# Patient Record
Sex: MALE | Race: WHITE | Employment: OTHER | URBAN - METROPOLITAN AREA
[De-identification: names, ages, dates, MRNs, and addresses within clinical notes are randomized per-mention and may not be internally consistent; named-entity substitution may affect disease eponyms.]

---

## 2017-02-10 ENCOUNTER — HOSPITAL ENCOUNTER (OUTPATIENT)
Dept: OTHER | Age: 62
Discharge: HOME | End: 2017-02-22
Attending: FAMILY MEDICINE | Admitting: INTERNAL MEDICINE

## 2017-02-26 PROBLEM — K80.50 CHOLEDOCHOLITHIASIS: Status: ACTIVE | Noted: 2017-02-26

## 2017-02-26 PROBLEM — N18.30 CHRONIC KIDNEY DISEASE, STAGE III (MODERATE) (HCC): Status: ACTIVE | Noted: 2017-02-26

## 2017-02-27 PROBLEM — R91.8 ABNORMALITY OF LUNG ON CXR: Status: ACTIVE | Noted: 2017-02-27

## 2017-02-27 PROBLEM — Z89.519 S/P BKA (BELOW KNEE AMPUTATION) UNILATERAL (HCC): Chronic | Status: ACTIVE | Noted: 2017-02-27

## 2017-03-08 ENCOUNTER — OFFICE VISIT (OUTPATIENT)
Dept: BARIATRICS/WEIGHT MGMT | Age: 62
End: 2017-03-08

## 2017-03-08 VITALS
DIASTOLIC BLOOD PRESSURE: 84 MMHG | BODY MASS INDEX: 36.48 KG/M2 | HEART RATE: 85 BPM | SYSTOLIC BLOOD PRESSURE: 146 MMHG | WEIGHT: 240.7 LBS | HEIGHT: 68 IN

## 2017-03-08 DIAGNOSIS — Z90.49 S/P LAPAROSCOPIC CHOLECYSTECTOMY: Primary | ICD-10-CM

## 2017-03-08 PROCEDURE — 99024 POSTOP FOLLOW-UP VISIT: CPT | Performed by: SURGERY

## 2017-04-10 PROBLEM — I73.9 PAD (PERIPHERAL ARTERY DISEASE) (HCC): Status: ACTIVE | Noted: 2017-04-10

## 2017-04-10 PROBLEM — I10 HTN (HYPERTENSION): Status: ACTIVE | Noted: 2017-04-10

## 2017-04-10 PROBLEM — N18.4 CHRONIC KIDNEY DISEASE (CKD) STAGE G4/A1, SEVERELY DECREASED GLOMERULAR FILTRATION RATE (GFR) BETWEEN 15-29 ML/MIN/1.73 SQUARE METER AND ALBUMINURIA CREATININE RATIO LESS THAN 30 MG/G (HCC): Status: ACTIVE | Noted: 2017-04-10

## 2017-04-10 PROBLEM — J44.9 COPD (CHRONIC OBSTRUCTIVE PULMONARY DISEASE) (HCC): Status: ACTIVE | Noted: 2017-04-10

## 2017-04-10 PROBLEM — N26.1 ATROPHIC KIDNEY: Status: ACTIVE | Noted: 2017-04-10

## 2017-05-23 PROBLEM — R80.9 PROTEINURIA: Status: ACTIVE | Noted: 2017-05-23

## 2017-05-23 PROBLEM — N18.32 CHRONIC KIDNEY DISEASE (CKD) STAGE G3B/A3, MODERATELY DECREASED GLOMERULAR FILTRATION RATE (GFR) BETWEEN 30-44 ML/MIN/1.73 SQUARE METER AND ALBUMINURIA CREATININE RATIO GREATER THAN 300 MG/G (HCC): Status: ACTIVE | Noted: 2017-05-23

## 2017-05-23 PROBLEM — N18.4 CHRONIC KIDNEY DISEASE (CKD) STAGE G4/A1, SEVERELY DECREASED GLOMERULAR FILTRATION RATE (GFR) BETWEEN 15-29 ML/MIN/1.73 SQUARE METER AND ALBUMINURIA CREATININE RATIO LESS THAN 30 MG/G (HCC): Status: RESOLVED | Noted: 2017-04-10 | Resolved: 2017-05-23

## 2017-05-23 PROBLEM — E87.20 METABOLIC ACIDOSIS: Status: ACTIVE | Noted: 2017-05-23

## 2017-08-24 ENCOUNTER — HOSPITAL ENCOUNTER (OUTPATIENT)
Dept: INFUSION THERAPY | Age: 62
Discharge: HOME | End: 2017-08-24
Attending: FAMILY MEDICINE | Admitting: FAMILY MEDICINE

## 2017-08-24 VITALS
RESPIRATION RATE: 16 BRPM | SYSTOLIC BLOOD PRESSURE: 134 MMHG | DIASTOLIC BLOOD PRESSURE: 78 MMHG | HEART RATE: 67 BPM | TEMPERATURE: 98.6 F

## 2017-09-14 ENCOUNTER — HOSPITAL ENCOUNTER (OUTPATIENT)
Dept: PULMONOLOGY | Age: 62
Discharge: HOME | End: 2017-09-14
Attending: INTERNAL MEDICINE | Admitting: INTERNAL MEDICINE

## 2018-05-17 ENCOUNTER — HOSPITAL ENCOUNTER (OUTPATIENT)
Dept: INFUSION THERAPY | Age: 63
Discharge: HOME | End: 2018-05-17
Attending: FAMILY MEDICINE | Admitting: FAMILY MEDICINE

## 2018-05-17 VITALS
TEMPERATURE: 98.1 F | DIASTOLIC BLOOD PRESSURE: 89 MMHG | RESPIRATION RATE: 16 BRPM | HEART RATE: 80 BPM | OXYGEN SATURATION: 97 % | SYSTOLIC BLOOD PRESSURE: 139 MMHG

## 2018-11-30 ENCOUNTER — HOSPITAL ENCOUNTER (OUTPATIENT)
Dept: INFUSION THERAPY | Age: 63
Setting detail: INFUSION SERIES
Discharge: HOME | End: 2018-11-30
Payer: MEDICARE

## 2018-11-30 VITALS
RESPIRATION RATE: 14 BRPM | HEART RATE: 70 BPM | SYSTOLIC BLOOD PRESSURE: 146 MMHG | TEMPERATURE: 97.4 F | DIASTOLIC BLOOD PRESSURE: 85 MMHG

## 2018-11-30 PROCEDURE — 99195 PHLEBOTOMY: CPT

## 2018-11-30 PROCEDURE — 99211 OFF/OP EST MAY X REQ PHY/QHP: CPT

## 2018-11-30 NOTE — PROGRESS NOTES
Diagnosis:  Polycythemia              Pre-phlebotomy:  Pulse:  74  Blood Pressure:  149 89    Post-phlebotomy:  Pulse:  70  Blood Pressure:  146/85    Volume Removed:  996 Grams    Complications:  None    Comments:  Pt given discharge instructions and voiced understanding. Tolerated procedure without any side effects. To private auto per self.

## 2018-11-30 NOTE — PROGRESS NOTES
Pt arrived to unit for therapeutic phlebotomy. Taken to room 6 chair 1. Feeling well. Call light in reach. Pt oriented to room and TV. Agreeable for therapeutic phlebotomy procedure.

## 2018-12-03 ENCOUNTER — HOSPITAL ENCOUNTER (OUTPATIENT)
Dept: INFUSION THERAPY | Age: 63
End: 2018-12-03

## 2018-12-14 ENCOUNTER — HOSPITAL ENCOUNTER (OUTPATIENT)
Dept: CARDIAC REHAB | Age: 63
Setting detail: THERAPIES SERIES
Discharge: HOME | End: 2018-12-14
Payer: MEDICARE

## 2019-03-01 PROBLEM — I25.10 ASCVD (ARTERIOSCLEROTIC CARDIOVASCULAR DISEASE): Status: ACTIVE | Noted: 2019-03-01

## 2019-07-08 ENCOUNTER — HOSPITAL ENCOUNTER (OUTPATIENT)
Dept: INFUSION THERAPY | Age: 64
Setting detail: INFUSION SERIES
Discharge: HOME | End: 2019-07-08
Payer: MEDICARE

## 2019-07-08 VITALS
OXYGEN SATURATION: 93 % | RESPIRATION RATE: 20 BRPM | DIASTOLIC BLOOD PRESSURE: 83 MMHG | HEART RATE: 73 BPM | SYSTOLIC BLOOD PRESSURE: 126 MMHG | TEMPERATURE: 96 F

## 2019-07-08 PROCEDURE — 99211 OFF/OP EST MAY X REQ PHY/QHP: CPT

## 2019-07-08 PROCEDURE — 99195 PHLEBOTOMY: CPT

## 2019-07-08 NOTE — PROGRESS NOTES
Diagnosis: Polycythemia Vera    Pre-Phlebotomy: /91, HR 70    Post-Phlebotomy: /83, HR 73    Volume Removed: 035 grams    Complications: None    Comments: Pt tolerated phlebotomy well    Lot #: [id number]    Expiration Date: 8/2021    Armando Harrison

## 2020-10-07 ENCOUNTER — HOSPITAL ENCOUNTER (OUTPATIENT)
Age: 65
Discharge: HOME | End: 2020-10-07
Payer: MEDICARE

## 2020-10-07 LAB
ANION GAP SERPL CALCULATED.3IONS-SCNC: 14 MMOL/L (ref 4–16)
BACTERIA: NEGATIVE /HPF
BILIRUBIN URINE: NEGATIVE MG/DL
BLOOD, URINE: ABNORMAL
BUN BLDV-MCNC: 27 MG/DL (ref 6–23)
CALCIUM SERPL-MCNC: 8 MG/DL (ref 8.3–10.6)
CHLORIDE BLD-SCNC: 102 MMOL/L (ref 99–110)
CLARITY: CLEAR
CO2: 22 MMOL/L (ref 21–32)
COLOR: ABNORMAL
CREAT SERPL-MCNC: 3.4 MG/DL (ref 0.9–1.3)
CREATININE URINE: 50 MG/DL (ref 39–259)
GFR AFRICAN AMERICAN: 22 ML/MIN/1.73M2
GFR NON-AFRICAN AMERICAN: 18 ML/MIN/1.73M2
GLUCOSE BLD-MCNC: 84 MG/DL (ref 70–99)
GLUCOSE, URINE: NEGATIVE MG/DL
KETONES, URINE: NEGATIVE MG/DL
LEUKOCYTE ESTERASE, URINE: NEGATIVE
MAGNESIUM: 2.4 MG/DL (ref 1.8–2.4)
MUCUS: ABNORMAL HPF
NITRITE URINE, QUANTITATIVE: NEGATIVE
PH, URINE: 6 (ref 5–8)
PHOSPHORUS: 4.6 MG/DL (ref 2.5–4.9)
POTASSIUM SERPL-SCNC: 4 MMOL/L (ref 3.5–5.1)
PROT/CREAT RATIO, UR: 2.9
PROTEIN UA: 100 MG/DL
RBC URINE: <1 /HPF (ref 0–3)
SODIUM BLD-SCNC: 138 MMOL/L (ref 135–145)
SPECIFIC GRAVITY UA: 1.01 (ref 1–1.03)
SQUAMOUS EPITHELIAL: <1 /HPF
TRICHOMONAS: ABNORMAL /HPF
URINE TOTAL PROTEIN: 147.3 MG/DL
UROBILINOGEN, URINE: NORMAL MG/DL (ref 0.2–1)
WBC UA: <1 /HPF (ref 0–2)

## 2020-10-07 PROCEDURE — 80048 BASIC METABOLIC PNL TOTAL CA: CPT

## 2020-10-07 PROCEDURE — 83970 ASSAY OF PARATHORMONE: CPT

## 2020-10-07 PROCEDURE — 81001 URINALYSIS AUTO W/SCOPE: CPT

## 2020-10-07 PROCEDURE — 36415 COLL VENOUS BLD VENIPUNCTURE: CPT

## 2020-10-07 PROCEDURE — 84100 ASSAY OF PHOSPHORUS: CPT

## 2020-10-07 PROCEDURE — 84156 ASSAY OF PROTEIN URINE: CPT

## 2020-10-07 PROCEDURE — 82570 ASSAY OF URINE CREATININE: CPT

## 2020-10-07 PROCEDURE — 83735 ASSAY OF MAGNESIUM: CPT

## 2020-10-11 LAB — PARATHYROID HORMONE INTACT: 277 PG/ML (ref 15–65)

## 2021-05-11 ENCOUNTER — HOSPITAL ENCOUNTER (INPATIENT)
Age: 66
LOS: 7 days | Discharge: HOME HEALTH | DRG: 291 | End: 2021-05-18
Attending: EMERGENCY MEDICINE | Admitting: INTERNAL MEDICINE
Payer: MEDICARE

## 2021-05-11 ENCOUNTER — APPOINTMENT (OUTPATIENT)
Dept: GENERAL RADIOLOGY | Age: 66
DRG: 291 | End: 2021-05-11
Payer: MEDICARE

## 2021-05-11 DIAGNOSIS — N17.9 ACUTE KIDNEY INJURY SUPERIMPOSED ON CKD (HCC): ICD-10-CM

## 2021-05-11 DIAGNOSIS — J81.0 ACUTE PULMONARY EDEMA (HCC): ICD-10-CM

## 2021-05-11 DIAGNOSIS — J96.21 ACUTE ON CHRONIC RESPIRATORY FAILURE WITH HYPOXIA (HCC): Primary | ICD-10-CM

## 2021-05-11 DIAGNOSIS — N18.9 ACUTE KIDNEY INJURY SUPERIMPOSED ON CKD (HCC): ICD-10-CM

## 2021-05-11 LAB
ALBUMIN SERPL-MCNC: 3.5 GM/DL (ref 3.4–5)
ALP BLD-CCNC: 52 IU/L (ref 40–128)
ALT SERPL-CCNC: 14 U/L (ref 10–40)
ANION GAP SERPL CALCULATED.3IONS-SCNC: 15 MMOL/L (ref 4–16)
ANISOCYTOSIS: ABNORMAL
AST SERPL-CCNC: 12 IU/L (ref 15–37)
BACTERIA: NEGATIVE /HPF
BANDED NEUTROPHILS ABSOLUTE COUNT: 2.47 K/CU MM
BANDED NEUTROPHILS RELATIVE PERCENT: 17 % (ref 5–11)
BASE EXCESS MIXED: 0.6 (ref 0–1.2)
BILIRUB SERPL-MCNC: 0.3 MG/DL (ref 0–1)
BILIRUBIN URINE: NEGATIVE MG/DL
BLOOD, URINE: ABNORMAL
BUN BLDV-MCNC: 60 MG/DL (ref 6–23)
CALCIUM SERPL-MCNC: 7.5 MG/DL (ref 8.3–10.6)
CHLORIDE BLD-SCNC: 92 MMOL/L (ref 99–110)
CLARITY: CLEAR
CO2: 23 MMOL/L (ref 21–32)
COLOR: COLORLESS
COMMENT: ABNORMAL
CREAT SERPL-MCNC: 4.8 MG/DL (ref 0.9–1.3)
CREATININE URINE: 19.4 MG/DL (ref 39–259)
DIFFERENTIAL TYPE: ABNORMAL
DOHLE BODIES: PRESENT
EKG ATRIAL RATE: 95 BPM
EKG DIAGNOSIS: NORMAL
EKG P-R INTERVAL: 128 MS
EKG Q-T INTERVAL: 370 MS
EKG QRS DURATION: 118 MS
EKG QTC CALCULATION (BAZETT): 464 MS
EKG R AXIS: -43 DEGREES
EKG T AXIS: 79 DEGREES
EKG VENTRICULAR RATE: 95 BPM
GFR AFRICAN AMERICAN: 15 ML/MIN/1.73M2
GFR NON-AFRICAN AMERICAN: 12 ML/MIN/1.73M2
GLUCOSE BLD-MCNC: 119 MG/DL (ref 70–99)
GLUCOSE, URINE: NEGATIVE MG/DL
HCO3 VENOUS: 27.4 MMOL/L (ref 19–25)
HCT VFR BLD CALC: 37.9 % (ref 42–52)
HEMOGLOBIN: 12.3 GM/DL (ref 13.5–18)
KETONES, URINE: NEGATIVE MG/DL
LACTATE: 2 MMOL/L (ref 0.4–2)
LEUKOCYTE ESTERASE, URINE: NEGATIVE
LV EF: 50 %
LVEF MODALITY: NORMAL
LYMPHOCYTES ABSOLUTE: 0.6 K/CU MM
LYMPHOCYTES RELATIVE PERCENT: 4 % (ref 24–44)
MCH RBC QN AUTO: 30.3 PG (ref 27–31)
MCHC RBC AUTO-ENTMCNC: 32.5 % (ref 32–36)
MCV RBC AUTO: 93.3 FL (ref 78–100)
MONOCYTES ABSOLUTE: 1.5 K/CU MM
MONOCYTES RELATIVE PERCENT: 10 % (ref 0–4)
MUCUS: ABNORMAL HPF
NITRITE URINE, QUANTITATIVE: NEGATIVE
O2 SAT, VEN: 93.6 % (ref 50–70)
PCO2, VEN: 52 MMHG (ref 38–52)
PDW BLD-RTO: 14.6 % (ref 11.7–14.9)
PH VENOUS: 7.33 (ref 7.32–7.42)
PH, URINE: 6 (ref 5–8)
PLATELET # BLD: 158 K/CU MM (ref 140–440)
PMV BLD AUTO: 8.8 FL (ref 7.5–11.1)
PO2, VEN: 159 MMHG (ref 28–48)
POTASSIUM SERPL-SCNC: 3.8 MMOL/L (ref 3.5–5.1)
PRO-BNP: 3561 PG/ML
PROT/CREAT RATIO, UR: 1.9
PROTEIN UA: 30 MG/DL
RBC # BLD: 4.06 M/CU MM (ref 4.6–6.2)
RBC URINE: ABNORMAL /HPF (ref 0–3)
SARS-COV-2, NAAT: NOT DETECTED
SEGMENTED NEUTROPHILS ABSOLUTE COUNT: 9.9 K/CU MM
SEGMENTED NEUTROPHILS RELATIVE PERCENT: 69 % (ref 36–66)
SODIUM BLD-SCNC: 130 MMOL/L (ref 135–145)
SODIUM URINE: 37 MMOL/L (ref 35–167)
SOURCE: NORMAL
SPECIFIC GRAVITY UA: 1 (ref 1–1.03)
T4 FREE: 1.03 NG/DL (ref 0.9–1.8)
TOTAL PROTEIN: 5.8 GM/DL (ref 6.4–8.2)
TRICHOMONAS: ABNORMAL /HPF
TROPONIN T: 0.02 NG/ML
TROPONIN T: 0.02 NG/ML
TROPONIN T: 0.03 NG/ML
TSH HIGH SENSITIVITY: 0.87 UIU/ML (ref 0.27–4.2)
URINE TOTAL PROTEIN: 37.4 MG/DL
UROBILINOGEN, URINE: NEGATIVE MG/DL (ref 0.2–1)
WBC # BLD: 14.5 K/CU MM (ref 4–10.5)
WBC # BLD: ABNORMAL 10*3/UL
WBC UA: ABNORMAL /HPF (ref 0–2)

## 2021-05-11 PROCEDURE — 6370000000 HC RX 637 (ALT 250 FOR IP): Performed by: INTERNAL MEDICINE

## 2021-05-11 PROCEDURE — 82805 BLOOD GASES W/O2 SATURATION: CPT

## 2021-05-11 PROCEDURE — 80053 COMPREHEN METABOLIC PANEL: CPT

## 2021-05-11 PROCEDURE — 84484 ASSAY OF TROPONIN QUANT: CPT

## 2021-05-11 PROCEDURE — 6360000002 HC RX W HCPCS: Performed by: INTERNAL MEDICINE

## 2021-05-11 PROCEDURE — 84156 ASSAY OF PROTEIN URINE: CPT

## 2021-05-11 PROCEDURE — 96374 THER/PROPH/DIAG INJ IV PUSH: CPT

## 2021-05-11 PROCEDURE — 83605 ASSAY OF LACTIC ACID: CPT

## 2021-05-11 PROCEDURE — 82436 ASSAY OF URINE CHLORIDE: CPT

## 2021-05-11 PROCEDURE — 93005 ELECTROCARDIOGRAM TRACING: CPT | Performed by: EMERGENCY MEDICINE

## 2021-05-11 PROCEDURE — 6360000002 HC RX W HCPCS: Performed by: EMERGENCY MEDICINE

## 2021-05-11 PROCEDURE — 84439 ASSAY OF FREE THYROXINE: CPT

## 2021-05-11 PROCEDURE — 99285 EMERGENCY DEPT VISIT HI MDM: CPT

## 2021-05-11 PROCEDURE — 93306 TTE W/DOPPLER COMPLETE: CPT

## 2021-05-11 PROCEDURE — 82570 ASSAY OF URINE CREATININE: CPT

## 2021-05-11 PROCEDURE — 87635 SARS-COV-2 COVID-19 AMP PRB: CPT

## 2021-05-11 PROCEDURE — 51702 INSERT TEMP BLADDER CATH: CPT

## 2021-05-11 PROCEDURE — 84300 ASSAY OF URINE SODIUM: CPT

## 2021-05-11 PROCEDURE — 94660 CPAP INITIATION&MGMT: CPT

## 2021-05-11 PROCEDURE — 84443 ASSAY THYROID STIM HORMONE: CPT

## 2021-05-11 PROCEDURE — 94640 AIRWAY INHALATION TREATMENT: CPT

## 2021-05-11 PROCEDURE — 85027 COMPLETE CBC AUTOMATED: CPT

## 2021-05-11 PROCEDURE — 84133 ASSAY OF URINE POTASSIUM: CPT

## 2021-05-11 PROCEDURE — 87040 BLOOD CULTURE FOR BACTERIA: CPT

## 2021-05-11 PROCEDURE — 51798 US URINE CAPACITY MEASURE: CPT

## 2021-05-11 PROCEDURE — 2140000000 HC CCU INTERMEDIATE R&B

## 2021-05-11 PROCEDURE — 81001 URINALYSIS AUTO W/SCOPE: CPT

## 2021-05-11 PROCEDURE — 93010 ELECTROCARDIOGRAM REPORT: CPT | Performed by: INTERNAL MEDICINE

## 2021-05-11 PROCEDURE — 2700000000 HC OXYGEN THERAPY PER DAY

## 2021-05-11 PROCEDURE — 6370000000 HC RX 637 (ALT 250 FOR IP): Performed by: EMERGENCY MEDICINE

## 2021-05-11 PROCEDURE — 83880 ASSAY OF NATRIURETIC PEPTIDE: CPT

## 2021-05-11 PROCEDURE — 2580000003 HC RX 258: Performed by: INTERNAL MEDICINE

## 2021-05-11 PROCEDURE — 99223 1ST HOSP IP/OBS HIGH 75: CPT | Performed by: INTERNAL MEDICINE

## 2021-05-11 PROCEDURE — 85007 BL SMEAR W/DIFF WBC COUNT: CPT

## 2021-05-11 PROCEDURE — 71045 X-RAY EXAM CHEST 1 VIEW: CPT

## 2021-05-11 RX ORDER — SODIUM CHLORIDE 0.9 % (FLUSH) 0.9 %
5-40 SYRINGE (ML) INJECTION PRN
Status: DISCONTINUED | OUTPATIENT
Start: 2021-05-11 | End: 2021-05-18 | Stop reason: HOSPADM

## 2021-05-11 RX ORDER — ALPRAZOLAM 0.25 MG/1
0.25 TABLET ORAL 3 TIMES DAILY PRN
Status: DISCONTINUED | OUTPATIENT
Start: 2021-05-11 | End: 2021-05-18 | Stop reason: HOSPADM

## 2021-05-11 RX ORDER — DOXYCYCLINE HYCLATE 100 MG
100 TABLET ORAL EVERY 12 HOURS SCHEDULED
Status: DISCONTINUED | OUTPATIENT
Start: 2021-05-11 | End: 2021-05-18 | Stop reason: HOSPADM

## 2021-05-11 RX ORDER — ONDANSETRON 2 MG/ML
4 INJECTION INTRAMUSCULAR; INTRAVENOUS EVERY 6 HOURS PRN
Status: DISCONTINUED | OUTPATIENT
Start: 2021-05-11 | End: 2021-05-18 | Stop reason: HOSPADM

## 2021-05-11 RX ORDER — ASPIRIN 81 MG/1
81 TABLET ORAL EVERY MORNING
Status: DISCONTINUED | OUTPATIENT
Start: 2021-05-11 | End: 2021-05-18 | Stop reason: HOSPADM

## 2021-05-11 RX ORDER — FOLIC ACID 1 MG/1
1 TABLET ORAL DAILY
Status: DISCONTINUED | OUTPATIENT
Start: 2021-05-11 | End: 2021-05-18 | Stop reason: HOSPADM

## 2021-05-11 RX ORDER — FUROSEMIDE 10 MG/ML
20 INJECTION INTRAMUSCULAR; INTRAVENOUS ONCE
Status: DISCONTINUED | OUTPATIENT
Start: 2021-05-11 | End: 2021-05-11

## 2021-05-11 RX ORDER — POTASSIUM CHLORIDE 20 MEQ/1
10 TABLET, EXTENDED RELEASE ORAL 2 TIMES DAILY
Status: DISCONTINUED | OUTPATIENT
Start: 2021-05-11 | End: 2021-05-12

## 2021-05-11 RX ORDER — NITROGLYCERIN 0.4 MG/1
0.4 TABLET SUBLINGUAL ONCE
Status: COMPLETED | OUTPATIENT
Start: 2021-05-11 | End: 2021-05-11

## 2021-05-11 RX ORDER — NITROGLYCERIN 20 MG/100ML
5-200 INJECTION INTRAVENOUS CONTINUOUS
Status: DISCONTINUED | OUTPATIENT
Start: 2021-05-11 | End: 2021-05-11

## 2021-05-11 RX ORDER — HEPARIN SODIUM 5000 [USP'U]/ML
5000 INJECTION, SOLUTION INTRAVENOUS; SUBCUTANEOUS EVERY 8 HOURS SCHEDULED
Status: DISCONTINUED | OUTPATIENT
Start: 2021-05-11 | End: 2021-05-18 | Stop reason: HOSPADM

## 2021-05-11 RX ORDER — NITROGLYCERIN 0.4 MG/1
0.4 TABLET SUBLINGUAL EVERY 5 MIN PRN
Status: DISCONTINUED | OUTPATIENT
Start: 2021-05-11 | End: 2021-05-18 | Stop reason: HOSPADM

## 2021-05-11 RX ORDER — HYDROCODONE BITARTRATE AND ACETAMINOPHEN 5; 325 MG/1; MG/1
1 TABLET ORAL EVERY 6 HOURS PRN
Status: DISCONTINUED | OUTPATIENT
Start: 2021-05-11 | End: 2021-05-18 | Stop reason: HOSPADM

## 2021-05-11 RX ORDER — MAGNESIUM SULFATE IN WATER 40 MG/ML
2000 INJECTION, SOLUTION INTRAVENOUS PRN
Status: DISCONTINUED | OUTPATIENT
Start: 2021-05-11 | End: 2021-05-11

## 2021-05-11 RX ORDER — BUDESONIDE AND FORMOTEROL FUMARATE DIHYDRATE 80; 4.5 UG/1; UG/1
2 AEROSOL RESPIRATORY (INHALATION) 2 TIMES DAILY
Status: DISCONTINUED | OUTPATIENT
Start: 2021-05-11 | End: 2021-05-18 | Stop reason: HOSPADM

## 2021-05-11 RX ORDER — CITALOPRAM 20 MG/1
10 TABLET ORAL DAILY
Status: DISCONTINUED | OUTPATIENT
Start: 2021-05-11 | End: 2021-05-18 | Stop reason: HOSPADM

## 2021-05-11 RX ORDER — SODIUM CHLORIDE 0.9 % (FLUSH) 0.9 %
5-40 SYRINGE (ML) INJECTION EVERY 12 HOURS SCHEDULED
Status: DISCONTINUED | OUTPATIENT
Start: 2021-05-11 | End: 2021-05-18 | Stop reason: HOSPADM

## 2021-05-11 RX ORDER — CARVEDILOL 25 MG/1
25 TABLET ORAL 2 TIMES DAILY
Status: DISCONTINUED | OUTPATIENT
Start: 2021-05-11 | End: 2021-05-18 | Stop reason: HOSPADM

## 2021-05-11 RX ORDER — AZELASTINE 1 MG/ML
1 SPRAY, METERED NASAL 2 TIMES DAILY
Status: DISCONTINUED | OUTPATIENT
Start: 2021-05-11 | End: 2021-05-18 | Stop reason: HOSPADM

## 2021-05-11 RX ORDER — SODIUM CHLORIDE 9 MG/ML
25 INJECTION, SOLUTION INTRAVENOUS PRN
Status: DISCONTINUED | OUTPATIENT
Start: 2021-05-11 | End: 2021-05-18 | Stop reason: HOSPADM

## 2021-05-11 RX ORDER — IPRATROPIUM BROMIDE AND ALBUTEROL SULFATE 2.5; .5 MG/3ML; MG/3ML
1 SOLUTION RESPIRATORY (INHALATION)
Status: DISCONTINUED | OUTPATIENT
Start: 2021-05-11 | End: 2021-05-13

## 2021-05-11 RX ORDER — POLYETHYLENE GLYCOL 3350 17 G/17G
17 POWDER, FOR SOLUTION ORAL DAILY PRN
Status: DISCONTINUED | OUTPATIENT
Start: 2021-05-11 | End: 2021-05-18 | Stop reason: HOSPADM

## 2021-05-11 RX ORDER — ASPIRIN 81 MG/1
324 TABLET, CHEWABLE ORAL ONCE
Status: COMPLETED | OUTPATIENT
Start: 2021-05-11 | End: 2021-05-11

## 2021-05-11 RX ORDER — FAMOTIDINE 20 MG/1
20 TABLET, FILM COATED ORAL DAILY
Status: DISCONTINUED | OUTPATIENT
Start: 2021-05-11 | End: 2021-05-18 | Stop reason: HOSPADM

## 2021-05-11 RX ORDER — AMLODIPINE BESYLATE 5 MG/1
10 TABLET ORAL DAILY
Status: DISCONTINUED | OUTPATIENT
Start: 2021-05-11 | End: 2021-05-18 | Stop reason: HOSPADM

## 2021-05-11 RX ORDER — MONTELUKAST SODIUM 10 MG/1
10 TABLET ORAL NIGHTLY
Status: DISCONTINUED | OUTPATIENT
Start: 2021-05-11 | End: 2021-05-18 | Stop reason: HOSPADM

## 2021-05-11 RX ORDER — ACETAMINOPHEN 325 MG/1
650 TABLET ORAL EVERY 6 HOURS PRN
Status: DISCONTINUED | OUTPATIENT
Start: 2021-05-11 | End: 2021-05-18 | Stop reason: HOSPADM

## 2021-05-11 RX ORDER — GUAIFENESIN 600 MG/1
600 TABLET, EXTENDED RELEASE ORAL 2 TIMES DAILY
Status: DISCONTINUED | OUTPATIENT
Start: 2021-05-11 | End: 2021-05-18 | Stop reason: HOSPADM

## 2021-05-11 RX ORDER — PROMETHAZINE HYDROCHLORIDE 25 MG/1
12.5 TABLET ORAL EVERY 6 HOURS PRN
Status: DISCONTINUED | OUTPATIENT
Start: 2021-05-11 | End: 2021-05-18 | Stop reason: HOSPADM

## 2021-05-11 RX ORDER — FUROSEMIDE 10 MG/ML
40 INJECTION INTRAMUSCULAR; INTRAVENOUS ONCE
Status: COMPLETED | OUTPATIENT
Start: 2021-05-11 | End: 2021-05-11

## 2021-05-11 RX ORDER — ACETAMINOPHEN 650 MG/1
650 SUPPOSITORY RECTAL EVERY 6 HOURS PRN
Status: DISCONTINUED | OUTPATIENT
Start: 2021-05-11 | End: 2021-05-18 | Stop reason: HOSPADM

## 2021-05-11 RX ORDER — METOLAZONE 2.5 MG/1
2.5 TABLET ORAL DAILY
Status: DISCONTINUED | OUTPATIENT
Start: 2021-05-11 | End: 2021-05-13

## 2021-05-11 RX ADMIN — SODIUM CHLORIDE, PRESERVATIVE FREE 10 ML: 5 INJECTION INTRAVENOUS at 21:49

## 2021-05-11 RX ADMIN — FUROSEMIDE 40 MG: 10 INJECTION INTRAMUSCULAR; INTRAVENOUS at 07:27

## 2021-05-11 RX ADMIN — DOXYCYCLINE HYCLATE 100 MG: 100 TABLET, COATED ORAL at 11:53

## 2021-05-11 RX ADMIN — IPRATROPIUM BROMIDE AND ALBUTEROL SULFATE 1 AMPULE: .5; 3 SOLUTION RESPIRATORY (INHALATION) at 12:12

## 2021-05-11 RX ADMIN — METOLAZONE 2.5 MG: 2.5 TABLET ORAL at 11:54

## 2021-05-11 RX ADMIN — MONTELUKAST 10 MG: 10 TABLET, FILM COATED ORAL at 21:48

## 2021-05-11 RX ADMIN — GUAIFENESIN 600 MG: 600 TABLET, EXTENDED RELEASE ORAL at 21:48

## 2021-05-11 RX ADMIN — FUROSEMIDE 10 MG/HR: 10 INJECTION, SOLUTION INTRAVENOUS at 22:58

## 2021-05-11 RX ADMIN — Medication 2000 UNITS: at 11:53

## 2021-05-11 RX ADMIN — CITALOPRAM HYDROBROMIDE 10 MG: 20 TABLET ORAL at 11:54

## 2021-05-11 RX ADMIN — CARVEDILOL 25 MG: 25 TABLET, FILM COATED ORAL at 21:48

## 2021-05-11 RX ADMIN — FUROSEMIDE 10 MG/HR: 10 INJECTION, SOLUTION INTRAVENOUS at 11:54

## 2021-05-11 RX ADMIN — FAMOTIDINE 20 MG: 20 TABLET, FILM COATED ORAL at 11:54

## 2021-05-11 RX ADMIN — AMLODIPINE BESYLATE 10 MG: 5 TABLET ORAL at 11:58

## 2021-05-11 RX ADMIN — ALPRAZOLAM 0.25 MG: 0.25 TABLET ORAL at 18:17

## 2021-05-11 RX ADMIN — DOXYCYCLINE HYCLATE 100 MG: 100 TABLET, COATED ORAL at 21:56

## 2021-05-11 RX ADMIN — POTASSIUM CHLORIDE 10 MEQ: 1500 TABLET, EXTENDED RELEASE ORAL at 21:48

## 2021-05-11 RX ADMIN — HEPARIN SODIUM 5000 UNITS: 5000 INJECTION INTRAVENOUS; SUBCUTANEOUS at 21:48

## 2021-05-11 RX ADMIN — CARVEDILOL 25 MG: 25 TABLET, FILM COATED ORAL at 11:54

## 2021-05-11 RX ADMIN — GUAIFENESIN 600 MG: 600 TABLET, EXTENDED RELEASE ORAL at 11:54

## 2021-05-11 RX ADMIN — ASPIRIN 324 MG: 81 TABLET, CHEWABLE ORAL at 07:27

## 2021-05-11 RX ADMIN — FOLIC ACID 1 MG: 1 TABLET ORAL at 11:53

## 2021-05-11 RX ADMIN — HEPARIN SODIUM 5000 UNITS: 5000 INJECTION INTRAVENOUS; SUBCUTANEOUS at 16:00

## 2021-05-11 RX ADMIN — IPRATROPIUM BROMIDE AND ALBUTEROL SULFATE 1 AMPULE: .5; 3 SOLUTION RESPIRATORY (INHALATION) at 15:37

## 2021-05-11 RX ADMIN — NITROGLYCERIN 0.4 MG: 0.4 TABLET, ORALLY DISINTEGRATING SUBLINGUAL at 06:27

## 2021-05-11 ASSESSMENT — PAIN SCALES - GENERAL: PAINLEVEL_OUTOF10: 3

## 2021-05-11 ASSESSMENT — PAIN DESCRIPTION - DESCRIPTORS: DESCRIPTORS: ACHING

## 2021-05-11 ASSESSMENT — PAIN DESCRIPTION - ORIENTATION: ORIENTATION: RIGHT;LEFT

## 2021-05-11 NOTE — PROGRESS NOTES
Change settings on bipap from 12/7/70% to 18/10/70% per Dr Carroll See order. Will continue to montior.

## 2021-05-11 NOTE — CONSULTS
is stable. History of renal disease, stage  III to IV. Former smoker. Hypertension, hyperlipidemia, obesity, sleep  apnea present. History of polycythemia vera also. PAST SURGICAL HISTORY:  Left leg below-the-knee amputation done from  vascular issues. FAMILY HISTORY:  Significant for hypertension present. SOCIAL HISTORY:  He does not smoke and does not drink anymore. ALLERGIES:  PENICILLIN and LORAZEPAM.    MEDICATIONS AT HOME:  He was on his Singulair, Bentyl, Aldactone,  inhalers bicarb, Coreg, Celexa, folic acid, aspirin. PHYSICAL EXAMINATION:  GENERAL:  The patient is awake, alert, answering questions, on BiPAP. VITAL SIGNS:  Temperature afebrile; pulse is 73, sinus rhythm; blood  pressure 142/79. HEENT:  Head is normocephalic and atraumatic. Pupils are equal and  reactive. CHEST:  Equal expansion. LUNGS:  Clear to auscultation. Decreased breath sound is present. HEART:  Regular rhythm. ABDOMEN:  Soft and nontender, but distended. EXTREMITIES:  Right leg is 1+ edema, left leg is amputated. LABORATORY DATA:  Creatinine is 4.8. I believe his creatinine according  to the wife has been in this range, it was 3.4. Troponin is elevated,  not positive. BNP is 3500. LFTs are normal.  TSH, T4 normal.  CBC is  normal.  An echo shows LV function is 50% range. IMPRESSION:  A 60-year-old male patient comes in with heart failure  symptoms present. Appears more of a diastolic heart failure present and  also severe exacerbation present. From cardiac stand, his EF is normal  and he does have peripheral vascular disease, but he is stable at this  time. Further recommendations based on hospital course.         Margarette Gonzalez MD    D: 05/11/2021 17:54:01       T: 05/11/2021 17:59:28     [email or web address]  Job#: [id number]     Doc#: [id number]    CC:
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
## 2021-05-11 NOTE — CONSULTS
Discussed with Sandra Medico  Will follow patient  Echo pending  Hx of PAD   Hx of DVT UE in 2015  Hx of COPD  Hx of left leg amputation  Distal abdominal aorta occluded  S/p graft auxiliary fem-bypass -bilateral   Cr was 1.4 -cr is now 4.8  Trop elevated no ACS  EKG no acute changes       Dictated -[id number]    Echo 2014-IMPRESSION:  1. Technically difficult study. 2.  Mild left ventricular hypertrophy noted. 3.  Ejection fraction is around 50% range present. 4.  Right ventricle appears to be grossly normal.   5.  Trace pericardial effusion noted. 6.  Aortic valve is sclerotic. 7.  Grade 1 diastolic dysfunction noted.      Electronically signed by Cayetano Mcleod MD on 5/11/21 at 3:12 PM EDT

## 2021-05-11 NOTE — ED NOTES
This RN called and spoke with YAO in pharmacy to review patient's medications.      Charlotte Meyers, LUDA  05/11/21 [id number]

## 2021-05-11 NOTE — ED NOTES
Urine output 1000mls clear yellow.   Post void residual bladder scan was 212 mls       Garrick Heart RN  05/11/21 8516

## 2021-05-11 NOTE — ED PROVIDER NOTES
Triage Chief Complaint:   Respiratory Distress    [place]:  Lai Prado is a 77 y.o. male that presents with respiratory distress by EMS. Patient reports 2 weeks of progressive decline. Patient reports a history of COPD. Patient was found markedly hypoxemic into the 60s and placed on CPAP prehospital.  IV was established prehospital.  Patient was brought to ED. Patient denies any known congestive heart failure however per chart review patient with diastolic heart failure seen through records through [facility]. Patient is on 2 L of oxygen for baseline. Patient does report adherence to his medications including his Lasix. Patient is in marked distress and is critically ill limiting history. ROS:  Limited as above.     Past Medical History:   Diagnosis Date    Acid reflux     ARF (acute renal failure) ([facility] [address]     with admission 12/18/2015- consult done with Dr Radonna Gosselin Back pain     \"Lower Back\"    Chronic bronchitis ([facility] [address]     Chronic kidney disease     COPD (chronic obstructive pulmonary disease) ([facility] [address]     NO PULMONOLOGIST AT THIS TIME    Emphysema/COPD ([facility] [address]     NO PULMONOLOGIST AT THIS TIME    H. pylori infection Dx 1990's    Hiatal hernia     History of blood transfusion 1987    NO REACTION TO BLOOD TRANSFUSION RECEIVED    History of respiratory failure     following surgery 12/18/2015- pt developed resp failure- placed on ventilator-unable to wean of vent- had trachesotomy tube placed 1/3/2015- off vent 1/7/2015( discharged 1/13/2015)\"per wife went to [place] after discharg and had to be sent to [facility] after had bleeding around the trach- they said the trach was to big- put back on ventilator for 24 hrs- then there for about a week then sent to ARU 2/15    [place] (hard of hearing)     Bilateral Ears    HX OTHER MEDICAL     Primary Care Physician Is Dr. Tramaine Morris     \"Dr. Jack Patel One Of My Kidneys Is Dead\"   Torsten Boom     \"See Dr. Serafin Gonzalez For Blood Being Too Thick\"(per old chart pt dx with polycythemia in 1990's and has had several phlebotomies in the past(pc)( per wife Dr Sergey Washington now says he does not really have polycythemia just from the COPD\"    [address] [place] [address] 6*/25/2015    \"has drainage , clear drainage, since he was in ARU in Feb 2015, under left shoulder, arm pit area\"    Hypertension     Lung nodule \"MRI, PET SCAN\"    \"Right Lung\"    Pneumonia \"Last Episode Early 2000's\"    \"At Least Twice\"( with admission 12/18/2015)    Shortness of breath on exertion     Teeth missing     Upper And Lower    Wears glasses      Past Surgical History:   Procedure Laterality Date    CHOLECYSTECTOMY, LAPAROSCOPIC  02/27/2017    cholangiogram     COLONOSCOPY  2011    \"Couple Polyps Removed\"    ENDOSCOPY, COLON, DIAGNOSTIC  \"Once\"    GASTROSTOMY TUBE PLACEMENT Left 1/3/15    to gravity drain( removed g tube in Feb or jan per wife)    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    Farming Accident    OTHER SURGICAL HISTORY  Mid [address]    \"Emergency Surgery For Ruptured Ulcer\"   [address]    TRACHEOSTOMY N/A 1/3/15    per old chart pt had trachesotomy tube, bronch , egd and g- tube placement done 1/13/2015    VASCULAR SURGERY      per old chart pt had left axillary to bifemoral bypass graft done 12/18/2014(pc)    WISDOM TOOTH EXTRACTION      All [place] Teeth Extracted In Past     Family History   Problem Relation Age of Onset    Cancer Mother         Breast Cancer    Arthritis Mother     High Blood Pressure Mother     High Cholesterol Mother     Vision Loss Mother         Wears Glasses    Cancer Father         Prostate And Bone Cancer    Heart Disease Father         \"Triple Bypass Surgery\"   Sands Migraines Sister     Other Sister         \"Bad Back\"     Social History     Socioeconomic History    Marital status:      Spouse name: Not on file    Number of children: Not on file    Years of education: Not on file    Highest education level: Not on file   Occupational History    Not on file   Social Needs    Financial resource strain: Not on file    Food insecurity     Worry: Not on file     Inability: Not on file    Transportation needs     Medical: Not on file     Non-medical: Not on file   Tobacco Use    Smoking status: Former Smoker     Packs/day: 2.00     Years: 43.00     Pack years: 86.00     Types: Cigarettes     Start date: 1971     Quit date: 12/15/2014     Years since quittin.4    Smokeless tobacco: Never Used   Substance and Sexual Activity    Alcohol use:  Yes     Alcohol/week: 0.0 standard drinks     Comment: \"Occ\"\"average one time per week    Drug use: Yes     Types: Marijuana     Comment: \"Occ\"\"last used 3 rd week 2015\"    Sexual activity: Yes     Partners: Female   Lifestyle    Physical activity     Days per week: Not on file     Minutes per session: Not on file    Stress: Not on file   Relationships    Social connections     Talks on phone: Not on file     Gets together: Not on file     Attends Evangelical service: Not on file     Active member of club or organization: Not on file     Attends meetings of clubs or organizations: Not on file     Relationship status: Not on file    Intimate partner violence     Fear of current or ex partner: Not on file     Emotionally abused: Not on file     Physically abused: Not on file     Forced sexual activity: Not on file   Other Topics Concern    Not on file   Social History Narrative    Not on file     Current Facility-Administered Medications   Medication Dose Route Frequency Provider Last Rate Last Admin    [Held by provider] nitroGLYCERIN 50 mg in dextrose 5% 250 mL infusion  5-200 mcg/min Intravenous Continuous Gilberto Segovia MD   Stopped at 21 0655    furosemide (LASIX) injection 40 mg  40 mg Intravenous Once Macias Lawn, MD         Current Outpatient Medications   Medication Sig Dispense Refill    montelukast (SINGULAIR) 10 MG tablet Take 10 mg by mouth nightly      dicyclomine (BENTYL) 20 MG tablet Take 20 mg by mouth every 6 hours      HYDROcodone-acetaminophen (NORCO) 5-325 MG per tablet Take 1 tablet by mouth every 6 hours as needed for Pain.  spironolactone (ALDACTONE) 25 MG tablet Take 25 mg by mouth daily      azelastine (ASTELIN) 0.1 % nasal spray 1 spray by Nasal route 2 times daily Use in each nostril as directed      fluticasone-vilanterol (BREO ELLIPTA) 100-25 MCG/INH AEPB inhaler Inhale 1 puff into the lungs daily      ipratropium-albuterol (DUONEB) 0.5-2.5 (3) MG/3ML SOLN nebulizer solution Inhale 1 vial into the lungs every 4 hours      sodium bicarbonate 325 MG tablet Take 650 mg by mouth daily      Lactobacillus (ACIDOPHILUS PO) Take by mouth 2 times daily      Cholecalciferol (VITAMIN D3) 2000 UNITS CAPS Take 1 capsule by mouth daily      carvedilol (COREG) 25 MG tablet Take 25 mg by mouth 2 times daily      albuterol (PROVENTIL HFA;VENTOLIN HFA) 108 (90 BASE) MCG/ACT inhaler Inhale 2 puffs into the lungs every 6 hours as needed for Wheezing      ALPRAZolam (XANAX) 0.25 MG tablet Take 0.25 mg by mouth 3 times daily as needed for Sleep      amLODIPine (NORVASC) 10 MG tablet Take 10 mg by mouth daily      citalopram (CELEXA) 20 MG tablet Take 1 tablet by mouth daily. (Patient taking differently: Take 10 mg by mouth daily ) 30 tablet 0    folic acid (FOLVITE) 1 MG tablet Take 1 tablet by mouth daily. 30 tablet 0    aspirin EC 81 MG EC tablet Take 81 mg by mouth every morning. Over The Counter, Last Dose Taken 12-9-14 Due To Scheduled Surgery       Allergies   Allergen Reactions    Penicillins Swelling    Lorazepam      Other reaction(s):  Other - comment required  hallucination       Nursing Notes Reviewed    Physical Exam:  ED Triage Vitals   Enc Vitals Group      BP 05/11/21 0607 (!) 159/91      Pulse 05/11/21 0607 104      Resp 05/11/21 0608 28      Temp --       Temp src --       SpO2 05/11/21 0607 93 %      Weight 05/11/21 0607 246 lb (111.6 kg)      Height 05/11/21 0607 5' 8\" (1.727 m)      Head Circumference --       Peak Flow --       Pain Score --       Pain Loc --       Pain Edu? --       Excl. in [address]? --        My pulse ox interpretation is -hypoxemic to the 70s on nasal cannula oxygen    General appearance: In moderate distress. Appears deconditioned. Appears older than stated age. Skin:   Pale and diaphoretic. Eye:  Extraocular movements intact. Ears, nose, mouth and throat:  Oral mucosa moist   Neck:  Trachea midline. Extremity:  No swelling. Normal ROM. Right-sided below-knee amputation. Heart: Tachycardic and regular, normal S1 & S2, no extra heart sounds. Perfusion:  Intact. Strong symmetric bilateral radial pulses. The right DP pulses present. Left leg distal stump is perfused. Respiratory: Audible gurgling breath sounds heard from doorway. Tachypnea present. Speaking in one-word sentences with clear increased work of breathing and tripoding. .     Abdominal:  Normal bowel sounds. Soft. Nontender. Elevated BMI. Slightly distended. Back:  No CVA tenderness to palpation     Neurological:  Alert and oriented times 3. No focal neuro deficits. Psychiatric: Anxious. Lilly thoughts. Good eye contact.     I have reviewed and interpreted all of the currently available lab results from this visit (if applicable):  Results for orders placed or performed during the hospital encounter of 05/11/21   COVID-19, Rapid    Specimen: Nasopharyngeal   Result Value Ref Range    Source UNKNOWN     SARS-CoV-2, NAAT NOT DETECTED NOT DETECTED   CBC auto diff   Result Value Ref Range    WBC 14.5 (H) 4.0 - 10.5 K/CU MM    RBC 4.06 (L) 4.6 - 6.2 M/CU MM    Hemoglobin 12.3 (L) 13.5 - 18.0 GM/DL    Hematocrit 37.9 (L) 42 - 52 %    MCV 93.3 78 - 100 FL    MCH 30.3 27 - 31 PG    MCHC 32.5 32.0 - 36.0 %    RDW 14.6 11.7 - 14.9 %    Platelets 304 009 - 440 K/CU MM    MPV 8.8 7.5 - 11.1 FL   CMP   Result Value Ref Range    Sodium 130 (L) 135 - 145 MMOL/L    Potassium 3.8 3.5 - 5.1 MMOL/L    Chloride 92 (L) 99 - 110 mMol/L    CO2 23 21 - 32 MMOL/L    BUN 60 (H) 6 - 23 MG/DL    CREATININE 4.8 (H) 0.9 - 1.3 MG/DL    Glucose 119 (H) 70 - 99 MG/DL    Calcium 7.5 (L) 8.3 - 10.6 MG/DL    Albumin 3.5 3.4 - 5.0 GM/DL    Total Protein 5.8 (L) 6.4 - 8.2 GM/DL    Total Bilirubin 0.3 0.0 - 1.0 MG/DL    ALT 14 10 - 40 U/L    AST 12 (L) 15 - 37 IU/L    Alkaline Phosphatase 52 40 - 128 IU/L    GFR Non- 12 (L) >60 mL/min/1.73m2    GFR  15 (L) >60 mL/min/1.73m2    Anion Gap 15 4 - 16   Troponin   Result Value Ref Range    Troponin T 0.018 (H) <0.01 NG/ML   Brain Natriuretic Peptide   Result Value Ref Range    Pro-BNP 3,561 (H) <300 PG/ML   Blood Gas, Venous   Result Value Ref Range    pH, Luke 7.33 7.32 - 7.42    pCO2, Luke 52 38 - 52 mmHG    pO2, Luke 159 (H) 28 - 48 mmHG    Base Exc, Mixed . 6 0 - 1.2    HCO3, Venous 27.4 (H) 19 - 25 MMOL/L    O2 Sat, Luke 93.6 (H) 50 - 70 %    Comment VBG    Lactic Acid, Plasma   Result Value Ref Range    Lactate 2.0 0.4 - 2.0 mMOL/L      Radiographs (if obtained):  [] The following radiograph was interpreted by myself in the absence of a radiologist:   [x] Radiologist's Report Reviewed:  XR CHEST PORTABLE   Preliminary Result   COPD with crowding of the bibasilar lung markings               EKG (if obtained): (All EKG's are interpreted by myself in the absence of a cardiologist)  12 lead EKG per my interpretation:  Normal Sinus Rhythm at 95  Axis is   Left axis deviation  QTc is  within an acceptable range  There is no specific T wave changes appreciated. There is no specific ST wave changes appreciated. No STEMI    Prior EKG to compare with was available and nonspecific intraventricular conduction delay is noted on today's EKG which is new when compared to prior from 2017.       Chart review shows recent radiographs:  No results found. MDM:  Pt presents as above. Emergent conditions considered. Presentation prompted initial immediate evaluation. Additional IV is established and patient is placed on monitor. Respiratory therapy is paged to bedside and patient placed on noninvasive positive pressure ventilation with marked improvement of work of breathing. Patient is hypertensive and with gurgling wet breath sounds and history of COPD and CHF. Nitroglycerin tablet is given and nitroglycerin infusion is ordered. Chest x-ray with findings of COPD and crowding of the bibasilar lung markings. BNP is elevated suggestive of volume overload. Patient is found to have acute on chronic renal injury. VBG is without acidemia. Troponin is abnormal but in the setting of reassuring EKG doubt ACS at this time and suspect type II troponinemia. CBC is with leukocytosis of 14-1/2. Mild chronic anemia. Rapid Covid is negative. Presentation consistent with respiratory failure secondary to pulmonary edema in the setting of underlying COPD. Patient is requiring BiPAP. IV diuresis is initiated in the emergency department. Blood pressure is controlled on recheck. Patient will require admission and is amendable to admission here. Case is to be discussed with hospitalist and patient is to be admitted to medicine. Questions sought and answered with the patient. They voice understanding and agree with plan. CRITICAL CARE NOTE:  There was a high probability of clinically significant life-threatening deterioration of the patient's condition requiring my urgent intervention due to respiratory failure with hypoxemia and pulmonary edema in the setting of COPD.   Noninvasive positive pressure ventilation, respiratory therapy consultation, telemetry monitoring, IV diuresis, chart review including most recent hospitalization discharge summary, medication review, frequent reassessments throughout ED course was performed to address this. Total critical care time is 40 minutes. This includes vital sign monitoring, pulse oximetry monitoring, telemetry monitoring, clinical response to the IV medications, reviewing the nursing notes, consultation time, dictation/documentation time, and interpretation of the lab work. This time excludes time spent performing procedures and separately billable procedures and family discussion time. Clinical Impression:  1. Acute on chronic respiratory failure with hypoxia (HCC)    2. Acute pulmonary edema (HCC)    3. Acute kidney injury superimposed on CKD [facility])      Disposition referral (if applicable):  No follow-up provider specified. Disposition medications (if applicable):  New Prescriptions    No medications on file       Comment: Please note this report has been produced using speech recognition software and may contain errors related to that system including errors in grammar, punctuation, and spelling, as well as words and phrases that may be inappropriate. If there are any questions or concerns please feel free to contact the dictating provider for clarification.        Saira Adamson MD  05/11/21 4560

## 2021-05-11 NOTE — CONSULTS
Pulmonary Consult Note      Reason for Consult: Shortness of breath  Requesting Physician: Dr. Gibson Dotson  Subjective:   CHIEF COMPLAINT :SOB    Patient Active Problem List    Diagnosis Date Noted    Choledocholithiasis 02/26/2017     Priority: High    Chronic kidney disease (CKD), stage III (moderate) 02/26/2017     Priority: Medium     Overview Note:     Overview:   CT in 2016 shows chronic occlusion of R renal artery, left renal artery is patent. 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  Acute and chronic respiratory failure with hypoxia ([address] 05/11/2021    ASCVD (arteriosclerotic cardiovascular disease) 03/01/2019    Proteinuria 05/23/2017    Chronic kidney disease (CKD) stage G3b/A3, moderately decreased glomerular filtration rate (GFR) between 30-44 mL/min/1.73 square meter and albuminuria creatinine ratio greater than 300 mg/g 05/30/6389    Metabolic acidosis 09/54/0298    Atrophic kidney 04/10/2017    COPD (chronic obstructive pulmonary disease) ([address] 04/10/2017    HTN (hypertension) 04/10/2017    PAD (peripheral artery disease) ([address] 04/10/2017    Abnormality of lung on CXR 02/27/2017    S/P BKA (below knee amputation) unilateral ([address] 02/27/2017    Cholangitis     Acute calculous cholecystitis     Abdominal adhesions         HPI:  Acid reflux, ARF (acute renal failure) (HCC), Back pain, Chronic bronchitis (HCC), Chronic kidney disease, COPD- 80 pk yr (chronic obstructive pulmonary disease) ([address] Emphysema/COPD ([address] H. pylori infection, Hiatal hernia              The patient is a 77 y.o. male with significant past medical history of   presents with complaints of SOB x 2 weeks getting worse. He has no sick exposure, no fever, no headaches, no n/v, no abd pain, no diarrhea, no dysuria. He is on 2 L/min oxygen at home. He desaturated to 60's in the ER and on CPAP. He is getting abx, inhalers, Solumedrol. At this time he is lying in the bed.  He is in mild to moderate resp distress    Past Medical History:      Diagnosis Date    Acid reflux     [facility]) [facility]     with admission 12/18/2015- consult done with Dr Sharifa Wyatt Back pain     \"Lower Back\"    Chronic bronchitis ([facility]     Chronic kidney disease     COPD (chronic obstructive pulmonary disease) ([facility]     NO PULMONOLOGIST AT THIS TIME    Emphysema/COPD ([facility]     NO PULMONOLOGIST AT THIS TIME    H. pylori infection Dx 1990's    Hiatal hernia     History of blood transfusion 1987    NO REACTION TO BLOOD TRANSFUSION RECEIVED    History of respiratory failure     following surgery 12/18/2015- pt developed resp failure- placed on ventilator-unable to wean of vent- had trachesotomy tube placed 1/3/2015- off vent 1/7/2015( discharged 1/13/2015)\"per wife went to [place] after discharg and had to be sent to [facility] after had bleeding around the trach- they said the trach was to big- put back on ventilator for 24 hrs- then there for about a week then sent to ARU 2/15    [place] (hard of hearing)     Bilateral Ears    HX OTHER MEDICAL     Primary Care Physician Is Dr. Richard Kramer Right Renal Artery    HX OTHER MEDICAL     \"Dr. Mariluz Bucio One Of My Kidneys Is Dead\"   Jasmine Wilkes     \"See Dr. Alysa Combs For Blood Being Too Thick\"(per old chart pt dx with polycythemia in 1990's and has had several phlebotomies in the past(pc)( per wife Dr Linda Brooke now says he does not really have polycythemia just from the COPD\"    HX OTHER MEDICAL 6*/25/2015    \"has drainage , clear drainage, since he was in ARU in Feb 2015, under left shoulder, arm pit area\"    Hypertension     Lung nodule \"MRI, PET SCAN\"    \"Right Lung\"    Pneumonia \"Last Episode Early 2000's\"    \"At Least Twice\"( with admission 12/18/2015)    Shortness of breath on exertion     Teeth missing     Upper And Lower    Wears glasses       Past Surgical History:        Procedure Laterality Date    Glasses    Cancer Father         Prostate And Bone Cancer    Heart Disease Father         \"Triple Bypass Surgery\"   [place] Migraines Sister     Other Sister         \"Bad Back\"       REVIEW OF SYSTEMS:    CONSTITUTIONAL:  negative for fevers, chills, diaphoresis, activity change, appetite change, fatigue, night sweats and unexpected weight change. EYES:  negative for blurred vision, eye discharge, visual disturbance and icterus  HEENT:  negative for hearing loss, tinnitus, ear drainage, sinus pressure, nasal congestion, epistaxis and snoring  RESPIRATORY:  See HPI  CARDIOVASCULAR:  negative for chest pain, palpitations, exertional chest pressure/discomfort, edema, syncope  GASTROINTESTINAL:  negative for nausea, vomiting, diarrhea, constipation, blood in stool and abdominal pain  GENITOURINARY:  negative for frequency, dysuria, urinary incontinence, decreased urine volume, and hematuria  HEMATOLOGIC/LYMPHATIC:  negative for easy bruising, bleeding and lymphadenopathy  ALLERGIC/IMMUNOLOGIC:  negative for recurrent infections, angioedema, anaphylaxis and drug reactions  ENDOCRINE:  negative for weight changes and diabetic symptoms including polyuria, polydipsia and polyphagia  MUSCULOSKELETAL:  negative for  pain, joint swelling, decreased range of motion and muscle weakness  NEUROLOGICAL:  negative for headaches, slurred speech, unilateral weakness  PSYCHIATRIC/BEHAVIORAL: negative for hallucinations, behavioral problems, confusion and agitation.      Objective:   PHYSICAL EXAM:      VITALS:  /77   Pulse 80   Temp 97 °F (36.1 °C) (Axillary)   Resp 23   Ht 5' 8\" (1.727 m)   Wt 246 lb (111.6 kg)   SpO2 99%   BMI 37.40 kg/m²   24HR INTAKE/OUTPUT:  No intake or output data in the 24 hours ending 21 1240  CURRENT PULSE OXIMETRY:  SpO2: 99 %  24HR PULSE OXIMETRY RANGE:  SpO2  Av.5 %  Min: 93 %  Max: 99 %    CONSTITUTIONAL:  awake, alert, cooperative, no apparent distress, and appears stated age  NECK: Supple, symmetrical, trachea midline, no adenopathy, thyroid symmetric, not enlarged and no tenderness, skin normal  LUNGS:  Occasional basal crackles  CARDIOVASCULAR:  normal S1 and S2, no edema and no JVD  ABDOMEN:  normal bowel sounds, non-distended and no masses palpated, and no tenderness to palpation. No hepatospleenomegaly  LYMPHADENOPATHY:  no axillary or supraclavicular adenopathy. No cervical adnenopathy  PSYCHIATRIC: Oriented to person place and time. No obvious depression or anxiety. MUSCULOSKELETAL: No obvious misalignment or effusion of the joints. No clubbing, cyanosis of the digits. SKIN:  normal skin color, texture, turgor and no redness, warmth, or swelling.  No palpable nodules    DATA:    Old records have been reviewed  CBC with Differential:    Lab Results   Component Value Date    WBC 14.5 05/11/2021    RBC 4.06 05/11/2021    HGB 12.3 05/11/2021    HCT 37.9 05/11/2021     05/11/2021    MCV 93.3 05/11/2021    MCH 30.3 05/11/2021    MCHC 32.5 05/11/2021    RDW 14.6 05/11/2021    NRBC 2 12/28/2014    SEGSPCT 69.0 05/11/2021    BANDSPCT 17 05/11/2021    BLASTSPCT 1 12/28/2014    LYMPHOPCT 4.0 05/11/2021    MONOPCT 10.0 05/11/2021    MYELOPCT 1 03/02/2017    BASOPCT 0.8 02/26/2017    MONOSABS 1.5 05/11/2021    LYMPHSABS 0.6 05/11/2021    EOSABS 0.1 02/26/2017    BASOSABS 0.1 02/26/2017    DIFFTYPE MANUAL DIFFERENTIAL 05/11/2021     BMP:    Lab Results   Component Value Date     05/11/2021    K 3.8 05/11/2021    CL 92 05/11/2021    CO2 23 05/11/2021    BUN 60 05/11/2021    CREATININE 4.8 05/11/2021    CALCIUM 7.5 05/11/2021    GFRAA 15 05/11/2021    LABGLOM 12 05/11/2021    GLUCOSE 119 05/11/2021     Hepatic Function Panel:    Lab Results   Component Value Date    ALKPHOS 52 05/11/2021    ALT 14 05/11/2021    AST 12 05/11/2021    PROT 5.8 05/11/2021    BILITOT 0.3 05/11/2021    BILIDIR 0.2 02/27/2017    IBILI 0.3 02/27/2017     ABG:    Lab Results   Component Value Date    GLO1YJF 19.6 02/27/2017    DRJ5CUF 39.0 02/27/2017    PO2ART 115 02/27/2017       Cultures:   Pending       Radiology Review:    COPD with crowding of the bibasilar lung markings      Assessment/Plan       Patient Active Problem List    Diagnosis Date Noted    Choledocholithiasis 02/26/2017     Priority: High    Chronic kidney disease (CKD), stage III (moderate) 02/26/2017     Priority: Medium     Overview Note:     Overview:   CT in 2016 shows chronic occlusion of R renal artery, left renal artery is patent. 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  Acute and chronic respiratory failure with hypoxia ([address] 05/11/2021    ASCVD (arteriosclerotic cardiovascular disease) 03/01/2019    Proteinuria 05/23/2017    Chronic kidney disease (CKD) stage G3b/A3, moderately decreased glomerular filtration rate (GFR) between 30-44 mL/min/1.73 square meter and albuminuria creatinine ratio greater than 300 mg/g 66/48/1274    Metabolic acidosis 49/13/3976    Atrophic kidney 04/10/2017    COPD (chronic obstructive pulmonary disease) ([facility] 04/10/2017    HTN (hypertension) 04/10/2017    PAD (peripheral artery disease) ([address] 04/10/2017    Abnormality of lung on CXR 02/27/2017    S/P BKA (below knee amputation) unilateral ([address] 02/27/2017    Cholangitis     Acute calculous cholecystitis     Abdominal adhesions      DAX on CKD  AECOPD  Obesity  COMPA  ? Diastolic dysfunction  HTN  Stable RML nodule from 2017    PLAN  1. Inhalers  2. Diuresis  3. CHF optimization  4. Abx  5.f/u C&S  6. Keep sats > 92%  7. DVT and GI Prophylaxis  8. CXR in am  9. CBC, BMP in am  10. BIPAP 18/10 cmh20    Electronically signed by Bright Bright MD on 5/11/2021 at 12:40 PM

## 2021-05-11 NOTE — ED NOTES
Bed: 01TR-01  Expected date:   Expected time:   Means of arrival:   Comments:  Respiratory distress.      lIiana Swan RN  05/11/21 [id number]
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
## 2021-05-11 NOTE — CONSULTS
CARDIOLOGY CONSULT NOTE   Reason for consultation:  CHF    Referring physician:  Cassidy Naranjo MD     Primary care physician: Ravi Rodas DO      Dear  Dr. Cassidy Naranjo MD   Thanks for the consult. Chief Complaints :  Chief Complaint   Patient presents with    Respiratory Distress        History of present illness:Ori is a 77 y. o.year old who presents with worsening shortness of breath lower extremity swelling lethargic somnolence ongoing for several weeks noncompliant with Lasix he does have renal insufficiency with creatinine more than 4 follows up with Dr. Ac Sousa but has not seen nephrology for many years now. He was seen in the emergency department when he was on BiPAP and he was having difficulty expressing himself due to feeling short of breath and winded. He tells me the symptoms have been ongoing for few days he has no chest pain or pressure. Not been compliant with medication    Past medical history:    has a past medical history of Acid reflux, ARF (acute renal failure) (HCC), Back pain, Chronic bronchitis (HCC), Chronic kidney disease, COPD (chronic obstructive pulmonary disease) ([address] Emphysema/COPD ([facility] [address] H. pylori infection, Hiatal hernia, History of blood transfusion, History of respiratory failure, [place] (hard of hearing), HX OTHER MEDICAL, HX OTHER MEDICAL, HX OTHER MEDICAL, HX OTHER MEDICAL, HX OTHER MEDICAL, Hypertension, Lung nodule, Pneumonia, Shortness of breath on exertion, Teeth missing, and Wears glasses. Past surgical history:   has a past surgical history that includes Leg amputation below knee (Left, 6-7-87); Bivalve tooth extraction; other surgical history (Mid 1980's); Endoscopy, colon, diagnostic (\"Once\"); Tonsillectomy (1962); tracheostomy (N/A, 1/3/15); Gastrostomy tube placement (Left, 1/3/15); vascular surgery; Colonoscopy (2011); and Cholecystectomy, laparoscopic (02/27/2017). Social History:   reports that he quit smoking about 6 years ago.  His smoking use included cigarettes. He started smoking about 49 years ago. He has a 86.00 pack-year smoking history. He has never used smokeless tobacco. He reports current alcohol use. He reports current drug use. Drug: Marijuana. Family history:   no family history of CAD, STROKE of DM at early age    Allergies   Allergen Reactions    Penicillins Swelling    Lorazepam      Other reaction(s):  Other - comment required  hallucination       [Held by provider] nitroGLYCERIN 50 mg in dextrose 5% 250 mL infusion, Continuous  ipratropium-albuterol (DUONEB) nebulizer solution 1 ampule, Q4H WA  ALPRAZolam (XANAX) tablet 0.25 mg, TID PRN  amLODIPine (NORVASC) tablet 10 mg, Daily  aspirin EC tablet 81 mg, QAM  azelastine (ASTELIN) 0.1 % nasal spray 1 spray, BID  carvedilol (COREG) tablet 25 mg, BID  vitamin D CAPS 2,000 Units, Daily  citalopram (CELEXA) tablet 10 mg, Daily  budesonide-formoterol (SYMBICORT) 80-4.5 MCG/ACT inhaler 2 puff, BID  folic acid (FOLVITE) tablet 1 mg, Daily  HYDROcodone-acetaminophen (NORCO) 5-325 MG per tablet 1 tablet, Q6H PRN  montelukast (SINGULAIR) tablet 10 mg, Nightly  sodium chloride flush 0.9 % injection 5-40 mL, 2 times per day  sodium chloride flush 0.9 % injection 5-40 mL, PRN  0.9 % sodium chloride infusion, PRN  promethazine (PHENERGAN) tablet 12.5 mg, Q6H PRN    Or  ondansetron (ZOFRAN) injection 4 mg, Q6H PRN  polyethylene glycol (GLYCOLAX) packet 17 g, Daily PRN  acetaminophen (TYLENOL) tablet 650 mg, Q6H PRN    Or  acetaminophen (TYLENOL) suppository 650 mg, Q6H PRN  doxycycline hyclate (VIBRA-TABS) tablet 100 mg, 2 times per day  guaiFENesin (MUCINEX) extended release tablet 600 mg, BID  famotidine (PEPCID) tablet 20 mg, Daily  heparin (porcine) injection 5,000 Units, 3 times per day  furosemide (LASIX) 100 mg in dextrose 5 % 100 mL infusion, Continuous  metOLazone (ZAROXOLYN) tablet 2.5 mg, Daily  nitroGLYCERIN (NITROSTAT) SL tablet 0.4 mg, Q5 Min PRN      Current Facility-Administered Medications   Medication Dose Route Frequency Provider Last Rate Last Admin    [Held by provider] nitroGLYCERIN 50 mg in dextrose 5% 250 mL infusion  5-200 mcg/min Intravenous Continuous Dee Jaimes MD   Stopped at 05/11/21 0655    ipratropium-albuterol (DUONEB) nebulizer solution 1 ampule  1 ampule Inhalation Q4H WA MD Adelaide   1 ampule at 05/11/21 1212    ALPRAZolam (XANAX) tablet 0.25 mg  0.25 mg Oral TID PRN MD Adelaide        amLODIPine (NORVASC) tablet 10 mg  10 mg Oral Daily MD [place]   10 mg at 05/11/21 1158    aspirin EC tablet 81 mg  81 mg Oral QAM MD [place]        azelastine (ASTELIN) 0.1 % nasal spray 1 spray  1 spray Each Nostril BID MD Adelaide        carvedilol (COREG) tablet 25 mg  25 mg Oral BID MD [place]   25 mg at 05/11/21 1154    vitamin D CAPS 2,000 Units  2,000 Units Oral Daily MD [place]   2,000 Units at 05/11/21 1153    citalopram (CELEXA) tablet 10 mg  10 mg Oral Daily MD [place]   10 mg at 05/11/21 1154    budesonide-formoterol (SYMBICORT) 80-4.5 MCG/ACT inhaler 2 puff  2 puff Inhalation BID MD Adelaide        folic acid (FOLVITE) tablet 1 mg  1 mg Oral Daily MD [place]   1 mg at 05/11/21 1153    HYDROcodone-acetaminophen (NORCO) 5-325 MG per tablet 1 tablet  1 tablet Oral Q6H PRN MD [place]        montelukast (SINGULAIR) tablet 10 mg  10 mg Oral Nightly MD [place]        sodium chloride flush 0.9 % injection 5-40 mL  5-40 mL Intravenous 2 times per day MD [place]        sodium chloride flush 0.9 % injection 5-40 mL  5-40 mL Intravenous PRN MD [place]        0.9 % sodium chloride infusion  25 mL Intravenous PRN MD [place]        promethazine (PHENERGAN) tablet 12.5 mg  12.5 mg Oral Q6H PRN MD [place]        Or    ondansetron [facility]) injection 4 mg  4 mg Intravenous Q6H PRN MD [place]        polyethylene glycol times daily      Cholecalciferol (VITAMIN D3) 2000 UNITS CAPS Take 1 capsule by mouth daily      carvedilol (COREG) 25 MG tablet Take 25 mg by mouth 2 times daily      albuterol (PROVENTIL HFA;VENTOLIN HFA) 108 (90 BASE) MCG/ACT inhaler Inhale 2 puffs into the lungs every 6 hours as needed for Wheezing      ALPRAZolam (XANAX) 0.25 MG tablet Take 0.25 mg by mouth 3 times daily as needed for Sleep      amLODIPine (NORVASC) 10 MG tablet Take 10 mg by mouth daily      citalopram (CELEXA) 20 MG tablet Take 1 tablet by mouth daily. (Patient taking differently: Take 10 mg by mouth daily ) 30 tablet 0    folic acid (FOLVITE) 1 MG tablet Take 1 tablet by mouth daily. 30 tablet 0    aspirin EC 81 MG EC tablet Take 81 mg by mouth every morning. Over The Counter, Last Dose Taken 12-9-14 Due To Scheduled Surgery       Review of Systems:   · Constitutional: No Fever or Weight Loss   · Eyes: No Decreased Vision  · ENT: No Headaches, Hearing Loss or Vertigo  · Cardiovascular: As per HPI  · Respiratory: As per HPI  · Gastrointestinal: No abdominal pain, appetite loss, blood in stools, constipation, diarrhea or heartburn  · Genitourinary: No dysuria, trouble voiding, or hematuria  · Musculoskeletal:  No gait disturbance, weakness or joint complaints  · Integumentary: No rash or pruritis  · Neurological: No TIA or stroke symptoms  · Psychiatric: No anxiety or depression  · Endocrine: No malaise, fatigue or temperature intolerance  · Hematologic/Lymphatic: No bleeding problems, blood clots or swollen lymph nodes  · Allergic/Immunologic: No nasal congestion or hives  All systems negative except as marked.         Physical Examination:    Vitals:    05/11/21 1212 05/11/21 1215 05/11/21 1217 05/11/21 1404   BP:       Pulse: 80      Resp:  23 23 19   Temp:       TempSrc:       SpO2:  99%     Weight:       Height:           General Appearance:  , conversant on BiPAP somewhat distressed    Constitutional:  Well developed, Well nourished, No acute distress, Non-toxic appearance. HENT:  Normocephalic, Atraumatic, Bilateral external ears normal, Oropharynx moist, No oral exudates, Nose normal. Neck- Normal range of motion, No tenderness, Supple, No stridor,no apical-carotid delay  Lymphatics : no palpable lymph nodes  Eyes:  PERRL, EOMI, Conjunctiva normal, No discharge. Respiratory:  Normal breath sounds, No respiratory distress, No wheezing, No chest tenderness. ,no use of accessory muscles, crackles Present  Cardiovascular: (PMI) apex non displaced,no lifts no thrills, ankle swelling Present , 1+, s1 and s2 audible,Murmur. Absent , JVD not noted    Abdomen /GI:  Bowel sounds normal, Soft, No tenderness, No masses, No gross visceromegaly   :  No costovertebral angle tenderness   Musculoskeletal:  No edema, no tenderness, no deformities.  Back- no tenderness  Integument:  Well hydrated, no rash   Lymphatic:  No lymphadenopathy noted   Neurologic:  Alert & oriented x 3, CN 2-12 normal, normal motor function, normal sensory function, no focal deficits noted           Medical decision making and Data review:    Lab Review   Recent Labs     05/11/21 0621   WBC 14.5*   HGB 12.3*   HCT 37.9*         Recent Labs     05/11/21 0621   *   K 3.8   CL 92*   CO2 23   BUN 60*   CREATININE 4.8*     Recent Labs     05/11/21 0621   AST 12*   ALT 14   BILITOT 0.3   ALKPHOS 52     Recent Labs     05/11/21  0621 05/11/21  0947   TROPONINT 0.018* 0.025*       Recent Labs     05/11/21 0621   PROBNP 3,561*     Lab Results   Component Value Date    INR 0.85 06/26/2015    PROTIME 9.7 06/26/2015       EKG: (reviewed by myself)    ECHO:(reviewed by myself)    Chest Xray:(reviewed by myself)  Xr Chest Portable    Result Date: 5/11/2021  EXAMINATION: ONE XRAY VIEW OF THE CHEST 5/11/2021 6:24 am COMPARISON: 02/26/2017 HISTORY: ORDERING SYSTEM PROVIDED HISTORY: gurgling breath sounds, resp distress, HTN, h/o copd TECHNOLOGIST PROVIDED HISTORY: Reason for exam:->gurgling breath sounds, resp distress, HTN, h/o copd Reason for Exam: gurgling breath sounds, resp distress, HTN, h/o copd Acuity: Acute Type of Exam: Initial FINDINGS: Heart is stable in size and configuration. Emphysematous changes are present with prominent biapical blebs left greater than right. There is crowding of the bronchovascular markings in the lung bases. No acute osseous abnormality is seen. Surgical clips overlie the left chest.     COPD with crowding of the bibasilar lung markings. All labs, medications and tests reviewed by myself including data  from outside source , patient and available family . Continue all other medications of all above medical condition listed as is. Impression:  Active Problems:    Acute and chronic respiratory failure with hypoxia (HCC)  Resolved Problems:    * No resolved hospital problems. *      Assessment: 77 y. o.year old with PMH of  has a past medical history of Acid reflux, ARF (acute renal failure) ([facility]), Back pain, Chronic bronchitis (HCC), Chronic kidney disease, COPD (chronic obstructive pulmonary disease) ([address] Emphysema/COPD ([address] H. pylori infection, Hiatal hernia, History of blood transfusion, History of respiratory failure, [place] (hard of hearing), HX OTHER MEDICAL, HX OTHER MEDICAL, HX OTHER MEDICAL, HX OTHER MEDICAL, HX OTHER MEDICAL, Hypertension, Lung nodule, Pneumonia, Shortness of breath on exertion, Teeth missing, and Wears glasses. Plan and Recommendations:     Doubt ACS most probably Demand ischemia related leak, No further work up at this time add aspirin 81 mg ,   CHF: Acute  Diastolic decompensated heart failure. Appears to be volume overloaded . Started on Lasix drip by nephrology. Strict Is and Os and Daily weights. chf nurse consult  HTN: stable, continue present medications   Get echo to evaluate LVEF  DVT prophylaxis if no contraindication  6.    Dyslipidemia: continue statins           Thank you  much for consult and giving us the opportunity in contributing in the care of this patient. Please feel free to call me for any questions.        Coby Chen MD, 5/11/2021 2:50 PM

## 2021-05-12 ENCOUNTER — APPOINTMENT (OUTPATIENT)
Dept: INTERVENTIONAL RADIOLOGY/VASCULAR | Age: 66
DRG: 291 | End: 2021-05-12
Payer: MEDICARE

## 2021-05-12 ENCOUNTER — APPOINTMENT (OUTPATIENT)
Dept: GENERAL RADIOLOGY | Age: 66
DRG: 291 | End: 2021-05-12
Payer: MEDICARE

## 2021-05-12 LAB
ALBUMIN SERPL-MCNC: 3.6 GM/DL (ref 3.4–5)
ANION GAP SERPL CALCULATED.3IONS-SCNC: 15 MMOL/L (ref 4–16)
APTT: 29.8 SECONDS (ref 25.1–37.1)
BASOPHILS ABSOLUTE: 0 K/CU MM
BASOPHILS RELATIVE PERCENT: 0.1 % (ref 0–1)
BUN BLDV-MCNC: 68 MG/DL (ref 6–23)
CALCIUM SERPL-MCNC: 8.4 MG/DL (ref 8.3–10.6)
CHLORIDE BLD-SCNC: 98 MMOL/L (ref 99–110)
CHLORIDE URINE RANDOM: 33 MMOL/L (ref 43–210)
CHOLESTEROL: 173 MG/DL
CO2: 25 MMOL/L (ref 21–32)
CREAT SERPL-MCNC: 5 MG/DL (ref 0.9–1.3)
DIFFERENTIAL TYPE: ABNORMAL
EOSINOPHILS ABSOLUTE: 0.1 K/CU MM
EOSINOPHILS RELATIVE PERCENT: 0.4 % (ref 0–3)
GFR AFRICAN AMERICAN: 14 ML/MIN/1.73M2
GFR NON-AFRICAN AMERICAN: 12 ML/MIN/1.73M2
GLUCOSE BLD-MCNC: 107 MG/DL (ref 70–99)
HCT VFR BLD CALC: 37.4 % (ref 42–52)
HDLC SERPL-MCNC: 126 MG/DL
HEMOGLOBIN: 11.7 GM/DL (ref 13.5–18)
IMMATURE NEUTROPHIL %: 0.6 % (ref 0–0.43)
INR BLD: 0.87 INDEX
LDL CHOLESTEROL DIRECT: 46 MG/DL
LYMPHOCYTES ABSOLUTE: 0.7 K/CU MM
LYMPHOCYTES RELATIVE PERCENT: 5.4 % (ref 24–44)
MAGNESIUM: 2.3 MG/DL (ref 1.8–2.4)
MCH RBC QN AUTO: 30.8 PG (ref 27–31)
MCHC RBC AUTO-ENTMCNC: 31.3 % (ref 32–36)
MCV RBC AUTO: 98.4 FL (ref 78–100)
MONOCYTES ABSOLUTE: 0.7 K/CU MM
MONOCYTES RELATIVE PERCENT: 5.3 % (ref 0–4)
NUCLEATED RBC %: 0 %
PDW BLD-RTO: 14.7 % (ref 11.7–14.9)
PHOSPHORUS: 5.3 MG/DL (ref 2.5–4.9)
PLATELET # BLD: 138 K/CU MM (ref 140–440)
PMV BLD AUTO: 9.6 FL (ref 7.5–11.1)
POTASSIUM SERPL-SCNC: 3.6 MMOL/L (ref 3.5–5.1)
POTASSIUM, UR: 15.7 MMOL/L (ref 22–119)
PROTHROMBIN TIME: 10.5 SECONDS (ref 11.7–14.5)
RBC # BLD: 3.8 M/CU MM (ref 4.6–6.2)
SEGMENTED NEUTROPHILS ABSOLUTE COUNT: 12.1 K/CU MM
SEGMENTED NEUTROPHILS RELATIVE PERCENT: 88.2 % (ref 36–66)
SODIUM BLD-SCNC: 138 MMOL/L (ref 135–145)
TOTAL IMMATURE NEUTOROPHIL: 0.08 K/CU MM
TOTAL NUCLEATED RBC: 0 K/CU MM
TRIGL SERPL-MCNC: 55 MG/DL
WBC # BLD: 13.7 K/CU MM (ref 4–10.5)

## 2021-05-12 PROCEDURE — 94761 N-INVAS EAR/PLS OXIMETRY MLT: CPT

## 2021-05-12 PROCEDURE — 6370000000 HC RX 637 (ALT 250 FOR IP): Performed by: INTERNAL MEDICINE

## 2021-05-12 PROCEDURE — 83735 ASSAY OF MAGNESIUM: CPT

## 2021-05-12 PROCEDURE — 2140000000 HC CCU INTERMEDIATE R&B

## 2021-05-12 PROCEDURE — 83721 ASSAY OF BLOOD LIPOPROTEIN: CPT

## 2021-05-12 PROCEDURE — 94640 AIRWAY INHALATION TREATMENT: CPT

## 2021-05-12 PROCEDURE — 85025 COMPLETE CBC W/AUTO DIFF WBC: CPT

## 2021-05-12 PROCEDURE — 2580000003 HC RX 258: Performed by: INTERNAL MEDICINE

## 2021-05-12 PROCEDURE — 85610 PROTHROMBIN TIME: CPT

## 2021-05-12 PROCEDURE — 80069 RENAL FUNCTION PANEL: CPT

## 2021-05-12 PROCEDURE — 76604 US EXAM CHEST: CPT

## 2021-05-12 PROCEDURE — 2700000000 HC OXYGEN THERAPY PER DAY

## 2021-05-12 PROCEDURE — 80061 LIPID PANEL: CPT

## 2021-05-12 PROCEDURE — 85730 THROMBOPLASTIN TIME PARTIAL: CPT

## 2021-05-12 PROCEDURE — 94660 CPAP INITIATION&MGMT: CPT

## 2021-05-12 PROCEDURE — 71045 X-RAY EXAM CHEST 1 VIEW: CPT

## 2021-05-12 PROCEDURE — 99232 SBSQ HOSP IP/OBS MODERATE 35: CPT | Performed by: INTERNAL MEDICINE

## 2021-05-12 PROCEDURE — 36415 COLL VENOUS BLD VENIPUNCTURE: CPT

## 2021-05-12 PROCEDURE — 6360000002 HC RX W HCPCS: Performed by: INTERNAL MEDICINE

## 2021-05-12 PROCEDURE — 2709999900 HC NON-CHARGEABLE SUPPLY

## 2021-05-12 PROCEDURE — 80048 BASIC METABOLIC PNL TOTAL CA: CPT

## 2021-05-12 PROCEDURE — C1729 CATH, DRAINAGE: HCPCS

## 2021-05-12 RX ORDER — SPIRONOLACTONE 25 MG/1
25 TABLET ORAL DAILY
Status: DISCONTINUED | OUTPATIENT
Start: 2021-05-12 | End: 2021-05-17

## 2021-05-12 RX ORDER — AMILORIDE HYDROCHLORIDE 5 MG/1
5 TABLET ORAL DAILY
Status: DISCONTINUED | OUTPATIENT
Start: 2021-05-12 | End: 2021-05-13

## 2021-05-12 RX ADMIN — SPIRONOLACTONE 25 MG: 25 TABLET ORAL at 08:00

## 2021-05-12 RX ADMIN — FOLIC ACID 1 MG: 1 TABLET ORAL at 08:01

## 2021-05-12 RX ADMIN — FUROSEMIDE 10 MG/HR: 10 INJECTION, SOLUTION INTRAVENOUS at 08:06

## 2021-05-12 RX ADMIN — ASPIRIN 81 MG: 81 TABLET, COATED ORAL at 08:02

## 2021-05-12 RX ADMIN — AMLODIPINE BESYLATE 10 MG: 5 TABLET ORAL at 08:01

## 2021-05-12 RX ADMIN — HYDROCODONE BITARTRATE AND ACETAMINOPHEN 1 TABLET: 5; 325 TABLET ORAL at 15:55

## 2021-05-12 RX ADMIN — METOLAZONE 2.5 MG: 2.5 TABLET ORAL at 08:00

## 2021-05-12 RX ADMIN — GUAIFENESIN 600 MG: 600 TABLET, EXTENDED RELEASE ORAL at 08:02

## 2021-05-12 RX ADMIN — IPRATROPIUM BROMIDE AND ALBUTEROL SULFATE 1 AMPULE: .5; 3 SOLUTION RESPIRATORY (INHALATION) at 11:20

## 2021-05-12 RX ADMIN — SODIUM CHLORIDE, PRESERVATIVE FREE 10 ML: 5 INJECTION INTRAVENOUS at 20:47

## 2021-05-12 RX ADMIN — ALPRAZOLAM 0.25 MG: 0.25 TABLET ORAL at 20:44

## 2021-05-12 RX ADMIN — HEPARIN SODIUM 5000 UNITS: 5000 INJECTION INTRAVENOUS; SUBCUTANEOUS at 05:29

## 2021-05-12 RX ADMIN — Medication 2000 UNITS: at 08:01

## 2021-05-12 RX ADMIN — DOXYCYCLINE HYCLATE 100 MG: 100 TABLET, COATED ORAL at 07:59

## 2021-05-12 RX ADMIN — GUAIFENESIN 600 MG: 600 TABLET, EXTENDED RELEASE ORAL at 20:44

## 2021-05-12 RX ADMIN — HEPARIN SODIUM 5000 UNITS: 5000 INJECTION INTRAVENOUS; SUBCUTANEOUS at 14:26

## 2021-05-12 RX ADMIN — FAMOTIDINE 20 MG: 20 TABLET, FILM COATED ORAL at 08:01

## 2021-05-12 RX ADMIN — DOXYCYCLINE HYCLATE 100 MG: 100 TABLET, COATED ORAL at 20:44

## 2021-05-12 RX ADMIN — BUDESONIDE AND FORMOTEROL FUMARATE DIHYDRATE 2 PUFF: 80; 4.5 AEROSOL RESPIRATORY (INHALATION) at 07:40

## 2021-05-12 RX ADMIN — AMILORIDE HYDROCLORIDE 5 MG: 5 TABLET ORAL at 07:59

## 2021-05-12 RX ADMIN — CARVEDILOL 25 MG: 25 TABLET, FILM COATED ORAL at 08:00

## 2021-05-12 RX ADMIN — CITALOPRAM HYDROBROMIDE 10 MG: 20 TABLET ORAL at 08:00

## 2021-05-12 RX ADMIN — HYDROCODONE BITARTRATE AND ACETAMINOPHEN 1 TABLET: 5; 325 TABLET ORAL at 20:44

## 2021-05-12 RX ADMIN — IPRATROPIUM BROMIDE AND ALBUTEROL SULFATE 1 AMPULE: .5; 3 SOLUTION RESPIRATORY (INHALATION) at 07:40

## 2021-05-12 RX ADMIN — ALPRAZOLAM 0.25 MG: 0.25 TABLET ORAL at 15:55

## 2021-05-12 RX ADMIN — BUDESONIDE AND FORMOTEROL FUMARATE DIHYDRATE 2 PUFF: 80; 4.5 AEROSOL RESPIRATORY (INHALATION) at 20:40

## 2021-05-12 RX ADMIN — CARVEDILOL 25 MG: 25 TABLET, FILM COATED ORAL at 20:44

## 2021-05-12 RX ADMIN — SODIUM CHLORIDE, PRESERVATIVE FREE 10 ML: 5 INJECTION INTRAVENOUS at 08:02

## 2021-05-12 RX ADMIN — IPRATROPIUM BROMIDE AND ALBUTEROL SULFATE 1 AMPULE: .5; 3 SOLUTION RESPIRATORY (INHALATION) at 15:44

## 2021-05-12 RX ADMIN — IPRATROPIUM BROMIDE AND ALBUTEROL SULFATE 1 AMPULE: .5; 3 SOLUTION RESPIRATORY (INHALATION) at 20:41

## 2021-05-12 RX ADMIN — HEPARIN SODIUM 5000 UNITS: 5000 INJECTION INTRAVENOUS; SUBCUTANEOUS at 20:44

## 2021-05-12 ASSESSMENT — PAIN SCALES - GENERAL
PAINLEVEL_OUTOF10: 0
PAINLEVEL_OUTOF10: 6
PAINLEVEL_OUTOF10: 7

## 2021-05-12 NOTE — PROGRESS NOTES
Pt arrived to IR for a Left thoracentesis. US images taked by Dr Tala Hargrove and no fluid was found to safely perform the procedure.  [id number] Joe Camarillo RN notified

## 2021-05-12 NOTE — PROGRESS NOTES
Nephrology Progress Note  5/12/2021 6:42 AM        Subjective:   Admit Date: 5/11/2021  PCP: Vanessa Fay DO    Interval History: events leading up to and since adm briefly reviewed     Diet: some    ROS:  With BIPAP   Sob   UOP 4.2 l/d       Data:     Current meds:    ipratropium-albuterol  1 ampule Inhalation Q4H WA    amLODIPine  10 mg Oral Daily    aspirin EC  81 mg Oral QAM    azelastine  1 spray Each Nostril BID    carvedilol  25 mg Oral BID    vitamin D  2,000 Units Oral Daily    citalopram  10 mg Oral Daily    budesonide-formoterol  2 puff Inhalation BID    folic acid  1 mg Oral Daily    montelukast  10 mg Oral Nightly    sodium chloride flush  5-40 mL Intravenous 2 times per day    doxycycline hyclate  100 mg Oral 2 times per day    guaiFENesin  600 mg Oral BID    famotidine  20 mg Oral Daily    heparin (porcine)  5,000 Units Subcutaneous 3 times per day    metOLazone  2.5 mg Oral Daily    potassium chloride  10 mEq Oral BID      sodium chloride      furosemide (LASIX) 1mg/ml infusion 10 mg/hr (05/11/21 2958)         I/O last 3 completed shifts:   In: 480 [P.O.:480]  Out: 1800 [Urine:1800]    CBC:   Recent Labs     05/11/21  0621 05/12/21  0507   WBC 14.5* 13.7*   HGB 12.3* 11.7*    138*          Recent Labs     05/11/21  0621 05/12/21  0507   * 138   K 3.8 3.6   CL 92* 98*   CO2 23 25   BUN 60* 68*   CREATININE 4.8* 5.0*   GLUCOSE 119* 107*       Lab Results   Component Value Date    CALCIUM 8.4 05/12/2021    PHOS 5.3 (H) 05/12/2021       Objective:     Vitals: BP (!) 152/74   Pulse 73   Temp 98.3 °F (36.8 °C) (Oral)   Resp 22   Ht 5' 8\" (1.727 m)   Wt 242 lb 6.4 oz (110 kg)   SpO2 97%   BMI 36.86 kg/m²     General appearance:  No ac distress - some conversational dyspnea- wearing BIPAP- HOB e;evated 30 deg   HEENT:  + conj pallor  Neck:  supple  Lungs:  Limited exam , + adv BS  Heart:  irregular  Abdomen: soft  Extremities:  Lt BKA  Rt + edema   Has arreola

## 2021-05-12 NOTE — PROGRESS NOTES
Pulmonary and Critical Care  Progress Note      VITALS:  /75   Pulse 80   Temp 98 °F (36.7 °C) (Oral)   Resp 20   Ht 5' 8\" (1.727 m)   Wt 242 lb 6.4 oz (110 kg)   SpO2 95%   BMI 36.86 kg/m²     Subjective:   CHIEF COMPLAINT :SOB     HPI:                The patient is lying in the bed. He is in mild resp distress    Objective:   PHYSICAL EXAM:    LUNGS:Decreased air entry right base  Abd-soft, BS+,NT  Ext- no pedal edema  CVS-s1s2,no murmurs      DATA:    CBC:  Recent Labs     05/11/21  0621 05/12/21  0507   WBC 14.5* 13.7*   RBC 4.06* 3.80*   HGB 12.3* 11.7*   HCT 37.9* 37.4*    138*   MCV 93.3 98.4   MCH 30.3 30.8   MCHC 32.5 31.3*   RDW 14.6 14.7   SEGSPCT 69.0* 88.2*   BANDSPCT 17*  --       BMP:  Recent Labs     05/11/21 0621 05/12/21  0507   * 138   K 3.8 3.6   CL 92* 98*   CO2 23 25   BUN 60* 68*   CREATININE 4.8* 5.0*   CALCIUM 7.5* 8.4   GLUCOSE 119* 107*      ABG:  No results for input(s): PH, PO2ART, KMQ9YFO, HCO3, BEART, O2SAT in the last 72 hours. BNP  No results found for: BNP   D-Dimer:  No results found for: DDIMER   Radiology:   Increased moderate-large left pleural effusion.  Slightly improved airspace   disease in the mid to lower right lung     1. Assessment/Plan     Patient Active Problem List    Diagnosis Date Noted    Choledocholithiasis 02/26/2017     Priority: High    Chronic kidney disease (CKD), stage III (moderate) 02/26/2017     Priority: Medium     Overview Note:     Overview:   CT in 2016 shows chronic occlusion of R renal artery, left renal artery is patent. 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       Acute and chronic respiratory failure with hypoxia ([address] 05/11/2021    ASCVD (arteriosclerotic cardiovascular disease) 03/01/2019    Proteinuria 05/23/2017    Chronic kidney disease (CKD) stage G3b/A3, moderately decreased glomerular filtration rate (GFR) between 30-44 mL/min/1.73 square meter and albuminuria creatinine ratio greater than 300 mg/g 41/41/3727    Metabolic acidosis 07/37/7488    Atrophic kidney 04/10/2017    COPD (chronic obstructive pulmonary disease) ([facility] 04/10/2017    HTN (hypertension) 04/10/2017    PAD (peripheral artery disease) ([facility] 04/10/2017    Abnormality of lung on CXR 02/27/2017    S/P BKA (below knee amputation) unilateral ([facility] [facility] 02/27/2017    Cholangitis     Acute calculous cholecystitis     Abdominal adhesions    DAX on CKD  AECOPD- improving  Obesity  COMPA  ? Diastolic dysfunction  HTN  Stable RML nodule from 2017  Moderate to large right pleural effusion       1. Await thoracentesis  2. Inhalers  3. Diuresis  4. CHF optimization  5. BIPAP  6. ICS  7. abx  8. F/u C&S  9. Keep sats > 92%  10. C/w present management  No follow-ups on file.     Electronically signed by Hannah Jiménez MD on 5/12/2021 at 11:31 AM

## 2021-05-12 NOTE — PROGRESS NOTES
Cardiology Progress Note       Aristides Abel is a 77 y.o. male   1955     SUBJECTIVE:   Seen and examined no chest pain for thoracentesis there was not enough fluid for thoracentesis rhythm is sinus  OBJECTIVE:    Review of Systems:  General appearance: alert, appears stated age and cooperative  Skin: Skin color, texture, normal. No rashes or lesions  HEENT: No nose bleed, headache, vision problems  CV: C/O chest pain, tightness, pressure,   Respiratory: C/o no SOB, WRIGHT, Orthopnea, PND  GI: No abdominal pain, black stool, bloating  Limbs: No c/o edema, pain, swelling, intermittent claudication, joint pains  Neuro: No dizziness, lightheadedness, syncope, gait problems, memory problems  Psych: grossly normal. No SI/depression. Vitals:   Blood pressure 138/75, pulse 80, temperature 98 °F (36.7 °C), temperature source Oral, resp. rate 20, height 5' 8\" (1.727 m), weight 242 lb 6.4 oz (110 kg), SpO2 95 %.     HEENT: AT, NC, PERRLA  Neck: No JVD  Heart: S1 S2 audible, no murmur   Lungs: CTA   Abdomen: Nontender   Limbs: No edema   CNS: no focal deficit      Past Medical History:   Diagnosis Date    Acid reflux     ARF (acute renal failure) ([facility] [address]     with admission 12/18/2015- consult done with Dr Mary Jane Ibarra Back pain     \"Lower Back\"    Chronic bronchitis ([facility]     Chronic kidney disease     COPD (chronic obstructive pulmonary disease) ([facility] [facility]     NO PULMONOLOGIST AT THIS TIME    Emphysema/COPD ([facility] [address]     NO PULMONOLOGIST AT THIS TIME    H. pylori infection Dx 1990's    Hiatal hernia     History of blood transfusion 1987    NO REACTION TO BLOOD TRANSFUSION RECEIVED    History of respiratory failure     following surgery 12/18/2015- pt developed resp failure- placed on ventilator-unable to wean of vent- had trachesotomy tube placed 1/3/2015- off vent 1/7/2015( discharged 1/13/2015)\"per wife went to [place] after discharg and had to be sent to [facility] after had bleeding around the trach- they said the trach was Lab Results   Component Value Date     05/12/2021    K 3.6 05/12/2021    CL 98 05/12/2021    CO2 25 05/12/2021    BUN 68 05/12/2021    CREATININE 5.0 05/12/2021    GFRAA 14 05/12/2021    LABGLOM 12 05/12/2021    GLUCOSE 107 05/12/2021    PROT 5.8 05/11/2021    LABALBU 3.6 05/12/2021    CALCIUM 8.4 05/12/2021    BILITOT 0.3 05/11/2021    ALKPHOS 52 05/11/2021    AST 12 05/11/2021    ALT 14 05/11/2021     Hepatic Function Panel:    Lab Results   Component Value Date    ALKPHOS 52 05/11/2021    ALT 14 05/11/2021    AST 12 05/11/2021    PROT 5.8 05/11/2021    BILITOT 0.3 05/11/2021    BILIDIR 0.2 02/27/2017    IBILI 0.3 02/27/2017    LABALBU 3.6 05/12/2021     Magnesium:    Lab Results   Component Value Date    MG 2.3 05/12/2021     PT/INR:    Lab Results   Component Value Date    PROTIME 10.5 05/12/2021    INR 0.87 05/12/2021     Last 3 Troponin:  No results found for: TROPONINI  U/A:    Lab Results   Component Value Date    COLORU COLORLESS 05/11/2021    PHUR 6.0 12/06/2019    WBCUA NONE SEEN 05/11/2021    RBCUA NONE SEEN 05/11/2021    MUCUS RARE 05/11/2021    TRICHOMONAS NONE SEEN 05/11/2021    YEAST FEW 01/09/2015    BACTERIA NEGATIVE 05/11/2021    CLARITYU CLEAR 05/11/2021    SPECGRAV 1.004 05/11/2021    LEUKOCYTESUR NEGATIVE 05/11/2021    UROBILINOGEN NEGATIVE 05/11/2021    BILIRUBINUR NEGATIVE 05/11/2021    BLOODU SMALL 05/11/2021    GLUCOSEU Negative 12/06/2019     ABG:    Lab Results   Component Value Date    GTV6OFB 39.0 02/27/2017    PO2ART 115 02/27/2017    YJI2AZS 19.6 02/27/2017     FLP:    Lab Results   Component Value Date    TRIG 55 05/12/2021     05/12/2021    LDLDIRECT 46 05/12/2021     TSH:  No results found for: TSH   DATA:   ECG: Sinus Rhythm       ASSESSMENT:   1 history of a left axillary femoropopliteal bypass  2 hypertension  Currently well controlled  3 acute on chronic systolic heart failure currently on Lasix drip from nephrology  4 obesity with obstructive sleep apnea  5  PVD 6  Acute on chronic kidney injury creatinine currently 5.0 nephrology following  7 high cholesterol  PLAN   Continue medical treatment

## 2021-05-12 NOTE — CONSULTS
Per the physical rehabilitation triage process, the PT referral was discontinued. Observed patient sleeping and on bipap. Discussed with RN. Patient reported to her he was indep, but did not wish to move when offered by RN, Tu Chatman. Last therapy notes in chart are from OP service back in 2015. No acute care PT needs  Identified at this time. Recommend RN mobility as condition allows.    Rosa Hines, PT  5/12/2021, 2:03 PM

## 2021-05-12 NOTE — CONSULTS
Nutrition Education    · Verbally reviewed information with Patient  · Educated on Heart Failure Nutrition Therapy (Nutrition Care Manual)  · Written educational materials provided. · Contact name and number provided. · Refer to Patient Education activity for more details.     Electronically signed by Stanley Winter RD, AUGUSTIN on 5/12/21 at 2:33 PM EDT    Contact: [phone]

## 2021-05-12 NOTE — PROGRESS NOTES
at 05/12/21 0800    vitamin D CAPS 2,000 Units  2,000 Units Oral Daily Diana Valentino MD   2,000 Units at 05/12/21 0801    citalopram (CELEXA) tablet 10 mg  10 mg Oral Daily Diana Valentino MD   10 mg at 05/12/21 0800    budesonide-formoterol (SYMBICORT) 80-4.5 MCG/ACT inhaler 2 puff  2 puff Inhalation BID Diana Valentino MD   2 puff at 30/29/03 5064    folic acid (FOLVITE) tablet 1 mg  1 mg Oral Daily Diana Valentino MD   1 mg at 05/12/21 0801    HYDROcodone-acetaminophen ([facility]) 5-325 MG per tablet 1 tablet  1 tablet Oral Q6H PRN Diana Valentino MD        montelukast (SINGULAIR) tablet 10 mg  10 mg Oral Nightly Diana Valentino MD   10 mg at 05/11/21 2148    sodium chloride flush 0.9 % injection 5-40 mL  5-40 mL Intravenous 2 times per day Diana Valentino MD   10 mL at 05/12/21 0802    sodium chloride flush 0.9 % injection 5-40 mL  5-40 mL Intravenous PRN Diana Valentino MD        0.9 % sodium chloride infusion  25 mL Intravenous PRN Diana Valentino MD        promethazine (PHENERGAN) tablet 12.5 mg  12.5 mg Oral Q6H PRN Diana Valentino MD        Or    ondansetron [facility]) injection 4 mg  4 mg Intravenous Q6H PRN Diana Valentino MD        polyethylene glycol [facility]) packet 17 g  17 g Oral Daily PRN Diana Valentino MD        acetaminophen (TYLENOL) tablet 650 mg  650 mg Oral Q6H PRN Diana Valentino MD        Or    acetaminophen (TYLENOL) suppository 650 mg  650 mg Rectal Q6H PRN Diana Valentino MD        doxycycline hyclate (VIBRA-TABS) tablet 100 mg  100 mg Oral 2 times per day Diana Valentino MD   100 mg at 05/12/21 0759    guaiFENesin (MUCINEX) extended release tablet 600 mg  600 mg Oral BID Diana Valentino MD   600 mg at 05/12/21 0802    famotidine (PEPCID) tablet 20 mg  20 mg Oral Daily Diana Valentino MD   20 mg at 05/12/21 0801    heparin (porcine) injection 5,000 Units  5,000 Units Subcutaneous 3 times per day Diana Valentino MD   5,000 Units at 05/12/21 0538    furosemide (LASIX) 100 mg in dextrose 5 % 100 mL infusion  10 mg/hr Intravenous Continuous Jeannine Kaminski MD 10 mL/hr at 05/12/21 0806 10 mg/hr at 05/12/21 0806    metOLazone (ZAROXOLYN) tablet 2.5 mg  2.5 mg Oral Daily Jeannine Kaminski MD   2.5 mg at 05/12/21 0800    nitroGLYCERIN (NITROSTAT) SL tablet 0.4 mg  0.4 mg Sublingual Q5 Min PRN Jeannine Kaminski MD           Subjective:     Patient states he is feeling much better today. Using BiPAP as needed and at night. No significant overnight events. Objective: Intake/Output Summary (Last 24 hours) at 5/12/2021 1413  Last data filed at 5/12/2021 0803  Gross per 24 hour   Intake 1108.97 ml   Output 5500 ml   Net -4391.03 ml      Vitals:   Vitals:    05/12/21 1200   BP: 137/79   Pulse: 80   Resp: 22   Temp: 98.6 °F (37 °C)   SpO2: 92%     Physical Exam:  General Appearance:    Alert, cooperative, no distress  Head:      Normocephalic, without obvious abnormality, atraumatic  Eyes:       Conjunctiva/corneas clear, EOM's intact  Lungs:    Clear to auscultation bilaterally, respirations unlabored  Heart:                RRR, S1 and S2 normal, no murmur,   rub or gallop  Abdomen:     Soft, non-tender, bowel sounds +  Extremities:   Left BKA +, right pedal edema + +  Neurological:   Grossly Intact.     Significant Diagnostic Studies:   DATA:    CBC   Recent Labs     05/11/21  0621 05/12/21  0507   WBC 14.5* 13.7*   HGB 12.3* 11.7*   HCT 37.9* 37.4*    138*      BMP   Recent Labs     05/11/21  0621 05/12/21  0507   * 138   K 3.8 3.6   CL 92* 98*   CO2 23 25   PHOS  --  5.3*   BUN 60* 68*   CREATININE 4.8* 5.0*     LFT'S   Recent Labs     05/11/21  0621   AST 12*   ALT 14   BILITOT 0.3   ALKPHOS 52     COAG   Recent Labs     05/12/21  0507   INR 0.87     POC:   Lab Results   Component Value Date    POCGLU 91 02/10/2015    POCGLU 97 02/10/2015    POCGLU 100 02/09/2015    POCGLU 96 02/09/2015     [id number]:  Lab Results   Component Value Date LABA1C 5.4 12/19/2014     CARDIAC ENZYMES  No results for input(s): CKTOTAL, CKMB, CKMBINDEX, TROPONINI in the last 72 hours. Troponin:   Recent Labs     05/11/21  0621 05/11/21  0947 05/11/21  1422   TROPONINT 0.018* 0.025* 0.018*     BNP:   Recent Labs     05/11/21  0621   PROBNP 3,561*     U/A:    Lab Results   Component Value Date    COLORU COLORLESS 05/11/2021    WBCUA NONE SEEN 05/11/2021    RBCUA NONE SEEN 05/11/2021    MUCUS RARE 05/11/2021    BACTERIA NEGATIVE 05/11/2021    CLARITYU CLEAR 05/11/2021    SPECGRAV 1.004 05/11/2021    LEUKOCYTESUR NEGATIVE 05/11/2021    BLOODU SMALL 05/11/2021    GLUCOSEU Negative 12/06/2019       Echocardiogram Complete 2d With Doppler With Color    Result Date: 5/11/2021  Transthoracic Echocardiography Report (TTE)  Demographics   Patient Name       Basia BLACKWOOD     Date of Study       05/11/2021   Date of Birth      1955         Gender              Male   Age                77 year(s)         Race                   Patient Number     [id number]         Room Number         R   Visit Number       [id number]   Corporate ID       [id number]   Accession Number   [id number]         Gabrielle Arriola RDMS   Ordering Physician Shane Haley      Interpreting        Alessandra Rehman MD                     [facility] MD           Physician  Procedure Type of Study   TTE procedure:ECHOCARDIOGRAM COMPLETE 2D W DOPPLER W COLOR. Procedure Date Date: 05/11/2021 Start: 09:38 AM Study Location: ER Technical Quality: Fair visualization Indications:Congestive heart failure. Patient Status: Routine Height: 68 inches Weight: 246 pounds BSA: 2.23 m2 BMI: 37.4 kg/m2 HR: 84 bpm BP: 159/91 mmHg  Conclusions   Summary  Left ventricular systolic function is normal.  Ejection fraction is visually estimated at 50 %. Moderate left ventricular hypertrophy. Mildly dilated left atrium.   Heavily calcified right coronary cusp; Mean PG 8 mmHg. No evidence of any pericardial effusion. Signature   ------------------------------------------------------------------  Electronically signed by Alessandra Rehman MD (Interpreting  physician) on 05/11/2021 at 05:32 PM  ------------------------------------------------------------------   Findings   Left Ventricle  Left ventricular systolic function is normal.  Ejection fraction is visually estimated at 50%. Moderate left ventricular hypertrophy. Left Atrium  Mildly dilated left atrium. Right Atrium  Essentially normal right atrium. Right Ventricle  Essentially normal right ventricle. Aortic Valve  Heavily calcified right coronary cusp; Mean PG 8 mmHg. Trivial aortic regurgitation. Mitral Valve  Trace mitral regurgitation. Tricuspid Valve  Trace tricuspid regurgitation; normal RVSP. Pulmonic Valve  The pulmonic valve was not well visualized. Pericardial Effusion  No evidence of any pericardial effusion. Pleural Effusion  No evidence of pleural effusion. Miscellaneous  Pericardial fat pad visualized.   M-Mode/2D Measurements & Calculations   LV Diastolic Dimension:  LV Systolic Dimension:  LA Dimension: 3.3 cmAO Root  4.26 cm                  3.12 cm                 Dimension: 3.7 cmLA Area:  LV FS:26.8 %             LV Volume Diastolic:    59.4 cm2  LV PW Diastolic: 1.9 cm  092 ml  LV PW Systolic: 2.2 cm   LV Volume Systolic: 53  Septum Diastolic: 5.10   ml  cm                       LV EDV/LV EDV Index:    RV Diastolic Dimension:  Septum Systolic: 7.35 cm 569 BA/83 m2LV ESV/LV   3.09 cm  CO: 7.5 l/min            ESV Index: 53 ml/24 m2  CI: 3.36 l/m*m2          EF Calculated (A4C):    LA/Aorta: 0.89                           54.3 %  LV Area Diastolic: 57.3  EF Calculated (2D):     LA volume/Index: 69 ml  cm2                      52.6 %                  /75M8  LV Area Systolic: 99.4  cm2                      LV Length: 8.23 cm                            LVOT: 2.2 cm and configuration. Emphysematous changes are present with prominent biapical blebs left greater than right. There is crowding of the bronchovascular markings in the lung bases. No acute osseous abnormality is seen. Surgical clips overlie the left chest.     COPD with crowding of the bibasilar lung markings.            [facility]ist

## 2021-05-13 LAB
ALBUMIN SERPL-MCNC: 3.7 GM/DL (ref 3.4–5)
ANION GAP SERPL CALCULATED.3IONS-SCNC: 15 MMOL/L (ref 4–16)
BASOPHILS ABSOLUTE: 0 K/CU MM
BASOPHILS RELATIVE PERCENT: 0.2 % (ref 0–1)
BUN BLDV-MCNC: 74 MG/DL (ref 6–23)
CALCIUM SERPL-MCNC: 8.8 MG/DL (ref 8.3–10.6)
CHLORIDE BLD-SCNC: 92 MMOL/L (ref 99–110)
CO2: 28 MMOL/L (ref 21–32)
CREAT SERPL-MCNC: 5.4 MG/DL (ref 0.9–1.3)
DIFFERENTIAL TYPE: ABNORMAL
EOSINOPHILS ABSOLUTE: 0.2 K/CU MM
EOSINOPHILS RELATIVE PERCENT: 1.6 % (ref 0–3)
GFR AFRICAN AMERICAN: 13 ML/MIN/1.73M2
GFR NON-AFRICAN AMERICAN: 11 ML/MIN/1.73M2
GLUCOSE BLD-MCNC: 103 MG/DL (ref 70–99)
HCT VFR BLD CALC: 38.5 % (ref 42–52)
HEMOGLOBIN: 13.1 GM/DL (ref 13.5–18)
IMMATURE NEUTROPHIL %: 0.4 % (ref 0–0.43)
LYMPHOCYTES ABSOLUTE: 1.4 K/CU MM
LYMPHOCYTES RELATIVE PERCENT: 10.5 % (ref 24–44)
MAGNESIUM: 2.3 MG/DL (ref 1.8–2.4)
MCH RBC QN AUTO: 31.5 PG (ref 27–31)
MCHC RBC AUTO-ENTMCNC: 34 % (ref 32–36)
MCV RBC AUTO: 92.5 FL (ref 78–100)
MONOCYTES ABSOLUTE: 0.8 K/CU MM
MONOCYTES RELATIVE PERCENT: 6 % (ref 0–4)
NUCLEATED RBC %: 0 %
PDW BLD-RTO: 14.4 % (ref 11.7–14.9)
PHOSPHORUS: 5.6 MG/DL (ref 2.5–4.9)
PLATELET # BLD: 143 K/CU MM (ref 140–440)
PMV BLD AUTO: 9.5 FL (ref 7.5–11.1)
POTASSIUM SERPL-SCNC: 3.6 MMOL/L (ref 3.5–5.1)
RBC # BLD: 4.16 M/CU MM (ref 4.6–6.2)
SEGMENTED NEUTROPHILS ABSOLUTE COUNT: 10.9 K/CU MM
SEGMENTED NEUTROPHILS RELATIVE PERCENT: 81.3 % (ref 36–66)
SODIUM BLD-SCNC: 135 MMOL/L (ref 135–145)
TOTAL IMMATURE NEUTOROPHIL: 0.06 K/CU MM
TOTAL NUCLEATED RBC: 0 K/CU MM
WBC # BLD: 13.4 K/CU MM (ref 4–10.5)

## 2021-05-13 PROCEDURE — 85025 COMPLETE CBC W/AUTO DIFF WBC: CPT

## 2021-05-13 PROCEDURE — 94150 VITAL CAPACITY TEST: CPT

## 2021-05-13 PROCEDURE — 6370000000 HC RX 637 (ALT 250 FOR IP): Performed by: INTERNAL MEDICINE

## 2021-05-13 PROCEDURE — 2580000003 HC RX 258: Performed by: INTERNAL MEDICINE

## 2021-05-13 PROCEDURE — 83735 ASSAY OF MAGNESIUM: CPT

## 2021-05-13 PROCEDURE — 2140000000 HC CCU INTERMEDIATE R&B

## 2021-05-13 PROCEDURE — 2700000000 HC OXYGEN THERAPY PER DAY

## 2021-05-13 PROCEDURE — 80069 RENAL FUNCTION PANEL: CPT

## 2021-05-13 PROCEDURE — 99232 SBSQ HOSP IP/OBS MODERATE 35: CPT | Performed by: INTERNAL MEDICINE

## 2021-05-13 PROCEDURE — 94660 CPAP INITIATION&MGMT: CPT

## 2021-05-13 PROCEDURE — 94640 AIRWAY INHALATION TREATMENT: CPT

## 2021-05-13 PROCEDURE — 36415 COLL VENOUS BLD VENIPUNCTURE: CPT

## 2021-05-13 PROCEDURE — 94664 DEMO&/EVAL PT USE INHALER: CPT

## 2021-05-13 PROCEDURE — 94761 N-INVAS EAR/PLS OXIMETRY MLT: CPT

## 2021-05-13 PROCEDURE — 6360000002 HC RX W HCPCS: Performed by: INTERNAL MEDICINE

## 2021-05-13 RX ORDER — ALBUTEROL SULFATE 90 UG/1
2 AEROSOL, METERED RESPIRATORY (INHALATION) 4 TIMES DAILY
Status: DISCONTINUED | OUTPATIENT
Start: 2021-05-13 | End: 2021-05-16

## 2021-05-13 RX ADMIN — HYDROCODONE BITARTRATE AND ACETAMINOPHEN 1 TABLET: 5; 325 TABLET ORAL at 04:03

## 2021-05-13 RX ADMIN — AMLODIPINE BESYLATE 10 MG: 5 TABLET ORAL at 10:11

## 2021-05-13 RX ADMIN — ASPIRIN 81 MG: 81 TABLET, COATED ORAL at 10:12

## 2021-05-13 RX ADMIN — HYDROCODONE BITARTRATE AND ACETAMINOPHEN 1 TABLET: 5; 325 TABLET ORAL at 15:51

## 2021-05-13 RX ADMIN — ALBUTEROL SULFATE 2 PUFF: 90 AEROSOL, METERED RESPIRATORY (INHALATION) at 08:50

## 2021-05-13 RX ADMIN — SODIUM CHLORIDE, PRESERVATIVE FREE 10 ML: 5 INJECTION INTRAVENOUS at 10:12

## 2021-05-13 RX ADMIN — Medication 2 PUFF: at 08:50

## 2021-05-13 RX ADMIN — ALPRAZOLAM 0.25 MG: 0.25 TABLET ORAL at 10:19

## 2021-05-13 RX ADMIN — CITALOPRAM HYDROBROMIDE 10 MG: 20 TABLET ORAL at 10:12

## 2021-05-13 RX ADMIN — GUAIFENESIN 600 MG: 600 TABLET, EXTENDED RELEASE ORAL at 10:11

## 2021-05-13 RX ADMIN — CARVEDILOL 25 MG: 25 TABLET, FILM COATED ORAL at 22:29

## 2021-05-13 RX ADMIN — HEPARIN SODIUM 5000 UNITS: 5000 INJECTION INTRAVENOUS; SUBCUTANEOUS at 06:28

## 2021-05-13 RX ADMIN — Medication 2 PUFF: at 20:35

## 2021-05-13 RX ADMIN — BUDESONIDE AND FORMOTEROL FUMARATE DIHYDRATE 2 PUFF: 80; 4.5 AEROSOL RESPIRATORY (INHALATION) at 20:35

## 2021-05-13 RX ADMIN — HEPARIN SODIUM 5000 UNITS: 5000 INJECTION INTRAVENOUS; SUBCUTANEOUS at 22:30

## 2021-05-13 RX ADMIN — MONTELUKAST 10 MG: 10 TABLET, FILM COATED ORAL at 22:29

## 2021-05-13 RX ADMIN — SPIRONOLACTONE 25 MG: 25 TABLET ORAL at 10:11

## 2021-05-13 RX ADMIN — Medication 2000 UNITS: at 10:11

## 2021-05-13 RX ADMIN — ALBUTEROL SULFATE 2 PUFF: 90 AEROSOL, METERED RESPIRATORY (INHALATION) at 20:34

## 2021-05-13 RX ADMIN — FAMOTIDINE 20 MG: 20 TABLET, FILM COATED ORAL at 10:12

## 2021-05-13 RX ADMIN — DOXYCYCLINE HYCLATE 100 MG: 100 TABLET, COATED ORAL at 10:11

## 2021-05-13 RX ADMIN — GUAIFENESIN 600 MG: 600 TABLET, EXTENDED RELEASE ORAL at 22:28

## 2021-05-13 RX ADMIN — HYDROCODONE BITARTRATE AND ACETAMINOPHEN 1 TABLET: 5; 325 TABLET ORAL at 22:44

## 2021-05-13 RX ADMIN — HEPARIN SODIUM 5000 UNITS: 5000 INJECTION INTRAVENOUS; SUBCUTANEOUS at 15:49

## 2021-05-13 RX ADMIN — DOXYCYCLINE HYCLATE 100 MG: 100 TABLET, COATED ORAL at 22:30

## 2021-05-13 RX ADMIN — SODIUM CHLORIDE, PRESERVATIVE FREE 10 ML: 5 INJECTION INTRAVENOUS at 22:30

## 2021-05-13 RX ADMIN — HYDROCODONE BITARTRATE AND ACETAMINOPHEN 1 TABLET: 5; 325 TABLET ORAL at 10:19

## 2021-05-13 RX ADMIN — FOLIC ACID 1 MG: 1 TABLET ORAL at 10:12

## 2021-05-13 RX ADMIN — AZELASTINE HYDROCHLORIDE 1 SPRAY: 137 SPRAY, METERED NASAL at 11:42

## 2021-05-13 RX ADMIN — BUDESONIDE AND FORMOTEROL FUMARATE DIHYDRATE 2 PUFF: 80; 4.5 AEROSOL RESPIRATORY (INHALATION) at 08:50

## 2021-05-13 RX ADMIN — CARVEDILOL 25 MG: 25 TABLET, FILM COATED ORAL at 10:12

## 2021-05-13 RX ADMIN — ALPRAZOLAM 0.25 MG: 0.25 TABLET ORAL at 22:44

## 2021-05-13 ASSESSMENT — PAIN SCALES - GENERAL
PAINLEVEL_OUTOF10: 4
PAINLEVEL_OUTOF10: 7
PAINLEVEL_OUTOF10: 3

## 2021-05-13 ASSESSMENT — PAIN DESCRIPTION - PAIN TYPE: TYPE: CHRONIC PAIN

## 2021-05-13 ASSESSMENT — PAIN DESCRIPTION - FREQUENCY: FREQUENCY: CONTINUOUS

## 2021-05-13 ASSESSMENT — PAIN DESCRIPTION - ONSET: ONSET: ON-GOING

## 2021-05-13 NOTE — PROGRESS NOTES
5/13/2021 1:09 PM  Patient Room #: 5515/5367-P  Patient Name: Salena Figueroa    (Step 1 Done by RN if possible otherwise call Pulmonary Diagnostics)  1. Place patient on room air at rest for at least 30 minutes. If patient falls below 88% before 30 minutes then you can record the level and stop. Record room air saturation level __ %. If patient is at 88% or below, they will qualify for home oxygen and you can stop. If level does not fall below 88%, fill in level above. If indicated continue to Step 2. Signature:_____ Date: ___  (Step 2&3 Done by [facility])  2. Ambulate patient on room air until saturation falls below 89%. Record level of room air saturation with ambulation___ %. Next, place patient back on ___lpm oxygen and ambulate, record level __%. (Note:  this level must show improvement from room air level done with ambulation.)  If patients saturation on room air with ambulation is 88% or below AND patient shows improvement with oxygen during ambulation, they will qualify for home oxygen and you can stop. If patient does not drop below 89%, then patient should have an overnight oximetry trending on room air to see if level falls below 88%. Complete level in Step 3 below. 3. Room air overnight oximetry level 88 % for___  cumulative minutes. If patients room air oxygen level is < 89% for at least 5 cumulative minutes, patient will qualify for home oxygen and you can stop. (Attach Night Trending Report)    Complete order below: Diagnosis:_COPD___  Home oxygen at:  Length of Need: ?X Lifetime ?  3 Months     ___lpm or __%   via  [] nasal cannula  []mask  [] other:___         []continuous []  with activity  []  Nocturnal   [] Portable Tanks []  Concentrator        Therapist Signature:_______     Date:  ___  Physician Signature:  __Electronically Signed in EMR_    Date:___  Physician Printed Name:  Priscilla Stewart MD NPI:  [id number]    [] Patient Qualifies      [] Patient Does NOT qualify

## 2021-05-13 NOTE — PROGRESS NOTES
Hospitalist Progress Note         Admit Date: 5/11/2021    PCP: Eugenio Pratt DO     Chief Complaint   Patient presents with    Respiratory Distress        Assessment and Plan:     -Acute and chronic respiratory failure with hypoxia (HCC) multifactorial.  Keep on BiPAP. -Acute on chronic diastolic heart failure improving on Lasix IV, metolazone. Good BP control. Echocardiogram and cardiology evaluation ordered. Replace electrolytes as needed.  -Acute kidney injury on stage IV CKD improved on Lasix drip, metolazone, amiloride per nephro. Lasix drip as well as other meds discontinued today. Continue Aldactone. -COPD with minimal exacerbation doxy, duo nebs and BiPAP support. Pulmonology on board. -Mild troponin elevation from kidney injury. Trend tropes. Continue home aspirin. -HTN uncontrolled continue home Norvasc, Coreg. Adjust medication per hospital course.  -Sleep apnea continue BiPAP support as needed and at night.      DVT prophylaxis: Heparin.   DC home depending on hospital progress    Current Facility-Administered Medications   Medication Dose Route Frequency Provider Last Rate Last Admin    albuterol sulfate  (90 Base) MCG/ACT inhaler 2 puff  2 puff Inhalation 4x daily Hannah Jiménez MD        ipratropium (ATROVENT HFA) 17 MCG/ACT inhaler 2 puff  2 puff Inhalation 4x daily Hannah Jiménez MD        spironolactone (ALDACTONE) tablet 25 mg  25 mg Oral Daily Evgeny Zamudio MD   25 mg at 05/12/21 0800    ALPRAZolam (XANAX) tablet 0.25 mg  0.25 mg Oral TID PRN Kimmie Everett MD   0.25 mg at 05/12/21 2044    amLODIPine (NORVASC) tablet 10 mg  10 mg Oral Daily Kimmie Everett MD   10 mg at 05/12/21 0801    aspirin EC tablet 81 mg  81 mg Oral QAM Kimmie Everett MD   81 mg at 05/12/21 0802    azelastine (ASTELIN) 0.1 % nasal spray 1 spray  1 spray Each Nostril BID Kimmie Everett MD        carvedilol (COREG) tablet 25 mg  25 mg Oral BID Kimmie Everett MD   25 mg at 05/12/21 2044    vitamin D CAPS 2,000 Units  2,000 Units Oral Daily Adilene Juarez MD   2,000 Units at 05/12/21 0801    citalopram (CELEXA) tablet 10 mg  10 mg Oral Daily Adilene Juarez MD   10 mg at 05/12/21 0800    budesonide-formoterol (SYMBICORT) 80-4.5 MCG/ACT inhaler 2 puff  2 puff Inhalation BID Adilene Juarez MD   2 puff at 36/27/03 8602    folic acid (FOLVITE) tablet 1 mg  1 mg Oral Daily Adilene Juarez MD   1 mg at 05/12/21 0801    HYDROcodone-acetaminophen ([place]) 5-325 MG per tablet 1 tablet  1 tablet Oral Q6H PRN Adilene Juarez MD   1 tablet at 05/13/21 0403    montelukast (SINGULAIR) tablet 10 mg  10 mg Oral Nightly Adilene Juarez MD   10 mg at 05/11/21 2148    sodium chloride flush 0.9 % injection 5-40 mL  5-40 mL Intravenous 2 times per day Adilene Juarez MD   10 mL at 05/12/21 2047    sodium chloride flush 0.9 % injection 5-40 mL  5-40 mL Intravenous PRN Adilene Juarez MD        0.9 % sodium chloride infusion  25 mL Intravenous PRN Adilene Juarez MD        promethazine (PHENERGAN) tablet 12.5 mg  12.5 mg Oral Q6H PRN Adilene Juarez MD        Or    ondansetron [facility]) injection 4 mg  4 mg Intravenous Q6H PRN Adilene Juarez MD        polyethylene glycol [facility]) packet 17 g  17 g Oral Daily PRN Adilene Juarez MD        acetaminophen (TYLENOL) tablet 650 mg  650 mg Oral Q6H PRN Adilene Juarez MD        Or    acetaminophen (TYLENOL) suppository 650 mg  650 mg Rectal Q6H PRN Adilene Juarez MD        doxycycline hyclate (VIBRA-TABS) tablet 100 mg  100 mg Oral 2 times per day Adilene Juarez MD   100 mg at 05/12/21 2044    guaiFENesin [facility]) extended release tablet 600 mg  600 mg Oral BID Adilene Juarez MD   600 mg at 05/12/21 2044    famotidine (PEPCID) tablet 20 mg  20 mg Oral Daily Adilene Juarez MD   20 mg at 05/12/21 0801    heparin (porcine) injection 5,000 Units  5,000 Units Subcutaneous 3 times per day Adilene Juarez MD   5,000 Units at 05/13/21 0628    nitroGLYCERIN (NITROSTAT) SL tablet 0.4 mg  0.4 mg Sublingual Q5 Min PRN Lilian Felton MD           Subjective:     Patient states he is feeling much better today. Using BiPAP as needed and at night. No significant overnight events. Objective: Intake/Output Summary (Last 24 hours) at 5/13/2021 0732  Last data filed at 5/12/2021 1803  Gross per 24 hour   Intake 139.6 ml   Output 5000 ml   Net -4860.4 ml      Vitals:   Vitals:    05/13/21 0432   BP:    Pulse:    Resp: 18   Temp:    SpO2:      Physical Exam:  General Appearance:    Alert, cooperative, no distress  Head:      Normocephalic, without obvious abnormality, atraumatic  Eyes:       Conjunctiva/corneas clear, EOM's intact  Lungs:    Clear to auscultation bilaterally, respirations unlabored  Heart:                RRR, S1 and S2 normal, no murmur,   rub or gallop  Abdomen:     Soft, non-tender, bowel sounds +  Extremities:   Left BKA +, right pedal edema +   Neurological:   Grossly Intact. Significant Diagnostic Studies:   DATA:    CBC   Recent Labs     05/11/21  0621 05/12/21  0507 05/13/21  0347   WBC 14.5* 13.7* 13.4*   HGB 12.3* 11.7* 13.1*   HCT 37.9* 37.4* 38.5*    138* 143      BMP   Recent Labs     05/11/21  0621 05/12/21  0507 05/13/21  0347   * 138 135   K 3.8 3.6 3.6   CL 92* 98* 92*   CO2 23 25 28   PHOS  --  5.3* 5.6*   BUN 60* 68* 74*   CREATININE 4.8* 5.0* 5.4*     LFT'S   Recent Labs     05/11/21  0621   AST 12*   ALT 14   BILITOT 0.3   ALKPHOS 52     COAG   Recent Labs     05/12/21  0507   INR 0.87     POC:   Lab Results   Component Value Date    POCGLU 91 02/10/2015    POCGLU 97 02/10/2015    POCGLU 100 02/09/2015    POCGLU 96 02/09/2015     [id number]:  Lab Results   Component Value Date    LABA1C 5.4 12/19/2014     CARDIAC ENZYMES  No results for input(s): CKTOTAL, CKMB, CKMBINDEX, TROPONINI in the last 72 hours.   Troponin:   Recent Labs     05/11/21  0621 05/11/21  0947 05/11/21  1422 TROPONINT 0.018* 0.025* 0.018*     BNP:   Recent Labs     05/11/21  0621   PROBNP 3,561*     U/A:    Lab Results   Component Value Date    COLORU COLORLESS 05/11/2021    WBCUA NONE SEEN 05/11/2021    RBCUA NONE SEEN 05/11/2021    MUCUS RARE 05/11/2021    BACTERIA NEGATIVE 05/11/2021    CLARITYU CLEAR 05/11/2021    SPECGRAV 1.004 05/11/2021    LEUKOCYTESUR NEGATIVE 05/11/2021    BLOODU SMALL 05/11/2021    GLUCOSEU Negative 12/06/2019       Echocardiogram Complete 2d With Doppler With Color    Result Date: 5/11/2021  Transthoracic Echocardiography Report (TTE)  Demographics   Patient Name       Prateek BLACKWOOD     Date of Study       05/11/2021   Date of Birth      1955         Gender              Male   Age                77 year(s)         Race                   Patient Number     [id number]         Room Number         [facility]   Visit Number       [id number]   Corporate ID       [id number]   Accession Number   [id number]         Kanwal Nazario RDMS   Ordering Physician Tony Zapata      Interpreting        Valorie Crawley MD                     [facility] MD           Physician  Procedure Type of Study   TTE procedure:ECHOCARDIOGRAM COMPLETE 2D W DOPPLER W COLOR. Procedure Date Date: 05/11/2021 Start: 09:38 AM Study Location: ER Technical Quality: Fair visualization Indications:Congestive heart failure. Patient Status: Routine Height: 68 inches Weight: 246 pounds BSA: 2.23 m2 BMI: 37.4 kg/m2 HR: 84 bpm BP: 159/91 mmHg  Conclusions   Summary  Left ventricular systolic function is normal.  Ejection fraction is visually estimated at 50 %. Moderate left ventricular hypertrophy. Mildly dilated left atrium. Heavily calcified right coronary cusp; Mean PG 8 mmHg. No evidence of any pericardial effusion.    Signature   ------------------------------------------------------------------  Electronically signed by Valorie Crawley MD (Interpreting  physician) on 05/11/2021 at 05:32 PM  ------------------------------------------------------------------   Findings   Left Ventricle  Left ventricular systolic function is normal.  Ejection fraction is visually estimated at 50%. Moderate left ventricular hypertrophy. Left Atrium  Mildly dilated left atrium. Right Atrium  Essentially normal right atrium. Right Ventricle  Essentially normal right ventricle. Aortic Valve  Heavily calcified right coronary cusp; Mean PG 8 mmHg. Trivial aortic regurgitation. Mitral Valve  Trace mitral regurgitation. Tricuspid Valve  Trace tricuspid regurgitation; normal RVSP. Pulmonic Valve  The pulmonic valve was not well visualized. Pericardial Effusion  No evidence of any pericardial effusion. Pleural Effusion  No evidence of pleural effusion. Miscellaneous  Pericardial fat pad visualized.   M-Mode/2D Measurements & Calculations   LV Diastolic Dimension:  LV Systolic Dimension:  LA Dimension: 3.3 cmAO Root  4.26 cm                  3.12 cm                 Dimension: 3.7 cmLA Area:  LV FS:26.8 %             LV Volume Diastolic:    71.2 cm2  LV PW Diastolic: 1.9 cm  009 ml  LV PW Systolic: 2.2 cm   LV Volume Systolic: 53  Septum Diastolic: 7.34   ml  cm                       LV EDV/LV EDV Index:    RV Diastolic Dimension:  Septum Systolic: 7.99 cm 781 QJ/63 m2LV ESV/LV   3.09 cm  CO: 7.5 l/min            ESV Index: 53 ml/24 m2  CI: 3.36 l/m*m2          EF Calculated (A4C):    LA/Aorta: 0.89                           54.3 %  LV Area Diastolic: 78.2  EF Calculated (2D):     LA volume/Index: 69 ml  cm2                      52.6 %                  /57V6  LV Area Systolic: 83.8  cm2                      LV Length: 8.23 cm                            LVOT: 2.2 cm  Doppler Measurements & Calculations   MV Peak E-Wave: 123   AV Peak Velocity: 176 cm/s   LVOT Peak Velocity: 137  cm/s                  AV Peak Gradient: 12.39 mmHg cm/s  MV Peak A-Wave: 103   AV Mean Velocity: 133 cm/s   LVOT Mean Velocity: 90.3  cm/s                  AV Mean Gradient: 8 mmHg     cm/s  MV E/A Ratio: 1.19    AV VTI: 32.4 cm              LVOT Peak Gradient: 8  MV Peak Gradient:     AV Area (Continuity):2.76    mmHgLVOT Mean Gradient: 4  6.05 mmHg             cm2                          mmHg                                                     Estimated RVSP: 16 mmHg  MV P1/2t: 48 msec     LVOT VTI: 23.5 cm            Estimated RAP:3 mmHg  MVA by PHT:4.58 cm2                        Estimated PASP: 11.29 mmHg  MV E' Septal                                       TR Velocity:144 cm/s  Velocity: 6.91 cm/s                                TR Gradient:8.29 mmHg  MV E' Lateral  Velocity: 11 cm/s  MV E/E' septal: 17.8  MV E/E' lateral:  11.18      Xr Chest Portable    Result Date: 5/12/2021  EXAMINATION: ONE XRAY VIEW OF THE CHEST 5/12/2021 5:49 am COMPARISON: 05/11/2021 HISTORY: Acute hypoxia. FINDINGS: Patient is slightly rotated. Heart remains enlarged. Moderate-large left pleural effusion has increased and there is adjacent airspace disease. Slightly improved airspace disease in the mid to lower right lung. No pneumothorax seen. Increased moderate-large left pleural effusion. Slightly improved airspace disease in the mid to lower right lung. Xr Chest Portable    Result Date: 5/11/2021  EXAMINATION: ONE XRAY VIEW OF THE CHEST 5/11/2021 6:24 am COMPARISON: 02/26/2017 HISTORY: ORDERING SYSTEM PROVIDED HISTORY: gurgling breath sounds, resp distress, HTN, h/o copd TECHNOLOGIST PROVIDED HISTORY: Reason for exam:->gurgling breath sounds, resp distress, HTN, h/o copd Reason for Exam: gurgling breath sounds, resp distress, HTN, h/o copd Acuity: Acute Type of Exam: Initial FINDINGS: Heart is stable in size and configuration. Emphysematous changes are present with prominent biapical blebs left greater than right. There is crowding of the bronchovascular markings in the lung bases.   No acute osseous abnormality is seen. Surgical clips overlie the left chest.     COPD with crowding of the bibasilar lung markings.            Jeannine Kaminski  [facility] System[facility]

## 2021-05-13 NOTE — PROGRESS NOTES
Pulmonary and Critical Care  Progress Note      VITALS:  BP (!) 135/92   Pulse 66   Temp 98.3 °F (36.8 °C) (Oral)   Resp 20   Ht 5' 8\" (1.727 m)   Wt 240 lb 4.8 oz (109 kg)   SpO2 96%   BMI 36.54 kg/m²     Subjective:   CHIEF COMPLAINT :SOB     HPI:                The patient is lying in the bed. He is not in acute resp distress    Objective:   PHYSICAL EXAM:    LUNGS:Occasional basal crackles  Abd-soft, BS+,NT  Ext- no pedal edema  CVS-s1s2,no murmurs      DATA:    CBC:  Recent Labs     05/11/21  0621 05/12/21  0507 05/13/21 0347   WBC 14.5* 13.7* 13.4*   RBC 4.06* 3.80* 4.16*   HGB 12.3* 11.7* 13.1*   HCT 37.9* 37.4* 38.5*    138* 143   MCV 93.3 98.4 92.5   MCH 30.3 30.8 31.5*   MCHC 32.5 31.3* 34.0   RDW 14.6 14.7 14.4   SEGSPCT 69.0* 88.2* 81.3*   BANDSPCT 17*  --   --       BMP:  Recent Labs     05/11/21  0621 05/12/21  0507 05/13/21 0347   * 138 135   K 3.8 3.6 3.6   CL 92* 98* 92*   CO2 23 25 28   BUN 60* 68* 74*   CREATININE 4.8* 5.0* 5.4*   CALCIUM 7.5* 8.4 8.8   GLUCOSE 119* 107* 103*      ABG:  No results for input(s): PH, PO2ART, DWX6HAQ, HCO3, BEART, O2SAT in the last 72 hours. BNP  No results found for: BNP   D-Dimer:  No results found for: DDIMER   1. Radiology: None      Assessment/Plan     Patient Active Problem List    Diagnosis Date Noted    Choledocholithiasis 02/26/2017     Priority: High    Chronic kidney disease (CKD), stage III (moderate) 02/26/2017     Priority: Medium     Overview Note:     Overview:   CT in 2016 shows chronic occlusion of R renal artery, left renal artery is patent. 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       Acute on chronic respiratory failure with hypoxia (HCC) 05/11/2021    ASCVD (arteriosclerotic cardiovascular disease) 03/01/2019    Proteinuria 05/23/2017    Chronic kidney disease (CKD) stage G3b/A3, moderately decreased glomerular filtration rate (GFR) between 30-44 mL/min/1.73 square meter and albuminuria creatinine ratio greater than 300 mg/g 07/26/1072    Metabolic acidosis 49/51/4579    Atrophic kidney 04/10/2017    COPD (chronic obstructive [facility] disease) [facility] 04/10/2017    HTN (hypertension) 04/10/2017    PAD (peripheral artery disease) ([facility] 04/10/2017    Abnormality of lung on CXR 02/27/2017    S/P BKA (below knee amputation) unilateral ([facility] 02/27/2017    Cholangitis     Acute calculous cholecystitis     Abdominal adhesions      DAX on CKD  AECOPD- improving  Obesity  COMPA  ? Diastolic dysfunction  HTN  Stable RML nodule from 2017  Moderate to large right pleural effusion       1. Diuresis  2. I/O chart  3. Pleural fluid analysis  4. CHF optimization  5. BIPAP  6. ICS  7. OOB  8. Keep sats > 92%  9. CXR in am  10. C.w present management  No follow-ups on file.     Electronically signed by Babak Lucio MD on 5/13/2021 at 11:56 AM

## 2021-05-13 NOTE — PROGRESS NOTES
Cardiology Progress Note       Salena Figueroa is a 77 y.o. male   1955     SUBJECTIVE:   Seen and examined no chest pain for thoracentesis there was not enough fluid for thoracentesis rhythm is sinus  5/13  Patient seen and examined currently on BiPAP no chest pain  OBJECTIVE:    Review of Systems:  General appearance: alert, appears stated age and cooperative  Skin: Skin color, texture, normal. No rashes or lesions  HEENT: No nose bleed, headache, vision problems  CV: C/O chest pain, tightness, pressure,   Respiratory: C/o no SOB, WRIGHT, Orthopnea, PND  GI: No abdominal pain, black stool, bloating  Limbs: No c/o edema, pain, swelling, intermittent claudication, joint pains  Neuro: No dizziness, lightheadedness, syncope, gait problems, memory problems  Psych: grossly normal. No SI/depression. Vitals:   Blood pressure (!) 135/92, pulse 66, temperature 98.3 °F (36.8 °C), temperature source Oral, resp. rate 20, height 5' 8\" (1.727 m), weight 240 lb 4.8 oz (109 kg), SpO2 94 %.     HEENT: AT, NC, PERRLA  Neck: No JVD  Heart: S1 S2 audible, no murmur   Lungs: CTA   Abdomen: Nontender   Limbs: No edema   CNS: no focal deficit      Past Medical History:   Diagnosis Date    Acid reflux     ARF (acute renal failure) ([address]     with admission 12/18/2015- consult done with Dr Abisai Sun Back pain     \"Lower Back\"    Chronic bronchitis ([address]     Chronic kidney disease     COPD (chronic obstructive pulmonary disease) ([address]     NO PULMONOLOGIST AT THIS TIME    Emphysema/COPD ([facility]     NO PULMONOLOGIST AT THIS TIME    H. pylori infection Dx 1990's    Hiatal hernia     History of blood transfusion 1987    NO REACTION TO BLOOD TRANSFUSION RECEIVED    History of respiratory failure     following surgery 12/18/2015- pt developed resp failure- placed on ventilator-unable to wean of vent- had trachesotomy tube placed 1/3/2015- off vent 1/7/2015( discharged 1/13/2015)\"per wife went to [place] after discharg and had to be sent to [facility] after had bleeding around the trach- they said the trach was to big- put back on ventilator for 24 hrs- then there for about a week then sent to ARU 2/15    [place] (hard of hearing)     Bilateral Ears    HX OTHER MEDICAL     Primary Care Physician Is Dr. Femi Amezquita Right Renal Artery    [facility]     \"Dr. Reuben Miramontes One Of My Kidneys Is Dead\"   [place] Lover     \"See Dr. Fatimah Elder For Blood Being Too Thick\"(per old chart pt dx with polycythemia in 1990's and has had several phlebotomies in the past(pc)( per wife Dr Diop Sly now says he does not really have polycythemia just from the COPD\"    [address] [place] [address] 6*/25/2015    \"has drainage , clear drainage, since he was in ARU in Feb 2015, under left shoulder, arm pit area\"    Hypertension     Lung nodule \"MRI, PET SCAN\"    \"Right Lung\"    Pneumonia \"Last Episode Early 2000's\"    \"At Least Twice\"( with admission 12/18/2015)    Shortness of breath on exertion     Teeth missing     Upper And Lower    Wears glasses         Patient Active Problem List   Diagnosis    Choledocholithiasis    Chronic kidney disease (CKD), stage III (moderate)    Abnormality of lung on CXR    S/P BKA (below knee amputation) unilateral (HCC)    Cholangitis    Acute calculous cholecystitis    Abdominal adhesions    Atrophic kidney    COPD (chronic obstructive pulmonary disease) ([address]    HTN (hypertension)    [facility]    Proteinuria    Chronic kidney disease (CKD) stage G3b/A3, moderately decreased glomerular filtration rate (GFR) between 30-44 mL/min/1.73 square meter and albuminuria creatinine ratio greater than 875 mg/g    Metabolic acidosis    ASCVD (arteriosclerotic cardiovascular disease)    Acute on chronic respiratory failure with hypoxia (HCC)        Allergies   Allergen Reactions    Penicillins Swelling    Lorazepam      Other reaction(s):  Other - comment required  hallucination        Current Inpatient Medications:    Current Facility-Administered Medications   Medication Dose Route Frequency Provider Last Rate Last Admin    albuterol sulfate  (90 Base) MCG/ACT inhaler 2 puff  2 puff Inhalation 4x daily Srikanth Stone MD   2 puff at 05/13/21 0850    ipratropium (ATROVENT HFA) 17 MCG/ACT inhaler 2 puff  2 puff Inhalation 4x daily Srikanth Stone MD   2 puff at 05/13/21 0850    spironolactone (ALDACTONE) tablet 25 mg  25 mg Oral Daily Ricarda Irizarry MD   25 mg at 05/12/21 0800    ALPRAZolam (XANAX) tablet 0.25 mg  0.25 mg Oral TID PRN Tori Jacobo MD   0.25 mg at 05/12/21 2044    amLODIPine (NORVASC) tablet 10 mg  10 mg Oral Daily Tori Jacobo MD   10 mg at 05/12/21 0801    aspirin EC tablet 81 mg  81 mg Oral QAM Tori Jacobo MD   81 mg at 05/12/21 0802    azelastine (ASTELIN) 0.1 % nasal spray 1 spray  1 spray Each Nostril BID Tori Jacobo MD        carvedilol (COREG) tablet 25 mg  25 mg Oral BID Tori Jacobo MD   25 mg at 05/12/21 2044    vitamin D CAPS 2,000 Units  2,000 Units Oral Daily Tori Jacobo MD   2,000 Units at 05/12/21 0801    citalopram (CELEXA) tablet 10 mg  10 mg Oral Daily Tori Jacobo MD   10 mg at 05/12/21 0800    budesonide-formoterol (SYMBICORT) 80-4.5 MCG/ACT inhaler 2 puff  2 puff Inhalation BID Tori Jacobo MD   2 puff at 24/91/06 4785    folic acid (FOLVITE) tablet 1 mg  1 mg Oral Daily Tori Jacobo MD   1 mg at 05/12/21 0801    HYDROcodone-acetaminophen (Dorenda Black) 5-325 MG per tablet 1 tablet  1 tablet Oral Q6H PRN Tori Jacobo MD   1 tablet at 05/13/21 0403    montelukast (SINGULAIR) tablet 10 mg  10 mg Oral Nightly Tori Jacobo MD   10 mg at 05/11/21 2148    sodium chloride flush 0.9 % injection 5-40 mL  5-40 mL Intravenous 2 times per day Tori Jacobo MD   10 mL at 05/12/21 2047    sodium chloride flush 0.9 % injection 5-40 mL  5-40 mL Intravenous RICCO Vail Purcell Libman, MD        0.9 % sodium chloride infusion  25 mL Intravenous PRN Bryan Sesay MD        promethazine (PHENERGAN) tablet 12.5 mg  12.5 mg Oral Q6H PRN Bryan Sesay MD        Or    ondansetron [facility]) injection 4 mg  4 mg Intravenous Q6H PRN Bryan Sesay MD        polyethylene glycol [facility]) packet 17 g  17 g Oral Daily PRN Bryan Sesay MD        acetaminophen (TYLENOL) tablet 650 mg  650 mg Oral Q6H PRN Bryan Sesay MD        Or    acetaminophen (TYLENOL) suppository 650 mg  650 mg Rectal Q6H PRN Bryan Sesay MD        doxycycline hyclate (VIBRA-TABS) tablet 100 mg  100 mg Oral 2 times per day Bryan Sesay MD   100 mg at 05/12/21 2044    guaiFENesin (MUCINEX) extended release tablet 600 mg  600 mg Oral BID Bryan Sesay MD   600 mg at 05/12/21 2044    famotidine (PEPCID) tablet 20 mg  20 mg Oral Daily Bryan Sesay MD   20 mg at 05/12/21 0801    heparin (porcine) injection 5,000 Units  5,000 Units Subcutaneous 3 times per day Bryan Sesay MD   5,000 Units at 05/13/21 0628    nitroGLYCERIN (NITROSTAT) SL tablet 0.4 mg  0.4 mg Sublingual Q5 Min PRN Bryan Sesay MD               Labs:  CBC with Differential:    Lab Results   Component Value Date    WBC 13.4 05/13/2021    RBC 4.16 05/13/2021    HGB 13.1 05/13/2021    HCT 38.5 05/13/2021     05/13/2021    MCV 92.5 05/13/2021    MCH 31.5 05/13/2021    MCHC 34.0 05/13/2021    RDW 14.4 05/13/2021    NRBC 2 12/28/2014    SEGSPCT 81.3 05/13/2021    BANDSPCT 17 05/11/2021    BLASTSPCT 1 12/28/2014    LYMPHOPCT 10.5 05/13/2021    MONOPCT 6.0 05/13/2021    MYELOPCT 1 03/02/2017    BASOPCT 0.2 05/13/2021    MONOSABS 0.8 05/13/2021    LYMPHSABS 1.4 05/13/2021    EOSABS 0.2 05/13/2021    BASOSABS 0.0 05/13/2021    DIFFTYPE AUTOMATED DIFFERENTIAL 05/13/2021     CMP:    Lab Results   Component Value Date     05/13/2021    K 3.6 05/13/2021    CL 92 05/13/2021    CO2 28 05/13/2021    BUN 74 05/13/2021 Continue medical treatment  Stable from cardiology standpoint

## 2021-05-13 NOTE — PROGRESS NOTES
Pt's SpO2 is 97% on BiPap with FIO2 of 45%. Removed BiPap for Home Oxygen Evaluation and notified RN. Pt has pulse oximeter on finger and is being monitored.

## 2021-05-13 NOTE — CARE COORDINATION
.CM met with pt for d/c planning. Introduced self and updated white board. Pt lives with spouse and is usually independent with ADL's. His wife is a nurse and helps him as needed. Pt drives, has a PCP, has insurance, and is able to afford his medication. Pt has an O2 concentrator and a O2 tank. He is on O2 at hs and prn. Pt uses a walker and a cane, has prosthetic leg,shower seat and a raised toilet seat. Pt states that he is active with [facility]. Pt denies any new d/c needs at this time. D/c plan is home with spouse and [facility]. Notify CM if any new d/c needs arise. TE        NOTIFY Marilin Chin -[phone] AND FAX THE AVS, H&P, AND [facility] ORDER -[phone] WHEN PT IS DISCHARGED.

## 2021-05-13 NOTE — PROGRESS NOTES
Nephrology Progress Note  5/13/2021 9:27 AM        Subjective:   Admit Date: 5/11/2021  PCP: Tomasz Suggs DO    Interval History: better   Off BIPAP    Diet: reasonable     ROS:  Less sob   He is cramping   UOP 5 L/d     Data:     Current meds:    albuterol sulfate HFA  2 puff Inhalation 4x daily    ipratropium  2 puff Inhalation 4x daily    spironolactone  25 mg Oral Daily    amLODIPine  10 mg Oral Daily    aspirin EC  81 mg Oral QAM    azelastine  1 spray Each Nostril BID    carvedilol  25 mg Oral BID    vitamin D  2,000 Units Oral Daily    citalopram  10 mg Oral Daily    budesonide-formoterol  2 puff Inhalation BID    folic acid  1 mg Oral Daily    montelukast  10 mg Oral Nightly    sodium chloride flush  5-40 mL Intravenous 2 times per day    doxycycline hyclate  100 mg Oral 2 times per day    guaiFENesin  600 mg Oral BID    famotidine  20 mg Oral Daily    heparin (porcine)  5,000 Units Subcutaneous 3 times per day      sodium chloride           I/O last 3 completed shifts: In: 139.6 [I.V.:139.6]  Out: 5000 [Urine:5000]    CBC:   Recent Labs     05/11/21  0621 05/12/21  0507 05/13/21  0347   WBC 14.5* 13.7* 13.4*   HGB 12.3* 11.7* 13.1*    138* 143          Recent Labs     05/11/21  0621 05/12/21  0507 05/13/21  0347   * 138 135   K 3.8 3.6 3.6   CL 92* 98* 92*   CO2 23 25 28   BUN 60* 68* 74*   CREATININE 4.8* 5.0* 5.4*   GLUCOSE 119* 107* 103*       Lab Results   Component Value Date    CALCIUM 8.8 05/13/2021    PHOS 5.6 (H) 05/13/2021       Objective:     Vitals: BP (!) 135/92   Pulse 66   Temp 98.3 °F (36.8 °C) (Oral)   Resp 20   Ht 5' 8\" (1.727 m)   Wt 240 lb 4.8 oz (109 kg)   SpO2 94%   BMI 36.54 kg/m²     General appearance:  No distress   HEENT:  No gross conj pallor   Neck:  Supple   Lungs:  Coarse BS  Heart:  Seems RRR  Abdomen: soft  Extremities:  No edema       Problem List :         Impression :     1.  DAX- CKD stage 4 A3 - sec to CRS / ACOPDE etc - UOP 5 l/d ans he is cramping likely from too much diureitcs - so hold all diuretics for today except MRA - let the \" plasma refill \"  2. ADHF- better by sx   3. ACOPDE- and diffuse and several atherosclerotic dz -   4. HTN  Acceptable     Recommendation/Plan  :     1. Hold loop- thiazide   2. D/C amiloride   3. Keep aldactone   4. Watch UOP  daily    5. Wt'  Daily  6. low salt  7.  Fluid restriction to 1.5 l/d   8. Labs in am       Edna Tate MD

## 2021-05-14 ENCOUNTER — APPOINTMENT (OUTPATIENT)
Dept: ULTRASOUND IMAGING | Age: 66
DRG: 291 | End: 2021-05-14
Payer: MEDICARE

## 2021-05-14 ENCOUNTER — APPOINTMENT (OUTPATIENT)
Dept: INTERVENTIONAL RADIOLOGY/VASCULAR | Age: 66
DRG: 291 | End: 2021-05-14
Payer: MEDICARE

## 2021-05-14 ENCOUNTER — APPOINTMENT (OUTPATIENT)
Dept: GENERAL RADIOLOGY | Age: 66
DRG: 291 | End: 2021-05-14
Payer: MEDICARE

## 2021-05-14 ENCOUNTER — APPOINTMENT (OUTPATIENT)
Dept: CT IMAGING | Age: 66
DRG: 291 | End: 2021-05-14
Payer: MEDICARE

## 2021-05-14 LAB
ALBUMIN SERPL-MCNC: 3.7 GM/DL (ref 3.4–5)
ANION GAP SERPL CALCULATED.3IONS-SCNC: 16 MMOL/L (ref 4–16)
BACTERIA: NEGATIVE /HPF
BASOPHILS ABSOLUTE: 0 K/CU MM
BASOPHILS RELATIVE PERCENT: 0.3 % (ref 0–1)
BILIRUBIN URINE: NEGATIVE MG/DL
BLOOD, URINE: ABNORMAL
BUN BLDV-MCNC: 76 MG/DL (ref 6–23)
CALCIUM SERPL-MCNC: 8.7 MG/DL (ref 8.3–10.6)
CHLORIDE BLD-SCNC: 91 MMOL/L (ref 99–110)
CLARITY: CLEAR
CO2: 28 MMOL/L (ref 21–32)
COLOR: YELLOW
CREAT SERPL-MCNC: 5.8 MG/DL (ref 0.9–1.3)
DIFFERENTIAL TYPE: ABNORMAL
EOSINOPHILS ABSOLUTE: 0.3 K/CU MM
EOSINOPHILS RELATIVE PERCENT: 2.6 % (ref 0–3)
GFR AFRICAN AMERICAN: 12 ML/MIN/1.73M2
GFR NON-AFRICAN AMERICAN: 10 ML/MIN/1.73M2
GLUCOSE BLD-MCNC: 119 MG/DL (ref 70–99)
GLUCOSE, URINE: NEGATIVE MG/DL
HCT VFR BLD CALC: 40.4 % (ref 42–52)
HEMOGLOBIN: 13.4 GM/DL (ref 13.5–18)
HIGH SENSITIVE C-REACTIVE PROTEIN: 94.6 MG/L
IMMATURE NEUTROPHIL %: 0.4 % (ref 0–0.43)
KETONES, URINE: NEGATIVE MG/DL
LEUKOCYTE ESTERASE, URINE: ABNORMAL
LYMPHOCYTES ABSOLUTE: 1.3 K/CU MM
LYMPHOCYTES RELATIVE PERCENT: 11.5 % (ref 24–44)
MAGNESIUM: 2.3 MG/DL (ref 1.8–2.4)
MCH RBC QN AUTO: 30.6 PG (ref 27–31)
MCHC RBC AUTO-ENTMCNC: 33.2 % (ref 32–36)
MCV RBC AUTO: 92.2 FL (ref 78–100)
MONOCYTES ABSOLUTE: 0.9 K/CU MM
MONOCYTES RELATIVE PERCENT: 8.2 % (ref 0–4)
NITRITE URINE, QUANTITATIVE: NEGATIVE
NUCLEATED RBC %: 0 %
PDW BLD-RTO: 14 % (ref 11.7–14.9)
PH, URINE: 7 (ref 5–8)
PHOSPHORUS: 5.2 MG/DL (ref 2.5–4.9)
PLATELET # BLD: 155 K/CU MM (ref 140–440)
PMV BLD AUTO: 9.5 FL (ref 7.5–11.1)
POTASSIUM SERPL-SCNC: 3.5 MMOL/L (ref 3.5–5.1)
PRO-BNP: 1831 PG/ML
PROCALCITONIN: 5.53
PROTEIN UA: 100 MG/DL
RBC # BLD: 4.38 M/CU MM (ref 4.6–6.2)
RBC URINE: 1 /HPF (ref 0–3)
SEGMENTED NEUTROPHILS ABSOLUTE COUNT: 8.6 K/CU MM
SEGMENTED NEUTROPHILS RELATIVE PERCENT: 77 % (ref 36–66)
SODIUM BLD-SCNC: 135 MMOL/L (ref 135–145)
SPECIFIC GRAVITY UA: 1.01 (ref 1–1.03)
TOTAL IMMATURE NEUTOROPHIL: 0.04 K/CU MM
TOTAL NUCLEATED RBC: 0 K/CU MM
TRICHOMONAS: ABNORMAL /HPF
UROBILINOGEN, URINE: NEGATIVE MG/DL (ref 0.2–1)
WBC # BLD: 11.2 K/CU MM (ref 4–10.5)
WBC UA: 1 /HPF (ref 0–2)

## 2021-05-14 PROCEDURE — 87205 SMEAR GRAM STAIN: CPT

## 2021-05-14 PROCEDURE — 94761 N-INVAS EAR/PLS OXIMETRY MLT: CPT

## 2021-05-14 PROCEDURE — 6370000000 HC RX 637 (ALT 250 FOR IP): Performed by: INTERNAL MEDICINE

## 2021-05-14 PROCEDURE — 81001 URINALYSIS AUTO W/SCOPE: CPT

## 2021-05-14 PROCEDURE — 84145 PROCALCITONIN (PCT): CPT

## 2021-05-14 PROCEDURE — 71250 CT THORAX DX C-: CPT

## 2021-05-14 PROCEDURE — 83735 ASSAY OF MAGNESIUM: CPT

## 2021-05-14 PROCEDURE — 36415 COLL VENOUS BLD VENIPUNCTURE: CPT

## 2021-05-14 PROCEDURE — 2580000003 HC RX 258: Performed by: INTERNAL MEDICINE

## 2021-05-14 PROCEDURE — 2709999900 HC NON-CHARGEABLE SUPPLY

## 2021-05-14 PROCEDURE — 6360000002 HC RX W HCPCS: Performed by: INTERNAL MEDICINE

## 2021-05-14 PROCEDURE — 71045 X-RAY EXAM CHEST 1 VIEW: CPT

## 2021-05-14 PROCEDURE — 93971 EXTREMITY STUDY: CPT

## 2021-05-14 PROCEDURE — 2700000000 HC OXYGEN THERAPY PER DAY

## 2021-05-14 PROCEDURE — 87070 CULTURE OTHR SPECIMN AEROBIC: CPT

## 2021-05-14 PROCEDURE — 94640 AIRWAY INHALATION TREATMENT: CPT

## 2021-05-14 PROCEDURE — 85025 COMPLETE CBC W/AUTO DIFF WBC: CPT

## 2021-05-14 PROCEDURE — 83880 ASSAY OF NATRIURETIC PEPTIDE: CPT

## 2021-05-14 PROCEDURE — 80069 RENAL FUNCTION PANEL: CPT

## 2021-05-14 PROCEDURE — C1729 CATH, DRAINAGE: HCPCS

## 2021-05-14 PROCEDURE — 86141 C-REACTIVE PROTEIN HS: CPT

## 2021-05-14 PROCEDURE — 99232 SBSQ HOSP IP/OBS MODERATE 35: CPT | Performed by: INTERNAL MEDICINE

## 2021-05-14 PROCEDURE — 2140000000 HC CCU INTERMEDIATE R&B

## 2021-05-14 PROCEDURE — 76604 US EXAM CHEST: CPT

## 2021-05-14 RX ORDER — DOCUSATE SODIUM 100 MG/1
100 CAPSULE, LIQUID FILLED ORAL 2 TIMES DAILY
Status: DISCONTINUED | OUTPATIENT
Start: 2021-05-14 | End: 2021-05-18 | Stop reason: HOSPADM

## 2021-05-14 RX ADMIN — FAMOTIDINE 20 MG: 20 TABLET, FILM COATED ORAL at 09:19

## 2021-05-14 RX ADMIN — CARVEDILOL 25 MG: 25 TABLET, FILM COATED ORAL at 22:06

## 2021-05-14 RX ADMIN — HEPARIN SODIUM 5000 UNITS: 5000 INJECTION INTRAVENOUS; SUBCUTANEOUS at 06:32

## 2021-05-14 RX ADMIN — AMLODIPINE BESYLATE 10 MG: 5 TABLET ORAL at 09:19

## 2021-05-14 RX ADMIN — HEPARIN SODIUM 5000 UNITS: 5000 INJECTION INTRAVENOUS; SUBCUTANEOUS at 22:06

## 2021-05-14 RX ADMIN — ALBUTEROL SULFATE 2 PUFF: 90 AEROSOL, METERED RESPIRATORY (INHALATION) at 08:40

## 2021-05-14 RX ADMIN — ALPRAZOLAM 0.25 MG: 0.25 TABLET ORAL at 09:24

## 2021-05-14 RX ADMIN — AZELASTINE HYDROCHLORIDE 1 SPRAY: 137 SPRAY, METERED NASAL at 00:17

## 2021-05-14 RX ADMIN — ASPIRIN 81 MG: 81 TABLET, COATED ORAL at 09:19

## 2021-05-14 RX ADMIN — CARVEDILOL 25 MG: 25 TABLET, FILM COATED ORAL at 09:19

## 2021-05-14 RX ADMIN — SODIUM CHLORIDE, PRESERVATIVE FREE 10 ML: 5 INJECTION INTRAVENOUS at 22:07

## 2021-05-14 RX ADMIN — Medication 2000 UNITS: at 09:19

## 2021-05-14 RX ADMIN — ALPRAZOLAM 0.25 MG: 0.25 TABLET ORAL at 22:12

## 2021-05-14 RX ADMIN — SODIUM CHLORIDE, PRESERVATIVE FREE 10 ML: 5 INJECTION INTRAVENOUS at 09:19

## 2021-05-14 RX ADMIN — CITALOPRAM HYDROBROMIDE 10 MG: 20 TABLET ORAL at 09:19

## 2021-05-14 RX ADMIN — FOLIC ACID 1 MG: 1 TABLET ORAL at 09:19

## 2021-05-14 RX ADMIN — BUDESONIDE AND FORMOTEROL FUMARATE DIHYDRATE 2 PUFF: 80; 4.5 AEROSOL RESPIRATORY (INHALATION) at 08:40

## 2021-05-14 RX ADMIN — DOCUSATE SODIUM 100 MG: 100 CAPSULE, LIQUID FILLED ORAL at 22:05

## 2021-05-14 RX ADMIN — ALBUTEROL SULFATE 2 PUFF: 90 AEROSOL, METERED RESPIRATORY (INHALATION) at 11:39

## 2021-05-14 RX ADMIN — CEFEPIME HYDROCHLORIDE 500 MG: 1 INJECTION, POWDER, FOR SOLUTION INTRAMUSCULAR; INTRAVENOUS at 13:46

## 2021-05-14 RX ADMIN — ALBUTEROL SULFATE 2 PUFF: 90 AEROSOL, METERED RESPIRATORY (INHALATION) at 20:49

## 2021-05-14 RX ADMIN — DOXYCYCLINE HYCLATE 100 MG: 100 TABLET, COATED ORAL at 09:19

## 2021-05-14 RX ADMIN — Medication 2 PUFF: at 11:39

## 2021-05-14 RX ADMIN — HYDROCODONE BITARTRATE AND ACETAMINOPHEN 1 TABLET: 5; 325 TABLET ORAL at 09:24

## 2021-05-14 RX ADMIN — MONTELUKAST 10 MG: 10 TABLET, FILM COATED ORAL at 22:05

## 2021-05-14 RX ADMIN — GUAIFENESIN 600 MG: 600 TABLET, EXTENDED RELEASE ORAL at 09:19

## 2021-05-14 RX ADMIN — Medication 2 PUFF: at 08:40

## 2021-05-14 RX ADMIN — Medication 2 PUFF: at 20:50

## 2021-05-14 RX ADMIN — BUDESONIDE AND FORMOTEROL FUMARATE DIHYDRATE 2 PUFF: 80; 4.5 AEROSOL RESPIRATORY (INHALATION) at 20:49

## 2021-05-14 RX ADMIN — SPIRONOLACTONE 25 MG: 25 TABLET ORAL at 09:19

## 2021-05-14 RX ADMIN — DOXYCYCLINE HYCLATE 100 MG: 100 TABLET, COATED ORAL at 22:05

## 2021-05-14 RX ADMIN — HEPARIN SODIUM 5000 UNITS: 5000 INJECTION INTRAVENOUS; SUBCUTANEOUS at 13:46

## 2021-05-14 RX ADMIN — GUAIFENESIN 600 MG: 600 TABLET, EXTENDED RELEASE ORAL at 22:05

## 2021-05-14 RX ADMIN — HYDROCODONE BITARTRATE AND ACETAMINOPHEN 1 TABLET: 5; 325 TABLET ORAL at 22:12

## 2021-05-14 ASSESSMENT — PAIN DESCRIPTION - DESCRIPTORS
DESCRIPTORS: ACHING;DISCOMFORT;CRAMPING
DESCRIPTORS: ACHING;DISCOMFORT;THROBBING

## 2021-05-14 ASSESSMENT — PAIN SCALES - GENERAL
PAINLEVEL_OUTOF10: 0
PAINLEVEL_OUTOF10: 8
PAINLEVEL_OUTOF10: 8
PAINLEVEL_OUTOF10: 0
PAINLEVEL_OUTOF10: 6

## 2021-05-14 ASSESSMENT — PAIN DESCRIPTION - PROGRESSION
CLINICAL_PROGRESSION: NOT CHANGED

## 2021-05-14 ASSESSMENT — PAIN DESCRIPTION - PAIN TYPE: TYPE: ACUTE PAIN

## 2021-05-14 ASSESSMENT — PAIN DESCRIPTION - ORIENTATION
ORIENTATION: OTHER (COMMENT)
ORIENTATION: RIGHT;OTHER (COMMENT)

## 2021-05-14 ASSESSMENT — PAIN DESCRIPTION - ONSET: ONSET: ON-GOING

## 2021-05-14 ASSESSMENT — PAIN - FUNCTIONAL ASSESSMENT: PAIN_FUNCTIONAL_ASSESSMENT: PREVENTS OR INTERFERES SOME ACTIVE ACTIVITIES AND ADLS

## 2021-05-14 ASSESSMENT — PAIN DESCRIPTION - FREQUENCY: FREQUENCY: CONTINUOUS

## 2021-05-14 NOTE — PROGRESS NOTES
Cardiology Progress Note       Elida Garcia is a 77 y.o. male   1955     SUBJECTIVE:   Seen and examined no chest pain for thoracentesis there was not enough fluid for thoracentesis rhythm is sinus  5/13  Patient seen and examined currently on BiPAP no chest pain  5/14  Patient seen and examined said he feels much better  OBJECTIVE:    Review of Systems:  General appearance: alert, appears stated age and cooperative  Skin: Skin color, texture, normal. No rashes or lesions  HEENT: No nose bleed, headache, vision problems  CV: C/O chest pain, tightness, pressure,   Respiratory: C/o no SOB, WRIGHT, Orthopnea, PND  GI: No abdominal pain, black stool, bloating  Limbs: No c/o edema, pain, swelling, intermittent claudication, joint pains  Neuro: No dizziness, lightheadedness, syncope, gait problems, memory problems  Psych: grossly normal. No SI/depression. Vitals:   Blood pressure 121/77, pulse 79, temperature 98.6 °F (37 °C), temperature source Oral, resp. rate 16, height 5' 8\" (1.727 m), weight 240 lb 6.4 oz (109 kg), SpO2 94 %.     HEENT: AT, NC, PERRLA  Neck: No JVD  Heart: S1 S2 audible, no murmur   Lungs: CTA   Abdomen: Nontender   Limbs: No edema   CNS: no focal deficit      Past Medical History:   Diagnosis Date    Acid reflux     [facility]) [facility]     with admission 12/18/2015- consult done with Dr Abbe Chaney Back pain     \"Lower Back\"    Chronic bronchitis ([address]     Chronic kidney disease     COPD (chronic obstructive pulmonary disease) ([address]     NO PULMONOLOGIST AT THIS TIME    Emphysema/COPD ([facility]     NO PULMONOLOGIST AT THIS TIME    H. pylori infection Dx 1990's    Hiatal hernia     History of blood transfusion 1987    NO REACTION TO BLOOD TRANSFUSION RECEIVED    History of respiratory failure     following surgery 12/18/2015- pt developed resp failure- placed on ventilator-unable to wean of vent- had trachesotomy tube placed 1/3/2015- off vent 1/7/2015( discharged 1/13/2015)\"per wife went to [place] after discharg and had to be sent to [facility] after had bleeding around the trach- they said the trach was to big- put back on ventilator for 24 hrs- then there for about a week then sent to ARU 2/15    [place] (hard of hearing)     Bilateral Ears    HX OTHER MEDICAL     Primary Care Physician Is Dr. Antoine Sharpe Right Renal Artery    HX OTHER MEDICAL     \"Dr. Arianne De Luna One Of My Kidneys Is Dead\"   [facility] [place]     \"See Dr. Salena Antoine For Blood Being Too Thick\"(per old chart pt dx with polycythemia in 1990's and has had several phlebotomies in the past(pc)( per wife Dr Melinda Briceño now says he does not really have polycythemia just from the COPD\"    [address] [place] [address] 6*/25/2015    \"has drainage , clear drainage, since he was in ARU in Feb 2015, under left shoulder, arm pit area\"    Hypertension     Lung nodule \"MRI, PET SCAN\"    \"Right Lung\"    Pneumonia \"Last Episode Early 2000's\"    \"At Least Twice\"( with admission 12/18/2015)    Shortness of breath on exertion     Teeth missing     Upper And Lower    Wears glasses         Patient Active Problem List   Diagnosis    Choledocholithiasis    Chronic kidney disease (CKD), stage III (moderate)    Abnormality of lung on CXR    S/P BKA (below knee amputation) unilateral (HCC)    Cholangitis    Acute calculous cholecystitis    Abdominal adhesions    Atrophic kidney    [facility]    HTN (hypertension)    PAD (peripheral artery disease) ([facility]    Proteinuria    Chronic kidney disease (CKD) stage G3b/A3, moderately decreased glomerular filtration rate (GFR) between 30-44 mL/min/1.73 square meter and albuminuria creatinine ratio greater than 407 mg/g    Metabolic acidosis    ASCVD (arteriosclerotic cardiovascular disease)    Acute on chronic respiratory failure with hypoxia (HCC)        Allergies   Allergen Reactions    Penicillins Swelling    Lorazepam      Other reaction(s):  Other - comment required  hallucination        Current Inpatient Medications:    Current Facility-Administered Medications   Medication Dose Route Frequency Provider Last Rate Last Admin    albuterol sulfate  (90 Base) MCG/ACT inhaler 2 puff  2 puff Inhalation 4x daily Meera Corcoran MD   2 puff at 05/14/21 0840    ipratropium (ATROVENT HFA) 17 MCG/ACT inhaler 2 puff  2 puff Inhalation 4x daily Meera Corcoran MD   2 puff at 05/14/21 0840    spironolactone (ALDACTONE) tablet 25 mg  25 mg Oral Daily João Cardenas MD   25 mg at 05/14/21 0919    ALPRAZolam (XANAX) tablet 0.25 mg  0.25 mg Oral TID PRN Tamera Monge MD   0.25 mg at 05/14/21 0924    amLODIPine (NORVASC) tablet 10 mg  10 mg Oral Daily Tamera Monge MD   10 mg at 05/14/21 0919    aspirin EC tablet 81 mg  81 mg Oral QAM Tamera Monge MD   81 mg at 05/14/21 0919    azelastine (ASTELIN) 0.1 % nasal spray 1 spray  1 spray Each Nostril BID Tamera Monge MD   1 spray at 05/14/21 0017    carvedilol (COREG) tablet 25 mg  25 mg Oral BID Tamera Monge MD   25 mg at 05/14/21 0919    vitamin D CAPS 2,000 Units  2,000 Units Oral Daily Tamera Monge MD   2,000 Units at 05/14/21 0919    citalopram (CELEXA) tablet 10 mg  10 mg Oral Daily Tamera Monge MD   10 mg at 05/14/21 0919    budesonide-formoterol (SYMBICORT) 80-4.5 MCG/ACT inhaler 2 puff  2 puff Inhalation BID Tamera Monge MD   2 puff at 89/46/71 5187    folic acid (FOLVITE) tablet 1 mg  1 mg Oral Daily Tamera Monge MD   1 mg at 05/14/21 0919    HYDROcodone-acetaminophen (Yoshi Free) 5-325 MG per tablet 1 tablet  1 tablet Oral Q6H PRN Tamera Monge MD   1 tablet at 05/14/21 0924    montelukast (SINGULAIR) tablet 10 mg  10 mg Oral Nightly Tamera Monge MD   10 mg at 05/13/21 2227    sodium chloride flush 0.9 % injection 5-40 mL  5-40 mL Intravenous 2 times per day Tamera Monge MD   10 mL at 05/14/21 6932    05/14/2021    CL 91 05/14/2021    CO2 28 05/14/2021    BUN 76 05/14/2021    CREATININE 5.8 05/14/2021    GFRAA 12 05/14/2021    LABGLOM 10 05/14/2021    GLUCOSE 119 05/14/2021    PROT 5.8 05/11/2021    LABALBU 3.7 05/14/2021    CALCIUM 8.7 05/14/2021    BILITOT 0.3 05/11/2021    ALKPHOS 52 05/11/2021    AST 12 05/11/2021    ALT 14 05/11/2021     Hepatic Function Panel:    Lab Results   Component Value Date    ALKPHOS 52 05/11/2021    ALT 14 05/11/2021    AST 12 05/11/2021    PROT 5.8 05/11/2021    BILITOT 0.3 05/11/2021    BILIDIR 0.2 02/27/2017    IBILI 0.3 02/27/2017    LABALBU 3.7 05/14/2021     Magnesium:    Lab Results   Component Value Date    MG 2.3 05/14/2021     PT/INR:    Lab Results   Component Value Date    PROTIME 10.5 05/12/2021    INR 0.87 05/12/2021     Last 3 Troponin:  No results found for: TROPONINI  U/A:    Lab Results   Component Value Date    COLORU COLORLESS 05/11/2021    PHUR 6.0 12/06/2019    WBCUA NONE SEEN 05/11/2021    RBCUA NONE SEEN 05/11/2021    MUCUS RARE 05/11/2021    TRICHOMONAS NONE SEEN 05/11/2021    YEAST FEW 01/09/2015    BACTERIA NEGATIVE 05/11/2021    CLARITYU CLEAR 05/11/2021    SPECGRAV 1.004 05/11/2021    LEUKOCYTESUR NEGATIVE 05/11/2021    UROBILINOGEN NEGATIVE 05/11/2021    BILIRUBINUR NEGATIVE 05/11/2021    BLOODU SMALL 05/11/2021    GLUCOSEU Negative 12/06/2019     ABG:    Lab Results   Component Value Date    ZRP9LIQ 39.0 02/27/2017    PO2ART 115 02/27/2017    BYK4QOR 19.6 02/27/2017     FLP:    Lab Results   Component Value Date    TRIG 55 05/12/2021     05/12/2021    LDLDIRECT 46 05/12/2021     TSH:  No results found for: TSH   DATA:   ECG: Sinus Rhythm       ASSESSMENT:   1 history of a left axillary femoropopliteal bypass  No claudication    2 hypertension  Currently well controlled    3 acute on chronic systolic heart failure currently on Lasix drip from nephrology  Patient clinically much better    4 obesity with obstructive sleep apnea    5  PVD    6 Acute on chronic kidney injury creatinine currently 5.4 nephrology following  7 high cholesterol  PLAN   Continue medical treatment  Stable from cardiology standpoint

## 2021-05-14 NOTE — PROGRESS NOTES
Nephrology Progress Note  5/14/2021 9:23 AM        Subjective:   Admit Date: 5/11/2021  PCP: Margoth Jordan, DO    Interval History: breathing better     Diet: better    ROS:  Off BIPAP- with O2 per Nc  UOP 2200- off all diureitcs except MRA  No cramps now     Data:     Current meds:    albuterol sulfate HFA  2 puff Inhalation 4x daily    ipratropium  2 puff Inhalation 4x daily    spironolactone  25 mg Oral Daily    amLODIPine  10 mg Oral Daily    aspirin EC  81 mg Oral QAM    azelastine  1 spray Each Nostril BID    carvedilol  25 mg Oral BID    vitamin D  2,000 Units Oral Daily    citalopram  10 mg Oral Daily    budesonide-formoterol  2 puff Inhalation BID    folic acid  1 mg Oral Daily    montelukast  10 mg Oral Nightly    sodium chloride flush  5-40 mL Intravenous 2 times per day    doxycycline hyclate  100 mg Oral 2 times per day    guaiFENesin  600 mg Oral BID    famotidine  20 mg Oral Daily    heparin (porcine)  5,000 Units Subcutaneous 3 times per day      sodium chloride           I/O last 3 completed shifts:   In: 780 [P.O.:780]  Out: 2200 [Urine:2200]    CBC:   Recent Labs     05/12/21  0507 05/13/21  0347 05/14/21  0459   WBC 13.7* 13.4* 11.2*   HGB 11.7* 13.1* 13.4*   * 143 155          Recent Labs     05/12/21  0507 05/13/21  0347 05/14/21  0459    135 135   K 3.6 3.6 3.5   CL 98* 92* 91*   CO2 25 28 28   BUN 68* 74* 76*   CREATININE 5.0* 5.4* 5.8*   GLUCOSE 107* 103* 119*       Lab Results   Component Value Date    CALCIUM 8.7 05/14/2021    PHOS 5.2 (H) 05/14/2021       Objective:     Vitals: /77   Pulse 79   Temp 98.6 °F (37 °C) (Oral)   Resp 16   Ht 5' 8\" (1.727 m)   Wt 240 lb 6.4 oz (109 kg)   SpO2 94%   BMI 36.55 kg/m²     General appearance:  Off BIPAP- HOB elevated 45 deg - no ac distress  HEENT:  No conj pallor  Neck:  supple  Lungs:  + crackles- ADV   Bs  Heart:  RRR  Abdomen: soft  Extremities:  Lt BKA  Rt leg no overt edema       Problem List : Impression :     1. DAX- CKD stage 4 a3- good UOP_ sec to CRS- ACOPDE etc - cr up - but He breaths better- likely has delayed \" capillary plasma refill- redo UA   2. Cr will plateau soon -   3. ADHF-ACOP DE  4. AS CVD- HTN - proteinuria etc     Recommendation/Plan  :     1. Hold all but MRA  2. Watch UOP  3. Labs in am   4. expect his cr to plateau  soon in 1-2 days   5. Follow clinically   6.  Repeat Kiarra Andrade MD

## 2021-05-14 NOTE — PROGRESS NOTES
Pulmonary and Critical Care  Progress Note      VITALS:  /77   Pulse 79   Temp 98.6 °F (37 °C) (Oral)   Resp 16   Ht 5' 8\" (1.727 m)   Wt 240 lb 6.4 oz (109 kg)   SpO2 94%   BMI 36.55 kg/m²     Subjective:   CHIEF COMPLAINT :SOB     HPI:                The patient is lying in the bed. He is in mild reps distress    Objective:   PHYSICAL EXAM:    LUNGS:Decreased air entry left base  Abd-soft, BS+,NT  Ext- no pedal edema  CVS-s1s2,no murmurs      DATA:    CBC:  Recent Labs     05/12/21  0507 05/13/21 0347 05/14/21  0459   WBC 13.7* 13.4* 11.2*   RBC 3.80* 4.16* 4.38*   HGB 11.7* 13.1* 13.4*   HCT 37.4* 38.5* 40.4*   * 143 155   MCV 98.4 92.5 92.2   MCH 30.8 31.5* 30.6   MCHC 31.3* 34.0 33.2   RDW 14.7 14.4 14.0   SEGSPCT 88.2* 81.3* 77.0*      BMP:  Recent Labs     05/12/21  0507 05/13/21 0347 05/14/21  0459    135 135   K 3.6 3.6 3.5   CL 98* 92* 91*   CO2 25 28 28   BUN 68* 74* 76*   CREATININE 5.0* 5.4* 5.8*   CALCIUM 8.4 8.8 8.7   GLUCOSE 107* 103* 119*      ABG:  No results for input(s): PH, PO2ART, OLG7TRW, HCO3, BEART, O2SAT in the last 72 hours. BNP  No results found for: BNP   D-Dimer:  No results found for: DDIMER   1. Radiology: Larger left pleural effusion with decreased aeration of the left lung apex. Assessment/Plan     Patient Active Problem List    Diagnosis Date Noted    Choledocholithiasis 02/26/2017     Priority: High    Chronic kidney disease (CKD), stage III (moderate) 02/26/2017     Priority: Medium     Overview Note:     Overview:   CT in 2016 shows chronic occlusion of R renal artery, left renal artery is patent. 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       Acute on chronic respiratory failure with hypoxia (HCC) 05/11/2021    ASCVD (arteriosclerotic cardiovascular disease) 03/01/2019    Proteinuria 05/23/2017    Chronic kidney disease (CKD) stage G3b/A3, moderately decreased glomerular filtration rate (GFR) between 30-44 mL/min/1.73

## 2021-05-14 NOTE — PROGRESS NOTES
Hospitalist Progress Note         Admit Date: 5/11/2021    PCP: Agatha Hudson DO     Chief Complaint   Patient presents with    Respiratory Distress        Assessment and Plan:     -Acute and chronic respiratory failure with hypoxia (HCC) multifactorial.  Keep on BiPAP. -Acute on chronic diastolic heart failure improving on Lasix IV, metolazone. Good BP control. Echocardiogram and cardiology evaluation ordered. Replace electrolytes as needed.  -Possible pneumonia/atelectasis CT chest pending. Start meropenem besides doxy. Continue to trend inflammatory markers and sputum C/S results. No fluid on ultrasound to drain.  -Acute kidney injury on stage IV CKD improved on Lasix drip, metolazone, amiloride per nephro. Lasix drip as well as other meds discontinued today. Continue Aldactone. -COPD with minimal exacerbation doxy, duo nebs and BiPAP support. Pulmonology on board. -Mild troponin elevation from kidney injury. Trend tropes. Continue home aspirin. -HTN uncontrolled continue home Norvasc, Coreg. Adjust medication per hospital course.  -Sleep apnea continue BiPAP support as needed and at night.      DVT prophylaxis: Heparin.   DC home depending on hospital progress    Current Facility-Administered Medications   Medication Dose Route Frequency Provider Last Rate Last Admin    albuterol sulfate  (90 Base) MCG/ACT inhaler 2 puff  2 puff Inhalation 4x daily Brenna Angeles MD   2 puff at 05/13/21 2034    ipratropium (ATROVENT HFA) 17 MCG/ACT inhaler 2 puff  2 puff Inhalation 4x daily Brenna Angeles MD   2 puff at 05/13/21 2035    spironolactone (ALDACTONE) tablet 25 mg  25 mg Oral Daily Caden Roque MD   25 mg at 05/13/21 1011    ALPRAZolam Clarise Jaegers) tablet 0.25 mg  0.25 mg Oral TID PRN Farzana Golden MD   0.25 mg at 05/13/21 2244    amLODIPine (NORVASC) tablet 10 mg  10 mg Oral Daily Farzana Golden MD   10 mg at 05/13/21 1011    aspirin EC tablet 81 mg  81 mg Oral QAM Guerrero Nassar MD   81 mg at 05/13/21 1012    azelastine (ASTELIN) 0.1 % nasal spray 1 spray  1 spray Each Nostril BID Guerrero Nassar MD   1 spray at 05/14/21 0017    carvedilol (COREG) tablet 25 mg  25 mg Oral BID Guerrero Nassar MD   25 mg at 05/13/21 2229    vitamin D CAPS 2,000 Units  2,000 Units Oral Daily Guerrero Nassar MD   2,000 Units at 05/13/21 1011    citalopram (CELEXA) tablet 10 mg  10 mg Oral Daily Guerrero Nassar MD   10 mg at 05/13/21 1012    budesonide-formoterol (SYMBICORT) 80-4.5 MCG/ACT inhaler 2 puff  2 puff Inhalation BID Guerrero Nassar MD   2 puff at 77/38/10 8211    folic acid (FOLVITE) tablet 1 mg  1 mg Oral Daily Guerrero Nassar MD   1 mg at 05/13/21 1012    HYDROcodone-acetaminophen ([address] [place] [address]) 5-325 MG per tablet 1 tablet  1 tablet Oral Q6H PRN Guerrero Nassar MD   1 tablet at 05/13/21 2244    montelukast (SINGULAIR) tablet 10 mg  10 mg Oral Nightly Guerrero Nassar MD   10 mg at 05/13/21 2229    sodium chloride flush 0.9 % injection 5-40 mL  5-40 mL Intravenous 2 times per day Guerrero Nassar MD   10 mL at 05/13/21 2230    sodium chloride flush 0.9 % injection 5-40 mL  5-40 mL Intravenous PRN Guerrero Nassar MD        0.9 % sodium chloride infusion  25 mL Intravenous PRN Guerrero Nassar MD        promethazine (PHENERGAN) tablet 12.5 mg  12.5 mg Oral Q6H PRN Guerrero Nassar MD        Or    ondansetron [facility]) injection 4 mg  4 mg Intravenous Q6H PRN Guerrero Nassar MD        polyethylene glycol [facility]) packet 17 g  17 g Oral Daily PRN Guerrero Nassar MD        acetaminophen (TYLENOL) tablet 650 mg  650 mg Oral Q6H PRN Guerrero Nassar MD        Or    acetaminophen (TYLENOL) suppository 650 mg  650 mg Rectal Q6H PRN Guerrero Nassar MD        doxycycline hyclate (VIBRA-TABS) tablet 100 mg  100 mg Oral 2 times per day Guerrero Nassar MD   100 mg at 05/13/21 2230    guaiFENesin (MUCINEX) extended release tablet 600 mg  600 mg Oral BID Guerrero Nassar MD Results   Component Value Date    LABA1C 5.4 12/19/2014     CARDIAC ENZYMES  No results for input(s): CKTOTAL, CKMB, CKMBINDEX, TROPONINI in the last 72 hours. Troponin:   Recent Labs     05/11/21  0947 05/11/21  1422   TROPONINT 0.025* 0.018*     BNP:   Recent Labs     05/14/21  0459   PROBNP 1,831*     U/A:    Lab Results   Component Value Date    COLORU COLORLESS 05/11/2021    WBCUA NONE SEEN 05/11/2021    RBCUA NONE SEEN 05/11/2021    MUCUS RARE 05/11/2021    BACTERIA NEGATIVE 05/11/2021    CLARITYU CLEAR 05/11/2021    SPECGRAV 1.004 05/11/2021    LEUKOCYTESUR NEGATIVE 05/11/2021    BLOODU SMALL 05/11/2021    GLUCOSEU Negative 12/06/2019       Echocardiogram Complete 2d With Doppler With Color    Result Date: 5/11/2021  Transthoracic Echocardiography Report (TTE)  Demographics   Patient Name       Cherise Ganser S     Date of Study       05/11/2021   Date of Birth      1955         Gender              Male   Age                77 year(s)         Race                   Patient Number     [id number]         Room Number         [facility]   Visit Number       [id number]   Corporate ID       [id number]   Accession Number   [id number]         Gia Francis RDMS   Ordering Physician Emily Torres      Interpreting        Chris Fink MD                     [facility] MD           Physician  Procedure Type of Study   TTE procedure:ECHOCARDIOGRAM COMPLETE 2D W DOPPLER W COLOR. Procedure Date Date: 05/11/2021 Start: 09:38 AM Study Location: ER Technical Quality: Fair visualization Indications:Congestive heart failure. Patient Status: Routine Height: 68 inches Weight: 246 pounds BSA: 2.23 m2 BMI: 37.4 kg/m2 HR: 84 bpm BP: 159/91 mmHg  Conclusions   Summary  Left ventricular systolic function is normal.  Ejection fraction is visually estimated at 50 %. Moderate left ventricular hypertrophy. Mildly dilated left atrium.   Heavily calcified right coronary cusp; Mean PG 8 mmHg. No evidence of any pericardial effusion. Signature   ------------------------------------------------------------------  Electronically signed by Julio Cesar Ellis MD (Interpreting  physician) on 05/11/2021 at 05:32 PM  ------------------------------------------------------------------   Findings   Left Ventricle  Left ventricular systolic function is normal.  Ejection fraction is visually estimated at 50%. Moderate left ventricular hypertrophy. Left Atrium  Mildly dilated left atrium. Right Atrium  Essentially normal right atrium. Right Ventricle  Essentially normal right ventricle. Aortic Valve  Heavily calcified right coronary cusp; Mean PG 8 mmHg. Trivial aortic regurgitation. Mitral Valve  Trace mitral regurgitation. Tricuspid Valve  Trace tricuspid regurgitation; normal RVSP. Pulmonic Valve  The pulmonic valve was not well visualized. Pericardial Effusion  No evidence of any pericardial effusion. Pleural Effusion  No evidence of pleural effusion. Miscellaneous  Pericardial fat pad visualized.   M-Mode/2D Measurements & Calculations   LV Diastolic Dimension:  LV Systolic Dimension:  LA Dimension: 3.3 cmAO Root  4.26 cm                  3.12 cm                 Dimension: 3.7 cmLA Area:  LV FS:26.8 %             LV Volume Diastolic:    75.6 cm2  LV PW Diastolic: 1.9 cm  890 ml  LV PW Systolic: 2.2 cm   LV Volume Systolic: 53  Septum Diastolic: 0.33   ml  cm                       LV EDV/LV EDV Index:    RV Diastolic Dimension:  Septum Systolic: 7.13 cm 288 OP/47 m2LV ESV/LV   3.09 cm  CO: 7.5 l/min            ESV Index: 53 ml/24 m2  CI: 3.36 l/m*m2          EF Calculated (A4C):    LA/Aorta: 0.89                           54.3 %  LV Area Diastolic: 51.2  EF Calculated (2D):     LA volume/Index: 69 ml  cm2                      52.6 %                  /55Q5  LV Area Systolic: 01.0  cm2                      LV Length: 8.23 cm LVOT: 2.2 cm  Doppler Measurements & Calculations   MV Peak E-Wave: 123   AV Peak Velocity: 176 cm/s   LVOT Peak Velocity: 137  cm/s                  AV Peak Gradient: 12.39 mmHg cm/s  MV Peak A-Wave: 103   AV Mean Velocity: 133 cm/s   LVOT Mean Velocity: 90.3  cm/s                  AV Mean Gradient: 8 mmHg     cm/s  MV E/A Ratio: 1.19    AV VTI: 32.4 cm              LVOT Peak Gradient: 8  MV Peak Gradient:     AV Area (Continuity):2.76    mmHgLVOT Mean Gradient: 4  6.05 mmHg             cm2                          mmHg                                                     Estimated RVSP: 16 mmHg  MV P1/2t: 48 msec     LVOT VTI: 23.5 cm            Estimated RAP:3 mmHg  MVA by PHT:4.58 cm2                        Estimated PASP: 11.29 mmHg  MV E' Septal                                       TR Velocity:144 cm/s  Velocity: 6.91 cm/s                                TR Gradient:8.29 mmHg  MV E' Lateral  Velocity: 11 cm/s  MV E/E' septal: 17.8  MV E/E' lateral:  11.18      Xr Chest Portable    Result Date: 5/12/2021  EXAMINATION: ONE XRAY VIEW OF THE CHEST 5/12/2021 5:49 am COMPARISON: 05/11/2021 HISTORY: Acute hypoxia. FINDINGS: Patient is slightly rotated. Heart remains enlarged. Moderate-large left pleural effusion has increased and there is adjacent airspace disease. Slightly improved airspace disease in the mid to lower right lung. No pneumothorax seen. Increased moderate-large left pleural effusion. Slightly improved airspace disease in the mid to lower right lung.      Xr Chest Portable    Result Date: 5/11/2021  EXAMINATION: ONE XRAY VIEW OF THE CHEST 5/11/2021 6:24 am COMPARISON: 02/26/2017 HISTORY: ORDERING SYSTEM PROVIDED HISTORY: gurgling breath sounds, resp distress, HTN, h/o copd TECHNOLOGIST PROVIDED HISTORY: Reason for exam:->gurgling breath sounds, resp distress, HTN, h/o copd Reason for Exam: gurgling breath sounds, resp distress, HTN, h/o copd Acuity: Acute Type of Exam: Initial FINDINGS: Heart is stable in size and configuration. Emphysematous changes are present with prominent biapical blebs left greater than right. There is crowding of the bronchovascular markings in the lung bases. No acute osseous abnormality is seen. Surgical clips overlie the left chest.     COPD with crowding of the bibasilar lung markings.            [facility] Hospitalist

## 2021-05-14 NOTE — PROGRESS NOTES
LARGE IR PROCEDURES      PROCEDURE PERFORMED: Left Thoracentesis    PRIMARY INDICATION FOR PROCEDURE:  Pleural Effusion    PT TRANSPORTED FROM:  3119A         TO THE IR ROOM:  Small Room    NOTE:  Not enough fluid to perform procedure. STAFF PRESENT DURING PROCEDURE:  Emmanuel Lyn RN, Ricarda Felix RT, Dr. Tho Patterson:  Phillip perez RN. Pt transported back to 3119.

## 2021-05-14 NOTE — PLAN OF CARE
Problem: Skin Integrity:  Goal: Will show no infection signs and symptoms  Description: Will show no infection signs and symptoms  5/14/2021 1450 by Jenae Schwarz RN  Outcome: Ongoing  5/14/2021 0741 by Juan José Cox RN  Outcome: Ongoing  Goal: Absence of new skin breakdown  Description: Absence of new skin breakdown  5/14/2021 1450 by Jenae Schwarz RN  Outcome: Ongoing  5/14/2021 0741 by Juan José Cox RN  Outcome: Ongoing     Problem: Falls - Risk of:  Goal: Will remain free from falls  Description: Will remain free from falls  5/14/2021 1450 by Jenae Schwarz RN  Outcome: Ongoing  5/14/2021 0741 by Juan José Cox RN  Outcome: Ongoing  Goal: Absence of physical injury  Description: Absence of physical injury  5/14/2021 1450 by Jenae Schwarz RN  Outcome: Ongoing  5/14/2021 0741 by Juan José Cox RN  Outcome: Ongoing     Problem: Pain:  Goal: Pain level will decrease  Description: Pain level will decrease  5/14/2021 1450 by Jenae Schwarz RN  Outcome: Ongoing  5/14/2021 0741 by Juan José Cox RN  Outcome: Ongoing  Goal: Control of acute pain  Description: Control of acute pain  5/14/2021 1450 by Jenae Schwarz RN  Outcome: Ongoing  5/14/2021 0741 by Juan José Cox RN  Outcome: Ongoing  Goal: Control of chronic pain  Description: Control of chronic pain  5/14/2021 1450 by Jenae Schwarz RN  Outcome: Ongoing  5/14/2021 0741 by Juan José Cox RN  Outcome: Ongoing     Problem: Breathing Pattern - Ineffective:  Goal: Ability to achieve and maintain a regular respiratory rate will improve  Description: Ability to achieve and maintain a regular respiratory rate will improve  Outcome: Ongoing     Problem: Fluid Volume:  Goal: Hemodynamic stability will improve  Description: Hemodynamic stability will improve  Outcome: Ongoing  Goal: Ability to maintain a balanced intake and output will improve  Description: Ability to maintain a balanced intake and output will improve  Outcome: Ongoing Problem: Health Behavior:  Goal: Identification of resources available to assist in meeting health care needs will improve  Description: Identification of resources available to assist in meeting health care needs will improve  Outcome: Ongoing     Problem: Respiratory:  Goal: Respiratory status will improve  Description: Respiratory status will improve  Outcome: Ongoing

## 2021-05-15 LAB
ALBUMIN SERPL-MCNC: 3.6 GM/DL (ref 3.4–5)
ANION GAP SERPL CALCULATED.3IONS-SCNC: 14 MMOL/L (ref 4–16)
BASOPHILS ABSOLUTE: 0 K/CU MM
BASOPHILS RELATIVE PERCENT: 0.4 % (ref 0–1)
BUN BLDV-MCNC: 80 MG/DL (ref 6–23)
CALCIUM SERPL-MCNC: 9.1 MG/DL (ref 8.3–10.6)
CHLORIDE BLD-SCNC: 93 MMOL/L (ref 99–110)
CO2: 29 MMOL/L (ref 21–32)
CREAT SERPL-MCNC: 6.2 MG/DL (ref 0.9–1.3)
DIFFERENTIAL TYPE: ABNORMAL
EOSINOPHILS ABSOLUTE: 0.3 K/CU MM
EOSINOPHILS RELATIVE PERCENT: 3.7 % (ref 0–3)
GFR AFRICAN AMERICAN: 11 ML/MIN/1.73M2
GFR NON-AFRICAN AMERICAN: 9 ML/MIN/1.73M2
GLUCOSE BLD-MCNC: 95 MG/DL (ref 70–99)
HCT VFR BLD CALC: 40.8 % (ref 42–52)
HEMOGLOBIN: 13.3 GM/DL (ref 13.5–18)
IMMATURE NEUTROPHIL %: 0.5 % (ref 0–0.43)
LYMPHOCYTES ABSOLUTE: 1 K/CU MM
LYMPHOCYTES RELATIVE PERCENT: 12.9 % (ref 24–44)
MAGNESIUM: 2.3 MG/DL (ref 1.8–2.4)
MCH RBC QN AUTO: 30.6 PG (ref 27–31)
MCHC RBC AUTO-ENTMCNC: 32.6 % (ref 32–36)
MCV RBC AUTO: 94 FL (ref 78–100)
MONOCYTES ABSOLUTE: 0.9 K/CU MM
MONOCYTES RELATIVE PERCENT: 12 % (ref 0–4)
NUCLEATED RBC %: 0 %
PDW BLD-RTO: 13.8 % (ref 11.7–14.9)
PHOSPHORUS: 5.6 MG/DL (ref 2.5–4.9)
PLATELET # BLD: 155 K/CU MM (ref 140–440)
PMV BLD AUTO: 9.6 FL (ref 7.5–11.1)
POTASSIUM SERPL-SCNC: 3.8 MMOL/L (ref 3.5–5.1)
RBC # BLD: 4.34 M/CU MM (ref 4.6–6.2)
SEGMENTED NEUTROPHILS ABSOLUTE COUNT: 5.5 K/CU MM
SEGMENTED NEUTROPHILS RELATIVE PERCENT: 70.5 % (ref 36–66)
SODIUM BLD-SCNC: 136 MMOL/L (ref 135–145)
TOTAL IMMATURE NEUTOROPHIL: 0.04 K/CU MM
TOTAL NUCLEATED RBC: 0 K/CU MM
WBC # BLD: 7.7 K/CU MM (ref 4–10.5)

## 2021-05-15 PROCEDURE — 6370000000 HC RX 637 (ALT 250 FOR IP): Performed by: INTERNAL MEDICINE

## 2021-05-15 PROCEDURE — 2580000003 HC RX 258: Performed by: INTERNAL MEDICINE

## 2021-05-15 PROCEDURE — 94761 N-INVAS EAR/PLS OXIMETRY MLT: CPT

## 2021-05-15 PROCEDURE — 2700000000 HC OXYGEN THERAPY PER DAY

## 2021-05-15 PROCEDURE — 83735 ASSAY OF MAGNESIUM: CPT

## 2021-05-15 PROCEDURE — 6360000002 HC RX W HCPCS: Performed by: INTERNAL MEDICINE

## 2021-05-15 PROCEDURE — 2140000000 HC CCU INTERMEDIATE R&B

## 2021-05-15 PROCEDURE — 51702 INSERT TEMP BLADDER CATH: CPT

## 2021-05-15 PROCEDURE — 94660 CPAP INITIATION&MGMT: CPT

## 2021-05-15 PROCEDURE — 99232 SBSQ HOSP IP/OBS MODERATE 35: CPT | Performed by: INTERNAL MEDICINE

## 2021-05-15 PROCEDURE — 36415 COLL VENOUS BLD VENIPUNCTURE: CPT

## 2021-05-15 PROCEDURE — 85025 COMPLETE CBC W/AUTO DIFF WBC: CPT

## 2021-05-15 PROCEDURE — 80069 RENAL FUNCTION PANEL: CPT

## 2021-05-15 PROCEDURE — 87081 CULTURE SCREEN ONLY: CPT

## 2021-05-15 PROCEDURE — 94640 AIRWAY INHALATION TREATMENT: CPT

## 2021-05-15 RX ORDER — ACETYLCYSTEINE 200 MG/ML
600 SOLUTION ORAL; RESPIRATORY (INHALATION) 2 TIMES DAILY
Status: DISCONTINUED | OUTPATIENT
Start: 2021-05-15 | End: 2021-05-18 | Stop reason: HOSPADM

## 2021-05-15 RX ADMIN — ALBUTEROL SULFATE 2 PUFF: 90 AEROSOL, METERED RESPIRATORY (INHALATION) at 07:47

## 2021-05-15 RX ADMIN — ASPIRIN 81 MG: 81 TABLET, COATED ORAL at 09:31

## 2021-05-15 RX ADMIN — HEPARIN SODIUM 5000 UNITS: 5000 INJECTION INTRAVENOUS; SUBCUTANEOUS at 05:44

## 2021-05-15 RX ADMIN — AMLODIPINE BESYLATE 10 MG: 5 TABLET ORAL at 09:31

## 2021-05-15 RX ADMIN — MONTELUKAST 10 MG: 10 TABLET, FILM COATED ORAL at 20:48

## 2021-05-15 RX ADMIN — FAMOTIDINE 20 MG: 20 TABLET, FILM COATED ORAL at 09:31

## 2021-05-15 RX ADMIN — DOCUSATE SODIUM 100 MG: 100 CAPSULE, LIQUID FILLED ORAL at 20:49

## 2021-05-15 RX ADMIN — BUDESONIDE AND FORMOTEROL FUMARATE DIHYDRATE 2 PUFF: 80; 4.5 AEROSOL RESPIRATORY (INHALATION) at 07:47

## 2021-05-15 RX ADMIN — Medication 2 PUFF: at 07:47

## 2021-05-15 RX ADMIN — ALPRAZOLAM 0.25 MG: 0.25 TABLET ORAL at 09:38

## 2021-05-15 RX ADMIN — Medication 2 PUFF: at 11:00

## 2021-05-15 RX ADMIN — GUAIFENESIN 600 MG: 600 TABLET, EXTENDED RELEASE ORAL at 09:31

## 2021-05-15 RX ADMIN — CARVEDILOL 25 MG: 25 TABLET, FILM COATED ORAL at 09:31

## 2021-05-15 RX ADMIN — BUDESONIDE AND FORMOTEROL FUMARATE DIHYDRATE 2 PUFF: 80; 4.5 AEROSOL RESPIRATORY (INHALATION) at 20:07

## 2021-05-15 RX ADMIN — CITALOPRAM HYDROBROMIDE 10 MG: 20 TABLET ORAL at 09:31

## 2021-05-15 RX ADMIN — ACETYLCYSTEINE 600 MG: 200 SOLUTION ORAL; RESPIRATORY (INHALATION) at 20:08

## 2021-05-15 RX ADMIN — GUAIFENESIN 600 MG: 600 TABLET, EXTENDED RELEASE ORAL at 20:49

## 2021-05-15 RX ADMIN — Medication 2000 UNITS: at 09:31

## 2021-05-15 RX ADMIN — Medication 2 PUFF: at 20:07

## 2021-05-15 RX ADMIN — ALBUTEROL SULFATE 2 PUFF: 90 AEROSOL, METERED RESPIRATORY (INHALATION) at 11:00

## 2021-05-15 RX ADMIN — SODIUM CHLORIDE, PRESERVATIVE FREE 10 ML: 5 INJECTION INTRAVENOUS at 09:32

## 2021-05-15 RX ADMIN — HYDROCODONE BITARTRATE AND ACETAMINOPHEN 1 TABLET: 5; 325 TABLET ORAL at 09:38

## 2021-05-15 RX ADMIN — AZELASTINE HYDROCHLORIDE 1 SPRAY: 137 SPRAY, METERED NASAL at 09:41

## 2021-05-15 RX ADMIN — SPIRONOLACTONE 25 MG: 25 TABLET ORAL at 09:31

## 2021-05-15 RX ADMIN — DOXYCYCLINE HYCLATE 100 MG: 100 TABLET, COATED ORAL at 09:31

## 2021-05-15 RX ADMIN — CEFEPIME HYDROCHLORIDE 500 MG: 1 INJECTION, POWDER, FOR SOLUTION INTRAMUSCULAR; INTRAVENOUS at 20:54

## 2021-05-15 RX ADMIN — SODIUM CHLORIDE, PRESERVATIVE FREE 10 ML: 5 INJECTION INTRAVENOUS at 20:50

## 2021-05-15 RX ADMIN — ALBUTEROL SULFATE 2 PUFF: 90 AEROSOL, METERED RESPIRATORY (INHALATION) at 20:05

## 2021-05-15 RX ADMIN — FOLIC ACID 1 MG: 1 TABLET ORAL at 09:31

## 2021-05-15 RX ADMIN — HYDROCODONE BITARTRATE AND ACETAMINOPHEN 1 TABLET: 5; 325 TABLET ORAL at 20:48

## 2021-05-15 RX ADMIN — HEPARIN SODIUM 5000 UNITS: 5000 INJECTION INTRAVENOUS; SUBCUTANEOUS at 13:43

## 2021-05-15 RX ADMIN — DOCUSATE SODIUM 100 MG: 100 CAPSULE, LIQUID FILLED ORAL at 09:31

## 2021-05-15 RX ADMIN — HEPARIN SODIUM 5000 UNITS: 5000 INJECTION INTRAVENOUS; SUBCUTANEOUS at 20:43

## 2021-05-15 RX ADMIN — ALPRAZOLAM 0.25 MG: 0.25 TABLET ORAL at 20:48

## 2021-05-15 RX ADMIN — CARVEDILOL 25 MG: 25 TABLET, FILM COATED ORAL at 20:49

## 2021-05-15 RX ADMIN — Medication 2 PUFF: at 15:11

## 2021-05-15 RX ADMIN — DOXYCYCLINE HYCLATE 100 MG: 100 TABLET, COATED ORAL at 20:49

## 2021-05-15 RX ADMIN — CEFEPIME HYDROCHLORIDE 500 MG: 1 INJECTION, POWDER, FOR SOLUTION INTRAMUSCULAR; INTRAVENOUS at 09:38

## 2021-05-15 RX ADMIN — ALBUTEROL SULFATE 2 PUFF: 90 AEROSOL, METERED RESPIRATORY (INHALATION) at 15:11

## 2021-05-15 RX ADMIN — AZELASTINE HYDROCHLORIDE 1 SPRAY: 137 SPRAY, METERED NASAL at 20:50

## 2021-05-15 ASSESSMENT — PAIN SCALES - GENERAL
PAINLEVEL_OUTOF10: 5
PAINLEVEL_OUTOF10: 4
PAINLEVEL_OUTOF10: 8

## 2021-05-15 NOTE — PROGRESS NOTES
Nephrology Progress Note  5/15/2021 10:51 AM  Subjective: Interval History: Hosie Duane is a 77 y.o. male with improved shortness of breath and congestion appears somewhat better today        Data:   Scheduled Meds:   cefepime  500 mg Intravenous Q12H    docusate sodium  100 mg Oral BID    albuterol sulfate HFA  2 puff Inhalation 4x daily    ipratropium  2 puff Inhalation 4x daily    spironolactone  25 mg Oral Daily    amLODIPine  10 mg Oral Daily    aspirin EC  81 mg Oral QAM    azelastine  1 spray Each Nostril BID    carvedilol  25 mg Oral BID    vitamin D  2,000 Units Oral Daily    citalopram  10 mg Oral Daily    budesonide-formoterol  2 puff Inhalation BID    folic acid  1 mg Oral Daily    montelukast  10 mg Oral Nightly    sodium chloride flush  5-40 mL Intravenous 2 times per day    doxycycline hyclate  100 mg Oral 2 times per day    guaiFENesin  600 mg Oral BID    famotidine  20 mg Oral Daily    heparin (porcine)  5,000 Units Subcutaneous 3 times per day     Continuous Infusions:   sodium chloride           CBC   Recent Labs     05/13/21  0347 05/14/21  0459 05/15/21  0549   WBC 13.4* 11.2* 7.7   HGB 13.1* 13.4* 13.3*   HCT 38.5* 40.4* 40.8*    155 155      BMP   Recent Labs     05/13/21 0347 05/14/21  0459 05/15/21  0549    135 136   K 3.6 3.5 3.8   CL 92* 91* 93*   CO2 28 28 29   PHOS 5.6* 5.2* 5.6*   BUN 74* 76* 80*   CREATININE 5.4* 5.8* 6.2*     Hepatic: No results for input(s): AST, ALT, ALB, BILITOT, ALKPHOS in the last 72 hours. Troponin: No results for input(s): TROPONINI in the last 72 hours. BNP: No results for input(s): BNP in the last 72 hours. Lipids: No results for input(s): CHOL, HDL in the last 72 hours. Invalid input(s): LDLCALCU  ABGs:   Lab Results   Component Value Date    PO2ART 115 02/27/2017    AOG5EKN 39.0 02/27/2017     INR: No results for input(s): INR in the last 72 hours.   Renal Labs  Albumin:    Lab Results   Component Value Date

## 2021-05-15 NOTE — PROGRESS NOTES
Pulmonary and Critical Care  Progress Note      VITALS:  /74   Pulse 84   Temp 99 °F (37.2 °C) (Oral)   Resp 17   Ht 5' 8\" (1.727 m)   Wt 240 lb 6.4 oz (109 kg)   SpO2 94%   BMI 36.55 kg/m²     Subjective:   CHIEF COMPLAINT :SOB     HPI:                The patient is sitting in the bed. He is not in acute resp distress    Objective:   PHYSICAL EXAM:    LUNGS:Decreased air entry left base  Abd-soft, BS+,NT  Ext- no pedal edema  CVS-s1s2,no murmurs      DATA:    CBC:  Recent Labs     05/13/21  0347 05/14/21  0459 05/15/21  0549   WBC 13.4* 11.2* 7.7   RBC 4.16* 4.38* 4.34*   HGB 13.1* 13.4* 13.3*   HCT 38.5* 40.4* 40.8*    155 155   MCV 92.5 92.2 94.0   MCH 31.5* 30.6 30.6   MCHC 34.0 33.2 32.6   RDW 14.4 14.0 13.8   SEGSPCT 81.3* 77.0* 70.5*      BMP:  Recent Labs     05/13/21  0347 05/14/21  0459 05/15/21  0549    135 136   K 3.6 3.5 3.8   CL 92* 91* 93*   CO2 28 28 29   BUN 74* 76* 80*   CREATININE 5.4* 5.8* 6.2*   CALCIUM 8.8 8.7 9.1   GLUCOSE 103* 119* 95      ABG:  No results for input(s): PH, PO2ART, VWG9CWN, HCO3, BEART, O2SAT in the last 72 hours. BNP  No results found for: BNP   D-Dimer:  No results found for: DDIMER   1. Radiology: None      Assessment/Plan     Patient Active Problem List    Diagnosis Date Noted    Choledocholithiasis 02/26/2017     Priority: High    Chronic kidney disease (CKD), stage III (moderate) 02/26/2017     Priority: Medium     Overview Note:     Overview:   CT in 2016 shows chronic occlusion of R renal artery, left renal artery is patent. 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       Acute on chronic respiratory failure with hypoxia (HCC) 05/11/2021    ASCVD (arteriosclerotic cardiovascular disease) 03/01/2019    Proteinuria 05/23/2017    Chronic kidney disease (CKD) stage G3b/A3, moderately decreased glomerular filtration rate (GFR) between 30-44 mL/min/1.73 square meter and albuminuria creatinine ratio greater than 300 mg/g 36/35/9330    Metabolic acidosis 50/95/8633    Atrophic kidney 04/10/2017    COPD (chronic obstructive pulmonary disease) ([facility] [address] 04/10/2017    HTN (hypertension) 04/10/2017    PAD (peripheral artery disease) ([facility] [address] 04/10/2017    Abnormality of lung on CXR 02/27/2017    S/P BKA (below knee amputation) unilateral ([facility] 02/27/2017    Cholangitis     Acute calculous cholecystitis     Abdominal adhesions    DAX on CKD  AECOPD- improving  Obesity  COMPA  ? Diastolic dysfunction  HTN  Stable RML nodule from 2017  Left Lower lobe atelectasis       1. Diuresis  2. BIPAP  3. LLL if no better, may need a Bronch  4. CPT  5. ICS  6. OOB  7. Keep sats > 92%  8. C.w present management  No follow-ups on file.     Electronically signed by Ibrahima Olmos MD on 5/15/2021 at 12:05 PM

## 2021-05-15 NOTE — PROGRESS NOTES
Cardiology Progress Note       Lui Aguero is a 77 y.o. male   1955     SUBJECTIVE:   Seen and examined no chest pain for thoracentesis there was not enough fluid for thoracentesis rhythm is sinus  5/13  Patient seen and examined currently on BiPAP no chest pain  5/15  Patient seen and examined said he feels much better rhythm is sinus no new cardiac issues  OBJECTIVE:    Review of Systems:  General appearance: alert, appears stated age and cooperative  Skin: Skin color, texture, normal. No rashes or lesions  HEENT: No nose bleed, headache, vision problems  CV: C/O chest pain, tightness, pressure,   Respiratory: C/o no SOB, WRIGHT, Orthopnea, PND  GI: No abdominal pain, black stool, bloating  Limbs: No c/o edema, pain, swelling, intermittent claudication, joint pains  Neuro: No dizziness, lightheadedness, syncope, gait problems, memory problems  Psych: grossly normal. No SI/depression. Vitals:   Blood pressure 108/72, pulse 69, temperature 98.5 °F (36.9 °C), temperature source Oral, resp. rate 14, height 5' 8\" (1.727 m), weight 240 lb 6.4 oz (109 kg), SpO2 92 %.     HEENT: AT, NC, PERRLA  Neck: No JVD  Heart: S1 S2 audible, no murmur   Lungs: CTA   Abdomen: Nontender   Limbs: No edema   CNS: no focal deficit      Past Medical History:   Diagnosis Date    Acid reflux     [facility]acute [facility] failure) [address] [address]     with admission 12/18/2015- consult done with Dr Rodriguez Oar Back pain     \"Lower Back\"    Chronic bronchitis ([address]     Chronic kidney disease     COPD (chronic obstructive pulmonary disease) ([address]     NO PULMONOLOGIST AT THIS TIME    Emphysema/COPD ([address]     NO PULMONOLOGIST AT THIS TIME    H. pylori infection Dx 1990's    Hiatal hernia     History of blood transfusion 1987    NO REACTION TO BLOOD TRANSFUSION RECEIVED    History of respiratory failure     following surgery 12/18/2015- pt developed resp failure- placed on ventilator-unable to wean of vent- had trachesotomy tube placed 1/3/2015- off vent 1/7/2015( discharged 1/13/2015)\"per wife went to [facility] after discharg and had to be sent to [facility] after had bleeding around the trach- they said the trach was to big- put back on ventilator for 24 hrs- then there for about a week then sent to ARU 2/15    [place] (hard of hearing)     Bilateral Ears    HX OTHER MEDICAL     Primary Care Physician Is Dr. Rc Shin Right Renal Artery    [address] [place] [address]     \"Dr. Kirt Bullock One Of My Kidneys Is Dead\"   [place] Maharaj     \"See Dr. Jamshid Marx For Blood Being Too Thick\"(per old chart pt dx with polycythemia in 1990's and has had several phlebotomies in the past(pc)( per wife Dr Jennie Ogden now says he does not really have polycythemia just from the COPD\"    [address] [place] [address] 6*/25/2015    \"has drainage , clear drainage, since he was in ARU in Feb 2015, under left shoulder, arm pit area\"    Hypertension     Lung nodule \"MRI, PET SCAN\"    \"Right Lung\"    Pneumonia \"Last Episode Early 2000's\"    \"At Least Twice\"( with admission 12/18/2015)    Shortness of breath on exertion     Teeth missing     Upper And Lower    Wears glasses         Patient Active Problem List   Diagnosis    Choledocholithiasis    Chronic kidney disease (CKD), stage III (moderate)    Abnormality of lung on CXR    S/P BKA (below knee amputation) unilateral (HCC)    Cholangitis    Acute calculous cholecystitis    Abdominal adhesions    Atrophic kidney    COPD [facility]chronic obstructive pulmonary disease) [facility] [address]    HTN (hypertension)    PAD (peripheral artery disease) ([facility] [address]    Proteinuria    Chronic kidney disease (CKD) stage G3b/A3, moderately decreased glomerular filtration rate (GFR) between 30-44 mL/min/1.73 square meter and albuminuria creatinine ratio greater than 355 mg/g    Metabolic acidosis    ASCVD (arteriosclerotic cardiovascular disease)    Acute on chronic respiratory failure with hypoxia (HCC)        Allergies   Allergen Reactions    Penicillins Swelling    Lorazepam      Other reaction(s):  Other - comment required  hallucination        Current Inpatient Medications:    Current Facility-Administered Medications   Medication Dose Route Frequency Provider Last Rate Last Admin    cefepime (MAXIPIME) 500 mg in dextrose 5 % 50 mL IVPB  500 mg Intravenous Q12H Myles Hurd  mL/hr at 05/15/21 0938 500 mg at 05/15/21 2536    docusate sodium (COLACE) capsule 100 mg  100 mg Oral BID Myles Hurd MD   100 mg at 05/15/21 0931    albuterol sulfate  (90 Base) MCG/ACT inhaler 2 puff  2 puff Inhalation 4x daily Ibrahima Olmos MD   2 puff at 05/15/21 0747    ipratropium (ATROVENT HFA) 17 MCG/ACT inhaler 2 puff  2 puff Inhalation 4x daily Ibrahima Olmos MD   2 puff at 05/15/21 0747    spironolactone (ALDACTONE) tablet 25 mg  25 mg Oral Daily Mendel Hermann, MD   25 mg at 05/15/21 0931    ALPRAZolam (XANAX) tablet 0.25 mg  0.25 mg Oral TID PRN Myles Hurd MD   0.25 mg at 05/15/21 0938    amLODIPine (NORVASC) tablet 10 mg  10 mg Oral Daily Myles Hurd MD   10 mg at 05/15/21 0931    aspirin EC tablet 81 mg  81 mg Oral QAM Myles Hurd MD   81 mg at 05/15/21 0931    azelastine (ASTELIN) 0.1 % nasal spray 1 spray  1 spray Each Nostril BID Myles Hurd MD   1 spray at 05/15/21 0941    carvedilol (COREG) tablet 25 mg  25 mg Oral BID Myles Hurd MD   25 mg at 05/15/21 0931    vitamin D CAPS 2,000 Units  2,000 Units Oral Daily Myles Hurd MD   2,000 Units at 05/15/21 0931    citalopram (CELEXA) tablet 10 mg  10 mg Oral Daily Myles Hurd MD   10 mg at 05/15/21 0931    budesonide-formoterol (SYMBICORT) 80-4.5 MCG/ACT inhaler 2 puff  2 puff Inhalation BID Myles Hurd MD   2 puff at 18/47/23 8849    folic acid (FOLVITE) tablet 1 mg  1 mg Oral Daily Myles Hurd MD   1 mg at 05/15/21 0931    HYDROcodone-acetaminophen (NORCO) 5-325 MG per tablet 1 tablet  1 tablet Oral Q6H PRN Avni Linder MD   1 tablet at 05/15/21 0938    montelukast (SINGULAIR) tablet 10 mg  10 mg Oral Nightly Avni Linder MD   10 mg at 05/14/21 2205    sodium chloride flush 0.9 % injection 5-40 mL  5-40 mL Intravenous 2 times per day Avni Linder MD   10 mL at 05/15/21 0932    sodium chloride flush 0.9 % injection 5-40 mL  5-40 mL Intravenous PRN Avni Linder MD        0.9 % sodium chloride infusion  25 mL Intravenous PRN Avni Linder MD        promethazine (PHENERGAN) tablet 12.5 mg  12.5 mg Oral Q6H PRN Avni Linder MD        Or    [facility] injection 4 mg  4 mg Intravenous Q6H PRN Avni Linder MD        polyethylene glycol [facility]) packet 17 g  17 g Oral Daily PRN Avni Linder MD        acetaminophen (TYLENOL) tablet 650 mg  650 mg Oral Q6H PRN Avni Linder MD        Or    acetaminophen (TYLENOL) suppository 650 mg  650 mg Rectal Q6H PRN Avni Linder MD        doxycycline hyclate (VIBRA-TABS) tablet 100 mg  100 mg Oral 2 times per day Avni Linder MD   100 mg at 05/15/21 0931    guaiFENesin (MUCINEX) extended release tablet 600 mg  600 mg Oral BID Avni Linder MD   600 mg at 05/15/21 0931    famotidine (PEPCID) tablet 20 mg  20 mg Oral Daily Avni Linder MD   20 mg at 05/15/21 0931    heparin (porcine) injection 5,000 Units  5,000 Units Subcutaneous 3 times per day Avni Linder MD   5,000 Units at 05/15/21 0544    nitroGLYCERIN (NITROSTAT) SL tablet 0.4 mg  0.4 mg Sublingual Q5 Min PRN Avni Linder MD               Labs:  CBC with Differential:    Lab Results   Component Value Date    WBC 7.7 05/15/2021    RBC 4.34 05/15/2021    HGB 13.3 05/15/2021    HCT 40.8 05/15/2021     05/15/2021    MCV 94.0 05/15/2021    MCH 30.6 05/15/2021    MCHC 32.6 05/15/2021    RDW 13.8 05/15/2021    NRBC 2 12/28/2014    SEGSPCT 70.5 05/15/2021    BANDSPCT 17 05/11/2021    BLASTSPCT 1 12/28/2014    LYMPHOPCT 12.9 05/15/2021    MONOPCT 12.0 05/15/2021    MYELOPCT 1 03/02/2017    BASOPCT 0.4 05/15/2021    MONOSABS 0.9 05/15/2021    LYMPHSABS 1.0 05/15/2021    EOSABS 0.3 05/15/2021    BASOSABS 0.0 05/15/2021    DIFFTYPE AUTOMATED DIFFERENTIAL 05/15/2021     CMP:    Lab Results   Component Value Date     05/15/2021    K 3.8 05/15/2021    CL 93 05/15/2021    CO2 29 05/15/2021    BUN 80 05/15/2021    CREATININE 6.2 05/15/2021    GFRAA 11 05/15/2021    LABGLOM 9 05/15/2021    GLUCOSE 95 05/15/2021    PROT 5.8 05/11/2021    LABALBU 3.6 05/15/2021    CALCIUM 9.1 05/15/2021    BILITOT 0.3 05/11/2021    ALKPHOS 52 05/11/2021    AST 12 05/11/2021    ALT 14 05/11/2021     Hepatic Function Panel:    Lab Results   Component Value Date    ALKPHOS 52 05/11/2021    ALT 14 05/11/2021    AST 12 05/11/2021    PROT 5.8 05/11/2021    BILITOT 0.3 05/11/2021    BILIDIR 0.2 02/27/2017    IBILI 0.3 02/27/2017    LABALBU 3.6 05/15/2021     Magnesium:    Lab Results   Component Value Date    MG 2.3 05/15/2021     PT/INR:    Lab Results   Component Value Date    PROTIME 10.5 05/12/2021    INR 0.87 05/12/2021     Last 3 Troponin:  No results found for: TROPONINI  U/A:    Lab Results   Component Value Date    COLORU YELLOW 05/14/2021    PHUR 6.0 12/06/2019    WBCUA 1 05/14/2021    RBCUA 1 05/14/2021    MUCUS RARE 05/11/2021    TRICHOMONAS NONE SEEN 05/14/2021    YEAST FEW 01/09/2015    BACTERIA NEGATIVE 05/14/2021    CLARITYU CLEAR 05/14/2021    SPECGRAV 1.013 05/14/2021    LEUKOCYTESUR TRACE 05/14/2021    UROBILINOGEN NEGATIVE 05/14/2021    BILIRUBINUR NEGATIVE 05/14/2021    BLOODU SMALL 05/14/2021    GLUCOSEU Negative 12/06/2019     ABG:    Lab Results   Component Value Date    WLY6FQC 39.0 02/27/2017    PO2ART 115 02/27/2017    TZV3JDB 19.6 02/27/2017     FLP:    Lab Results   Component Value Date    TRIG 55 05/12/2021     05/12/2021    LDLDIRECT 46 05/12/2021     TSH:  No results found for: TSH   DATA:   ECG: Sinus Rhythm       ASSESSMENT:   1 history of a left

## 2021-05-15 NOTE — PROGRESS NOTES
Hospitalist Progress Note         Admit Date: 5/11/2021    PCP: Bobby Gerard DO     Chief Complaint   Patient presents with    Respiratory Distress        Assessment and Plan:     -Acute and chronic respiratory failure with hypoxia (HCC) multifactorial.  Keep on BiPAP. -Acute on chronic diastolic heart failure improving on Lasix IV, metolazone. Good BP control. Echocardiogram and cardiology evaluation ordered. Replace electrolytes as needed.  -Possible left lower lobe pneumonia/atelectasis Continue meropenem and doxy. Continue to trend inflammatory markers and sputum C/S results. No fluid on ultrasound to drain.  -Acute kidney injury on stage IV CKD improved on Lasix drip, metolazone, amiloride per nephro. Lasix drip as well as other meds discontinued today. Continue Aldactone. -COPD with minimal exacerbation doxy, duo nebs and BiPAP support. Pulmonology on board. -Mild troponin elevation from kidney injury. Trend tropes. Continue home aspirin. -HTN uncontrolled continue home Norvasc, Coreg. Adjust medication per hospital course.  -Sleep apnea continue BiPAP support as needed and at night.   -Constipation with large stools visible on CT continue MiraLAX and Colace.     DVT prophylaxis: Heparin.   DC home depending on hospital progress    Current Facility-Administered Medications   Medication Dose Route Frequency Provider Last Rate Last Admin    acetylcysteine (MUCOMYST) 20 % solution 600 mg  600 mg Inhalation BID Bright Bright MD        cefepime (MAXIPIME) 500 mg in dextrose 5 % 50 mL IVPB  500 mg Intravenous Q12H Sydni Pena MD   Stopped at 05/15/21 1008    docusate sodium (COLACE) capsule 100 mg  100 mg Oral BID Sydni Pena MD   100 mg at 05/15/21 0931    albuterol sulfate  (90 Base) MCG/ACT inhaler 2 puff  2 puff Inhalation 4x daily Bright Bright MD   2 puff at 05/15/21 1511    ipratropium (ATROVENT HFA) 17 MCG/ACT inhaler 2 puff  2 puff Inhalation 4x daily Micheline Reddy MD   2 puff at 05/15/21 1511    spironolactone (ALDACTONE) tablet 25 mg  25 mg Oral Daily Fabi Duarte MD   25 mg at 05/15/21 0931    ALPRAZolam (XANAX) tablet 0.25 mg  0.25 mg Oral TID PRN Jeannine Kaminski MD   0.25 mg at 05/15/21 0938    amLODIPine (NORVASC) tablet 10 mg  10 mg Oral Daily Jeannine Kaminski MD   10 mg at 05/15/21 0931    aspirin EC tablet 81 mg  81 mg Oral QAM Jeannine Kaminski MD   81 mg at 05/15/21 0931    azelastine (ASTELIN) 0.1 % nasal spray 1 spray  1 spray Each Nostril BID Jeannine Kaminski MD   1 spray at 05/15/21 0941    carvedilol (COREG) tablet 25 mg  25 mg Oral BID Jeannine Kaminski MD   25 mg at 05/15/21 0931    vitamin D CAPS 2,000 Units  2,000 Units Oral Daily Jeannine Kaminski MD   2,000 Units at 05/15/21 0931    citalopram (CELEXA) tablet 10 mg  10 mg Oral Daily Jeannine Kaminski MD   10 mg at 05/15/21 0931    budesonide-formoterol (SYMBICORT) 80-4.5 MCG/ACT inhaler 2 puff  2 puff Inhalation BID Jeannine Kaminski MD   2 puff at 44/47/93 0903    folic acid (FOLVITE) tablet 1 mg  1 mg Oral Daily Jeannine Kaminski MD   1 mg at 05/15/21 0931    HYDROcodone-acetaminophen (NORCO) 5-325 MG per tablet 1 tablet  1 tablet Oral Q6H PRN Jeannine Kaminski MD   1 tablet at 05/15/21 0938    montelukast (SINGULAIR) tablet 10 mg  10 mg Oral Nightly Jeannine Kaminski MD   10 mg at 05/14/21 2205    sodium chloride flush 0.9 % injection 5-40 mL  5-40 mL Intravenous 2 times per day Jeannine Kaminski MD   10 mL at 05/15/21 0932    sodium chloride flush 0.9 % injection 5-40 mL  5-40 mL Intravenous PRN Jeannine Kaminski MD        0.9 % sodium chloride infusion  25 mL Intravenous PRN Jeannine Kaminski MD        promethazine (PHENERGAN) tablet 12.5 mg  12.5 mg Oral Q6H PRN Jeannine Kaminski MD        Or    ondansetron [facility]) injection 4 mg  4 mg Intravenous Q6H PRN Jeannine Kaminski MD        polyethylene glycol [facility]) packet 17 g  17 g Oral Daily PRN Jeannine Kaminski MD  acetaminophen (TYLENOL) tablet 650 mg  650 mg Oral Q6H PRN MD [place]        Or    acetaminophen (TYLENOL) suppository 650 mg  650 mg Rectal Q6H PRN MD [place]        doxycycline hyclate (VIBRA-TABS) tablet 100 mg  100 mg Oral 2 times per day MD [place]   100 mg at 05/15/21 0931    guaiFENesin (MUCINEX) extended release tablet 600 mg  600 mg Oral BID MD [place]   600 mg at 05/15/21 0931    famotidine (PEPCID) tablet 20 mg  20 mg Oral Daily MD [place]   20 mg at 05/15/21 0931    heparin (porcine) injection 5,000 Units  5,000 Units Subcutaneous 3 times per day MD [place]   5,000 Units at 05/15/21 1343    nitroGLYCERIN (NITROSTAT) SL tablet 0.4 mg  0.4 mg Sublingual Q5 Min PRN MD [place]           Subjective:     Patient states he is feeling okay. Using BiPAP as needed and at night. No significant overnight events. Objective: Intake/Output Summary (Last 24 hours) at 5/15/2021 1759  Last data filed at 5/15/2021 0852  Gross per 24 hour   Intake --   Output 2900 ml   Net -2900 ml      Vitals:   Vitals:    05/15/21 1500   BP: 128/76   Pulse: 72   Resp: 13   Temp: 97.9 °F (36.6 °C)   SpO2: 93%     Physical Exam:  General Appearance:    Alert, cooperative, no distress  Head:      Normocephalic, without obvious abnormality, atraumatic  Eyes:       Conjunctiva/corneas clear, EOM's intact  Lungs:    Clear to auscultation bilaterally, respirations unlabored  Heart:                RRR, S1 and S2 normal, no murmur,   rub or gallop  Abdomen:     Soft, non-tender, bowel sounds +  Extremities:   Left BKA +, right pedal edema +   Neurological:   Grossly Intact.     Significant Diagnostic Studies:   DATA:    CBC   Recent Labs     05/13/21  0347 05/14/21  0459 05/15/21  0549   WBC 13.4* 11.2* 7.7   HGB 13.1* 13.4* 13.3*   HCT 38.5* 40.4* 40.8*    155 155      BMP   Recent Labs     05/13/21  0347 05/14/21  0459 05/15/21  0549    909 938 K 3.6 3.5 3.8   CL 92* 91* 93*   CO2 28 28 29   PHOS 5.6* 5.2* 5.6*   BUN 74* 76* 80*   CREATININE 5.4* 5.8* 6.2*     LFT'S   No results for input(s): AST, ALT, ALB, BILIDIR, BILITOT, ALKPHOS in the last 72 hours. COAG   No results for input(s): INR in the last 72 hours. POC:   Lab Results   Component Value Date    POCGLU 91 02/10/2015    POCGLU 97 02/10/2015    POCGLU 100 02/09/2015    POCGLU 96 02/09/2015     [id number]:  Lab Results   Component Value Date    LABA1C 5.4 12/19/2014     CARDIAC ENZYMES  No results for input(s): CKTOTAL, CKMB, CKMBINDEX, TROPONINI in the last 72 hours. Troponin:   No results for input(s): TROPONINT in the last 72 hours. BNP:   Recent Labs     05/14/21  0459   PROBNP 1,831*     U/A:    Lab Results   Component Value Date    COLORU YELLOW 05/14/2021    WBCUA 1 05/14/2021    RBCUA 1 05/14/2021    MUCUS RARE 05/11/2021    BACTERIA NEGATIVE 05/14/2021    CLARITYU CLEAR 05/14/2021    SPECGRAV 1.013 05/14/2021    LEUKOCYTESUR TRACE 05/14/2021    BLOODU SMALL 05/14/2021    GLUCOSEU Negative 12/06/2019       Echocardiogram Complete 2d With Doppler With Color    Result Date: 5/11/2021  Transthoracic Echocardiography Report (TTE)  Demographics   Patient Name       Chrystal BLACKWOOD     Date of Study       05/11/2021   Date of Birth      1955         Gender              Male   Age                77 year(s)         Race                   Patient Number     [id number]         Room Number         R   Visit Number       [id number]   Corporate ID       [id number]   Accession Number   [id number]         Pablo Scott RDMS   Ordering Physician Mignon Mata      Interpreting        Tiny Hinds MD                     [facility] MD           Physician  Procedure Type of Study   TTE procedure:ECHOCARDIOGRAM COMPLETE 2D W DOPPLER W COLOR.   Procedure Date Date: 05/11/2021 Start: 09:38 AM Study Location: ER m2LV ESV/LV   3.09 cm  CO: 7.5 l/min            ESV Index: 53 ml/24 m2  CI: 3.36 l/m*m2          EF Calculated (A4C):    LA/Aorta: 0.89                           54.3 %  LV Area Diastolic: 79.1  EF Calculated (2D):     LA volume/Index: 69 ml  cm2                      52.6 %                  /53I3  LV Area Systolic: 73.6  cm2                      LV Length: 8.23 cm                            LVOT: 2.2 cm  Doppler Measurements & Calculations   MV Peak E-Wave: 123   AV Peak Velocity: 176 cm/s   LVOT Peak Velocity: 137  cm/s                  AV Peak Gradient: 12.39 mmHg cm/s  MV Peak A-Wave: 103   AV Mean Velocity: 133 cm/s   LVOT Mean Velocity: 90.3  cm/s                  AV Mean Gradient: 8 mmHg     cm/s  MV E/A Ratio: 1.19    AV VTI: 32.4 cm              LVOT Peak Gradient: 8  MV Peak Gradient:     AV Area (Continuity):2.76    mmHgLVOT Mean Gradient: 4  6.05 mmHg             cm2                          mmHg                                                     Estimated RVSP: 16 mmHg  MV P1/2t: 48 msec     LVOT VTI: 23.5 cm            Estimated RAP:3 mmHg  MVA by PHT:4.58 cm2                        Estimated PASP: 11.29 mmHg  MV E' Septal                                       TR Velocity:144 cm/s  Velocity: 6.91 cm/s                                TR Gradient:8.29 mmHg  MV E' Lateral  Velocity: 11 cm/s  MV E/E' septal: 17.8  MV E/E' lateral:  11.18      Xr Chest Portable    Result Date: 5/12/2021  EXAMINATION: ONE XRAY VIEW OF THE CHEST 5/12/2021 5:49 am COMPARISON: 05/11/2021 HISTORY: Acute hypoxia. FINDINGS: Patient is slightly rotated. Heart remains enlarged. Moderate-large left pleural effusion has increased and there is adjacent airspace disease. Slightly improved airspace disease in the mid to lower right lung. No pneumothorax seen. Increased moderate-large left pleural effusion. Slightly improved airspace disease in the mid to lower right lung.      Xr Chest Portable    Result Date: 5/11/2021  EXAMINATION: ONE XRAY VIEW OF THE CHEST 5/11/2021 6:24 am COMPARISON: 02/26/2017 HISTORY: ORDERING SYSTEM PROVIDED HISTORY: gurgling breath sounds, resp distress, HTN, h/o copd TECHNOLOGIST PROVIDED HISTORY: Reason for exam:->gurgling breath sounds, resp distress, HTN, h/o copd Reason for Exam: gurgling breath sounds, resp distress, HTN, h/o copd Acuity: Acute Type of Exam: Initial FINDINGS: Heart is stable in size and configuration. Emphysematous changes are present with prominent biapical blebs left greater than right. There is crowding of the bronchovascular markings in the lung bases. No acute osseous abnormality is seen. Surgical clips overlie the left chest.     COPD with crowding of the bibasilar lung markings.            Diana Godfrey Hospitalist

## 2021-05-16 LAB
ALBUMIN SERPL-MCNC: 3.7 GM/DL (ref 3.4–5)
ANION GAP SERPL CALCULATED.3IONS-SCNC: 16 MMOL/L (ref 4–16)
BASOPHILS ABSOLUTE: 0 K/CU MM
BASOPHILS RELATIVE PERCENT: 0.3 % (ref 0–1)
BUN BLDV-MCNC: 86 MG/DL (ref 6–23)
CALCIUM SERPL-MCNC: 8.8 MG/DL (ref 8.3–10.6)
CHLORIDE BLD-SCNC: 95 MMOL/L (ref 99–110)
CO2: 25 MMOL/L (ref 21–32)
CREAT SERPL-MCNC: 6.4 MG/DL (ref 0.9–1.3)
CULTURE: NORMAL
DIFFERENTIAL TYPE: ABNORMAL
EOSINOPHILS ABSOLUTE: 0.4 K/CU MM
EOSINOPHILS RELATIVE PERCENT: 5.1 % (ref 0–3)
GFR AFRICAN AMERICAN: 11 ML/MIN/1.73M2
GFR NON-AFRICAN AMERICAN: 9 ML/MIN/1.73M2
GLUCOSE BLD-MCNC: 109 MG/DL (ref 70–99)
GRAM SMEAR: NORMAL
HCT VFR BLD CALC: 39.7 % (ref 42–52)
HEMOGLOBIN: 12.6 GM/DL (ref 13.5–18)
IMMATURE NEUTROPHIL %: 0.8 % (ref 0–0.43)
LYMPHOCYTES ABSOLUTE: 1.1 K/CU MM
LYMPHOCYTES RELATIVE PERCENT: 15 % (ref 24–44)
Lab: NORMAL
MAGNESIUM: 2.4 MG/DL (ref 1.8–2.4)
MCH RBC QN AUTO: 29.6 PG (ref 27–31)
MCHC RBC AUTO-ENTMCNC: 31.7 % (ref 32–36)
MCV RBC AUTO: 93.2 FL (ref 78–100)
MONOCYTES ABSOLUTE: 1.1 K/CU MM
MONOCYTES RELATIVE PERCENT: 14.8 % (ref 0–4)
NUCLEATED RBC %: 0 %
PDW BLD-RTO: 13.7 % (ref 11.7–14.9)
PHOSPHORUS: 7 MG/DL (ref 2.5–4.9)
PLATELET # BLD: 163 K/CU MM (ref 140–440)
PMV BLD AUTO: 9.6 FL (ref 7.5–11.1)
POTASSIUM SERPL-SCNC: 3.9 MMOL/L (ref 3.5–5.1)
RBC # BLD: 4.26 M/CU MM (ref 4.6–6.2)
SEGMENTED NEUTROPHILS ABSOLUTE COUNT: 4.7 K/CU MM
SEGMENTED NEUTROPHILS RELATIVE PERCENT: 64 % (ref 36–66)
SODIUM BLD-SCNC: 136 MMOL/L (ref 135–145)
SPECIMEN: NORMAL
TOTAL IMMATURE NEUTOROPHIL: 0.06 K/CU MM
TOTAL NUCLEATED RBC: 0 K/CU MM
WBC # BLD: 7.3 K/CU MM (ref 4–10.5)

## 2021-05-16 PROCEDURE — 6370000000 HC RX 637 (ALT 250 FOR IP): Performed by: INTERNAL MEDICINE

## 2021-05-16 PROCEDURE — 94640 AIRWAY INHALATION TREATMENT: CPT

## 2021-05-16 PROCEDURE — 51702 INSERT TEMP BLADDER CATH: CPT

## 2021-05-16 PROCEDURE — 36415 COLL VENOUS BLD VENIPUNCTURE: CPT

## 2021-05-16 PROCEDURE — 99232 SBSQ HOSP IP/OBS MODERATE 35: CPT | Performed by: INTERNAL MEDICINE

## 2021-05-16 PROCEDURE — 85025 COMPLETE CBC W/AUTO DIFF WBC: CPT

## 2021-05-16 PROCEDURE — 80069 RENAL FUNCTION PANEL: CPT

## 2021-05-16 PROCEDURE — 2580000003 HC RX 258: Performed by: INTERNAL MEDICINE

## 2021-05-16 PROCEDURE — 6370000000 HC RX 637 (ALT 250 FOR IP)

## 2021-05-16 PROCEDURE — 6360000002 HC RX W HCPCS: Performed by: INTERNAL MEDICINE

## 2021-05-16 PROCEDURE — 94761 N-INVAS EAR/PLS OXIMETRY MLT: CPT

## 2021-05-16 PROCEDURE — 2700000000 HC OXYGEN THERAPY PER DAY

## 2021-05-16 PROCEDURE — 83735 ASSAY OF MAGNESIUM: CPT

## 2021-05-16 PROCEDURE — 2140000000 HC CCU INTERMEDIATE R&B

## 2021-05-16 RX ORDER — IPRATROPIUM BROMIDE AND ALBUTEROL SULFATE 2.5; .5 MG/3ML; MG/3ML
SOLUTION RESPIRATORY (INHALATION)
Status: COMPLETED
Start: 2021-05-16 | End: 2021-05-16

## 2021-05-16 RX ORDER — ATORVASTATIN CALCIUM 20 MG/1
20 TABLET, FILM COATED ORAL NIGHTLY
Status: DISCONTINUED | OUTPATIENT
Start: 2021-05-16 | End: 2021-05-18 | Stop reason: HOSPADM

## 2021-05-16 RX ORDER — IPRATROPIUM BROMIDE AND ALBUTEROL SULFATE 2.5; .5 MG/3ML; MG/3ML
1 SOLUTION RESPIRATORY (INHALATION)
Status: DISCONTINUED | OUTPATIENT
Start: 2021-05-16 | End: 2021-05-18 | Stop reason: HOSPADM

## 2021-05-16 RX ORDER — ALBUTEROL SULFATE 90 UG/1
2 AEROSOL, METERED RESPIRATORY (INHALATION) EVERY 4 HOURS PRN
Status: DISCONTINUED | OUTPATIENT
Start: 2021-05-16 | End: 2021-05-18 | Stop reason: HOSPADM

## 2021-05-16 RX ADMIN — HYDROCODONE BITARTRATE AND ACETAMINOPHEN 1 TABLET: 5; 325 TABLET ORAL at 21:11

## 2021-05-16 RX ADMIN — ASPIRIN 81 MG: 81 TABLET, COATED ORAL at 08:31

## 2021-05-16 RX ADMIN — AMLODIPINE BESYLATE 10 MG: 5 TABLET ORAL at 08:31

## 2021-05-16 RX ADMIN — HEPARIN SODIUM 5000 UNITS: 5000 INJECTION INTRAVENOUS; SUBCUTANEOUS at 06:32

## 2021-05-16 RX ADMIN — HEPARIN SODIUM 5000 UNITS: 5000 INJECTION INTRAVENOUS; SUBCUTANEOUS at 21:10

## 2021-05-16 RX ADMIN — IPRATROPIUM BROMIDE AND ALBUTEROL SULFATE 1 AMPULE: .5; 3 SOLUTION RESPIRATORY (INHALATION) at 12:35

## 2021-05-16 RX ADMIN — IPRATROPIUM BROMIDE AND ALBUTEROL SULFATE 1 AMPULE: .5; 3 SOLUTION RESPIRATORY (INHALATION) at 09:20

## 2021-05-16 RX ADMIN — CARVEDILOL 25 MG: 25 TABLET, FILM COATED ORAL at 08:31

## 2021-05-16 RX ADMIN — HYDROCODONE BITARTRATE AND ACETAMINOPHEN 1 TABLET: 5; 325 TABLET ORAL at 03:26

## 2021-05-16 RX ADMIN — SPIRONOLACTONE 25 MG: 25 TABLET ORAL at 08:31

## 2021-05-16 RX ADMIN — CEFEPIME HYDROCHLORIDE 500 MG: 1 INJECTION, POWDER, FOR SOLUTION INTRAMUSCULAR; INTRAVENOUS at 21:16

## 2021-05-16 RX ADMIN — IPRATROPIUM BROMIDE AND ALBUTEROL SULFATE 1 AMPULE: .5; 3 SOLUTION RESPIRATORY (INHALATION) at 16:45

## 2021-05-16 RX ADMIN — Medication 2000 UNITS: at 08:31

## 2021-05-16 RX ADMIN — CEFEPIME HYDROCHLORIDE 500 MG: 1 INJECTION, POWDER, FOR SOLUTION INTRAMUSCULAR; INTRAVENOUS at 08:33

## 2021-05-16 RX ADMIN — DOCUSATE SODIUM 100 MG: 100 CAPSULE, LIQUID FILLED ORAL at 21:12

## 2021-05-16 RX ADMIN — AZELASTINE HYDROCHLORIDE 1 SPRAY: 137 SPRAY, METERED NASAL at 08:34

## 2021-05-16 RX ADMIN — ALPRAZOLAM 0.25 MG: 0.25 TABLET ORAL at 21:12

## 2021-05-16 RX ADMIN — SODIUM CHLORIDE, PRESERVATIVE FREE 10 ML: 5 INJECTION INTRAVENOUS at 21:12

## 2021-05-16 RX ADMIN — ACETYLCYSTEINE 600 MG: 200 SOLUTION ORAL; RESPIRATORY (INHALATION) at 20:33

## 2021-05-16 RX ADMIN — CITALOPRAM HYDROBROMIDE 10 MG: 20 TABLET ORAL at 08:31

## 2021-05-16 RX ADMIN — SODIUM CHLORIDE, PRESERVATIVE FREE 10 ML: 5 INJECTION INTRAVENOUS at 08:31

## 2021-05-16 RX ADMIN — GUAIFENESIN 600 MG: 600 TABLET, EXTENDED RELEASE ORAL at 21:12

## 2021-05-16 RX ADMIN — DOCUSATE SODIUM 100 MG: 100 CAPSULE, LIQUID FILLED ORAL at 08:30

## 2021-05-16 RX ADMIN — CARVEDILOL 25 MG: 25 TABLET, FILM COATED ORAL at 21:12

## 2021-05-16 RX ADMIN — ACETYLCYSTEINE 600 MG: 200 SOLUTION ORAL; RESPIRATORY (INHALATION) at 09:21

## 2021-05-16 RX ADMIN — MONTELUKAST 10 MG: 10 TABLET, FILM COATED ORAL at 21:11

## 2021-05-16 RX ADMIN — DOXYCYCLINE HYCLATE 100 MG: 100 TABLET, COATED ORAL at 21:12

## 2021-05-16 RX ADMIN — ALPRAZOLAM 0.25 MG: 0.25 TABLET ORAL at 03:26

## 2021-05-16 RX ADMIN — FAMOTIDINE 20 MG: 20 TABLET, FILM COATED ORAL at 08:31

## 2021-05-16 RX ADMIN — DOXYCYCLINE HYCLATE 100 MG: 100 TABLET, COATED ORAL at 08:30

## 2021-05-16 RX ADMIN — BUDESONIDE AND FORMOTEROL FUMARATE DIHYDRATE 2 PUFF: 80; 4.5 AEROSOL RESPIRATORY (INHALATION) at 20:33

## 2021-05-16 RX ADMIN — GUAIFENESIN 600 MG: 600 TABLET, EXTENDED RELEASE ORAL at 08:31

## 2021-05-16 RX ADMIN — IPRATROPIUM BROMIDE AND ALBUTEROL SULFATE 1 AMPULE: .5; 3 SOLUTION RESPIRATORY (INHALATION) at 20:33

## 2021-05-16 RX ADMIN — FOLIC ACID 1 MG: 1 TABLET ORAL at 08:31

## 2021-05-16 RX ADMIN — HEPARIN SODIUM 5000 UNITS: 5000 INJECTION INTRAVENOUS; SUBCUTANEOUS at 14:10

## 2021-05-16 RX ADMIN — ATORVASTATIN CALCIUM 20 MG: 20 TABLET, FILM COATED ORAL at 21:11

## 2021-05-16 ASSESSMENT — PAIN DESCRIPTION - PAIN TYPE
TYPE: ACUTE PAIN
TYPE: ACUTE PAIN;CHRONIC PAIN

## 2021-05-16 ASSESSMENT — PAIN DESCRIPTION - LOCATION: LOCATION: LEG

## 2021-05-16 ASSESSMENT — PAIN DESCRIPTION - ORIENTATION: ORIENTATION: RIGHT

## 2021-05-16 ASSESSMENT — PAIN SCALES - GENERAL
PAINLEVEL_OUTOF10: 8
PAINLEVEL_OUTOF10: 8

## 2021-05-16 NOTE — PROGRESS NOTES
Nephrology Progress Note  5/16/2021 11:08 AM  Subjective: Interval History: Loren Kirk is a 77 y.o. male appears somewhat better today and less short of breath but still weak. Data:   Scheduled Meds:   ipratropium-albuterol  1 ampule Inhalation Q4H WA    ipratropium-albuterol        acetylcysteine  600 mg Inhalation BID    cefepime  500 mg Intravenous Q12H    docusate sodium  100 mg Oral BID    spironolactone  25 mg Oral Daily    amLODIPine  10 mg Oral Daily    aspirin EC  81 mg Oral QAM    azelastine  1 spray Each Nostril BID    carvedilol  25 mg Oral BID    vitamin D  2,000 Units Oral Daily    citalopram  10 mg Oral Daily    budesonide-formoterol  2 puff Inhalation BID    folic acid  1 mg Oral Daily    montelukast  10 mg Oral Nightly    sodium chloride flush  5-40 mL Intravenous 2 times per day    doxycycline hyclate  100 mg Oral 2 times per day    guaiFENesin  600 mg Oral BID    famotidine  20 mg Oral Daily    heparin (porcine)  5,000 Units Subcutaneous 3 times per day     Continuous Infusions:   sodium chloride           CBC   Recent Labs     05/14/21 0459 05/15/21  0549 05/16/21  0429   WBC 11.2* 7.7 7.3   HGB 13.4* 13.3* 12.6*   HCT 40.4* 40.8* 39.7*    155 163      BMP   Recent Labs     05/14/21 0459 05/15/21  0549 05/16/21  0429    136 136   K 3.5 3.8 3.9   CL 91* 93* 95*   CO2 28 29 25   PHOS 5.2* 5.6* 7.0*   BUN 76* 80* 86*   CREATININE 5.8* 6.2* 6.4*     Hepatic: No results for input(s): AST, ALT, ALB, BILITOT, ALKPHOS in the last 72 hours. Troponin: No results for input(s): TROPONINI in the last 72 hours. BNP: No results for input(s): BNP in the last 72 hours. Lipids: No results for input(s): CHOL, HDL in the last 72 hours. Invalid input(s): LDLCALCU  ABGs:   Lab Results   Component Value Date    PO2ART 115 02/27/2017    SUL0QAM 39.0 02/27/2017     INR: No results for input(s): INR in the last 72 hours.   Renal Labs  Albumin:    Lab Results supple, symmetrical, trachea midline  Lungs: diminished breath sounds bilaterally  Heart: S1, S2 normal  Abdomen: abnormal findings:  soft nt obese  Extremities: edema positive  Neurologic: Mental status: alertness: alert        Assessment and Plan:      IMP:  Acute renal failure from ATN on CKD 4. Secondary to cardiorenal syndrome  2. Congestive heart failure with shortness of breath  3. Coronary disease  4. Hypertension    Plan     #1 renal function still slightly elevated in setting of cardiorenal syndrome good urine output and will monitor. 2.  Cardiac monitor  3. Hold on dialysis for now and hold on more aggressive therapy for now  4.   Blood pressure stable   urine output 3.5 L of diuretic therapy and monitor for now           Boston Collado MD, MD

## 2021-05-16 NOTE — PROGRESS NOTES
Daily Progress Note    Awake alert feeling ok  No chest pain, dyspnea improving  Heart rate is sinus has short SVT   COPDE   Hx of PAD s/p graft placed -left leg BKA  Normal EF noted on echo recently   Elevated trop no AC   Add statins low dose      Pt. Awake, alert and feeling better  HR stable, NSR, BP stable  No CP, Some SOB but improved    PNA    Noted on imaging    ON ABx    Tx. Per primary    Acute on Chronic HFpEF    Possibly exacerbated by PNA    Given Lasix-holding now as Creatinine is elevated    Diuresis per renal    BNP mildly elevated on 5/14    ON aldactone and Coreg-BP well controlled    Will follow-SOB is improved per pt. - he is on 3 L O2- baseline is 2 L at night only    DAX on CKD    Cr continues to rise    May need HD    Renal following    Elevated Trop- d/t kidney injury and HF- no ACS  Will follow    Echo-5/11/21  Summary   Left ventricular systolic function is normal.   Ejection fraction is visually estimated at 50 %. Moderate left ventricular hypertrophy. Mildly dilated left atrium. Heavily calcified right coronary cusp; Mean PG 8 mmHg. No evidence of any pericardial effusion. PAST MEDICAL HISTORY:  There is a past history of peripheral vascular  disease present, status post distal abdominal is occluded and he has a  left axillary fem bypass surgery done and had infection, had to revise  and multiple issues, but he is stable. History of renal disease, stage  III to IV. Former smoker. Hypertension, hyperlipidemia, obesity, sleep  apnea present. History of polycythemia vera also.     PAST SURGICAL HISTORY:  Left leg below-the-knee amputation done from  vascular issues.     FAMILY HISTORY:  Significant for hypertension present.     SOCIAL HISTORY:  He does not smoke and does not drink anymore.     ALLERGIES:  PENICILLIN and LORAZEPAM.     MEDICATIONS AT HOME:  He was on his Singulair, Bentyl, Aldactone,  inhalers bicarb, Coreg, Celexa, folic acid, aspirin.   Objective:   BP 40.4* 40.8* 39.7*   MCV 92.2 94.0 93.2    155 163     BMP:   Recent Labs     05/14/21  0459 05/15/21  0549 05/16/21  0429    136 136   K 3.5 3.8 3.9   CL 91* 93* 95*   CO2 28 29 25   PHOS 5.2* 5.6* 7.0*   BUN 76* 80* 86*   CREATININE 5.8* 6.2* 6.4*     LIVER PROFILE: No results for input(s): AST, ALT, LIPASE, BILIDIR, BILITOT, ALKPHOS in the last 72 hours. Invalid input(s): AMYLASE,  ALB  PT/INR: No results for input(s): PROTIME, INR in the last 72 hours. APTT: No results for input(s): APTT in the last 72 hours. BNP:  No results for input(s): BNP in the last 72 hours.       Assessment:  Patient Active Problem List    Diagnosis Date Noted    Choledocholithiasis 02/26/2017    Chronic kidney disease (CKD), stage III (moderate) 02/26/2017    Acute on chronic respiratory failure with hypoxia ([facility] [address] 05/11/2021    ASCVD (arteriosclerotic cardiovascular disease) 03/01/2019    Proteinuria 05/23/2017    Chronic kidney disease (CKD) stage G3b/A3, moderately decreased glomerular filtration rate (GFR) between 30-44 mL/min/1.73 square meter and albuminuria creatinine ratio greater than 300 mg/g 96/20/3130    Metabolic acidosis 50/21/8578    Atrophic kidney 04/10/2017    COPD (chronic obstructive pulmonary disease) ([facility] [address] 04/10/2017    HTN (hypertension) 04/10/2017    PAD (peripheral artery disease) ([address] 04/10/2017    Abnormality of lung on CXR 02/27/2017    S/P BKA (below knee amputation) unilateral ([address] 02/27/2017    Cholangitis     Acute calculous cholecystitis     Abdominal adhesions        Electronically signed by Jorie Phalen, PA-C on 5/16/2021 at 9:31 AM    Electronically signed by Aaliyah Saxena MD on 5/16/21 at 11:35 AM EDT

## 2021-05-16 NOTE — PLAN OF CARE
Problem: Skin Integrity:  Goal: Will show no infection signs and symptoms  Description: Will show no infection signs and symptoms  Outcome: Ongoing  Goal: Absence of new skin breakdown  Description: Absence of new skin breakdown  Outcome: Ongoing     Problem: Falls - Risk of:  Goal: Will remain free from falls  Description: Will remain free from falls  Outcome: Ongoing  Goal: Absence of physical injury  Description: Absence of physical injury  Outcome: Ongoing     Problem: Pain:  Description: Pain management should include both nonpharmacologic and pharmacologic interventions. Goal: Pain level will decrease  Description: Pain level will decrease  Outcome: Ongoing  Goal: Control of acute pain  Description: Control of acute pain  Outcome: Ongoing  Goal: Control of chronic pain  Description: Control of chronic pain  Outcome: Ongoing     Problem: Breathing Pattern - Ineffective:  Goal: Ability to achieve and maintain a regular respiratory rate will improve  Description: Ability to achieve and maintain a regular respiratory rate will improve  Outcome: Ongoing     Problem: Fluid Volume:  Description: [TRUNCATED] Hyponatremia indicates a relatively greater amount of water to sodium in plasma. In hypovolemia, a deficit of both total body water and sodium exists but relatively less water deficit. Euvolemia represents near normal total body sodium co .. Harry Casanova [TRUNCATED] Hyponatremia indicates a relatively greater amount of water to sodium in plasma. In hypovolemia, a deficit of both total body water and sodium exists but relatively less water deficit. Euvolemia represents near normal total body sodium co ...   Goal: Hemodynamic stability will improve  Description: Hemodynamic stability will improve  Outcome: Ongoing  Goal: Ability to maintain a balanced intake and output will improve  Description: Ability to maintain a balanced intake and output will improve  Outcome: Ongoing     Problem: Health Behavior:  Goal: Identification of resources available to assist in meeting health care needs will improve  Description: Identification of resources available to assist in meeting health care needs will improve  Outcome: Ongoing     Problem: Respiratory:  Goal: Respiratory status will improve  Description: Respiratory status will improve  Outcome: Ongoing

## 2021-05-16 NOTE — PROGRESS NOTES
Hospitalist Progress Note         Admit Date: 5/11/2021    PCP: Mikki Squires DO     Chief Complaint   Patient presents with    Respiratory Distress        Assessment and Plan:     -Acute and chronic respiratory failure with hypoxia (HCC) multifactorial.  Keep on BiPAP. -Acute on chronic diastolic heart failure improving on Lasix IV, metolazone. Good BP control. Echocardiogram and cardiology evaluation ordered. Replace electrolytes as needed.  -Possible left lower lobe pneumonia/atelectasis Continue cefepime and doxy. Continue to trend inflammatory markers and sputum C/S results. No left pleural effusion on ultrasound.  -Acute kidney injury on stage IV CKD improved on Lasix drip, metolazone, amiloride per nephro. Lasix drip as well as other meds discontinued. Current creatinine 6.4 and dialysis plan if RF worsens. -COPD with minimal exacerbation doxy, duo nebs and BiPAP support. Pulmonology on board. -Mild troponin elevation from kidney injury. Trend tropes. Continue home aspirin. -HTN uncontrolled continue home Norvasc, Coreg. Adjust medication per hospital course.  -Sleep apnea continue BiPAP support as needed and at night.   -Constipation with large stools visible on CT continue MiraLAX and Colace.     DVT prophylaxis: Heparin. Patient will need renal function to stabilize/improve to make appropriate DC plan.     Current Facility-Administered Medications   Medication Dose Route Frequency Provider Last Rate Last Admin    acetylcysteine (MUCOMYST) 20 % solution 600 mg  600 mg Inhalation BID Reshma Dolan MD   600 mg at 05/15/21 2008    cefepime (MAXIPIME) 500 mg in dextrose 5 % 50 mL IVPB  500 mg Intravenous Q12H Orin Darby MD   Stopped at 05/16/21 9809    docusate sodium (COLACE) capsule 100 mg  100 mg Oral BID Orin Darby MD   100 mg at 05/16/21 0830    albuterol sulfate  (90 Base) MCG/ACT inhaler 2 puff  2 puff Inhalation 4x daily Reshma Dolan MD   2 puff at 05/15/21 2005    ipratropium (ATROVENT HFA) 17 MCG/ACT inhaler 2 puff  2 puff Inhalation 4x daily Hannah Jiménez MD   2 puff at 05/15/21 2007    spironolactone (ALDACTONE) tablet 25 mg  25 mg Oral Daily Evgeny Zamudio MD   25 mg at 05/16/21 0831    ALPRAZolam (XANAX) tablet 0.25 mg  0.25 mg Oral TID PRN Kimmie Everett MD   0.25 mg at 05/16/21 0326    amLODIPine (NORVASC) tablet 10 mg  10 mg Oral Daily Kimmie Everett MD   10 mg at 05/16/21 0831    aspirin EC tablet 81 mg  81 mg Oral QAM Kimmie Everett MD   81 mg at 05/16/21 0831    azelastine (ASTELIN) 0.1 % nasal spray 1 spray  1 spray Each Nostril BID Kimmie Everett MD   1 spray at 05/16/21 0834    carvedilol (COREG) tablet 25 mg  25 mg Oral BID Kimmie Everett MD   25 mg at 05/16/21 0831    vitamin D CAPS 2,000 Units  2,000 Units Oral Daily Kimmie Everett MD   2,000 Units at 05/16/21 0831    citalopram (CELEXA) tablet 10 mg  10 mg Oral Daily Kimmie Everett MD   10 mg at 05/16/21 0831    budesonide-formoterol (SYMBICORT) 80-4.5 MCG/ACT inhaler 2 puff  2 puff Inhalation BID Kimmie Everett MD   2 puff at 63/44/87 0999    folic acid (FOLVITE) tablet 1 mg  1 mg Oral Daily Kimmie Everett MD   1 mg at 05/16/21 0831    HYDROcodone-acetaminophen ([facility]) 5-325 MG per tablet 1 tablet  1 tablet Oral Q6H PRN Kimmie Everett MD   1 tablet at 05/16/21 0326    montelukast (SINGULAIR) tablet 10 mg  10 mg Oral Nightly Kimmie Everett MD   10 mg at 05/15/21 2048    sodium chloride flush 0.9 % injection 5-40 mL  5-40 mL Intravenous 2 times per day Kimmie Everett MD   10 mL at 05/16/21 0831    sodium chloride flush 0.9 % injection 5-40 mL  5-40 mL Intravenous PRN Kimmie Everett MD        0.9 % sodium chloride infusion  25 mL Intravenous PRN Kimmie Everett MD        promethazine (PHENERGAN) tablet 12.5 mg  12.5 mg Oral Q6H PRN Kimmie Everett MD        Or    ondansetron [facility]) injection 4 mg  4 mg Intravenous Q6H PRN [place] 155 155 163      BMP   Recent Labs     05/14/21  0459 05/15/21  0549 05/16/21  0429    136 136   K 3.5 3.8 3.9   CL 91* 93* 95*   CO2 28 29 25   PHOS 5.2* 5.6* 7.0*   BUN 76* 80* 86*   CREATININE 5.8* 6.2* 6.4*     LFT'S   No results for input(s): AST, ALT, ALB, BILIDIR, BILITOT, ALKPHOS in the last 72 hours. COAG   No results for input(s): INR in the last 72 hours. POC:   Lab Results   Component Value Date    POCGLU 91 02/10/2015    POCGLU 97 02/10/2015    POCGLU 100 02/09/2015    POCGLU 96 02/09/2015     [id number]:  Lab Results   Component Value Date    LABA1C 5.4 12/19/2014     CARDIAC ENZYMES  No results for input(s): CKTOTAL, CKMB, CKMBINDEX, TROPONINI in the last 72 hours. Troponin:   No results for input(s): TROPONINT in the last 72 hours.   BNP:   Recent Labs     05/14/21 0459   PROBNP 1,831*     U/A:    Lab Results   Component Value Date    COLORU YELLOW 05/14/2021    WBCUA 1 05/14/2021    RBCUA 1 05/14/2021    MUCUS RARE 05/11/2021    BACTERIA NEGATIVE 05/14/2021    CLARITYU CLEAR 05/14/2021    SPECGRAV 1.013 05/14/2021    LEUKOCYTESUR TRACE 05/14/2021    BLOODU SMALL 05/14/2021    GLUCOSEU Negative 12/06/2019       Echocardiogram Complete 2d With Doppler With Color    Result Date: 5/11/2021  Transthoracic Echocardiography Report (TTE)  Demographics   Patient Name       Jessica BLACKWOOD     Date of Study       05/11/2021   Date of Birth      1955         Gender              Male   Age                77 year(s)         Race                   Patient Number     [id number]         Room Number         [facility]   Visit Number       [id number]   Corporate ID       [id number]   Accession Number   [id number]         Aurelio Grimaldo RDMS   Ordering Physician Bruna Bermudez      Interpreting        Sylvia Cobb MD                     [facility] MD           Physician  Procedure Type of Study   TTE effusion. Slightly improved airspace disease in the mid to lower right lung. Xr Chest Portable    Result Date: 5/11/2021  EXAMINATION: ONE XRAY VIEW OF THE CHEST 5/11/2021 6:24 am COMPARISON: 02/26/2017 HISTORY: ORDERING SYSTEM PROVIDED HISTORY: gurgling breath sounds, resp distress, HTN, h/o copd TECHNOLOGIST PROVIDED HISTORY: Reason for exam:->gurgling breath sounds, resp distress, HTN, h/o copd Reason for Exam: gurgling breath sounds, resp distress, HTN, h/o copd Acuity: Acute Type of Exam: Initial FINDINGS: Heart is stable in size and configuration. Emphysematous changes are present with prominent biapical blebs left greater than right. There is crowding of the bronchovascular markings in the lung bases. No acute osseous abnormality is seen. Surgical clips overlie the left chest.     COPD with crowding of the bibasilar lung markings.            [facility] Hospitalist

## 2021-05-16 NOTE — PROGRESS NOTES
Pulmonary and Critical Care  Progress Note      VITALS:  /72   Pulse 81   Temp 98 °F (36.7 °C) (Oral)   Resp 24   Ht 5' 8\" (1.727 m)   Wt 240 lb 6.4 oz (109 kg)   SpO2 97%   BMI 36.55 kg/m²     Subjective:   CHIEF COMPLAINT :SOB     HPI:                The patient is sitting in the bed. He is not in acute resp distress    Objective:   PHYSICAL EXAM:    LUNGS:Decreased air entry left base  Abd-soft, BS+,NT  Ext- no pedal edema  CVS-s1s2,no murmurs      DATA:    CBC:  Recent Labs     05/14/21  0459 05/15/21  0549 05/16/21  0429   WBC 11.2* 7.7 7.3   RBC 4.38* 4.34* 4.26*   HGB 13.4* 13.3* 12.6*   HCT 40.4* 40.8* 39.7*    155 163   MCV 92.2 94.0 93.2   MCH 30.6 30.6 29.6   MCHC 33.2 32.6 31.7*   RDW 14.0 13.8 13.7   SEGSPCT 77.0* 70.5* 64.0      BMP:  Recent Labs     05/14/21  0459 05/15/21  0549 05/16/21  0429    136 136   K 3.5 3.8 3.9   CL 91* 93* 95*   CO2 28 29 25   BUN 76* 80* 86*   CREATININE 5.8* 6.2* 6.4*   CALCIUM 8.7 9.1 8.8   GLUCOSE 119* 95 109*      ABG:  No results for input(s): PH, PO2ART, ALL3JTM, HCO3, BEART, O2SAT in the last 72 hours. BNP  No results found for: BNP   D-Dimer:  No results found for: DDIMER   1. Radiology: None      Assessment/Plan     Patient Active Problem List    Diagnosis Date Noted    Choledocholithiasis 02/26/2017     Priority: High    Chronic kidney disease (CKD), stage III (moderate) 02/26/2017     Priority: Medium     Overview Note:     Overview:   CT in 2016 shows chronic occlusion of R renal artery, left renal artery is patent. 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       Acute on chronic respiratory failure with hypoxia (HCC) 05/11/2021    ASCVD (arteriosclerotic cardiovascular disease) 03/01/2019    Proteinuria 05/23/2017    Chronic kidney disease (CKD) stage G3b/A3, moderately decreased glomerular filtration rate (GFR) between 30-44 mL/min/1.73 square meter and albuminuria creatinine ratio greater than 300 mg/g 65/40/1723    Metabolic acidosis 78/83/2503    Atrophic kidney 04/10/2017    COPD (chronic obstructive pulmonary disease) ([facility] [address] 04/10/2017    HTN (hypertension) 04/10/2017    PAD (peripheral artery disease) ([facility] [address] 04/10/2017    Abnormality of lung on CXR 02/27/2017    [facility] 02/27/2017    Cholangitis     Acute calculous cholecystitis     Abdominal adhesions      DAX on CKD  AECOPD- improving  Obesity  COMPA  ? Diastolic dysfunction  HTN  Stable RML nodule from 2017  Left Lower lobe atelectasis       1. Mucomyst  2. CPT  3. CXR in am  4. Keep sats > 92%  5. BIPAP  6. Diuresis  7. ICS  8. OOB  9. C/w present management  No follow-ups on file.     Electronically signed by Sujata Murphy MD on 5/16/2021 at 12:25 PM

## 2021-05-16 NOTE — PLAN OF CARE
Problem: Skin Integrity:  Goal: Will show no infection signs and symptoms  Description: Will show no infection signs and symptoms  Outcome: Ongoing  Goal: Absence of new skin breakdown  Description: Absence of new skin breakdown  Outcome: Ongoing     Problem: Falls - Risk of:  Goal: Will remain free from falls  Description: Will remain free from falls  Outcome: Ongoing  Goal: Absence of physical injury  Description: Absence of physical injury  Outcome: Ongoing     Problem: Pain:  Goal: Pain level will decrease  Description: Pain level will decrease  Outcome: Ongoing  Goal: Control of acute pain  Description: Control of acute pain  Outcome: Ongoing  Goal: Control of chronic pain  Description: Control of chronic pain  Outcome: Ongoing     Problem: Breathing Pattern - Ineffective:  Goal: Ability to achieve and maintain a regular respiratory rate will improve  Description: Ability to achieve and maintain a regular respiratory rate will improve  Outcome: Ongoing     Problem: Fluid Volume:  Goal: Hemodynamic stability will improve  Description: Hemodynamic stability will improve  Outcome: Ongoing  Goal: Ability to maintain a balanced intake and output will improve  Description: Ability to maintain a balanced intake and output will improve  Outcome: Ongoing     Problem: Health Behavior:  Goal: Identification of resources available to assist in meeting health care needs will improve  Description: Identification of resources available to assist in meeting health care needs will improve  Outcome: Ongoing     Problem: Respiratory:  Goal: Respiratory status will improve  Description: Respiratory status will improve  Outcome: Ongoing

## 2021-05-17 ENCOUNTER — APPOINTMENT (OUTPATIENT)
Dept: GENERAL RADIOLOGY | Age: 66
DRG: 291 | End: 2021-05-17
Payer: MEDICARE

## 2021-05-17 LAB
ALBUMIN SERPL-MCNC: 3.7 GM/DL (ref 3.4–5)
ANION GAP SERPL CALCULATED.3IONS-SCNC: 17 MMOL/L (ref 4–16)
BACTERIA: ABNORMAL /HPF
BASOPHILS ABSOLUTE: 0 K/CU MM
BASOPHILS RELATIVE PERCENT: 0.4 % (ref 0–1)
BILIRUBIN URINE: NEGATIVE MG/DL
BLOOD, URINE: ABNORMAL
BUN BLDV-MCNC: 81 MG/DL (ref 6–23)
CALCIUM SERPL-MCNC: 8.9 MG/DL (ref 8.3–10.6)
CHLORIDE BLD-SCNC: 93 MMOL/L (ref 99–110)
CLARITY: CLEAR
CO2: 26 MMOL/L (ref 21–32)
COLOR: YELLOW
CREAT SERPL-MCNC: 6.5 MG/DL (ref 0.9–1.3)
CULTURE: NORMAL
DIFFERENTIAL TYPE: ABNORMAL
EOSINOPHILS ABSOLUTE: 0.4 K/CU MM
EOSINOPHILS RELATIVE PERCENT: 5.7 % (ref 0–3)
GFR AFRICAN AMERICAN: 10 ML/MIN/1.73M2
GFR NON-AFRICAN AMERICAN: 9 ML/MIN/1.73M2
GLUCOSE BLD-MCNC: 92 MG/DL (ref 70–99)
GLUCOSE, URINE: NEGATIVE MG/DL
HCT VFR BLD CALC: 37.4 % (ref 42–52)
HEMOGLOBIN: 12.6 GM/DL (ref 13.5–18)
IMMATURE NEUTROPHIL %: 1.2 % (ref 0–0.43)
KETONES, URINE: NEGATIVE MG/DL
LEUKOCYTE ESTERASE, URINE: ABNORMAL
LYMPHOCYTES ABSOLUTE: 1.4 K/CU MM
LYMPHOCYTES RELATIVE PERCENT: 18.3 % (ref 24–44)
Lab: NORMAL
MCH RBC QN AUTO: 30.6 PG (ref 27–31)
MCHC RBC AUTO-ENTMCNC: 33.7 % (ref 32–36)
MCV RBC AUTO: 90.8 FL (ref 78–100)
MONOCYTES ABSOLUTE: 1.4 K/CU MM
MONOCYTES RELATIVE PERCENT: 18.5 % (ref 0–4)
NITRITE URINE, QUANTITATIVE: NEGATIVE
NUCLEATED RBC %: 0 %
PDW BLD-RTO: 13.8 % (ref 11.7–14.9)
PH, URINE: 6 (ref 5–8)
PHOSPHORUS: 6.9 MG/DL (ref 2.5–4.9)
PLATELET # BLD: 179 K/CU MM (ref 140–440)
PMV BLD AUTO: 10.1 FL (ref 7.5–11.1)
POTASSIUM SERPL-SCNC: 4 MMOL/L (ref 3.5–5.1)
PRO-BNP: 569 PG/ML
PROTEIN UA: 100 MG/DL
RBC # BLD: 4.12 M/CU MM (ref 4.6–6.2)
RBC URINE: 14 /HPF (ref 0–3)
SEGMENTED NEUTROPHILS ABSOLUTE COUNT: 4.3 K/CU MM
SEGMENTED NEUTROPHILS RELATIVE PERCENT: 55.9 % (ref 36–66)
SODIUM BLD-SCNC: 136 MMOL/L (ref 135–145)
SPECIFIC GRAVITY UA: 1.01 (ref 1–1.03)
SPECIMEN: NORMAL
SQUAMOUS EPITHELIAL: <1 /HPF
TOTAL IMMATURE NEUTOROPHIL: 0.09 K/CU MM
TOTAL NUCLEATED RBC: 0 K/CU MM
TRICHOMONAS: ABNORMAL /HPF
UROBILINOGEN, URINE: NEGATIVE MG/DL (ref 0.2–1)
WBC # BLD: 7.7 K/CU MM (ref 4–10.5)
WBC UA: 13 /HPF (ref 0–2)

## 2021-05-17 PROCEDURE — 83880 ASSAY OF NATRIURETIC PEPTIDE: CPT

## 2021-05-17 PROCEDURE — 99232 SBSQ HOSP IP/OBS MODERATE 35: CPT | Performed by: INTERNAL MEDICINE

## 2021-05-17 PROCEDURE — 85025 COMPLETE CBC W/AUTO DIFF WBC: CPT

## 2021-05-17 PROCEDURE — 2140000000 HC CCU INTERMEDIATE R&B

## 2021-05-17 PROCEDURE — 81001 URINALYSIS AUTO W/SCOPE: CPT

## 2021-05-17 PROCEDURE — 2580000003 HC RX 258: Performed by: INTERNAL MEDICINE

## 2021-05-17 PROCEDURE — 71045 X-RAY EXAM CHEST 1 VIEW: CPT

## 2021-05-17 PROCEDURE — 36415 COLL VENOUS BLD VENIPUNCTURE: CPT

## 2021-05-17 PROCEDURE — 2700000000 HC OXYGEN THERAPY PER DAY

## 2021-05-17 PROCEDURE — 6370000000 HC RX 637 (ALT 250 FOR IP): Performed by: INTERNAL MEDICINE

## 2021-05-17 PROCEDURE — 6360000002 HC RX W HCPCS: Performed by: INTERNAL MEDICINE

## 2021-05-17 PROCEDURE — 51702 INSERT TEMP BLADDER CATH: CPT

## 2021-05-17 PROCEDURE — 94761 N-INVAS EAR/PLS OXIMETRY MLT: CPT

## 2021-05-17 PROCEDURE — 80069 RENAL FUNCTION PANEL: CPT

## 2021-05-17 PROCEDURE — 94640 AIRWAY INHALATION TREATMENT: CPT

## 2021-05-17 RX ADMIN — FOLIC ACID 1 MG: 1 TABLET ORAL at 09:11

## 2021-05-17 RX ADMIN — ACETYLCYSTEINE 600 MG: 200 SOLUTION ORAL; RESPIRATORY (INHALATION) at 07:21

## 2021-05-17 RX ADMIN — HEPARIN SODIUM 5000 UNITS: 5000 INJECTION INTRAVENOUS; SUBCUTANEOUS at 22:29

## 2021-05-17 RX ADMIN — SODIUM CHLORIDE, PRESERVATIVE FREE 10 ML: 5 INJECTION INTRAVENOUS at 09:20

## 2021-05-17 RX ADMIN — SODIUM CHLORIDE, PRESERVATIVE FREE 10 ML: 5 INJECTION INTRAVENOUS at 22:33

## 2021-05-17 RX ADMIN — ASPIRIN 81 MG: 81 TABLET, COATED ORAL at 09:12

## 2021-05-17 RX ADMIN — Medication 2000 UNITS: at 09:11

## 2021-05-17 RX ADMIN — HYDROCODONE BITARTRATE AND ACETAMINOPHEN 1 TABLET: 5; 325 TABLET ORAL at 22:36

## 2021-05-17 RX ADMIN — SODIUM CHLORIDE, PRESERVATIVE FREE 10 ML: 5 INJECTION INTRAVENOUS at 21:00

## 2021-05-17 RX ADMIN — IPRATROPIUM BROMIDE AND ALBUTEROL SULFATE 1 AMPULE: .5; 3 SOLUTION RESPIRATORY (INHALATION) at 22:46

## 2021-05-17 RX ADMIN — CITALOPRAM HYDROBROMIDE 10 MG: 20 TABLET ORAL at 09:12

## 2021-05-17 RX ADMIN — AZELASTINE HYDROCHLORIDE 1 SPRAY: 137 SPRAY, METERED NASAL at 22:28

## 2021-05-17 RX ADMIN — ATORVASTATIN CALCIUM 20 MG: 20 TABLET, FILM COATED ORAL at 22:27

## 2021-05-17 RX ADMIN — IPRATROPIUM BROMIDE AND ALBUTEROL SULFATE 1 AMPULE: .5; 3 SOLUTION RESPIRATORY (INHALATION) at 07:21

## 2021-05-17 RX ADMIN — HEPARIN SODIUM 5000 UNITS: 5000 INJECTION INTRAVENOUS; SUBCUTANEOUS at 15:32

## 2021-05-17 RX ADMIN — ALPRAZOLAM 0.25 MG: 0.25 TABLET ORAL at 04:30

## 2021-05-17 RX ADMIN — HYDROCODONE BITARTRATE AND ACETAMINOPHEN 1 TABLET: 5; 325 TABLET ORAL at 15:28

## 2021-05-17 RX ADMIN — CEFEPIME HYDROCHLORIDE 500 MG: 1 INJECTION, POWDER, FOR SOLUTION INTRAMUSCULAR; INTRAVENOUS at 09:19

## 2021-05-17 RX ADMIN — FAMOTIDINE 20 MG: 20 TABLET, FILM COATED ORAL at 09:12

## 2021-05-17 RX ADMIN — AZELASTINE HYDROCHLORIDE 1 SPRAY: 137 SPRAY, METERED NASAL at 09:19

## 2021-05-17 RX ADMIN — IPRATROPIUM BROMIDE AND ALBUTEROL SULFATE 1 AMPULE: .5; 3 SOLUTION RESPIRATORY (INHALATION) at 11:14

## 2021-05-17 RX ADMIN — GUAIFENESIN 600 MG: 600 TABLET, EXTENDED RELEASE ORAL at 09:12

## 2021-05-17 RX ADMIN — DOCUSATE SODIUM 100 MG: 100 CAPSULE, LIQUID FILLED ORAL at 09:12

## 2021-05-17 RX ADMIN — AMLODIPINE BESYLATE 10 MG: 5 TABLET ORAL at 09:12

## 2021-05-17 RX ADMIN — BUDESONIDE AND FORMOTEROL FUMARATE DIHYDRATE 2 PUFF: 80; 4.5 AEROSOL RESPIRATORY (INHALATION) at 07:21

## 2021-05-17 RX ADMIN — MONTELUKAST 10 MG: 10 TABLET, FILM COATED ORAL at 22:25

## 2021-05-17 RX ADMIN — IPRATROPIUM BROMIDE AND ALBUTEROL SULFATE 1 AMPULE: .5; 3 SOLUTION RESPIRATORY (INHALATION) at 15:44

## 2021-05-17 RX ADMIN — ALPRAZOLAM 0.25 MG: 0.25 TABLET ORAL at 22:37

## 2021-05-17 RX ADMIN — HEPARIN SODIUM 5000 UNITS: 5000 INJECTION INTRAVENOUS; SUBCUTANEOUS at 06:38

## 2021-05-17 RX ADMIN — GUAIFENESIN 600 MG: 600 TABLET, EXTENDED RELEASE ORAL at 22:27

## 2021-05-17 RX ADMIN — CEFEPIME HYDROCHLORIDE 1000 MG: 1 INJECTION, POWDER, FOR SOLUTION INTRAMUSCULAR; INTRAVENOUS at 22:20

## 2021-05-17 RX ADMIN — ALPRAZOLAM 0.25 MG: 0.25 TABLET ORAL at 17:00

## 2021-05-17 RX ADMIN — CARVEDILOL 25 MG: 25 TABLET, FILM COATED ORAL at 22:28

## 2021-05-17 RX ADMIN — DOXYCYCLINE HYCLATE 100 MG: 100 TABLET, COATED ORAL at 22:26

## 2021-05-17 RX ADMIN — DOCUSATE SODIUM 100 MG: 100 CAPSULE, LIQUID FILLED ORAL at 22:27

## 2021-05-17 RX ADMIN — BUDESONIDE AND FORMOTEROL FUMARATE DIHYDRATE 2 PUFF: 80; 4.5 AEROSOL RESPIRATORY (INHALATION) at 22:48

## 2021-05-17 RX ADMIN — DOXYCYCLINE HYCLATE 100 MG: 100 TABLET, COATED ORAL at 09:13

## 2021-05-17 RX ADMIN — HYDROCODONE BITARTRATE AND ACETAMINOPHEN 1 TABLET: 5; 325 TABLET ORAL at 04:30

## 2021-05-17 RX ADMIN — CARVEDILOL 25 MG: 25 TABLET, FILM COATED ORAL at 09:13

## 2021-05-17 ASSESSMENT — PAIN DESCRIPTION - FREQUENCY
FREQUENCY: CONTINUOUS
FREQUENCY: CONTINUOUS

## 2021-05-17 ASSESSMENT — PAIN SCALES - GENERAL
PAINLEVEL_OUTOF10: 10
PAINLEVEL_OUTOF10: 4
PAINLEVEL_OUTOF10: 0
PAINLEVEL_OUTOF10: 2

## 2021-05-17 ASSESSMENT — PAIN DESCRIPTION - ORIENTATION
ORIENTATION: RIGHT

## 2021-05-17 ASSESSMENT — PAIN DESCRIPTION - LOCATION
LOCATION: LEG
LOCATION: LEG

## 2021-05-17 ASSESSMENT — PAIN DESCRIPTION - DESCRIPTORS: DESCRIPTORS: ACHING

## 2021-05-17 ASSESSMENT — PAIN - FUNCTIONAL ASSESSMENT: PAIN_FUNCTIONAL_ASSESSMENT: PREVENTS OR INTERFERES SOME ACTIVE ACTIVITIES AND ADLS

## 2021-05-17 ASSESSMENT — PAIN DESCRIPTION - ONSET: ONSET: ON-GOING

## 2021-05-17 ASSESSMENT — PAIN DESCRIPTION - PROGRESSION
CLINICAL_PROGRESSION: NOT CHANGED
CLINICAL_PROGRESSION: GRADUALLY IMPROVING
CLINICAL_PROGRESSION: NOT CHANGED

## 2021-05-17 ASSESSMENT — PAIN DESCRIPTION - PAIN TYPE
TYPE: ACUTE PAIN
TYPE: ACUTE PAIN

## 2021-05-17 NOTE — PROGRESS NOTES
Nightly May Ogden MD   20 mg at 05/16/21 2111    acetylcysteine (MUCOMYST) 20 % solution 600 mg  600 mg Inhalation BID Opal Trammell MD   600 mg at 05/17/21 0721    docusate sodium (COLACE) capsule 100 mg  100 mg Oral BID Diana Valentino MD   100 mg at 05/17/21 0912    ALPRAZolam (XANAX) tablet 0.25 mg  0.25 mg Oral TID PRN Diana Valentino MD   0.25 mg at 05/17/21 1700    amLODIPine (NORVASC) tablet 10 mg  10 mg Oral Daily Diana Valentino MD   10 mg at 05/17/21 0912    aspirin EC tablet 81 mg  81 mg Oral QAM Diana Valentino MD   81 mg at 05/17/21 0912    azelastine (ASTELIN) 0.1 % nasal spray 1 spray  1 spray Each Nostril BID Diana Valentino MD   1 spray at 05/17/21 0919    carvedilol (COREG) tablet 25 mg  25 mg Oral BID Diana Valentino MD   25 mg at 05/17/21 0913    vitamin D CAPS 2,000 Units  2,000 Units Oral Daily Diana Valentino MD   2,000 Units at 05/17/21 0911    citalopram (CELEXA) tablet 10 mg  10 mg Oral Daily Diana Valentino MD   10 mg at 05/17/21 0912    budesonide-formoterol (SYMBICORT) 80-4.5 MCG/ACT inhaler 2 puff  2 puff Inhalation BID Diana Valentino MD   2 puff at 91/68/61 2356    folic acid (FOLVITE) tablet 1 mg  1 mg Oral Daily Diana Valentino MD   1 mg at 05/17/21 0911    HYDROcodone-acetaminophen ([address]) 5-325 MG per tablet 1 tablet  1 tablet Oral Q6H PRN Diana Valentino MD   1 tablet at 05/17/21 1528    montelukast (SINGULAIR) tablet 10 mg  10 mg Oral Nightly Diana Valentino MD   10 mg at 05/16/21 2111    sodium chloride flush 0.9 % injection 5-40 mL  5-40 mL Intravenous 2 times per day Diana Valentino MD   10 mL at 05/17/21 0920    sodium chloride flush 0.9 % injection 5-40 mL  5-40 mL Intravenous PRN Diana Valentino MD        0.9 % sodium chloride infusion  25 mL Intravenous PRN Diana Valentino MD        promethazine (PHENERGAN) tablet 12.5 mg  12.5 mg Oral Q6H PRN Diana Valentino MD        Or    ondansetron [facility] [address] [facility]) injection 4 mg  4 mg Intravenous 40.8* 39.7* 37.4*    163 179      BMP   Recent Labs     05/15/21  0549 05/16/21  0429 05/17/21  0559    136 136   K 3.8 3.9 4.0   CL 93* 95* 93*   CO2 29 25 26   PHOS 5.6* 7.0* 6.9*   BUN 80* 86* 81*   CREATININE 6.2* 6.4* 6.5*     LFT'S   No results for input(s): AST, ALT, ALB, BILIDIR, BILITOT, ALKPHOS in the last 72 hours. COAG   No results for input(s): INR in the last 72 hours. POC:   Lab Results   Component Value Date    POCGLU 91 02/10/2015    POCGLU 97 02/10/2015    POCGLU 100 02/09/2015    POCGLU 96 02/09/2015     [id number]:  Lab Results   Component Value Date    LABA1C 5.4 12/19/2014     CARDIAC ENZYMES  No results for input(s): CKTOTAL, CKMB, CKMBINDEX, TROPONINI in the last 72 hours. Troponin:   No results for input(s): TROPONINT in the last 72 hours.   BNP:   Recent Labs     05/17/21  0559   PROBNP 569.0*     U/A:    Lab Results   Component Value Date    COLORU YELLOW 05/17/2021    WBCUA 13 05/17/2021    RBCUA 14 05/17/2021    MUCUS RARE 05/11/2021    BACTERIA RARE 05/17/2021    CLARITYU CLEAR 05/17/2021    SPECGRAV 1.013 05/17/2021    LEUKOCYTESUR SMALL 05/17/2021    BLOODU MODERATE 05/17/2021    GLUCOSEU Negative 12/06/2019       Echocardiogram Complete 2d With Doppler With Color    Result Date: 5/11/2021  Transthoracic Echocardiography Report (TTE)  Demographics   Patient Name       Magi BLACKWOOD     Date of Study       05/11/2021   Date of Birth      1955         Gender              Male   Age                77 year(s)         Race                   Patient Number     [id number]         Room Number         IWR   Visit Number       [id number]   Corporate ID       [id number]   Accession Number   [id number]         Cary Leblanc RDMS   Ordering Physician Marlen Messina MD                     [facility] MD           Physician  Procedure Type of Study TTE procedure:ECHOCARDIOGRAM COMPLETE 2D W DOPPLER W COLOR. Procedure Date Date: 05/11/2021 Start: 09:38 AM Study Location: ER Technical Quality: Fair visualization Indications:Congestive heart failure. Patient Status: Routine Height: 68 inches Weight: 246 pounds BSA: 2.23 m2 BMI: 37.4 kg/m2 HR: 84 bpm BP: 159/91 mmHg  Conclusions   Summary  Left ventricular systolic function is normal.  Ejection fraction is visually estimated at 50 %. Moderate left ventricular hypertrophy. Mildly dilated left atrium. Heavily calcified right coronary cusp; Mean PG 8 mmHg. No evidence of any pericardial effusion. Signature   ------------------------------------------------------------------  Electronically signed by Gilberto Bee MD (Interpreting  physician) on 05/11/2021 at 05:32 PM  ------------------------------------------------------------------   Findings   Left Ventricle  Left ventricular systolic function is normal.  Ejection fraction is visually estimated at 50%. Moderate left ventricular hypertrophy. Left Atrium  Mildly dilated left atrium. Right Atrium  Essentially normal right atrium. Right Ventricle  Essentially normal right ventricle. Aortic Valve  Heavily calcified right coronary cusp; Mean PG 8 mmHg. Trivial aortic regurgitation. Mitral Valve  Trace mitral regurgitation. Tricuspid Valve  Trace tricuspid regurgitation; normal RVSP. Pulmonic Valve  The pulmonic valve was not well visualized. Pericardial Effusion  No evidence of any pericardial effusion. Pleural Effusion  No evidence of pleural effusion. Miscellaneous  Pericardial fat pad visualized.   M-Mode/2D Measurements & Calculations   LV Diastolic Dimension:  LV Systolic Dimension:  LA Dimension: 3.3 cmAO Root  4.26 cm                  3.12 cm                 Dimension: 3.7 cmLA Area:  LV FS:26.8 %             LV Volume Diastolic:    47.3 cm2  LV PW Diastolic: 1.9 cm  608 ml  LV PW Systolic: 2.2 cm   LV Volume Systolic: 53  Septum Diastolic: 6.05   ml  cm                       LV EDV/LV EDV Index:    RV Diastolic Dimension:  Septum Systolic: 3.27 cm 029 FH/38 m2LV ESV/LV   3.09 cm  CO: 7.5 l/min            ESV Index: 53 ml/24 m2  CI: 3.36 l/m*m2          EF Calculated (A4C):    LA/Aorta: 0.89                           54.3 %  LV Area Diastolic: 10.9  EF Calculated (2D):     LA volume/Index: 69 ml  cm2                      52.6 %                  /37F1  LV Area Systolic: 21.0  cm2                      LV Length: 8.23 cm                            LVOT: 2.2 cm  Doppler Measurements & Calculations   MV Peak E-Wave: 123   AV Peak Velocity: 176 cm/s   LVOT Peak Velocity: 137  cm/s                  AV Peak Gradient: 12.39 mmHg cm/s  MV Peak A-Wave: 103   AV Mean Velocity: 133 cm/s   LVOT Mean Velocity: 90.3  cm/s                  AV Mean Gradient: 8 mmHg     cm/s  MV E/A Ratio: 1.19    AV VTI: 32.4 cm              LVOT Peak Gradient: 8  MV Peak Gradient:     AV Area (Continuity):2.76    mmHgLVOT Mean Gradient: 4  6.05 mmHg             cm2                          mmHg                                                     Estimated RVSP: 16 mmHg  MV P1/2t: 48 msec     LVOT VTI: 23.5 cm            Estimated RAP:3 mmHg  MVA by PHT:4.58 cm2                        Estimated PASP: 11.29 mmHg  MV E' Septal                                       TR Velocity:144 cm/s  Velocity: 6.91 cm/s                                TR Gradient:8.29 mmHg  MV E' Lateral  Velocity: 11 cm/s  MV E/E' septal: 17.8  MV E/E' lateral:  11.18      Xr Chest Portable    Result Date: 5/12/2021  EXAMINATION: ONE XRAY VIEW OF THE CHEST 5/12/2021 5:49 am COMPARISON: 05/11/2021 HISTORY: Acute hypoxia. FINDINGS: Patient is slightly rotated. Heart remains enlarged. Moderate-large left pleural effusion has increased and there is adjacent airspace disease. Slightly improved airspace disease in the mid to lower right lung. No pneumothorax seen.      Increased moderate-large left pleural

## 2021-05-17 NOTE — PROGRESS NOTES
Pulmonary and Critical Care  Progress Note      VITALS:  /66   Pulse 81   Temp 98.1 °F (36.7 °C) (Oral)   Resp 16   Ht 5' 8\" (1.727 m)   Wt 235 lb 4.8 oz (106.7 kg)   SpO2 (!) 88%   BMI 35.78 kg/m²     Subjective:   CHIEF COMPLAINT :SOB     HPI:                The patient is sitting in the bed. He is not in acute resp distress    Objective:   PHYSICAL EXAM:    LUNGS:Decreased air entry left base  Abd-soft, BS+,NT  Ext- no pedal edema  CVS-s1s2,no murmurs      DATA:    CBC:  Recent Labs     05/15/21  0549 05/16/21  0429 05/17/21  0559   WBC 7.7 7.3 7.7   RBC 4.34* 4.26* 4.12*   HGB 13.3* 12.6* 12.6*   HCT 40.8* 39.7* 37.4*    163 179   MCV 94.0 93.2 90.8   MCH 30.6 29.6 30.6   MCHC 32.6 31.7* 33.7   RDW 13.8 13.7 13.8   SEGSPCT 70.5* 64.0 55.9      BMP:  Recent Labs     05/15/21  0549 05/16/21  0429 05/17/21  0559    136 136   K 3.8 3.9 4.0   CL 93* 95* 93*   CO2 29 25 26   BUN 80* 86* 81*   CREATININE 6.2* 6.4* 6.5*   CALCIUM 9.1 8.8 8.9   GLUCOSE 95 109* 92      ABG:  No results for input(s): PH, PO2ART, [id number], HCO3, BEART, O2SAT in the last 72 hours. BNP  No results found for: BNP   D-Dimer:  No results found for: [facility]   Radiology:   Improved aeration of the left lung when compared with 3 days prior. 2. Severe bullous emphysema. 3. Bibasilar airspace opacities, likely a combination of atelectasis/scarring   and pneumonia. 1.       Assessment/Plan     Patient Active Problem List    Diagnosis Date Noted    Choledocholithiasis 02/26/2017     Priority: High    Chronic kidney disease (CKD), stage III (moderate) 02/26/2017     Priority: Medium     Overview Note:     Overview:   CT in 2016 shows chronic occlusion of R renal artery, left renal artery is patent. 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       Acute on chronic respiratory failure with hypoxia (HCC) 05/11/2021    ASCVD (arteriosclerotic cardiovascular disease) 03/01/2019    Proteinuria 05/23/2017  Chronic kidney disease (CKD) stage G3b/A3, moderately decreased glomerular filtration rate (GFR) between 30-44 mL/min/1.73 square meter and albuminuria creatinine ratio greater than 300 mg/g 49/68/1977    Metabolic acidosis 91/58/6975    Atrophic kidney 04/10/2017    COPD (chronic obstructive pulmonary disease) ([facility] [address] 04/10/2017    HTN (hypertension) 04/10/2017    PAD (peripheral artery disease) ([facility] [address] 04/10/2017    Abnormality of lung on CXR 02/27/2017    S/P BKA (below knee amputation) unilateral ([facility] [address] 02/27/2017    Cholangitis     Acute calculous cholecystitis     Abdominal adhesions    DAX on CKD  AECOPD- improving  Obesity  COMPA  ? Diastolic dysfunction  HTN  Stable RML nodule from 2017  Left Lower lobe atelectasis- improving       1. CPT  2. Mucomyst  3. ICS  4. OOB  5. BIPAP  6. Keep sats > 92%  7. CXR in am  8. C/w present management  No follow-ups on file.     Electronically signed by Isac Cruz MD on 5/17/2021 at 12:43 PM

## 2021-05-17 NOTE — CARE COORDINATION
Met with pt for a f/u visit. Pt states that he is still planning to go home with his wife and [facility]. Dr Gamino Flick in room and states that pt may need to go to a SNF if he is d/c'd on dialysis. Pt states that he is not going to a nursing home. Pt is very adamant that he will not go to a SNF. Pt denies any new d/c needs. D/c plan is home with spouse and [facility]. Notify CM if any new d/c needs arise. TE     NOTIFY Unknown Caul [phone] AND FAX THE AVS, H&P, AND C ORDER [phone] WHEN PT IS DISCHARGED.

## 2021-05-17 NOTE — PLAN OF CARE
Problem: Skin Integrity:  Goal: Will show no infection signs and symptoms  Description: Will show no infection signs and symptoms  Outcome: Ongoing  Goal: Absence of new skin breakdown  Description: Absence of new skin breakdown  Outcome: Ongoing     Problem: Falls - Risk of:  Goal: Will remain free from falls  Description: Will remain free from falls  Outcome: Ongoing  Goal: Absence of physical injury  Description: Absence of physical injury  Outcome: Ongoing     Problem: Pain:  Description: Pain management should include both nonpharmacologic and pharmacologic interventions. Goal: Pain level will decrease  Description: Pain level will decrease  Outcome: Ongoing  Goal: Control of acute pain  Description: Control of acute pain  Outcome: Ongoing  Goal: Control of chronic pain  Description: Control of chronic pain  Outcome: Ongoing     Problem: Breathing Pattern - Ineffective:  Goal: Ability to achieve and maintain a regular respiratory rate will improve  Description: Ability to achieve and maintain a regular respiratory rate will improve  Outcome: Ongoing     Problem: Fluid Volume:  Description: [TRUNCATED] Hyponatremia indicates a relatively greater amount of water to sodium in plasma. In hypovolemia, a deficit of both total body water and sodium exists but relatively less water deficit. Euvolemia represents near normal total body sodium co .. Maryln Solar [TRUNCATED] Hyponatremia indicates a relatively greater amount of water to sodium in plasma. In hypovolemia, a deficit of both total body water and sodium exists but relatively less water deficit. Euvolemia represents near normal total body sodium co ...   Goal: Hemodynamic stability will improve  Description: Hemodynamic stability will improve  Outcome: Ongoing  Goal: Ability to maintain a balanced intake and output will improve  Description: Ability to maintain a balanced intake and output will improve  Outcome: Ongoing     Problem: Health Behavior:  Goal: Identification of resources available to assist in meeting health care needs will improve  Description: Identification of resources available to assist in meeting health care needs will improve  Outcome: Ongoing     Problem: Respiratory:  Goal: Respiratory status will improve  Description: Respiratory status will improve  Outcome: Ongoing

## 2021-05-17 NOTE — PROGRESS NOTES
flow - +  rhonchi   Heart:  Seems RRR  Abdomen: soft  Extremities:  Lt NKA  No LE edema   Has arreola     Problem List :         Impression :     1. DAX- CKD CKD stage 4 A3- he seems to have post ATI diuresis and his cr will plateau by tomorrow no uremia   2. ADHF- we have accomplished  the goal  of decongest   3. Also has ACOPD- ASCVD/ BP rather  low     Recommendation/Plan  :     1. So UA  2. May d/C arreola when he can handle  3. likely d/C tomorrow- as I anticipate cr will plateau  4. He is on several abx - ? deescalate   5. Manual BP  6.  Follow C;clinically       Frederic Lopez MD MD

## 2021-05-17 NOTE — PROGRESS NOTES
Daily Progress Note    Awake alert  Feeling better  Heart rate is stable and BP stable  Hx of PAD has a graft present and left leg +BKA  Renal note reviewed  Diuretics per renal -optimize medical treatment  Has normal EF   Hx of COPD and COMPA and obesity   Will plan for stress test as outpatient     Pt. Awake alert this AM. Feeling well. HR stable. BP soft but stable. NSR  SOB has improved. Almost to baseline    Acute on chronic HFpEF    Possibly exacerbated by PNA     Lasix on hold. Elevated Creatine    No plan for HD for now per nephro    On aldactone and Coreg    On 4L O2 NC. usually only uses 2L at   night. PNA     Noted on imaging     On ABX     Treat per primary team    DAX on CKD   Cr. Rising. Per nephro will plateau tomorrow    Nephro following. Elevated Trops d/t DAX and HF, no ACS. Will follow    Echo-5/11/21  Summary   Left ventricular systolic function is normal.   Ejection fraction is visually estimated at 50 %.   Moderate left ventricular hypertrophy.  Opal Plume dilated left atrium.   Heavily calcified right coronary cusp; Mean PG 8 mmHg.   No evidence of any pericardial effusion.     PAST MEDICAL HISTORY: Deidre Payan is a past history of peripheral vascular  disease present, status post distal abdominal is occluded and he has a  left axillary fem bypass surgery done and had infection, had to revise  and multiple issues, but he is stable.  History of renal disease, stage  III to IV.  Former smoker.  Hypertension, hyperlipidemia, obesity, sleep  apnea present.  History of polycythemia vera also.     PAST SURGICAL HISTORY:  Left leg below-the-knee amputation done from  vascular issues.     FAMILY HISTORY:  Significant for hypertension present.     SOCIAL HISTORY:  He does not smoke and does not drink anymore.     ALLERGIES:  PENICILLIN and LORAZEPAM.     MEDICATIONS AT HOME:  He was on his Singulair, Bentyl, Aldactone,  inhalers bicarb, Coreg, Celexa, folic acid, aspirin.     Objective:   BP (!) 91/50   Pulse 66   Temp 97.9 °F (36.6 °C) (Oral)   Resp 12   Ht 5' 8\" (1.727 m)   Wt 235 lb 4.8 oz (106.7 kg)   SpO2 98%   BMI 35.78 kg/m²       Intake/Output Summary (Last 24 hours) at 5/17/2021 0825  Last data filed at 5/17/2021 0640  Gross per 24 hour   Intake --   Output 3450 ml   Net -3450 ml       Medications:   Scheduled Meds:   ipratropium-albuterol  1 ampule Inhalation Q4H WA    atorvastatin  20 mg Oral Nightly    acetylcysteine  600 mg Inhalation BID    cefepime  500 mg Intravenous Q12H    docusate sodium  100 mg Oral BID    amLODIPine  10 mg Oral Daily    aspirin EC  81 mg Oral QAM    azelastine  1 spray Each Nostril BID    carvedilol  25 mg Oral BID    vitamin D  2,000 Units Oral Daily    citalopram  10 mg Oral Daily    budesonide-formoterol  2 puff Inhalation BID    folic acid  1 mg Oral Daily    montelukast  10 mg Oral Nightly    sodium chloride flush  5-40 mL Intravenous 2 times per day    doxycycline hyclate  100 mg Oral 2 times per day    guaiFENesin  600 mg Oral BID    famotidine  20 mg Oral Daily    heparin (porcine)  5,000 Units Subcutaneous 3 times per day      Infusions:   sodium chloride        PRN Meds:  albuterol sulfate HFA, ipratropium, ALPRAZolam, HYDROcodone-acetaminophen, sodium chloride flush, sodium chloride, promethazine **OR** ondansetron, polyethylene glycol, acetaminophen **OR** acetaminophen, nitroGLYCERIN       Physical Exam:  Vitals:    05/17/21 0723   BP:    Pulse:    Resp: 12   Temp:    SpO2: 98%        General: AAO, NAD  Chest: Nontender  Cardiac: First and Second Heart Sounds are Normal, No Murmurs or Gallops noted  Lungs:Clear to auscultation and percussion. Abdomen: Soft, NT, ND, +BS  Extremities: No clubbing, no edema  Vascular:  Equal 2+ peripheral pulses.         Lab Data:  CBC:   Recent Labs     05/15/21  0549 05/16/21  0429 05/17/21  0559   WBC 7.7 7.3 7.7   HGB 13.3* 12.6* 12.6*   HCT 40.8* 39.7* 37.4*   MCV 94.0 93.2 90.8    163 179     BMP:   Recent Labs     05/15/21  0549 05/16/21  0429 05/17/21  0559    136 136   K 3.8 3.9 4.0   CL 93* 95* 93*   CO2 29 25 26   PHOS 5.6* 7.0* 6.9*   BUN 80* 86* 81*   CREATININE 6.2* 6.4* 6.5*     LIVER PROFILE: No results for input(s): AST, ALT, LIPASE, BILIDIR, BILITOT, ALKPHOS in the last 72 hours. Invalid input(s): AMYLASE,  ALB  PT/INR: No results for input(s): PROTIME, INR in the last 72 hours. APTT: No results for input(s): APTT in the last 72 hours. BNP:  No results for input(s): BNP in the last 72 hours.       Assessment:  Patient Active Problem List    Diagnosis Date Noted    Choledocholithiasis 02/26/2017    Chronic kidney disease (CKD), stage III (moderate) 02/26/2017    Acute on chronic respiratory failure with hypoxia ([facility] [address] 05/11/2021    ASCVD (arteriosclerotic cardiovascular disease) 03/01/2019    Proteinuria 05/23/2017    Chronic kidney disease (CKD) stage G3b/A3, moderately decreased glomerular filtration rate (GFR) between 30-44 mL/min/1.73 square meter and albuminuria creatinine ratio greater than 300 mg/g 58/57/9745    Metabolic acidosis 42/72/8185    Atrophic kidney 04/10/2017    COPD (chronic obstructive pulmonary disease) ([facility] [address] 04/10/2017    HTN (hypertension) 04/10/2017    PAD (peripheral artery disease) ([facility] [address] 04/10/2017    Abnormality of lung on CXR 02/27/2017    S/P BKA (below knee amputation) unilateral ([facility] [address] 02/27/2017    Cholangitis     Acute calculous cholecystitis     Abdominal adhesions        Electronically signed by JAYDEN Dill CNP on 5/17/2021 at 8:25 AM  Electronically signed by Elisabeth Olson MD on 5/17/21 at 11:25 AM EDT

## 2021-05-17 NOTE — PLAN OF CARE
hypovolemia, a deficit of both total body water and sodium exists but relatively less water deficit. Euvolemia represents near normal total body sodium co .. Harry Casanova [TRUNCATED] Hyponatremia indicates a relatively greater amount of water to sodium in plasma. In hypovolemia, a deficit of both total body water and sodium exists but relatively less water deficit. Euvolemia represents near normal total body sodium co ...   Goal: Hemodynamic stability will improve  Description: Hemodynamic stability will improve  5/17/2021 0449 by Reyes Council, RN  Outcome: Ongoing  5/16/2021 1759 by Kendell Corcoran RN  Outcome: Ongoing  Goal: Ability to maintain a balanced intake and output will improve  Description: Ability to maintain a balanced intake and output will improve  5/17/2021 0449 by Reyes Council, RN  Outcome: Ongoing  5/16/2021 1759 by Kendell Corcorna RN  Outcome: Ongoing     Problem: Health Behavior:  Goal: Identification of resources available to assist in meeting health care needs will improve  Description: Identification of resources available to assist in meeting health care needs will improve  5/17/2021 0449 by Reyes Council, RN  Outcome: Ongoing  5/16/2021 1759 by Kendell Corcoran RN  Outcome: Ongoing     Problem: Respiratory:  Goal: Respiratory status will improve  Description: Respiratory status will improve  5/17/2021 0449 by Reyes Council, RN  Outcome: Ongoing  5/16/2021 1759 by Kendell Corcoran RN  Outcome: Ongoing

## 2021-05-18 ENCOUNTER — APPOINTMENT (OUTPATIENT)
Dept: GENERAL RADIOLOGY | Age: 66
DRG: 291 | End: 2021-05-18
Payer: MEDICARE

## 2021-05-18 VITALS
BODY MASS INDEX: 35.25 KG/M2 | DIASTOLIC BLOOD PRESSURE: 71 MMHG | TEMPERATURE: 98.3 F | SYSTOLIC BLOOD PRESSURE: 119 MMHG | RESPIRATION RATE: 18 BRPM | HEIGHT: 68 IN | OXYGEN SATURATION: 92 % | WEIGHT: 232.6 LBS | HEART RATE: 81 BPM

## 2021-05-18 LAB
ALBUMIN SERPL-MCNC: 3.6 GM/DL (ref 3.4–5)
ANION GAP SERPL CALCULATED.3IONS-SCNC: 16 MMOL/L (ref 4–16)
BASOPHILS ABSOLUTE: 0 K/CU MM
BASOPHILS RELATIVE PERCENT: 0.4 % (ref 0–1)
BUN BLDV-MCNC: 81 MG/DL (ref 6–23)
CALCIUM SERPL-MCNC: 8.8 MG/DL (ref 8.3–10.6)
CHLORIDE BLD-SCNC: 96 MMOL/L (ref 99–110)
CO2: 23 MMOL/L (ref 21–32)
CREAT SERPL-MCNC: 6.4 MG/DL (ref 0.9–1.3)
DIFFERENTIAL TYPE: ABNORMAL
EOSINOPHILS ABSOLUTE: 0.5 K/CU MM
EOSINOPHILS RELATIVE PERCENT: 7 % (ref 0–3)
GFR AFRICAN AMERICAN: 11 ML/MIN/1.73M2
GFR NON-AFRICAN AMERICAN: 9 ML/MIN/1.73M2
GLUCOSE BLD-MCNC: 100 MG/DL (ref 70–99)
HCT VFR BLD CALC: 38 % (ref 42–52)
HEMOGLOBIN: 12.4 GM/DL (ref 13.5–18)
IMMATURE NEUTROPHIL %: 1.2 % (ref 0–0.43)
LYMPHOCYTES ABSOLUTE: 1.5 K/CU MM
LYMPHOCYTES RELATIVE PERCENT: 22.5 % (ref 24–44)
MCH RBC QN AUTO: 30 PG (ref 27–31)
MCHC RBC AUTO-ENTMCNC: 32.6 % (ref 32–36)
MCV RBC AUTO: 91.8 FL (ref 78–100)
MONOCYTES ABSOLUTE: 1 K/CU MM
MONOCYTES RELATIVE PERCENT: 15.1 % (ref 0–4)
NUCLEATED RBC %: 0 %
PDW BLD-RTO: 13.6 % (ref 11.7–14.9)
PHOSPHORUS: 6.8 MG/DL (ref 2.5–4.9)
PLATELET # BLD: 199 K/CU MM (ref 140–440)
PMV BLD AUTO: 9.5 FL (ref 7.5–11.1)
POTASSIUM SERPL-SCNC: 4 MMOL/L (ref 3.5–5.1)
RBC # BLD: 4.14 M/CU MM (ref 4.6–6.2)
SEGMENTED NEUTROPHILS ABSOLUTE COUNT: 3.6 K/CU MM
SEGMENTED NEUTROPHILS RELATIVE PERCENT: 53.8 % (ref 36–66)
SODIUM BLD-SCNC: 135 MMOL/L (ref 135–145)
TOTAL IMMATURE NEUTOROPHIL: 0.08 K/CU MM
TOTAL NUCLEATED RBC: 0 K/CU MM
WBC # BLD: 6.8 K/CU MM (ref 4–10.5)

## 2021-05-18 PROCEDURE — 94761 N-INVAS EAR/PLS OXIMETRY MLT: CPT

## 2021-05-18 PROCEDURE — 6370000000 HC RX 637 (ALT 250 FOR IP): Performed by: INTERNAL MEDICINE

## 2021-05-18 PROCEDURE — 2700000000 HC OXYGEN THERAPY PER DAY

## 2021-05-18 PROCEDURE — 36415 COLL VENOUS BLD VENIPUNCTURE: CPT

## 2021-05-18 PROCEDURE — 99232 SBSQ HOSP IP/OBS MODERATE 35: CPT | Performed by: INTERNAL MEDICINE

## 2021-05-18 PROCEDURE — 2580000003 HC RX 258: Performed by: INTERNAL MEDICINE

## 2021-05-18 PROCEDURE — 94640 AIRWAY INHALATION TREATMENT: CPT

## 2021-05-18 PROCEDURE — 80069 RENAL FUNCTION PANEL: CPT

## 2021-05-18 PROCEDURE — 71045 X-RAY EXAM CHEST 1 VIEW: CPT

## 2021-05-18 PROCEDURE — 85025 COMPLETE CBC W/AUTO DIFF WBC: CPT

## 2021-05-18 PROCEDURE — 6360000002 HC RX W HCPCS: Performed by: INTERNAL MEDICINE

## 2021-05-18 PROCEDURE — 94660 CPAP INITIATION&MGMT: CPT

## 2021-05-18 RX ORDER — ACETYLCYSTEINE 200 MG/ML
600 SOLUTION ORAL; RESPIRATORY (INHALATION) 2 TIMES DAILY
Qty: 6 ML | Refills: 0 | Status: SHIPPED | OUTPATIENT
Start: 2021-05-18 | End: 2021-06-21

## 2021-05-18 RX ORDER — PSEUDOEPHEDRINE HCL 30 MG
100 TABLET ORAL 2 TIMES DAILY
Qty: 10 CAPSULE | Refills: 0 | Status: SHIPPED | OUTPATIENT
Start: 2021-05-18 | End: 2021-06-21

## 2021-05-18 RX ORDER — ATORVASTATIN CALCIUM 20 MG/1
20 TABLET, FILM COATED ORAL NIGHTLY
Qty: 30 TABLET | Refills: 3 | Status: ON HOLD | OUTPATIENT
Start: 2021-05-18 | End: 2022-10-02 | Stop reason: HOSPADM

## 2021-05-18 RX ADMIN — Medication 2000 UNITS: at 09:10

## 2021-05-18 RX ADMIN — CITALOPRAM HYDROBROMIDE 10 MG: 20 TABLET ORAL at 09:10

## 2021-05-18 RX ADMIN — IPRATROPIUM BROMIDE AND ALBUTEROL SULFATE 1 AMPULE: .5; 3 SOLUTION RESPIRATORY (INHALATION) at 12:18

## 2021-05-18 RX ADMIN — CARVEDILOL 25 MG: 25 TABLET, FILM COATED ORAL at 09:10

## 2021-05-18 RX ADMIN — FAMOTIDINE 20 MG: 20 TABLET, FILM COATED ORAL at 09:10

## 2021-05-18 RX ADMIN — ASPIRIN 81 MG: 81 TABLET, COATED ORAL at 09:10

## 2021-05-18 RX ADMIN — BUDESONIDE AND FORMOTEROL FUMARATE DIHYDRATE 2 PUFF: 80; 4.5 AEROSOL RESPIRATORY (INHALATION) at 09:08

## 2021-05-18 RX ADMIN — SODIUM CHLORIDE, PRESERVATIVE FREE 10 ML: 5 INJECTION INTRAVENOUS at 09:11

## 2021-05-18 RX ADMIN — FOLIC ACID 1 MG: 1 TABLET ORAL at 09:10

## 2021-05-18 RX ADMIN — AMLODIPINE BESYLATE 10 MG: 5 TABLET ORAL at 09:10

## 2021-05-18 RX ADMIN — AZELASTINE HYDROCHLORIDE 1 SPRAY: 137 SPRAY, METERED NASAL at 09:10

## 2021-05-18 RX ADMIN — CEFEPIME HYDROCHLORIDE 1000 MG: 1 INJECTION, POWDER, FOR SOLUTION INTRAMUSCULAR; INTRAVENOUS at 09:10

## 2021-05-18 RX ADMIN — HEPARIN SODIUM 5000 UNITS: 5000 INJECTION INTRAVENOUS; SUBCUTANEOUS at 06:48

## 2021-05-18 RX ADMIN — GUAIFENESIN 600 MG: 600 TABLET, EXTENDED RELEASE ORAL at 09:11

## 2021-05-18 RX ADMIN — DOXYCYCLINE HYCLATE 100 MG: 100 TABLET, COATED ORAL at 09:11

## 2021-05-18 RX ADMIN — DOCUSATE SODIUM 100 MG: 100 CAPSULE, LIQUID FILLED ORAL at 09:11

## 2021-05-18 RX ADMIN — IPRATROPIUM BROMIDE AND ALBUTEROL SULFATE 1 AMPULE: .5; 3 SOLUTION RESPIRATORY (INHALATION) at 09:04

## 2021-05-18 RX ADMIN — ACETYLCYSTEINE 600 MG: 200 SOLUTION ORAL; RESPIRATORY (INHALATION) at 09:04

## 2021-05-18 NOTE — PROGRESS NOTES
Pulmonary and Critical Care  Progress Note      VITALS:  /71   Pulse 81   Temp 98.3 °F (36.8 °C) (Oral)   Resp 18   Ht 5' 8\" (1.727 m)   Wt 232 lb 9.6 oz (105.5 kg)   SpO2 92%   BMI 35.37 kg/m²     Subjective:   CHIEF COMPLAINT :SOB     HPI:                The patient is lying in the bed. He is not in acute resp distress    Objective:   PHYSICAL EXAM:    LUNGS:Decreased air entry left base  Abd-soft, BS+,NT  Ext- no pedal edema  CVS-s1s2,no murmurs      DATA:    CBC:  Recent Labs     05/16/21  0429 05/17/21  0559 05/18/21  0545   WBC 7.3 7.7 6.8   RBC 4.26* 4.12* 4.14*   HGB 12.6* 12.6* 12.4*   HCT 39.7* 37.4* 38.0*    179 199   MCV 93.2 90.8 91.8   MCH 29.6 30.6 30.0   MCHC 31.7* 33.7 32.6   RDW 13.7 13.8 13.6   SEGSPCT 64.0 55.9 53.8      BMP:  Recent Labs     05/16/21 0429 05/17/21  0559 05/18/21  0545    136 135   K 3.9 4.0 4.0   CL 95* 93* 96*   CO2 25 26 23   BUN 86* 81* 81*   CREATININE 6.4* 6.5* 6.4*   CALCIUM 8.8 8.9 8.8   GLUCOSE 109* 92 100*      ABG:  No results for input(s): PH, PO2ART, NJH3DPG, HCO3, BEART, O2SAT in the last 72 hours. BNP  No results found for: BNP   D-Dimer:  No results found for: DDIMER   Radiology:   No significant interval change in left pneumonia.  Mild right basilar   atelectasis. Olivia Briseno 1.       Assessment/Plan     Patient Active Problem List    Diagnosis Date Noted    Choledocholithiasis 02/26/2017     Priority: High    Chronic kidney disease (CKD), stage III (moderate) 02/26/2017     Priority: Medium     Overview Note:     Overview:   CT in 2016 shows chronic occlusion of R renal artery, left renal artery is patent. 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       Acute on chronic respiratory failure with hypoxia (HCC) 05/11/2021    ASCVD (arteriosclerotic cardiovascular disease) 03/01/2019    Proteinuria 05/23/2017    Chronic kidney disease (CKD) stage G3b/A3, moderately decreased glomerular filtration rate (GFR) between 30-44

## 2021-05-18 NOTE — PROGRESS NOTES
05/18/21 0412   NIV Type   NIV Started/Stopped On   Equipment Type v60   Mode Bilevel   Mask Type Full face mask   Mask Size Large   Bonnet size Large   Settings/Measurements   IPAP 18 cmH20   CPAP/EPAP 10 cmH2O   Rate Ordered 12   Resp 15   FiO2  40 %   I Time/ I Time % 1 s   Vt Exhaled 736 mL   Minute Volume 11.7 Liters   Mask Leak (lpm) 0 lpm   Comfort Level Good   Using Accessory Muscles No   SpO2 97   Patient Observation   Observations Pt sleeping   Alarm Settings   Alarms On Y   Press Low Alarm 10 cmH2O   High Pressure Alarm 25 cmH2O   Delay Alarm 20 sec(s)   Apnea (secs) 20 secs   Resp Rate Low Alarm 10   High Respiratory Rate 40 br/min   Oxygen Therapy/Pulse Ox   SpO2 97 %

## 2021-05-18 NOTE — CONSULTS
Per the physical rehabilitation triage process, the PT referral was again discontinued. Patient recently mod indep with w/c and prosthesis for xfers including xfers to commode. D/c plan is home per RN CM, patient reportedly refusing to consider any SNF. No needs for acute care PT service at this time.     Musa, PT  5/18/2021, 10:44 AM

## 2021-05-18 NOTE — PROGRESS NOTES
switch assignments. Charge nurse will inform other nurse of change. Pt understands different nurse will take over care.

## 2021-05-18 NOTE — DISCHARGE SUMMARY
Discharge Summary    Name:  Carin Méndez /Age/Sex: 1955  (77 y.o. male)   MRN & CSN:  [id number] & [id number] Admission Date/Time: 2021  6:06 AM   Attending:  No att. providers found Discharging Physician: Sg Arora MD     Hospital Course:   Carin Méndez is a 77 y.o.  male  who presents with Acute on chronic respiratory failure with hypoxia ([address]    He was admitted to the hospital due to acute on chronic respiratory failure with hypoxia. It was thought to be from CHF exacerbation. He was given diuretics - Lasix infusion. Cardiology and Nephrology were consulted. He was also given antibiotic for mild COPD exacerbation. He was also started on antibiotics for possible pneumonia. He was able to complete the treatment. Lasix infusion was discontinued. He was started on PO diuretics. He was discharged stable. The patient expressed appropriate understanding of and agreement with the discharge recommendations, medications, and plan.      Consults this admission:  IP CONSULT TO HOSPITALIST  IP CONSULT TO HEART FAILURE NURSE/COORDINATOR  IP CONSULT TO DIETITIAN  IP CONSULT TO CARDIOLOGY  IP CONSULT TO NEPHROLOGY  IP CONSULT TO PULMONOLOGY  IP CONSULT TO [address]el [address]    Discharge Instruction:   Follow up appointments: Cardiology, nephrology, pulmonology  Primary care physician:  within 2 weeks    Diet:  cardiac diet   Activity: activity as tolerated  Disposition: Discharged to:   [x]Home, []HHC, []SNF, []Acute Rehab, []Hospice   Condition on discharge: Stable    Discharge Medications:      Renate Dominguez   Home Medication Instructions [facility]    Printed on:21 5796   Medication Information                      acetylcysteine (MUCOMYST) 20 % nebulizer solution  Inhale 3 mLs into the lungs 2 times daily for 1 day             albuterol (PROVENTIL HFA;VENTOLIN HFA) 108 (90 BASE) MCG/ACT inhaler  Inhale 2 puffs into the lungs every 6 hours as needed for Wheezing ALPRAZolam (XANAX) 0.25 MG tablet  Take 0.25 mg by mouth 3 times daily as needed for Sleep             amLODIPine (NORVASC) 10 MG tablet  Take 10 mg by mouth daily             aspirin EC 81 MG EC tablet  Take 81 mg by mouth every morning. Over The Counter, Last Dose Taken 12-9-14 Due To Scheduled Surgery             atorvastatin (LIPITOR) 20 MG tablet  Take 1 tablet by mouth nightly             azelastine (ASTELIN) 0.1 % nasal spray  1 spray by Nasal route 2 times daily Use in each nostril as directed             carvedilol (COREG) 25 MG tablet  Take 25 mg by mouth 2 times daily             Cholecalciferol (VITAMIN D3) 2000 UNITS CAPS  Take 1 capsule by mouth daily             citalopram (CELEXA) 20 MG tablet  Take 1 tablet by mouth daily. dicyclomine (BENTYL) 20 MG tablet  Take 20 mg by mouth every 6 hours             docusate sodium (COLACE, DULCOLAX) 100 MG CAPS  Take 100 mg by mouth 2 times daily             fluticasone-vilanterol (BREO ELLIPTA) 100-25 MCG/INH AEPB inhaler  Inhale 1 puff into the lungs daily             folic acid (FOLVITE) 1 MG tablet  Take 1 tablet by mouth daily. HYDROcodone-acetaminophen (NORCO) 5-325 MG per tablet  Take 1 tablet by mouth every 6 hours as needed for Pain.             ipratropium-albuterol (DUONEB) 0.5-2.5 (3) MG/3ML SOLN nebulizer solution  Inhale 1 vial into the lungs every 4 hours             Lactobacillus (ACIDOPHILUS PO)  Take by mouth 2 times daily             montelukast (SINGULAIR) 10 MG tablet  Take 10 mg by mouth nightly                 Objective Findings at Discharge:   /71   Pulse 81   Temp 98.3 °F (36.8 °C) (Oral)   Resp 18   Ht 5' 8\" (1.727 m)   Wt 232 lb 9.6 oz (105.5 kg)   SpO2 92%   BMI 35.37 kg/m²            PHYSICAL EXAM   GEN Awake male, sitting upright in bed in no apparent distress. Appears given age. EYES Pupils are equally round. No scleral erythema, discharge, or conjunctivitis.   HENT Mucous membranes are moist. Oral pharynx without exudates, no evidence of thrush. NECK Supple, no apparent thyromegaly or masses. RESP Clear to auscultation, no wheezes, rales or rhonchi. Symmetric chest movement while on room air. CARDIO/VASC S1/S2 auscultated. Regular rate without appreciable murmurs, rubs, or gallops. No JVD or carotid bruits. Peripheral pulses equal bilaterally and palpable. No peripheral edema. GI Abdomen is soft without significant tenderness, masses, or guarding. Bowel sounds are normoactive. Rectal exam deferred. MSK status post left below the knee amputation. SKIN Normal coloration, warm, dry. NEURO Cranial nerves appear grossly intact, normal speech, no lateralizing weakness. PSYCH Awake, alert, oriented x 4. Affect appropriate. BMP/CBC  Recent Labs     05/16/21  0429 05/17/21  0559 05/18/21  0545    136 135   K 3.9 4.0 4.0   CL 95* 93* 96*   CO2 25 26 23   BUN 86* 81* 81*   CREATININE 6.4* 6.5* 6.4*   WBC 7.3 7.7 6.8   HCT 39.7* 37.4* 38.0*    179 199       IMAGING:  Echocardiogram complete 2D with doppler with color    Result Date: 5/11/2021  Transthoracic Echocardiography Report (TTE)  Demographics   Patient Name       Dallin BLACKWOOD     Date of Study       05/11/2021   Date of Birth      1955         Gender              Male   Age                77 year(s)         Race                   Patient Number     [id number]         Room Number         [facility]   Visit Number       [id number]   Corporate ID       [id number]   Accession Number   [id number]         Maria A Iverson RDMS   Ordering Physician Maxwell Bhardwaj      Interpreting        Summer Marie MD                     [facility] MD           Physician  Procedure Type of Study   TTE procedure:ECHOCARDIOGRAM COMPLETE 2D W DOPPLER W COLOR.   Procedure Date Date: 05/11/2021 Start: 09:38 AM Study Location: ER Technical Quality: Fair visualization Indications:Congestive heart failure. Patient Status: Routine Height: 68 inches Weight: 246 pounds BSA: 2.23 m2 BMI: 37.4 kg/m2 HR: 84 bpm BP: 159/91 mmHg  Conclusions   Summary  Left ventricular systolic function is normal.  Ejection fraction is visually estimated at 50 %. Moderate left ventricular hypertrophy. Mildly dilated left atrium. Heavily calcified right coronary cusp; Mean PG 8 mmHg. No evidence of any pericardial effusion. Signature   ------------------------------------------------------------------  Electronically signed by Oliver Woodward MD (Interpreting  physician) on 05/11/2021 at 05:32 PM  ------------------------------------------------------------------   Findings   Left Ventricle  Left ventricular systolic function is normal.  Ejection fraction is visually estimated at 50%. Moderate left ventricular hypertrophy. Left Atrium  Mildly dilated left atrium. Right Atrium  Essentially normal right atrium. Right Ventricle  Essentially normal right ventricle. Aortic Valve  Heavily calcified right coronary cusp; Mean PG 8 mmHg. Trivial aortic regurgitation. Mitral Valve  Trace mitral regurgitation. Tricuspid Valve  Trace tricuspid regurgitation; normal RVSP. Pulmonic Valve  The pulmonic valve was not well visualized. Pericardial Effusion  No evidence of any pericardial effusion. Pleural Effusion  No evidence of pleural effusion. Miscellaneous  Pericardial fat pad visualized.   M-Mode/2D Measurements & Calculations   LV Diastolic Dimension:  LV Systolic Dimension:  LA Dimension: 3.3 cmAO Root  4.26 cm                  3.12 cm                 Dimension: 3.7 cmLA Area:  LV FS:26.8 %             LV Volume Diastolic:    92.4 cm2  LV PW Diastolic: 1.9 cm  161 ml  LV PW Systolic: 2.2 cm   LV Volume Systolic: 53  Septum Diastolic: 1.50   ml  cm                       LV EDV/LV EDV Index:    RV Diastolic Dimension:  Septum Systolic: 8.12 cm 144 XF/47 m2LV ESV/LV   3.09 cm  CO: 7.5 l/min No significant interval change in left pneumonia. Mild right basilar atelectasis. .     XR CHEST PORTABLE    Result Date: 5/17/2021  EXAMINATION: ONE XRAY VIEW OF THE CHEST 5/17/2021 6:41 am COMPARISON: 05/14/2021 HISTORY: ORDERING SYSTEM PROVIDED HISTORY: Hypoxia TECHNOLOGIST PROVIDED HISTORY: Reason for exam:->Hypoxia Reason for Exam: Hypoxia Acuity: Acute Type of Exam: Initial FINDINGS: There is improved aeration of the left upper lung. Bullous emphysema and bibasilar atelectasis/scarring/pneumonia remains. No evidence of a pleural effusion or pneumothorax. The heart is stable in size. No acute osseous abnormality is seen. 1. Improved aeration of the left lung when compared with 3 days prior. 2. Severe bullous emphysema. 3. Bibasilar airspace opacities, likely a combination of atelectasis/scarring and pneumonia. XR CHEST PORTABLE    Result Date: 5/14/2021  EXAMINATION: ONE XRAY VIEW OF THE CHEST 5/14/2021 6:20 am COMPARISON: 05/12/2021 HISTORY: ORDERING SYSTEM PROVIDED HISTORY: Hypoxia TECHNOLOGIST PROVIDED HISTORY: Reason for exam:->Hypoxia Reason for Exam: Hypoxia Acuity: Unknown Type of Exam: Subsequent/Follow-up Additional signs and symptoms: Hypoxia Relevant Medical/Surgical History: Hypoxia FINDINGS: The cardiac silhouette and mediastinal contours are stable. There is a left pleural effusion, increased in size with decreased aeration of the left superior hemithorax. Surgical clips are noted in the region of left upper chest. There are areas of atelectasis in the right lung base. No focal right lung infiltrate. Emphysematous changes are noted in the lungs. 1. Larger left pleural effusion with decreased aeration of the left lung apex. XR CHEST PORTABLE    Result Date: 5/12/2021  EXAMINATION: ONE XRAY VIEW OF THE CHEST 5/12/2021 5:49 am COMPARISON: 05/11/2021 HISTORY: Acute hypoxia. FINDINGS: Patient is slightly rotated. Heart remains enlarged.   Moderate-large left pleural effusion has increased and there is adjacent airspace disease. Slightly improved airspace disease in the mid to lower right lung. No pneumothorax seen. Increased moderate-large left pleural effusion. Slightly improved airspace disease in the mid to lower right lung. XR CHEST PORTABLE    Result Date: 5/11/2021  EXAMINATION: ONE XRAY VIEW OF THE CHEST 5/11/2021 6:24 am COMPARISON: 02/26/2017 HISTORY: ORDERING SYSTEM PROVIDED HISTORY: gurgling breath sounds, resp distress, HTN, h/o copd TECHNOLOGIST PROVIDED HISTORY: Reason for exam:->gurgling breath sounds, resp distress, HTN, h/o copd Reason for Exam: gurgling breath sounds, resp distress, HTN, h/o copd Acuity: Acute Type of Exam: Initial FINDINGS: Heart is stable in size and configuration. Emphysematous changes are present with prominent biapical blebs left greater than right. There is crowding of the bronchovascular markings in the lung bases. No acute osseous abnormality is seen. Surgical clips overlie the left chest.     COPD with crowding of the bibasilar lung markings. VL DUP LOWER EXTREMITY VENOUS RIGHT    Result Date: 5/18/2021  EXAMINATION: DUPLEX VENOUS ULTRASOUND OF THE RIGHT LOWER EXTREMITY, 5/14/2021 3:55 pm TECHNIQUE: Duplex ultrasound using B-mode/gray scaled imaging and Doppler spectral analysis and color flow was obtained of the right lower extremity. COMPARISON: None. HISTORY: ORDERING SYSTEM PROVIDED HISTORY: Leg pain TECHNOLOGIST PROVIDED HISTORY: Reason for exam:->Leg pain Reason for Exam: RT calf pain x2-3 days Acuity: Acute Type of Exam: Initial FINDINGS: The visualized veins of the right lower extremity are patent and free of echogenic thrombus. The veins demonstrate good compressibility with normal color flow study and spectral analysis. No evidence of DVT in the right lower extremity.      IR GUIDED THORACENTESIS PLEURAL    Result Date: 5/14/2021  PROCEDURE: Aborted ULTRASOUNDGUIDED left THORACENTESIS 5/14/2021 HISTORY: ORDERING SYSTEM PROVIDED HISTORY: large left pleural effusion TECHNOLOGIST PROVIDED HISTORY: I'll do it at 12:30 PM today Reason for exam:->large left pleural effusion Which side should the procedure be performed? ->Left TECHNIQUE: Informed consent was obtained after a detailed explanation of the procedure including risks, benefits, and alternatives. Universal protocol was performed. The left chest was scanned with preprocedure ultrasound, which showed no significant fluid in left pleural cavity to drain. The thoracentesis was aborted. FINDINGS: Aborted left thoracentesis due to no significant left pleural effusion to drain. Aborted ultrasound guided thoracentesis. IR GUIDED THORACENTESIS PLEURAL    Result Date: 5/12/2021  PROCEDURE: Aborted ULTRASOUNDGUIDED left THORACENTESIS 5/12/2021 HISTORY: ORDERING SYSTEM PROVIDED HISTORY: Concerning for left pleural effusion TECHNOLOGIST PROVIDED HISTORY: Reason for exam:-> left pleural effusion Which side should the procedure be performed? ->Left TECHNIQUE: Informed consent was obtained after a detailed explanation of the procedure including risks, benefits, and alternatives. Universal protocol was performed. The left chest was prepped and draped in sterile fashion. Preprocedure ultrasound showed no significant fluids in the left pleural cavity. The thoracentesis procedure was aborted. FINDINGS: Ultrasound of the left chest showed no significant pleural effusion to drain. The thoracentesis was aborted. Aborted ultrasound guided left thoracentesis due to no significant fluid to drain.        Discharge Time of 20 minutes    Electronically signed by Marnie Mar MD on 5/18/2021 at 5:39 PM

## 2021-05-18 NOTE — PROGRESS NOTES
Daily Progress Note      Overall doing better  Going home today  F/u in office in few weeks  Diuretics per renal  May need HD in future  Hx of PAD    Alert and awake today  Pt. Feeling like he keeps on improving. Cr trending downwards   No CP improvement in SOB  Encourage to ambulate out of bed.      Acute on chronic HFpEF    Possibly exacerbated by PNA     Lasix on hold. Elevated Creatine    No plan for HD for now per nephro    On aldactone and Coreg    On 4L O2 NC. usually only uses 2L at   night.      PNA     Noted on imaging     On ABX     Treat per primary team     DAX on CKD   Cr. plateau     Nephro following.     Elevated Trops d/t DAX and HF, no ACS. Okay from Cardiac standpoint if d/c.      Echo-5/11/21  Summary   Left ventricular systolic function is normal.   Ejection fraction is visually estimated at 50 %.   Moderate left ventricular hypertrophy.  Kamran Chen dilated left atrium.   Heavily calcified right coronary cusp; Mean PG 8 mmHg.   No evidence of any pericardial effusion.     PAST MEDICAL HISTORY: Jason Rodriges is a past history of peripheral vascular  disease present, status post distal abdominal is occluded and he has a  left axillary fem bypass surgery done and had infection, had to revise  and multiple issues, but he is stable.  History of renal disease, stage  III to IV.  Former smoker.  Hypertension, hyperlipidemia, obesity, sleep  apnea present.  History of polycythemia vera also.     PAST SURGICAL HISTORY:  Left leg below-the-knee amputation done from  vascular issues.     FAMILY HISTORY:  Significant for hypertension present.     SOCIAL HISTORY:  He does not smoke and does not drink anymore.     ALLERGIES:  PENICILLIN and LORAZEPAM.     MEDICATIONS AT HOME:  He was on his Singulair, Bentyl, Aldactone,  inhalers bicarb, Coreg, Celexa, folic acid, aspirin.   Objective:   /71   Pulse 76   Temp 98 °F (36.7 °C) (Oral)   Resp 15   Ht 5' 8\" (1.727 m)   Wt 232 lb 9.6 oz (105.5 kg)   SpO2 95% BMI 35.37 kg/m²       Intake/Output Summary (Last 24 hours) at 5/18/2021 1011  Last data filed at 5/18/2021 0328  Gross per 24 hour   Intake --   Output 2075 ml   Net -2075 ml       Medications:   Scheduled Meds:   cefepime  1,000 mg Intravenous Q12H    ipratropium-albuterol  1 ampule Inhalation Q4H WA    atorvastatin  20 mg Oral Nightly    acetylcysteine  600 mg Inhalation BID    docusate sodium  100 mg Oral BID    amLODIPine  10 mg Oral Daily    aspirin EC  81 mg Oral QAM    azelastine  1 spray Each Nostril BID    carvedilol  25 mg Oral BID    vitamin D  2,000 Units Oral Daily    citalopram  10 mg Oral Daily    budesonide-formoterol  2 puff Inhalation BID    folic acid  1 mg Oral Daily    montelukast  10 mg Oral Nightly    sodium chloride flush  5-40 mL Intravenous 2 times per day    doxycycline hyclate  100 mg Oral 2 times per day    guaiFENesin  600 mg Oral BID    famotidine  20 mg Oral Daily    heparin (porcine)  5,000 Units Subcutaneous 3 times per day      Infusions:   sodium chloride        PRN Meds:  albuterol sulfate HFA, ipratropium, ALPRAZolam, HYDROcodone-acetaminophen, sodium chloride flush, sodium chloride, promethazine **OR** ondansetron, polyethylene glycol, acetaminophen **OR** acetaminophen, nitroGLYCERIN       Physical Exam:  Vitals:    05/18/21 0908   BP:    Pulse:    Resp:    Temp:    SpO2: 95%        General: AAO, NAD  Chest: Nontender  Cardiac: First and Second Heart Sounds are Normal, No Murmurs or Gallops noted  Lungs:Clear to auscultation and percussion. Abdomen: Soft, NT, ND, +BS  Extremities: No clubbing, no edema  Vascular:  Equal 2+ peripheral pulses.         Lab Data:  CBC:   Recent Labs     05/16/21 0429 05/17/21  0559 05/18/21  0545   WBC 7.3 7.7 6.8   HGB 12.6* 12.6* 12.4*   HCT 39.7* 37.4* 38.0*   MCV 93.2 90.8 91.8    179 199     BMP:   Recent Labs     05/16/21  0429 05/17/21  0559 05/18/21  0545    136 135   K 3.9 4.0 4.0   CL 95* 93* 96* CO2 25 26 23   PHOS 7.0* 6.9* 6.8*   BUN 86* 81* 81*   CREATININE 6.4* 6.5* 6.4*     LIVER PROFILE: No results for input(s): AST, ALT, LIPASE, BILIDIR, BILITOT, ALKPHOS in the last 72 hours. Invalid input(s): AMYLASE,  ALB  PT/INR: No results for input(s): PROTIME, INR in the last 72 hours. APTT: No results for input(s): APTT in the last 72 hours. BNP:  No results for input(s): BNP in the last 72 hours.       Assessment:  Patient Active Problem List    Diagnosis Date Noted    Choledocholithiasis 02/26/2017    Chronic kidney disease (CKD), stage III (moderate) 02/26/2017    Acute on chronic respiratory failure with hypoxia ([facility] [address] 05/11/2021    ASCVD (arteriosclerotic cardiovascular disease) 03/01/2019    Proteinuria 05/23/2017    Chronic kidney disease (CKD) stage G3b/A3, moderately decreased glomerular filtration rate (GFR) between 30-44 mL/min/1.73 square meter and albuminuria creatinine ratio greater than 300 mg/g 69/95/3085    Metabolic acidosis 34/81/7983    Atrophic kidney 04/10/2017    COPD (chronic obstructive pulmonary disease) ([facility] [address] 04/10/2017    HTN (hypertension) 04/10/2017    PAD (peripheral artery disease) ([facility] [address] [facility] 04/10/2017    Abnormality of lung on CXR 02/27/2017    S/P BKA (below knee amputation) unilateral ([facility] [address] 02/27/2017    Cholangitis     Acute calculous cholecystitis     Abdominal adhesions        Electronically signed by JAYDEN Weiss CNP on 5/18/2021 at 10:11 AM   Electronically signed by Suzi Troncoso MD on 5/18/21 at 12:10 PM EDT

## 2021-08-17 ENCOUNTER — HOSPITAL ENCOUNTER (OUTPATIENT)
Age: 66
Setting detail: SPECIMEN
Discharge: HOME | End: 2021-08-17
Payer: MEDICARE

## 2021-08-17 LAB
ALBUMIN SERPL-MCNC: 4.2 GM/DL (ref 3.4–5)
ALP BLD-CCNC: 95 IU/L (ref 40–129)
ALT SERPL-CCNC: 15 U/L (ref 10–40)
ANION GAP SERPL CALCULATED.3IONS-SCNC: 11 MMOL/L (ref 4–16)
AST SERPL-CCNC: 15 IU/L (ref 15–37)
BILIRUB SERPL-MCNC: 0.3 MG/DL (ref 0–1)
BUN BLDV-MCNC: 49 MG/DL (ref 6–23)
CALCIUM SERPL-MCNC: 7.9 MG/DL (ref 8.3–10.6)
CHLORIDE BLD-SCNC: 96 MMOL/L (ref 99–110)
CO2: 23 MMOL/L (ref 21–32)
CREAT SERPL-MCNC: 5 MG/DL (ref 0.9–1.3)
GFR AFRICAN AMERICAN: 14 ML/MIN/1.73M2
GFR NON-AFRICAN AMERICAN: 12 ML/MIN/1.73M2
GLUCOSE BLD-MCNC: 86 MG/DL (ref 70–99)
MAGNESIUM: 2.3 MG/DL (ref 1.8–2.4)
PHOSPHORUS: 6.1 MG/DL (ref 2.5–4.9)
POTASSIUM SERPL-SCNC: 4.4 MMOL/L (ref 3.5–5.1)
SODIUM BLD-SCNC: 130 MMOL/L (ref 135–145)
TOTAL PROTEIN: 5.9 GM/DL (ref 6.4–8.2)

## 2021-08-17 PROCEDURE — 84100 ASSAY OF PHOSPHORUS: CPT

## 2021-08-17 PROCEDURE — 80053 COMPREHEN METABOLIC PANEL: CPT

## 2021-08-17 PROCEDURE — 83735 ASSAY OF MAGNESIUM: CPT

## 2021-08-24 ENCOUNTER — HOSPITAL ENCOUNTER (OUTPATIENT)
Age: 66
Setting detail: SPECIMEN
Discharge: HOME | End: 2021-08-24

## 2021-08-24 LAB
ANION GAP SERPL CALCULATED.3IONS-SCNC: 14 MMOL/L (ref 4–16)
BUN BLDV-MCNC: 50 MG/DL (ref 6–23)
CALCIUM SERPL-MCNC: 8.4 MG/DL (ref 8.3–10.6)
CHLORIDE BLD-SCNC: 101 MMOL/L (ref 99–110)
CO2: 23 MMOL/L (ref 21–32)
CREAT SERPL-MCNC: 5 MG/DL (ref 0.9–1.3)
GFR AFRICAN AMERICAN: 14 ML/MIN/1.73M2
GFR NON-AFRICAN AMERICAN: 12 ML/MIN/1.73M2
GLUCOSE BLD-MCNC: 107 MG/DL (ref 70–99)
POTASSIUM SERPL-SCNC: 4.6 MMOL/L (ref 3.5–5.1)
SODIUM BLD-SCNC: 138 MMOL/L (ref 135–145)

## 2021-08-24 PROCEDURE — 80048 BASIC METABOLIC PNL TOTAL CA: CPT

## 2022-06-06 ENCOUNTER — HOSPITAL ENCOUNTER (OUTPATIENT)
Dept: INTERVENTIONAL RADIOLOGY/VASCULAR | Age: 67
Discharge: HOME | End: 2022-06-06
Payer: MEDICARE

## 2022-06-06 VITALS
BODY MASS INDEX: 37.13 KG/M2 | WEIGHT: 245 LBS | OXYGEN SATURATION: 100 % | RESPIRATION RATE: 18 BRPM | TEMPERATURE: 97.2 F | HEIGHT: 68 IN | HEART RATE: 79 BPM | DIASTOLIC BLOOD PRESSURE: 89 MMHG | SYSTOLIC BLOOD PRESSURE: 162 MMHG

## 2022-06-06 DIAGNOSIS — N18.6 ESRD (END STAGE RENAL DISEASE) (HCC): ICD-10-CM

## 2022-06-06 LAB
APTT: 34.8 SECONDS (ref 25.1–37.1)
HCT VFR BLD CALC: 37.6 % (ref 42–52)
HEMOGLOBIN: 12.1 GM/DL (ref 13.5–18)
INR BLD: 0.81 INDEX
MCH RBC QN AUTO: 33.2 PG (ref 27–31)
MCHC RBC AUTO-ENTMCNC: 32.2 % (ref 32–36)
MCV RBC AUTO: 103.3 FL (ref 78–100)
PDW BLD-RTO: 13.5 % (ref 11.7–14.9)
PLATELET # BLD: 191 K/CU MM (ref 140–440)
PMV BLD AUTO: 10 FL (ref 7.5–11.1)
PROTHROMBIN TIME: 10.4 SECONDS (ref 11.7–14.5)
RBC # BLD: 3.64 M/CU MM (ref 4.6–6.2)
WBC # BLD: 11.8 K/CU MM (ref 4–10.5)

## 2022-06-06 PROCEDURE — 7100000010 HC PHASE II RECOVERY - FIRST 15 MIN

## 2022-06-06 PROCEDURE — 2709999900 HC NON-CHARGEABLE SUPPLY

## 2022-06-06 PROCEDURE — 85027 COMPLETE CBC AUTOMATED: CPT

## 2022-06-06 PROCEDURE — 85730 THROMBOPLASTIN TIME PARTIAL: CPT

## 2022-06-06 PROCEDURE — 85610 PROTHROMBIN TIME: CPT

## 2022-06-06 PROCEDURE — 7100000011 HC PHASE II RECOVERY - ADDTL 15 MIN

## 2022-06-06 PROCEDURE — 2580000003 HC RX 258: Performed by: RADIOLOGY

## 2022-06-06 PROCEDURE — 36589 REMOVAL TUNNELED CV CATH: CPT

## 2022-06-06 RX ORDER — SODIUM CHLORIDE 0.9 % (FLUSH) 0.9 %
10 SYRINGE (ML) INJECTION PRN
Status: DISCONTINUED | OUTPATIENT
Start: 2022-06-06 | End: 2022-06-07 | Stop reason: HOSPADM

## 2022-06-06 RX ADMIN — Medication 10 ML: at 10:16

## 2022-06-06 ASSESSMENT — PAIN SCALES - GENERAL
PAINLEVEL_OUTOF10: 0
PAINLEVEL_OUTOF10: 0

## 2022-06-06 ASSESSMENT — PAIN - FUNCTIONAL ASSESSMENT: PAIN_FUNCTIONAL_ASSESSMENT: 0-10

## 2022-06-06 NOTE — PROGRESS NOTES
1307- report from [facility] RN  2940- Discharge instructions reviewed w/ pt and pt wife at bedside, no questions at this time  1315- pt dressing   1318- pt discharged via car w/ pt wife driving

## 2022-06-06 NOTE — BRIEF OP NOTE
Brief Postoperative Note    Ricardo Guillen  YOB: 1955  [id number]    Pre-operative Diagnosis: functioning av graft    Post-operative Diagnosis: Same    Procedure: permcath removal    Anesthesia: Local    Surgeons/Assistants: Kerline Antunez    Estimated Blood Loss: 0    Complications: None    Specimens: Was Not Obtained    Findings: succesful removal    Electronically signed by Rossy Phoenix MD on 6/6/2022 at 2:15 PM

## 2022-06-06 NOTE — PROGRESS NOTES
Pt brought to small room for tunneled catheter removal. Procedure explained & consent signed. Dressing removed & site prepped with chlorhexadine and draped in sterile towels. 1% lidocaine injected by Juan Francisco Araujo RT. Catheter cuff dissected with no difficulty and catheter removed, tip intact. Pressure held for 5 minutes. Sterile dressing of guaze & tegaderm applied. Pt tolerated well. Report given to Kezia LINDO.

## 2022-06-06 NOTE — PROGRESS NOTES
1255 pt recd from IR to [facility] 21. Recd from [place]. Pt denies pain or nausea. o2 3 l per nc. Head of bed up. Call light in reach.  Wife at bedside, soda and annette recinos provided  1307 report to [employer]

## 2022-09-13 NOTE — PROGRESS NOTES
IR Procedure at [facility]: Spoke with patient and he will arrive at  1130 at [facility] on 9/14/2022 for  his procedure at  1330. Also went over below instructions. Patient states \"he had covid two months ago, cannot remember if it was June or July\". NPO at [address]       2. Follow your directions as prescribed by the doctor for your procedure and medications. 3.   Consult your provider as to when to stop blood thinner  4. Do not take any pain medication within 6 hours of your procedure  5. Do not drink any alcoholic beverages or use any street drugs 24 hours before procedure. 6.   Please wear simple, loose fitting clothing to the hospital.  Do not bring valuables (money,             credit cards, checkbooks, etc.)     7. If you  have a Living Will and Durable Power of  for Healthcare, please bring in a copy. 8.   Please bring picture ID,  insurance card, paperwork from the doctors office            (H & P, Consent,  & card for implantable devices). 9.   Report to the information desk on the ground floor. 10. Take a shower the night before or morning of your procedure, do not apply any lotion, oil or powder. 11. If you are going to be sedated for the procedure, you will need a responsible adult to drive you home.

## 2022-09-14 ENCOUNTER — HOSPITAL ENCOUNTER (OUTPATIENT)
Dept: INTERVENTIONAL RADIOLOGY/VASCULAR | Age: 67
Discharge: HOME | End: 2022-09-14
Payer: MEDICARE

## 2022-09-14 VITALS
WEIGHT: 246.91 LBS | BODY MASS INDEX: 37.42 KG/M2 | TEMPERATURE: 97.3 F | DIASTOLIC BLOOD PRESSURE: 84 MMHG | RESPIRATION RATE: 16 BRPM | HEART RATE: 97 BPM | HEIGHT: 68 IN | OXYGEN SATURATION: 95 % | SYSTOLIC BLOOD PRESSURE: 150 MMHG

## 2022-09-14 DIAGNOSIS — N18.6 ESRD (END STAGE RENAL DISEASE) (HCC): ICD-10-CM

## 2022-09-14 LAB
APTT: 39.3 SECONDS (ref 25.1–37.1)
HCT VFR BLD CALC: 34.8 % (ref 42–52)
HEMOGLOBIN: 11.6 GM/DL (ref 13.5–18)
INR BLD: 0.87 INDEX
MCH RBC QN AUTO: 32.3 PG (ref 27–31)
MCHC RBC AUTO-ENTMCNC: 33.3 % (ref 32–36)
MCV RBC AUTO: 96.9 FL (ref 78–100)
PDW BLD-RTO: 13.4 % (ref 11.7–14.9)
PLATELET # BLD: 183 K/CU MM (ref 140–440)
PMV BLD AUTO: 9.6 FL (ref 7.5–11.1)
PROTHROMBIN TIME: 11.2 SECONDS (ref 11.7–14.5)
RBC # BLD: 3.59 M/CU MM (ref 4.6–6.2)
REASON FOR REJECTION: NORMAL
REJECTED TEST: NORMAL
WBC # BLD: 10 K/CU MM (ref 4–10.5)

## 2022-09-14 PROCEDURE — C1757 CATH, THROMBECTOMY/EMBOLECT: HCPCS

## 2022-09-14 PROCEDURE — 7100000011 HC PHASE II RECOVERY - ADDTL 15 MIN

## 2022-09-14 PROCEDURE — 2580000003 HC RX 258

## 2022-09-14 PROCEDURE — 85610 PROTHROMBIN TIME: CPT

## 2022-09-14 PROCEDURE — 6360000004 HC RX CONTRAST MEDICATION

## 2022-09-14 PROCEDURE — 6360000002 HC RX W HCPCS

## 2022-09-14 PROCEDURE — 85730 THROMBOPLASTIN TIME PARTIAL: CPT

## 2022-09-14 PROCEDURE — 36905 THRMBC/NFS DIALYSIS CIRCUIT: CPT

## 2022-09-14 PROCEDURE — 85027 COMPLETE CBC AUTOMATED: CPT

## 2022-09-14 PROCEDURE — 7100000010 HC PHASE II RECOVERY - FIRST 15 MIN

## 2022-09-14 RX ORDER — SODIUM CHLORIDE 0.9 % (FLUSH) 0.9 %
10 SYRINGE (ML) INJECTION PRN
Status: DISCONTINUED | OUTPATIENT
Start: 2022-09-14 | End: 2022-09-15 | Stop reason: HOSPADM

## 2022-09-14 RX ORDER — HEPARIN SODIUM 1000 [USP'U]/ML
2000 INJECTION, SOLUTION INTRAVENOUS; SUBCUTANEOUS ONCE
Status: COMPLETED | OUTPATIENT
Start: 2022-09-14 | End: 2022-09-14

## 2022-09-14 RX ADMIN — HEPARIN SODIUM 2000 UNITS: 1000 INJECTION, SOLUTION INTRAVENOUS; SUBCUTANEOUS at 16:31

## 2022-09-14 ASSESSMENT — PAIN SCALES - GENERAL: PAINLEVEL_OUTOF10: 0

## 2022-09-14 ASSESSMENT — PAIN - FUNCTIONAL ASSESSMENT: PAIN_FUNCTIONAL_ASSESSMENT: 0-10

## 2022-09-14 NOTE — OR NURSING
Fistulagram (declot) right arm by Dr Jenifer Nova    PT TRANSPORTED FROM:      [facility]                              TO THE IR ROOM:     Large     BARRIER PRECAUTIONS & STERILE TECHNIQUE:               Pt placed on VS Monitor. Pt prepped and draped in a sterile fashion with chlorhexadine.     PAIN/LOCAL ANESTHESIA/SEDATION MANAGEMENT:           Local: Lidocaine 1% given by Dr          Sedation: None             Fentanyl:             Versed:     INTRAOPERATIVE:           ACCESS TIME:           US/FLUORO: Flouro          WIRE USED: 035 guide; 018 guide          SHEATH USED: 6FR nonshort distal access site: 5FR proximal access site          CATHETER USED: 4FR kumpe          FINAL IMAGE TAKEN TO CONFIRM PLACEMENT OF:           CONTRAST/CC: Isovue 370/95cc          Balloon: 5X40 William Sci; 7 X 40 william sci; 4FR fogerty  Heparin 2000u; 2mg TPA  inj into catheter during procedure by Dr Cartagena Home string suture to proximal and distal fistula access sites by Dr Pastor Paul: Gauze with tegaderm applied to both access sites    SPECIMENS: NA    EBL:  <0LK          COMPLICATIONS/ OUTCOME:   None        REPORT CALLED TO:

## 2022-09-14 NOTE — PROGRESS NOTES
1630  Patient arrived back to [facility]. No report given to this nurse. Patient A&O, no pain nausea or vomiting. Beverage of choice offered to patient. Call light in reach and bed in lowest position. 1705 IV removed. 1708 Discharge instructions given to . Slick Fregoso .  1715 Patient sitting on side of bed getting dressed assisted by wife. 1720 Patient escorted to car via wheelchair transported home by wife.

## 2022-09-16 NOTE — PROGRESS NOTES
IR Procedure at [facility]:  Spoke with patient and he will arrive at 0830 at [facility] on 9/20/2022 for his procedure at 1030. Also went over below instructions. NPO at [address]     2. Follow your directions as prescribed by the doctor for your procedure and medications. 3.   Consult your provider as to when to stop blood thinner  4. Do not take any pain medication within 6 hours of your procedure  5. Do not drink any alcoholic beverages or use any street drugs 24 hours before procedure. 6.   Please wear simple, loose fitting clothing to the hospital.  Do not bring valuables (money,             credit cards, checkbooks, etc.)     7. If you  have a Living Will and Durable Power of  for Healthcare, please bring in a copy. 8.   Please bring picture ID,  insurance card, paperwork from the doctors office            (H & P, Consent,  & card for implantable devices). 9.   Report to the information desk on the ground floor. 10. Take a shower the night before or morning of your procedure, do not apply any lotion, oil or powder. 11. If you are going to be sedated for the procedure, you will need a responsible adult to drive you home.

## 2022-09-20 ENCOUNTER — HOSPITAL ENCOUNTER (OUTPATIENT)
Dept: INTERVENTIONAL RADIOLOGY/VASCULAR | Age: 67
Discharge: HOME | End: 2022-09-20
Payer: MEDICARE

## 2022-09-20 VITALS
TEMPERATURE: 97.8 F | SYSTOLIC BLOOD PRESSURE: 137 MMHG | OXYGEN SATURATION: 92 % | HEART RATE: 107 BPM | DIASTOLIC BLOOD PRESSURE: 67 MMHG | RESPIRATION RATE: 24 BRPM

## 2022-09-20 DIAGNOSIS — N18.6 ESRD (END STAGE RENAL DISEASE) (HCC): ICD-10-CM

## 2022-09-20 PROCEDURE — 36589 REMOVAL TUNNELED CV CATH: CPT

## 2022-09-20 PROCEDURE — 7100000010 HC PHASE II RECOVERY - FIRST 15 MIN

## 2022-09-20 ASSESSMENT — PAIN - FUNCTIONAL ASSESSMENT: PAIN_FUNCTIONAL_ASSESSMENT: 0-10

## 2022-09-20 ASSESSMENT — PAIN DESCRIPTION - DESCRIPTORS: DESCRIPTORS: BURNING;NAGGING

## 2022-09-20 NOTE — PROGRESS NOTES
1009 Patient arrived back to [facility]. Report given to this nurse from IR. Patient A&O no pain. Beverage of choice offered to patient. Call light in reach and bed in lowest position. Per IR patient can leave at 1020  1021 IV removed. Discharge instructions given to Fatoumata. 1025 Patient sitting on side of bed getting dressed assisted by Fatoumata. 1032 Patient escorted to car via wheelchair transported home by Fatoumata.

## 2022-09-20 NOTE — PROCEDURES
Tunneled dialysis catheter removal  Left chest catheter removed by [facility]. Pressure held for 5 minutes. No bleeding noted. Dry, sterile dressing applied to site by [facility].

## 2022-09-20 NOTE — DISCHARGE INSTRUCTIONS
Watch for signs of infection    Elevated temperature, redness or pain at site,   Drainage at incision. Advance Care Planning  People with COVID-19 may have no symptoms, mild symptoms, such as fever, cough, and shortness of breath or they may have more severe illness, developing severe and fatal pneumonia. As a result, Advance Care Planning with attention to naming a health care decision maker (someone you trust to make healthcare decisions for you if you could not speak for yourself) and sharing other health care preferences is important BEFORE a possible health crisis. Please contact your Primary Care Provider to discuss Advance Care Planning. Preventing the Spread of Coronavirus Disease 2019 in Homes and Residential Communities  For the most recent information go to [facility].    Prevention steps for People with confirmed or suspected COVID-19 (including persons under investigation) who do not need to be hospitalized  and   People with confirmed COVID-19 who were hospitalized and determined to be medically stable to go home    Your healthcare provider and public health staff will evaluate whether you can be cared for at home. If it is determined that you do not need to be hospitalized and can be isolated at home, you will be monitored by staff from your local or state health department. You should follow the prevention steps below until a healthcare provider or local or state health department says you can return to your normal activities. Stay home except to get medical care  People who are mildly ill with COVID-19 are able to isolate at home during their illness. You should restrict activities outside your home, except for getting medical care. Do not go to work, school, or public areas. Avoid using public transportation, ride-sharing, or taxis.   Separate yourself from other people and animals in your home  People: As much as possible, least 20 seconds, especially after blowing your nose, coughing, or sneezing; going to the bathroom; and before eating or preparing food. If soap and water are not readily available, use an alcohol-based hand  with at least 60% alcohol, covering all surfaces of your hands and rubbing them together until they feel dry. Soap and water are the best option if hands are visibly dirty. Avoid touching your eyes, nose, and mouth with unwashed hands. Avoid sharing personal household items  You should not share dishes, drinking glasses, cups, eating utensils, towels, or bedding with other people or pets in your home. After using these items, they should be washed thoroughly with soap and water. Clean all high-touch surfaces everyday  High touch surfaces include counters, tabletops, doorknobs, bathroom fixtures, toilets, phones, keyboards, tablets, and bedside tables. Also, clean any surfaces that may have blood, stool, or body fluids on them. Use a household cleaning spray or wipe, according to the label instructions. Labels contain instructions for safe and effective use of the cleaning product including precautions you should take when applying the product, such as wearing gloves and making sure you have good ventilation during use of the product. Monitor your symptoms  Seek prompt medical attention if your illness is worsening (e.g., difficulty breathing). Before seeking care, call your healthcare provider and tell them that you have, or are being evaluated for, COVID-19. Put on a facemask before you enter the facility. These steps will help the healthcare providers office to keep other people in the office or waiting room from getting infected or exposed. Ask your healthcare provider to call the local or Critical access hospital health department.  Persons who are placed under active monitoring or facilitated self-monitoring should follow instructions provided by their local health department or occupational health professionals, as appropriate. When working with your local health department check their available hours. If you have a medical emergency and need to call 911, notify the dispatch personnel that you have, or are being evaluated for COVID-19. If possible, put on a facemask before emergency medical services arrive. Discontinuing home isolation  Patients with confirmed COVID-19 should remain under home isolation precautions until the risk of secondary transmission to others is thought to be low. The decision to discontinue home isolation precautions should be made on a case-by-case basis, in consultation with healthcare providers and state and local health departments.

## 2022-09-20 NOTE — PROGRESS NOTES
Upon admission pt stated he is here for fistulagram on rt arm but states lt tunnel cath is used for dialysis and has had burning pain and drainage. Pt verbalized concerns about infection, stating he \"wants that thing out of me and that's what I'm here for\" Called IR, spoke with Janine Grey to verify procedure.   Pt and wife informed procedure verified with IR and Dr Desiree Fermin

## 2022-09-26 ENCOUNTER — APPOINTMENT (OUTPATIENT)
Dept: GENERAL RADIOLOGY | Age: 67
DRG: 377 | End: 2022-09-26
Payer: MEDICARE

## 2022-09-26 ENCOUNTER — APPOINTMENT (OUTPATIENT)
Dept: CT IMAGING | Age: 67
DRG: 377 | End: 2022-09-26
Payer: MEDICARE

## 2022-09-26 ENCOUNTER — ANESTHESIA (OUTPATIENT)
Dept: ICU | Age: 67
DRG: 377 | End: 2022-09-26
Payer: MEDICARE

## 2022-09-26 ENCOUNTER — ANESTHESIA EVENT (OUTPATIENT)
Dept: ICU | Age: 67
DRG: 377 | End: 2022-09-26
Payer: MEDICARE

## 2022-09-26 ENCOUNTER — HOSPITAL ENCOUNTER (INPATIENT)
Age: 67
LOS: 6 days | Discharge: HOME | DRG: 377 | End: 2022-10-02
Attending: EMERGENCY MEDICINE | Admitting: STUDENT IN AN ORGANIZED HEALTH CARE EDUCATION/TRAINING PROGRAM
Payer: MEDICARE

## 2022-09-26 DIAGNOSIS — K92.2 GASTROINTESTINAL HEMORRHAGE, UNSPECIFIED GASTROINTESTINAL HEMORRHAGE TYPE: Primary | ICD-10-CM

## 2022-09-26 DIAGNOSIS — R57.8 HEMORRHAGIC SHOCK (HCC): ICD-10-CM

## 2022-09-26 LAB
ALBUMIN SERPL-MCNC: 3.6 GM/DL (ref 3.4–5)
ALBUMIN SERPL-MCNC: 4 GM/DL (ref 3.4–5)
ALP BLD-CCNC: 48 IU/L (ref 40–129)
ALP BLD-CCNC: 52 IU/L (ref 40–129)
ALT SERPL-CCNC: 16 U/L (ref 10–40)
ALT SERPL-CCNC: 18 U/L (ref 10–40)
ANION GAP SERPL CALCULATED.3IONS-SCNC: 15 MMOL/L (ref 4–16)
ANION GAP SERPL CALCULATED.3IONS-SCNC: 22 MMOL/L (ref 4–16)
APTT: 30.3 SECONDS (ref 25.1–37.1)
APTT: 31 SECONDS (ref 25.1–37.1)
AST SERPL-CCNC: 16 IU/L (ref 15–37)
AST SERPL-CCNC: 19 IU/L (ref 15–37)
BASE EXCESS: 11 (ref 0–3.3)
BASOPHILS ABSOLUTE: 0.1 K/CU MM
BASOPHILS RELATIVE PERCENT: 0.4 % (ref 0–1)
BILIRUB SERPL-MCNC: 0.2 MG/DL (ref 0–1)
BILIRUB SERPL-MCNC: 0.4 MG/DL (ref 0–1)
BILIRUBIN DIRECT: 0.2 MG/DL (ref 0–0.3)
BILIRUBIN, INDIRECT: 0.2 MG/DL (ref 0–0.7)
BUN BLDV-MCNC: 92 MG/DL (ref 6–23)
BUN BLDV-MCNC: 96 MG/DL (ref 6–23)
CALCIUM SERPL-MCNC: 8.2 MG/DL (ref 8.3–10.6)
CALCIUM SERPL-MCNC: 8.9 MG/DL (ref 8.3–10.6)
CARBON MONOXIDE, BLOOD: 2.2 % (ref 0–5)
CHLORIDE BLD-SCNC: 102 MMOL/L (ref 99–110)
CHLORIDE BLD-SCNC: 98 MMOL/L (ref 99–110)
CO2 CONTENT: 21.1 MMOL/L (ref 19–24)
CO2: 15 MMOL/L (ref 21–32)
CO2: 19 MMOL/L (ref 21–32)
COMMENT: ABNORMAL
CREAT SERPL-MCNC: 6.7 MG/DL (ref 0.9–1.3)
CREAT SERPL-MCNC: 7.4 MG/DL (ref 0.9–1.3)
DIFFERENTIAL TYPE: ABNORMAL
EOSINOPHILS ABSOLUTE: 0.2 K/CU MM
EOSINOPHILS RELATIVE PERCENT: 1 % (ref 0–3)
FIBRINOGEN LEVEL: 353 MG/DL (ref 196.9–442.1)
GFR AFRICAN AMERICAN: 10 ML/MIN/1.73M2
GFR AFRICAN AMERICAN: 9 ML/MIN/1.73M2
GFR NON-AFRICAN AMERICAN: 7 ML/MIN/1.73M2
GFR NON-AFRICAN AMERICAN: 8 ML/MIN/1.73M2
GLUCOSE BLD-MCNC: 128 MG/DL (ref 70–99)
GLUCOSE BLD-MCNC: 137 MG/DL (ref 70–99)
GLUCOSE BLD-MCNC: 152 MG/DL (ref 70–99)
HCO3 ARTERIAL: 19.2 MMOL/L (ref 18–23)
HCT VFR BLD CALC: 22.9 % (ref 42–52)
HCT VFR BLD CALC: 32.7 % (ref 42–52)
HEMOGLOBIN: 10.8 GM/DL (ref 13.5–18)
HEMOGLOBIN: 7.4 GM/DL (ref 13.5–18)
IMMATURE NEUTROPHIL %: 0.8 % (ref 0–0.43)
INR BLD: 0.8 INDEX
INR BLD: 0.85 INDEX
LACTATE: 0.5 MMOL/L (ref 0.4–2)
LACTATE: 3.8 MMOL/L (ref 0.4–2)
LIPASE: 52 IU/L (ref 13–60)
LYMPHOCYTES ABSOLUTE: 3.3 K/CU MM
LYMPHOCYTES RELATIVE PERCENT: 18.4 % (ref 24–44)
MAGNESIUM: 2.6 MG/DL (ref 1.8–2.4)
MCH RBC QN AUTO: 30.8 PG (ref 27–31)
MCH RBC QN AUTO: 32.2 PG (ref 27–31)
MCHC RBC AUTO-ENTMCNC: 32.3 % (ref 32–36)
MCHC RBC AUTO-ENTMCNC: 33 % (ref 32–36)
MCV RBC AUTO: 93.2 FL (ref 78–100)
MCV RBC AUTO: 99.6 FL (ref 78–100)
METHEMOGLOBIN ARTERIAL: 0.9 %
MONOCYTES ABSOLUTE: 1 K/CU MM
MONOCYTES RELATIVE PERCENT: 5.5 % (ref 0–4)
NUCLEATED RBC %: 0 %
O2 SATURATION: 83.9 % (ref 96–97)
PCO2 ARTERIAL: 62 MMHG (ref 32–45)
PDW BLD-RTO: 14.1 % (ref 11.7–14.9)
PDW BLD-RTO: 16.8 % (ref 11.7–14.9)
PH BLOOD: 7.1 (ref 7.34–7.45)
PLATELET # BLD: 223 K/CU MM (ref 140–440)
PLATELET # BLD: 269 K/CU MM (ref 140–440)
PMV BLD AUTO: 9.3 FL (ref 7.5–11.1)
PMV BLD AUTO: 9.7 FL (ref 7.5–11.1)
PO2 ARTERIAL: 60 MMHG (ref 75–100)
POTASSIUM SERPL-SCNC: 3.4 MMOL/L (ref 3.5–5.1)
POTASSIUM SERPL-SCNC: 3.5 MMOL/L (ref 3.5–5.1)
PRO-BNP: 3489 PG/ML
PROTHROMBIN TIME: 10.3 SECONDS (ref 11.7–14.5)
PROTHROMBIN TIME: 10.9 SECONDS (ref 11.7–14.5)
RBC # BLD: 2.3 M/CU MM (ref 4.6–6.2)
RBC # BLD: 3.51 M/CU MM (ref 4.6–6.2)
SEGMENTED NEUTROPHILS ABSOLUTE COUNT: 13.3 K/CU MM
SEGMENTED NEUTROPHILS RELATIVE PERCENT: 73.9 % (ref 36–66)
SODIUM BLD-SCNC: 135 MMOL/L (ref 135–145)
SODIUM BLD-SCNC: 136 MMOL/L (ref 135–145)
TOTAL IMMATURE NEUTOROPHIL: 0.14 K/CU MM
TOTAL NUCLEATED RBC: 0 K/CU MM
TOTAL PROTEIN: 5.9 GM/DL (ref 6.4–8.2)
TOTAL PROTEIN: 5.9 GM/DL (ref 6.4–8.2)
TRIGL SERPL-MCNC: 195 MG/DL
TROPONIN T: <0.01 NG/ML
TROPONIN T: <0.01 NG/ML
TSH HIGH SENSITIVITY: 4.23 UIU/ML (ref 0.27–4.2)
WBC # BLD: 18 K/CU MM (ref 4–10.5)
WBC # BLD: 21.4 K/CU MM (ref 4–10.5)

## 2022-09-26 PROCEDURE — 82962 GLUCOSE BLOOD TEST: CPT

## 2022-09-26 PROCEDURE — 85027 COMPLETE CBC AUTOMATED: CPT

## 2022-09-26 PROCEDURE — 6360000002 HC RX W HCPCS: Performed by: EMERGENCY MEDICINE

## 2022-09-26 PROCEDURE — 72170 X-RAY EXAM OF PELVIS: CPT

## 2022-09-26 PROCEDURE — 83605 ASSAY OF LACTIC ACID: CPT

## 2022-09-26 PROCEDURE — 36556 INSERT NON-TUNNEL CV CATH: CPT

## 2022-09-26 PROCEDURE — 85610 PROTHROMBIN TIME: CPT

## 2022-09-26 PROCEDURE — 36592 COLLECT BLOOD FROM PICC: CPT

## 2022-09-26 PROCEDURE — 85730 THROMBOPLASTIN TIME PARTIAL: CPT

## 2022-09-26 PROCEDURE — 6360000002 HC RX W HCPCS: Performed by: STUDENT IN AN ORGANIZED HEALTH CARE EDUCATION/TRAINING PROGRAM

## 2022-09-26 PROCEDURE — 83690 ASSAY OF LIPASE: CPT

## 2022-09-26 PROCEDURE — 84443 ASSAY THYROID STIM HORMONE: CPT

## 2022-09-26 PROCEDURE — 94002 VENT MGMT INPAT INIT DAY: CPT

## 2022-09-26 PROCEDURE — 85384 FIBRINOGEN ACTIVITY: CPT

## 2022-09-26 PROCEDURE — 93005 ELECTROCARDIOGRAM TRACING: CPT | Performed by: EMERGENCY MEDICINE

## 2022-09-26 PROCEDURE — 83880 ASSAY OF NATRIURETIC PEPTIDE: CPT

## 2022-09-26 PROCEDURE — 0DJ08ZZ INSPECTION OF UPPER INTESTINAL TRACT, VIA NATURAL OR ARTIFICIAL OPENING ENDOSCOPIC: ICD-10-PCS | Performed by: SPECIALIST

## 2022-09-26 PROCEDURE — 2709999900 HC NON-CHARGEABLE SUPPLY: Performed by: SPECIALIST

## 2022-09-26 PROCEDURE — 0BH17EZ INSERTION OF ENDOTRACHEAL AIRWAY INTO TRACHEA, VIA NATURAL OR ARTIFICIAL OPENING: ICD-10-PCS | Performed by: STUDENT IN AN ORGANIZED HEALTH CARE EDUCATION/TRAINING PROGRAM

## 2022-09-26 PROCEDURE — 6360000002 HC RX W HCPCS

## 2022-09-26 PROCEDURE — 82803 BLOOD GASES ANY COMBINATION: CPT

## 2022-09-26 PROCEDURE — 96374 THER/PROPH/DIAG INJ IV PUSH: CPT

## 2022-09-26 PROCEDURE — P9016 RBC LEUKOCYTES REDUCED: HCPCS

## 2022-09-26 PROCEDURE — 84478 ASSAY OF TRIGLYCERIDES: CPT

## 2022-09-26 PROCEDURE — 80053 COMPREHEN METABOLIC PANEL: CPT

## 2022-09-26 PROCEDURE — 83735 ASSAY OF MAGNESIUM: CPT

## 2022-09-26 PROCEDURE — 2580000003 HC RX 258: Performed by: EMERGENCY MEDICINE

## 2022-09-26 PROCEDURE — 36430 TRANSFUSION BLD/BLD COMPNT: CPT

## 2022-09-26 PROCEDURE — 6360000004 HC RX CONTRAST MEDICATION: Performed by: STUDENT IN AN ORGANIZED HEALTH CARE EDUCATION/TRAINING PROGRAM

## 2022-09-26 PROCEDURE — 89220 SPUTUM SPECIMEN COLLECTION: CPT

## 2022-09-26 PROCEDURE — 74018 RADEX ABDOMEN 1 VIEW: CPT

## 2022-09-26 PROCEDURE — 82248 BILIRUBIN DIRECT: CPT

## 2022-09-26 PROCEDURE — 96360 HYDRATION IV INFUSION INIT: CPT

## 2022-09-26 PROCEDURE — 3609017100 HC EGD: Performed by: SPECIALIST

## 2022-09-26 PROCEDURE — 71045 X-RAY EXAM CHEST 1 VIEW: CPT

## 2022-09-26 PROCEDURE — 2000000000 HC ICU R&B

## 2022-09-26 PROCEDURE — 85025 COMPLETE CBC W/AUTO DIFF WBC: CPT

## 2022-09-26 PROCEDURE — 86900 BLOOD TYPING SEROLOGIC ABO: CPT

## 2022-09-26 PROCEDURE — 86901 BLOOD TYPING SEROLOGIC RH(D): CPT

## 2022-09-26 PROCEDURE — 99222 1ST HOSP IP/OBS MODERATE 55: CPT | Performed by: SURGERY

## 2022-09-26 PROCEDURE — 84484 ASSAY OF TROPONIN QUANT: CPT

## 2022-09-26 PROCEDURE — 5A1935Z RESPIRATORY VENTILATION, LESS THAN 24 CONSECUTIVE HOURS: ICD-10-PCS | Performed by: STUDENT IN AN ORGANIZED HEALTH CARE EDUCATION/TRAINING PROGRAM

## 2022-09-26 PROCEDURE — 86922 COMPATIBILITY TEST ANTIGLOB: CPT

## 2022-09-26 PROCEDURE — 74174 CTA ABD&PLVS W/CONTRAST: CPT

## 2022-09-26 PROCEDURE — 6370000000 HC RX 637 (ALT 250 FOR IP): Performed by: STUDENT IN AN ORGANIZED HEALTH CARE EDUCATION/TRAINING PROGRAM

## 2022-09-26 PROCEDURE — 86850 RBC ANTIBODY SCREEN: CPT

## 2022-09-26 PROCEDURE — C9113 INJ PANTOPRAZOLE SODIUM, VIA: HCPCS | Performed by: EMERGENCY MEDICINE

## 2022-09-26 PROCEDURE — 99285 EMERGENCY DEPT VISIT HI MDM: CPT

## 2022-09-26 PROCEDURE — 2500000003 HC RX 250 WO HCPCS

## 2022-09-26 RX ORDER — ALBUTEROL SULFATE 2.5 MG/3ML
2.5 SOLUTION RESPIRATORY (INHALATION) EVERY 6 HOURS PRN
Status: DISCONTINUED | OUTPATIENT
Start: 2022-09-26 | End: 2022-09-27

## 2022-09-26 RX ORDER — SODIUM CHLORIDE 0.9 % (FLUSH) 0.9 %
5-40 SYRINGE (ML) INJECTION PRN
Status: DISCONTINUED | OUTPATIENT
Start: 2022-09-26 | End: 2022-10-02 | Stop reason: HOSPADM

## 2022-09-26 RX ORDER — PANTOPRAZOLE SODIUM 40 MG/10ML
40 INJECTION, POWDER, LYOPHILIZED, FOR SOLUTION INTRAVENOUS ONCE
Status: COMPLETED | OUTPATIENT
Start: 2022-09-26 | End: 2022-09-26

## 2022-09-26 RX ORDER — SODIUM CHLORIDE 9 MG/ML
INJECTION, SOLUTION INTRAVENOUS PRN
Status: DISCONTINUED | OUTPATIENT
Start: 2022-09-26 | End: 2022-10-02 | Stop reason: HOSPADM

## 2022-09-26 RX ORDER — SODIUM CHLORIDE 0.9 % (FLUSH) 0.9 %
5-40 SYRINGE (ML) INJECTION EVERY 12 HOURS SCHEDULED
Status: DISCONTINUED | OUTPATIENT
Start: 2022-09-26 | End: 2022-10-02 | Stop reason: HOSPADM

## 2022-09-26 RX ORDER — PANTOPRAZOLE SODIUM 40 MG/10ML
40 INJECTION, POWDER, LYOPHILIZED, FOR SOLUTION INTRAVENOUS DAILY
Status: CANCELLED | OUTPATIENT
Start: 2022-09-27

## 2022-09-26 RX ORDER — CHLORHEXIDINE GLUCONATE 0.12 MG/ML
15 RINSE ORAL 2 TIMES DAILY
Status: DISCONTINUED | OUTPATIENT
Start: 2022-09-26 | End: 2022-09-27

## 2022-09-26 RX ORDER — ACETAMINOPHEN 650 MG/1
650 SUPPOSITORY RECTAL EVERY 6 HOURS PRN
Status: DISCONTINUED | OUTPATIENT
Start: 2022-09-26 | End: 2022-09-26

## 2022-09-26 RX ORDER — FENTANYL CITRATE 50 UG/ML
25 INJECTION, SOLUTION INTRAMUSCULAR; INTRAVENOUS
Status: DISCONTINUED | OUTPATIENT
Start: 2022-09-26 | End: 2022-09-27

## 2022-09-26 RX ORDER — PROPOFOL 10 MG/ML
5-50 INJECTION, EMULSION INTRAVENOUS CONTINUOUS
Status: DISCONTINUED | OUTPATIENT
Start: 2022-09-26 | End: 2022-09-26

## 2022-09-26 RX ORDER — MIDAZOLAM HYDROCHLORIDE 1 MG/ML
INJECTION INTRAMUSCULAR; INTRAVENOUS
Status: COMPLETED
Start: 2022-09-26 | End: 2022-09-26

## 2022-09-26 RX ORDER — PROPOFOL 10 MG/ML
INJECTION, EMULSION INTRAVENOUS
Status: COMPLETED
Start: 2022-09-26 | End: 2022-09-26

## 2022-09-26 RX ORDER — MIDAZOLAM HYDROCHLORIDE 2 MG/2ML
2 INJECTION, SOLUTION INTRAMUSCULAR; INTRAVENOUS ONCE
Status: COMPLETED | OUTPATIENT
Start: 2022-09-26 | End: 2022-09-26

## 2022-09-26 RX ORDER — POLYVINYL ALCOHOL 14 MG/ML
1 SOLUTION/ DROPS OPHTHALMIC EVERY 4 HOURS
Status: DISCONTINUED | OUTPATIENT
Start: 2022-09-26 | End: 2022-09-27

## 2022-09-26 RX ORDER — 0.9 % SODIUM CHLORIDE 0.9 %
1000 INTRAVENOUS SOLUTION INTRAVENOUS ONCE
Status: COMPLETED | OUTPATIENT
Start: 2022-09-26 | End: 2022-09-26

## 2022-09-26 RX ORDER — MINERAL OIL AND WHITE PETROLATUM 150; 830 MG/G; MG/G
OINTMENT OPHTHALMIC EVERY 4 HOURS
Status: DISCONTINUED | OUTPATIENT
Start: 2022-09-27 | End: 2022-09-27

## 2022-09-26 RX ORDER — ONDANSETRON 2 MG/ML
4 INJECTION INTRAMUSCULAR; INTRAVENOUS EVERY 6 HOURS PRN
Status: DISCONTINUED | OUTPATIENT
Start: 2022-09-26 | End: 2022-10-02 | Stop reason: HOSPADM

## 2022-09-26 RX ORDER — PANTOPRAZOLE SODIUM 40 MG/1
40 TABLET, DELAYED RELEASE ORAL
Status: DISCONTINUED | OUTPATIENT
Start: 2022-09-27 | End: 2022-09-27

## 2022-09-26 RX ORDER — ACETAMINOPHEN 325 MG/1
650 TABLET ORAL EVERY 6 HOURS PRN
Status: DISCONTINUED | OUTPATIENT
Start: 2022-09-26 | End: 2022-10-02 | Stop reason: HOSPADM

## 2022-09-26 RX ORDER — CHOLECALCIFEROL (VITAMIN D3) 10 MCG
1 TABLET ORAL DAILY
COMMUNITY
Start: 2021-08-11

## 2022-09-26 RX ORDER — ATORVASTATIN CALCIUM 40 MG/1
TABLET, FILM COATED ORAL
COMMUNITY
Start: 2022-08-29

## 2022-09-26 RX ORDER — ONDANSETRON 4 MG/1
4 TABLET, ORALLY DISINTEGRATING ORAL EVERY 8 HOURS PRN
Status: DISCONTINUED | OUTPATIENT
Start: 2022-09-26 | End: 2022-10-02 | Stop reason: HOSPADM

## 2022-09-26 RX ORDER — PROPOFOL 10 MG/ML
5-80 INJECTION, EMULSION INTRAVENOUS CONTINUOUS
Status: DISCONTINUED | OUTPATIENT
Start: 2022-09-26 | End: 2022-09-27

## 2022-09-26 RX ORDER — 0.9 % SODIUM CHLORIDE 0.9 %
1000 INTRAVENOUS SOLUTION INTRAVENOUS ONCE
Status: DISCONTINUED | OUTPATIENT
Start: 2022-09-26 | End: 2022-09-26

## 2022-09-26 RX ADMIN — SODIUM CHLORIDE 1000 ML: 9 INJECTION, SOLUTION INTRAVENOUS at 19:29

## 2022-09-26 RX ADMIN — FENTANYL CITRATE 25 MCG: 50 INJECTION, SOLUTION INTRAMUSCULAR; INTRAVENOUS at 22:16

## 2022-09-26 RX ADMIN — SODIUM CHLORIDE 1000 ML: 9 INJECTION, SOLUTION INTRAVENOUS at 19:02

## 2022-09-26 RX ADMIN — PROPOFOL 20 MCG/KG/MIN: 10 INJECTION, EMULSION INTRAVENOUS at 21:17

## 2022-09-26 RX ADMIN — MIDAZOLAM HYDROCHLORIDE 2 MG: 2 INJECTION, SOLUTION INTRAMUSCULAR; INTRAVENOUS at 21:04

## 2022-09-26 RX ADMIN — PANTOPRAZOLE SODIUM 40 MG: 40 INJECTION, POWDER, FOR SOLUTION INTRAVENOUS at 19:27

## 2022-09-26 RX ADMIN — IOPAMIDOL 80 ML: 755 INJECTION, SOLUTION INTRAVENOUS at 23:40

## 2022-09-26 RX ADMIN — MIDAZOLAM HYDROCHLORIDE 2 MG: 1 INJECTION, SOLUTION INTRAMUSCULAR; INTRAVENOUS at 21:04

## 2022-09-26 RX ADMIN — POLYVINYL ALCOHOL 1 DROP: 14 SOLUTION/ DROPS OPHTHALMIC at 22:28

## 2022-09-26 ASSESSMENT — ENCOUNTER SYMPTOMS
DIARRHEA: 1
SHORTNESS OF BREATH: 1
NAUSEA: 1
ABDOMINAL PAIN: 1
ANAL BLEEDING: 1
ALLERGIC/IMMUNOLOGIC NEGATIVE: 1
EYES NEGATIVE: 1
BLOOD IN STOOL: 1
RESPIRATORY NEGATIVE: 1

## 2022-09-26 ASSESSMENT — COPD QUESTIONNAIRES: CAT_SEVERITY: SEVERE

## 2022-09-26 ASSESSMENT — PULMONARY FUNCTION TESTS
PIF_VALUE: 24
PIF_VALUE: 17
PIF_VALUE: 17

## 2022-09-26 NOTE — ED NOTES
IV fluid started at this time. Pt in trendelenburg. Active rectal bleeding at this time.       Karena Harrington RN  09/26/22 0167

## 2022-09-26 NOTE — ED NOTES
First unit of emergency blood completed at this time. Pt sitting up speaking with family at bedside.       Cosme Simms RN  09/26/22 1950

## 2022-09-26 NOTE — ED NOTES
Pt arrives via EMS and is talking, EMS reports that patient was hypotensive in route 54/31. Pt goes unresponsive at this time. Dr. Dayan Houston at bedside.       Lissy Saez RN  09/26/22 8085

## 2022-09-26 NOTE — ED PROVIDER NOTES
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 did contact [facility] surgery hospital medicine ICU physician as well as GI.  GI ICU physician came to the bedside and evaluated patient. Plan at this time is for emergent diascopy. Patient getting emergency blood. Patient critically ill but he is doing better with fluids and blood transfusion. Wife is here at bedside as well. Patient admitted to ICU. Currently protecting airway he is doing better. CLINICAL IMPRESSION:  Final diagnoses:   Hemorrhagic shock ([address]   Gastrointestinal hemorrhage, unspecified gastrointestinal hemorrhage type       (Please note that portions of this note may have been completed with a voice recognition program. Efforts were made to edit the dictations but occasionally words aremis-transcribed.)    DISPOSITION REFERRAL (if applicable):  No follow-up provider specified.     DISPOSITION MEDICATIONS (if applicable):  New Prescriptions    No medications on file [employer], [address] [facility], DO  09/26/22 2009

## 2022-09-26 NOTE — ED NOTES
Per Dr. Leslie Barrientos femoral CVC line ok to use at this time.       Herbert Adames RN  09/26/22 [id number]

## 2022-09-26 NOTE — ED NOTES
[facility] paged Dr Jerson Spangler  09/26/22 [phone] Dr Gibran Medina returned call      Roc Sotelo  09/26/22 [phone]

## 2022-09-26 NOTE — ED NOTES
2nd Emergency blood unit completed at this note time. Dr. Bernadine Barrios at bedside with pt at this time.       Harry Aguilar RN  09/26/22 [id number]

## 2022-09-27 LAB
ALBUMIN SERPL-MCNC: 3.5 GM/DL (ref 3.4–5)
ALP BLD-CCNC: 49 IU/L (ref 40–128)
ALT SERPL-CCNC: 14 U/L (ref 10–40)
AMYLASE: 80 U/L (ref 25–115)
ANION GAP SERPL CALCULATED.3IONS-SCNC: 16 MMOL/L (ref 4–16)
ANION GAP SERPL CALCULATED.3IONS-SCNC: 17 MMOL/L (ref 4–16)
AST SERPL-CCNC: 15 IU/L (ref 15–37)
BASE EXCESS MIXED: 9.2 (ref 0–1.2)
BASE EXCESS: 7 (ref 0–3.3)
BASE EXCESS: ABNORMAL (ref 0–3.3)
BILIRUB SERPL-MCNC: 0.3 MG/DL (ref 0–1)
BUN BLDV-MCNC: 89 MG/DL (ref 6–23)
BUN BLDV-MCNC: 90 MG/DL (ref 6–23)
CALCIUM SERPL-MCNC: 8 MG/DL (ref 8.3–10.6)
CALCIUM SERPL-MCNC: 8.2 MG/DL (ref 8.3–10.6)
CARBON MONOXIDE, BLOOD: 2.6 % (ref 0–5)
CHLORIDE BLD-SCNC: 104 MMOL/L (ref 99–110)
CHLORIDE BLD-SCNC: 104 MMOL/L (ref 99–110)
CO2 CONTENT: 19.6 MMOL/L (ref 19–24)
CO2: 18 MMOL/L (ref 21–32)
CO2: 18 MMOL/L (ref 21–32)
CO2: 20 MMOL/L (ref 21–32)
COMMENT: ABNORMAL
CREAT SERPL-MCNC: 8.1 MG/DL (ref 0.9–1.3)
CREAT SERPL-MCNC: 8.2 MG/DL (ref 0.9–1.3)
EKG ATRIAL RATE: 98 BPM
EKG DIAGNOSIS: NORMAL
EKG P AXIS: 116 DEGREES
EKG P-R INTERVAL: 164 MS
EKG Q-T INTERVAL: 418 MS
EKG QRS DURATION: 138 MS
EKG QTC CALCULATION (BAZETT): 533 MS
EKG R AXIS: -43 DEGREES
EKG T AXIS: 85 DEGREES
EKG VENTRICULAR RATE: 98 BPM
GFR AFRICAN AMERICAN: 8 ML/MIN/1.73M2
GFR AFRICAN AMERICAN: 8 ML/MIN/1.73M2
GFR NON-AFRICAN AMERICAN: 7 ML/MIN/1.73M2
GFR NON-AFRICAN AMERICAN: 7 ML/MIN/1.73M2
GLUCOSE BLD-MCNC: 118 MG/DL (ref 70–99)
GLUCOSE BLD-MCNC: 86 MG/DL (ref 70–99)
GLUCOSE BLD-MCNC: 88 MG/DL (ref 70–99)
HCO3 ARTERIAL: 18.5 MMOL/L (ref 18–23)
HCO3 ARTERIAL: 18.7 MMOL/L (ref 18–23)
HCT VFR BLD CALC: 27.2 % (ref 42–52)
HCT VFR BLD CALC: 28.2 % (ref 42–52)
HCT VFR BLD CALC: 29 % (ref 42–52)
HCT VFR BLD CALC: 29.3 % (ref 42–52)
HEMOGLOBIN: 10 GM/DL (ref 13.5–18)
HEMOGLOBIN: 9.1 GM/DL (ref 13.5–18)
HEMOGLOBIN: 9.4 GM/DL (ref 13.5–18)
HEMOGLOBIN: 9.7 GM/DL (ref 13.5–18)
LIPASE: 31 IU/L (ref 13–60)
MAGNESIUM: 3.3 MG/DL (ref 1.8–2.4)
MCH RBC QN AUTO: 30.6 PG (ref 27–31)
MCH RBC QN AUTO: 30.6 PG (ref 27–31)
MCHC RBC AUTO-ENTMCNC: 33.1 % (ref 32–36)
MCHC RBC AUTO-ENTMCNC: 33.5 % (ref 32–36)
MCV RBC AUTO: 91.6 FL (ref 78–100)
MCV RBC AUTO: 92.4 FL (ref 78–100)
METHEMOGLOBIN ARTERIAL: 0.9 %
O2 SATURATION: 83 % (ref 96–97)
O2 SATURATION: 90.7 % (ref 96–97)
PCO2 ARTERIAL: 36 MMHG (ref 32–45)
PCO2 ARTERIAL: 48.7 MMHG (ref 32–45)
PDW BLD-RTO: 18.3 % (ref 11.7–14.9)
PDW BLD-RTO: 18.6 % (ref 11.7–14.9)
PH BLOOD: 7.19 (ref 7.34–7.45)
PH BLOOD: 7.32 (ref 7.34–7.45)
PHOSPHORUS: 5.6 MG/DL (ref 2.5–4.9)
PLATELET # BLD: 193 K/CU MM (ref 140–440)
PLATELET # BLD: 210 K/CU MM (ref 140–440)
PMV BLD AUTO: 9.4 FL (ref 7.5–11.1)
PMV BLD AUTO: 9.6 FL (ref 7.5–11.1)
PO2 ARTERIAL: 58.6 MMHG (ref 75–100)
PO2 ARTERIAL: 59 MMHG (ref 75–100)
POC CALCIUM: 1.14 MMOL/L (ref 1.12–1.32)
POC CHLORIDE: 110 MMOL/L (ref 98–109)
POC CREATININE: 6.8 MG/DL (ref 0.9–1.3)
POTASSIUM SERPL-SCNC: 3.8 MMOL/L (ref 3.5–4.5)
POTASSIUM SERPL-SCNC: 3.8 MMOL/L (ref 3.5–5.1)
POTASSIUM SERPL-SCNC: 4.2 MMOL/L (ref 3.5–5.1)
RBC # BLD: 2.97 M/CU MM (ref 4.6–6.2)
RBC # BLD: 3.17 M/CU MM (ref 4.6–6.2)
SODIUM BLD-SCNC: 138 MMOL/L (ref 135–145)
SODIUM BLD-SCNC: 138 MMOL/L (ref 138–146)
SODIUM BLD-SCNC: 139 MMOL/L (ref 135–145)
SOURCE, BLOOD GAS: ABNORMAL
TOTAL PROTEIN: 5.1 GM/DL (ref 6.4–8.2)
WBC # BLD: 14.2 K/CU MM (ref 4–10.5)
WBC # BLD: 17.7 K/CU MM (ref 4–10.5)

## 2022-09-27 PROCEDURE — 99233 SBSQ HOSP IP/OBS HIGH 50: CPT | Performed by: SURGERY

## 2022-09-27 PROCEDURE — 94003 VENT MGMT INPAT SUBQ DAY: CPT

## 2022-09-27 PROCEDURE — 87040 BLOOD CULTURE FOR BACTERIA: CPT

## 2022-09-27 PROCEDURE — 85014 HEMATOCRIT: CPT

## 2022-09-27 PROCEDURE — 82803 BLOOD GASES ANY COMBINATION: CPT

## 2022-09-27 PROCEDURE — 89220 SPUTUM SPECIMEN COLLECTION: CPT

## 2022-09-27 PROCEDURE — 6370000000 HC RX 637 (ALT 250 FOR IP): Performed by: STUDENT IN AN ORGANIZED HEALTH CARE EDUCATION/TRAINING PROGRAM

## 2022-09-27 PROCEDURE — 80051 ELECTROLYTE PANEL: CPT

## 2022-09-27 PROCEDURE — 80048 BASIC METABOLIC PNL TOTAL CA: CPT

## 2022-09-27 PROCEDURE — 85025 COMPLETE CBC W/AUTO DIFF WBC: CPT

## 2022-09-27 PROCEDURE — 2500000003 HC RX 250 WO HCPCS: Performed by: SPECIALIST

## 2022-09-27 PROCEDURE — 85018 HEMOGLOBIN: CPT

## 2022-09-27 PROCEDURE — 37799 UNLISTED PX VASCULAR SURGERY: CPT

## 2022-09-27 PROCEDURE — 2000000000 HC ICU R&B

## 2022-09-27 PROCEDURE — 6370000000 HC RX 637 (ALT 250 FOR IP)

## 2022-09-27 PROCEDURE — 36415 COLL VENOUS BLD VENIPUNCTURE: CPT

## 2022-09-27 PROCEDURE — 2700000000 HC OXYGEN THERAPY PER DAY

## 2022-09-27 PROCEDURE — 82565 ASSAY OF CREATININE: CPT

## 2022-09-27 PROCEDURE — 94761 N-INVAS EAR/PLS OXIMETRY MLT: CPT

## 2022-09-27 PROCEDURE — 2580000003 HC RX 258: Performed by: STUDENT IN AN ORGANIZED HEALTH CARE EDUCATION/TRAINING PROGRAM

## 2022-09-27 PROCEDURE — 83690 ASSAY OF LIPASE: CPT

## 2022-09-27 PROCEDURE — 6360000002 HC RX W HCPCS: Performed by: STUDENT IN AN ORGANIZED HEALTH CARE EDUCATION/TRAINING PROGRAM

## 2022-09-27 PROCEDURE — 6370000000 HC RX 637 (ALT 250 FOR IP): Performed by: INTERNAL MEDICINE

## 2022-09-27 PROCEDURE — 93010 ELECTROCARDIOGRAM REPORT: CPT | Performed by: INTERNAL MEDICINE

## 2022-09-27 PROCEDURE — 82962 GLUCOSE BLOOD TEST: CPT

## 2022-09-27 PROCEDURE — 83735 ASSAY OF MAGNESIUM: CPT

## 2022-09-27 PROCEDURE — 82150 ASSAY OF AMYLASE: CPT

## 2022-09-27 PROCEDURE — 84100 ASSAY OF PHOSPHORUS: CPT

## 2022-09-27 PROCEDURE — 6370000000 HC RX 637 (ALT 250 FOR IP): Performed by: SPECIALIST

## 2022-09-27 PROCEDURE — 80053 COMPREHEN METABOLIC PANEL: CPT

## 2022-09-27 PROCEDURE — 85027 COMPLETE CBC AUTOMATED: CPT

## 2022-09-27 RX ORDER — FENTANYL CITRATE 50 UG/ML
25 INJECTION, SOLUTION INTRAMUSCULAR; INTRAVENOUS
Status: DISCONTINUED | OUTPATIENT
Start: 2022-09-27 | End: 2022-09-27

## 2022-09-27 RX ORDER — MAGNESIUM SULFATE IN WATER 40 MG/ML
2000 INJECTION, SOLUTION INTRAVENOUS ONCE
Status: COMPLETED | OUTPATIENT
Start: 2022-09-27 | End: 2022-09-27

## 2022-09-27 RX ORDER — DEXMEDETOMIDINE HYDROCHLORIDE 4 UG/ML
.1-1.5 INJECTION, SOLUTION INTRAVENOUS CONTINUOUS
Status: DISCONTINUED | OUTPATIENT
Start: 2022-09-27 | End: 2022-09-27

## 2022-09-27 RX ORDER — HYDROCODONE BITARTRATE AND ACETAMINOPHEN 5; 325 MG/1; MG/1
1 TABLET ORAL ONCE
Status: COMPLETED | OUTPATIENT
Start: 2022-09-27 | End: 2022-09-27

## 2022-09-27 RX ORDER — CLONIDINE HYDROCHLORIDE 0.1 MG/1
0.1 TABLET ORAL 2 TIMES DAILY
Status: DISCONTINUED | OUTPATIENT
Start: 2022-09-27 | End: 2022-10-02 | Stop reason: HOSPADM

## 2022-09-27 RX ORDER — HYDRALAZINE HYDROCHLORIDE 20 MG/ML
20 INJECTION INTRAMUSCULAR; INTRAVENOUS EVERY 6 HOURS PRN
Status: DISCONTINUED | OUTPATIENT
Start: 2022-09-27 | End: 2022-09-28

## 2022-09-27 RX ORDER — PANTOPRAZOLE SODIUM 40 MG/1
40 TABLET, DELAYED RELEASE ORAL
Status: DISCONTINUED | OUTPATIENT
Start: 2022-09-27 | End: 2022-09-27

## 2022-09-27 RX ORDER — PANTOPRAZOLE SODIUM 40 MG/1
40 TABLET, DELAYED RELEASE ORAL
Status: DISCONTINUED | OUTPATIENT
Start: 2022-09-28 | End: 2022-09-28

## 2022-09-27 RX ORDER — ALPRAZOLAM 0.25 MG/1
0.25 TABLET ORAL 2 TIMES DAILY PRN
Status: DISCONTINUED | OUTPATIENT
Start: 2022-09-27 | End: 2022-10-02 | Stop reason: HOSPADM

## 2022-09-27 RX ADMIN — POLYVINYL ALCOHOL 1 DROP: 14 SOLUTION/ DROPS OPHTHALMIC at 10:03

## 2022-09-27 RX ADMIN — CLONIDINE HYDROCHLORIDE 0.1 MG: 0.1 TABLET ORAL at 16:08

## 2022-09-27 RX ADMIN — ALPRAZOLAM 0.25 MG: 0.25 TABLET ORAL at 20:22

## 2022-09-27 RX ADMIN — SODIUM CHLORIDE, PRESERVATIVE FREE 10 ML: 5 INJECTION INTRAVENOUS at 10:03

## 2022-09-27 RX ADMIN — HYDROCODONE BITARTRATE AND ACETAMINOPHEN 1 TABLET: 5; 325 TABLET ORAL at 22:34

## 2022-09-27 RX ADMIN — SODIUM CHLORIDE, PRESERVATIVE FREE 5 ML: 5 INJECTION INTRAVENOUS at 00:21

## 2022-09-27 RX ADMIN — PROPOFOL 35 MCG/KG/MIN: 10 INJECTION, EMULSION INTRAVENOUS at 01:43

## 2022-09-27 RX ADMIN — MAGNESIUM SULFATE HEPTAHYDRATE 2000 MG: 40 INJECTION, SOLUTION INTRAVENOUS at 01:15

## 2022-09-27 RX ADMIN — MINERAL OIL AND WHITE PETROLATUM: 150; 830 OINTMENT OPHTHALMIC at 10:03

## 2022-09-27 RX ADMIN — PANTOPRAZOLE SODIUM 40 MG: 40 TABLET, DELAYED RELEASE ORAL at 16:08

## 2022-09-27 RX ADMIN — MINERAL OIL AND WHITE PETROLATUM: 150; 830 OINTMENT OPHTHALMIC at 00:24

## 2022-09-27 RX ADMIN — POLYVINYL ALCOHOL 1 DROP: 14 SOLUTION/ DROPS OPHTHALMIC at 01:20

## 2022-09-27 RX ADMIN — MINERAL OIL AND WHITE PETROLATUM: 150; 830 OINTMENT OPHTHALMIC at 03:54

## 2022-09-27 RX ADMIN — ALPRAZOLAM 0.25 MG: 0.25 TABLET ORAL at 14:25

## 2022-09-27 RX ADMIN — POLYVINYL ALCOHOL 1 DROP: 14 SOLUTION/ DROPS OPHTHALMIC at 06:09

## 2022-09-27 RX ADMIN — PROPOFOL 45 MCG/KG/MIN: 10 INJECTION, EMULSION INTRAVENOUS at 05:17

## 2022-09-27 RX ADMIN — SODIUM CHLORIDE, PRESERVATIVE FREE 10 ML: 5 INJECTION INTRAVENOUS at 20:22

## 2022-09-27 RX ADMIN — DEXMEDETOMIDINE HYDROCHLORIDE 0.2 MCG/KG/HR: 4 INJECTION, SOLUTION INTRAVENOUS at 08:04

## 2022-09-27 ASSESSMENT — PULMONARY FUNCTION TESTS
PIF_VALUE: 18
PIF_VALUE: 15
PIF_VALUE: 20
PIF_VALUE: 19
PIF_VALUE: 26
PIF_VALUE: 15
PIF_VALUE: 18
PIF_VALUE: 12
PIF_VALUE: 18
PIF_VALUE: 17
PIF_VALUE: 16
PIF_VALUE: 14
PIF_VALUE: 15
PIF_VALUE: 17

## 2022-09-27 ASSESSMENT — PAIN SCALES - GENERAL
PAINLEVEL_OUTOF10: 0
PAINLEVEL_OUTOF10: 6
PAINLEVEL_OUTOF10: 0
PAINLEVEL_OUTOF10: 0

## 2022-09-27 NOTE — CARE COORDINATION
Chart reviewed. Pt admitted with GI Bleed. Pt was intubated 9/26 and extubated 9/27. Pt had EGD 9/26. Chart indicates pt and spouse reside together. 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 Pt utilizes home 02. Pt is also on hemodialysis. Record indicates pt is [place]. Cm attempted to contact spouse to initiate discharge planning with voice mail message left requesting return call.

## 2022-09-27 NOTE — BRIEF OP NOTE
Brief Postoperative Note      Nilda Velasco is a 79 y.o. male     Pre-operative Diagnosis: R/O U/ GIT BLEEDING    Post-operative Diagnosis:GASTRITIS DUODENITIS-LIPOMA IN THE DUODENUM - NO U/GIT BLEEDING    Procedure: EGD    Anesthesia: MAC    Surgeons/Assistants:Jose Jang MD     Estimated Blood Loss: NIL    Complications: None    Specimens: WERE NOT obtained      Juan Ramon Holland MD   9/26/2022   9:24 PM

## 2022-09-27 NOTE — PROGRESS NOTES
V2.0  [facility] [place]      Name:  Myron Dimas /Age/Sex: 1955  (79 y.o. male)   MRN & CSN:  [id number] & [id number] Encounter Date/Time: 2022 12:30 PM EDT    Location:  -A PCP: [address] [place] [address] Day: 2    Assessment and Plan:   Myron Dimas is a 79 y.o. male  who presents with Acute GI bleeding    1. Acute GI bleed  -Patient presenting with dark tarry stools  -Status post transfusion of 3 units of PRBC  -Status post EGD which showed mild gastritis and duodenitis. -CT of the abdomen and pelvis showed No GI bleed identified. 2. 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  -Plan to do a colonoscopy at a later date  -Continue to monitor hemoglobin and transfuse if less than 7  -Continue Protonix    2. ESRD on hemodialysis  -Nephrology consulted and following  -Continue inpatient schedule per nephro recs    3. Acute on chronic respiratory failure secondary to acute blood loss due to GI bleed  -S/p extubation 2022  -Continue supplemental oxygen  -Breathing treatments as needed    4. COPD  -Not in acute exacerbation  -Continue breathing treatments  -Continue supplemental oxygen    5. Rest of chronic medical conditions. Hypertension. Hyperlipidemia. Continue medical management as appropriate    Diet ADULT DIET; Clear Liquid   DVT Prophylaxis [] Lovenox, []  Heparin, [] SCDs, [] Ambulation,  [] Eliquis, [] Xarelto  [] Coumadin   Code Status Full Code   Disposition From: Home  Expected Disposition: Home  Estimated Date of Discharge: TBD  Patient requires continued admission due to GI bleed         Subjective:     Chief Complaint: Rectal Bleeding (Rectal bleeding x 2 hours/)     Patient seen and examined at bedside this morning. Status post bedside EGD. Hemoglobin today stable. No overt bleeding noted. Denies chest pain, nausea vomiting, fever or chills. Blood pressure is elevated.   Objective: Intake/Output Summary (Last 24 hours) at 9/27/2022 1230  Last data filed at 9/27/2022 1052  Gross per 24 hour   Intake 612.39 ml   Output 700 ml   Net -87.61 ml        Vitals:   Vitals:    09/27/22 1143   BP:    Pulse: 78   Resp: 22   Temp:    SpO2: 97%       Physical Exam:     General: NAD  Eyes: EOMI  ENT: neck supple  Cardiovascular: Regular rate. Respiratory: Clear to auscultation  Gastrointestinal: Soft, non tender  Genitourinary: no suprapubic tenderness  Musculoskeletal: No edema. Left BKA  Skin: warm, dry  Neuro: Alert. Psych: Mood appropriate.      Medications:   Medications:    pantoprazole  40 mg Oral BID AC    sodium chloride flush  5-40 mL IntraVENous 2 times per day      Infusions:    sodium chloride      sodium chloride       PRN Meds: hydrALAZINE, 20 mg, Q6H PRN  sodium chloride, , PRN  sodium chloride flush, 5-40 mL, PRN  sodium chloride, , PRN  ondansetron, 4 mg, Q8H PRN   Or  ondansetron, 4 mg, Q6H PRN  acetaminophen, 650 mg, Q6H PRN        Labs      Recent Results (from the past 24 hour(s))   POCT Glucose    Collection Time: 09/26/22  6:56 PM   Result Value Ref Range    POC Glucose 137 (H) 70 - 99 MG/DL   CBC with Auto Differential    Collection Time: 09/26/22  7:00 PM   Result Value Ref Range    WBC 18.0 (H) 4.0 - 10.5 K/CU MM    RBC 2.30 (L) 4.6 - 6.2 M/CU MM    Hemoglobin 7.4 (L) 13.5 - 18.0 GM/DL    Hematocrit 22.9 (L) 42 - 52 %    MCV 99.6 78 - 100 FL    MCH 32.2 (H) 27 - 31 PG    MCHC 32.3 32.0 - 36.0 %    RDW 14.1 11.7 - 14.9 %    Platelets 614 592 - 221 K/CU MM    MPV 9.7 7.5 - 11.1 FL    Differential Type AUTOMATED DIFFERENTIAL     Segs Relative 73.9 (H) 36 - 66 %    Lymphocytes % 18.4 (L) 24 - 44 %    Monocytes % 5.5 (H) 0 - 4 %    Eosinophils % 1.0 0 - 3 %    Basophils % 0.4 0 - 1 %    Segs Absolute 13.3 K/CU MM    Lymphocytes Absolute 3.3 K/CU MM    Monocytes Absolute 1.0 K/CU MM    Eosinophils Absolute 0.2 K/CU MM    Basophils Absolute 0.1 K/CU MM    Nucleated RBC % 0.0 % Total Nucleated RBC 0.0 K/CU MM    Total Immature Neutrophil 0.14 K/CU MM    Immature Neutrophil % 0.8 (H) 0 - 0.43 %   Comprehensive Metabolic Panel    Collection Time: 09/26/22  7:00 PM   Result Value Ref Range    Sodium 135 135 - 145 MMOL/L    Potassium 3.4 (L) 3.5 - 5.1 MMOL/L    Chloride 98 (L) 99 - 110 mMol/L    CO2 15 (L) 21 - 32 MMOL/L    BUN 96 (H) 6 - 23 MG/DL    Creatinine 7.4 (H) 0.9 - 1.3 MG/DL    Glucose 152 (H) 70 - 99 MG/DL    Calcium 8.9 8.3 - 10.6 MG/DL    Albumin 3.6 3.4 - 5.0 GM/DL    Total Protein 5.9 (L) 6.4 - 8.2 GM/DL    Total Bilirubin 0.2 0.0 - 1.0 MG/DL    ALT 16 10 - 40 U/L    AST 16 15 - 37 IU/L    Alkaline Phosphatase 48 40 - 129 IU/L    GFR Non- 7 (L) >60 mL/min/1.73m2    GFR  9 (L) >60 mL/min/1.73m2    Anion Gap 22 (H) 4 - 16   Troponin    Collection Time: 09/26/22  7:00 PM   Result Value Ref Range    Troponin T <0.010 <0.01 NG/ML   Brain Natriuretic Peptide    Collection Time: 09/26/22  7:00 PM   Result Value Ref Range    Pro-BNP 3,489 (H) <300 PG/ML   Lipase    Collection Time: 09/26/22  7:00 PM   Result Value Ref Range    Lipase 52 13 - 60 IU/L   Lactic Acid    Collection Time: 09/26/22  7:00 PM   Result Value Ref Range    Lactate 3.8 (HH) 0.4 - 2.0 mMOL/L   TYPE AND SCREEN    Collection Time: 09/26/22  7:00 PM   Result Value Ref Range    ABO/Rh O NEGATIVE     Antibody Screen NEGATIVE     Unit Number [id number]     Component LEUKO-POOR RED CELLS     [facility]     Status ISSUED,FINAL     Transfusion Status OK TO TRANSFUSE     Crossmatch Result COMPATIBLE     Unit Number [id number]     Component LEUKO-POOR RED CELLS     [facility]     Status ISSUED,FINAL     Transfusion Status OK TO TRANSFUSE     Crossmatch Result COMPATIBLE     Unit Number [id number]     Component LEUKO-POOR RED CELLS     Unit Divison 00     Status ALLOCATED     Transfusion Status OK TO TRANSFUSE     Crossmatch Result COMPATIBLE     Unit Number [id number] Component LEUKO-POOR RED CELLS     Unit Divison 00     Status ISSUED,FINAL     Transfusion Status OK TO TRANSFUSE     Crossmatch Result COMPATIBLE    Protime/INR & PTT    Collection Time: 09/26/22  7:00 PM   Result Value Ref Range    Protime 10.9 (L) 11.7 - 14.5 SECONDS    INR 0.85 INDEX    aPTT 30.3 25.1 - 37.1 SECONDS   TSH    Collection Time: 09/26/22  7:00 PM   Result Value Ref Range    TSH, High Sensitivity 4.230 (H) 0.270 - 4.20 uIu/ml   Magnesium    Collection Time: 09/26/22  7:00 PM   Result Value Ref Range    Magnesium 2.6 (H) 1.8 - 2.4 mg/dl   EKG 12 Lead    Collection Time: 09/26/22  7:05 PM   Result Value Ref Range    Ventricular Rate 98 BPM    Atrial Rate 98 BPM    P-R Interval 164 ms    QRS Duration 138 ms    Q-T Interval 418 ms    QTc Calculation (Bazett) 533 ms    P Axis 116 degrees    R Axis -43 degrees    T Axis 85 degrees    Diagnosis       Sinus rhythm with frequent premature ventricular complexes  Left axis deviation  Left ventricular hypertrophy with QRS widening  Cannot rule out Septal infarct , age undetermined  Abnormal ECG  When compared with ECG of 11-MAY-2021 06:15,  premature ventricular complexes are now present  QRS duration has increased  Minimal criteria for Septal infarct are now present  QT has lengthened     Blood gas, arterial    Collection Time: 09/26/22  9:30 PM   Result Value Ref Range    pH, Bld 7.10 (L) 7.34 - 7.45    pCO2, Arterial 62.0 (H) 32 - 45 MMHG    pO2, Arterial 60 (L) 75 - 100 MMHG    Base Excess 11 (H) 0 - 3.3    HCO3, Arterial 19.2 18 - 23 MMOL/L    CO2 Content 21.1 19 - 24 MMOL/L    O2 Sat 83.9 (L) 96 - 97 %    Carbon Monoxide, Blood 2.2 0 - 5 %    Methemoglobin, Arterial 0.9 <1.5 %    Comment AC/,PEEP 5 50%    Triglyceride    Collection Time: 09/26/22  9:53 PM   Result Value Ref Range    Triglycerides 195 (H) <150 MG/DL   Basic Metabolic Panel    Collection Time: 09/26/22  9:53 PM   Result Value Ref Range    Sodium 136 135 - 145 MMOL/L    Potassium 3.5 3.5 - 5.1 MMOL/L    Chloride 102 99 - 110 mMol/L    CO2 19 (L) 21 - 32 MMOL/L    Anion Gap 15 4 - 16    BUN 92 (H) 6 - 23 MG/DL    Creatinine 6.7 (H) 0.9 - 1.3 MG/DL    Glucose 128 (H) 70 - 99 MG/DL    Calcium 8.2 (L) 8.3 - 10.6 MG/DL    GFR Non- 8 (L) >60 mL/min/1.73m2    GFR  10 (L) >60 mL/min/1.73m2   Hepatic Function Panel    Collection Time: 09/26/22  9:53 PM   Result Value Ref Range    Albumin 4.0 3.4 - 5.0 GM/DL    Total Bilirubin 0.4 0.0 - 1.0 MG/DL    Bilirubin, Direct 0.2 0.0 - 0.3 MG/DL    Bilirubin, Indirect 0.2 0 - 0.7 MG/DL    Alkaline Phosphatase 52 40 - 129 IU/L    AST 19 15 - 37 IU/L    ALT 18 10 - 40 U/L    Total Protein 5.9 (L) 6.4 - 8.2 GM/DL   CBC    Collection Time: 09/26/22  9:53 PM   Result Value Ref Range    WBC 21.4 (H) 4.0 - 10.5 K/CU MM    RBC 3.51 (L) 4.6 - 6.2 M/CU MM    Hemoglobin 10.8 (L) 13.5 - 18.0 GM/DL    Hematocrit 32.7 (L) 42 - 52 %    MCV 93.2 78 - 100 FL    MCH 30.8 27 - 31 PG    MCHC 33.0 32.0 - 36.0 %    RDW 16.8 (H) 11.7 - 14.9 %    Platelets 541 976 - 310 K/CU MM    MPV 9.3 7.5 - 11.1 FL   Protime/INR & PTT    Collection Time: 09/26/22  9:53 PM   Result Value Ref Range    Protime 10.3 (L) 11.7 - 14.5 SECONDS    INR 0.80 INDEX    aPTT 31.0 25.1 - 37.1 SECONDS   Fibrinogen    Collection Time: 09/26/22  9:53 PM   Result Value Ref Range    Fibrinogen 353 196.9 - 442.1 MG/DL   Lactic Acid    Collection Time: 09/26/22  9:53 PM   Result Value Ref Range    Lactate 0.5 0.4 - 2.0 mMOL/L   Troponin    Collection Time: 09/26/22  9:53 PM   Result Value Ref Range    Troponin T <0.010 <0.01 NG/ML   Blood gas, arterial    Collection Time: 09/27/22 12:45 AM   Result Value Ref Range    pH, Bld 7.32 (L) 7.34 - 7.45    pCO2, Arterial 36.0 32 - 45 MMHG    pO2, Arterial 59 (L) 75 - 100 MMHG    Base Excess 7 (H) 0 - 3.3    HCO3, Arterial 18.5 18 - 23 MMOL/L    CO2 Content 19.6 19 - 24 MMOL/L    O2 Sat 90.7 (L) 96 - 97 %    Carbon Monoxide, Blood 2.6 0 - 5 % Methemoglobin, Arterial 0.9 <1.5 %    Comment AC20,450,45%,PEEP 5    POC CRITICAL CARE PROFILE    Collection Time: 09/27/22 12:52 AM   Result Value Ref Range    pH, Bld 7.19 (L) 7.34 - 7.45    pCO2, Arterial 48.7 (H) 32 - 45 MMHG    pO2, Arterial 58.6 (L) 75 - 100 MMHG    HCO3, Arterial 18.7 18 - 23 MMOL/L    Base Excess MINUS 0 - 3.3    Base Exc, Mixed 9.2 (H) 0 - 1.2    O2 Sat 83.0 (L) 96 - 97 %    CO2 20 (L) 21 - 32 MMOL/L    Sodium 138 138 - 146 MMOL/L    Potassium 3.8 3.5 - 4.5 MMOL/L    POC CALCIUM 1.14 1.12 - 1.32 MMOL/L    POC Glucose 118 (H) 70 - 99 MG/DL    Hematocrit 29.0 (L) 42 - 52 %    Hemoglobin 10.0 (L) 13.5 - 18.0 GM/DL    POC Chloride 110 (H) 98 - 109 MMOL/L    POC Creatinine 6.8 (H) 0.9 - 1.3 MG/DL    Source: Arterial    Comprehensive Metabolic Panel    Collection Time: 09/27/22  3:35 AM   Result Value Ref Range    Sodium 139 135 - 145 MMOL/L    Potassium 4.2 3.5 - 5.1 MMOL/L    Chloride 104 99 - 110 mMol/L    CO2 18 (L) 21 - 32 MMOL/L    BUN 89 (H) 6 - 23 MG/DL    Creatinine 8.1 (H) 0.9 - 1.3 MG/DL    Glucose 88 70 - 99 MG/DL    Calcium 8.2 (L) 8.3 - 10.6 MG/DL    Albumin 3.5 3.4 - 5.0 GM/DL    Total Protein 5.1 (L) 6.4 - 8.2 GM/DL    Total Bilirubin 0.3 0.0 - 1.0 MG/DL    ALT 14 10 - 40 U/L    AST 15 15 - 37 IU/L    Alkaline Phosphatase 49 40 - 128 IU/L    GFR Non- 7 (L) >60 mL/min/1.73m2    GFR  8 (L) >60 mL/min/1.73m2    Anion Gap 17 (H) 4 - 16   Lipase    Collection Time: 09/27/22  3:35 AM   Result Value Ref Range    Lipase 31 13 - 60 IU/L   Amylase    Collection Time: 09/27/22  3:35 AM   Result Value Ref Range    Amylase 80 25 - 115 U/L   Hemoglobin and Hematocrit    Collection Time: 09/27/22  3:35 AM   Result Value Ref Range    Hemoglobin 9.4 (L) 13.5 - 18.0 GM/DL    Hematocrit 28.2 (L) 42 - 52 %   CBC    Collection Time: 09/27/22  4:58 AM   Result Value Ref Range    WBC 17.7 (H) 4.0 - 10.5 K/CU MM    RBC 2.97 (L) 4.6 - 6.2 M/CU MM    Hemoglobin 9.1 (L) 13.5 - 18.0 GM/DL    Hematocrit 27.2 (L) 42 - 52 %    MCV 91.6 78 - 100 FL    MCH 30.6 27 - 31 PG    MCHC 33.5 32.0 - 36.0 %    RDW 18.3 (H) 11.7 - 14.9 %    Platelets 839 189 - 473 K/CU MM    MPV 9.6 7.5 - 11.1 FL   Basic Metabolic Panel    Collection Time: 09/27/22  4:58 AM   Result Value Ref Range    Sodium 138 135 - 145 MMOL/L    Potassium 3.8 3.5 - 5.1 MMOL/L    Chloride 104 99 - 110 mMol/L    CO2 18 (L) 21 - 32 MMOL/L    Anion Gap 16 4 - 16    BUN 90 (H) 6 - 23 MG/DL    Creatinine 8.2 (H) 0.9 - 1.3 MG/DL    Glucose 86 70 - 99 MG/DL    Calcium 8.0 (L) 8.3 - 10.6 MG/DL    GFR Non- 7 (L) >60 mL/min/1.73m2    GFR  8 (L) >60 mL/min/1.73m2   Magnesium    Collection Time: 09/27/22  4:58 AM   Result Value Ref Range    Magnesium 3.3 (H) 1.8 - 2.4 mg/dl   Phosphorus    Collection Time: 09/27/22  4:58 AM   Result Value Ref Range    Phosphorus 5.6 (H) 2.5 - 4.9 MG/DL   CBC    Collection Time: 09/27/22 10:20 AM   Result Value Ref Range    WBC 14.2 (H) 4.0 - 10.5 K/CU MM    RBC 3.17 (L) 4.6 - 6.2 M/CU MM    Hemoglobin 9.7 (L) 13.5 - 18.0 GM/DL    Hematocrit 29.3 (L) 42 - 52 %    MCV 92.4 78 - 100 FL    MCH 30.6 27 - 31 PG    MCHC 33.1 32.0 - 36.0 %    RDW 18.6 (H) 11.7 - 14.9 %    Platelets 460 902 - 460 K/CU MM    MPV 9.4 7.5 - 11.1 FL        Imaging/Diagnostics Last 24 Hours   XR PELVIS (1-2 VIEWS)    Result Date: 9/26/2022  EXAMINATION: ONE XRAY VIEW OF THE PELVIS 9/26/2022 10:49 pm COMPARISON: None. HISTORY: ORDERING SYSTEM PROVIDED HISTORY: CVC placement Relevant Medical/Surgical History: Best possible image due to pt size and condition FINDINGS: *Right femoral catheter tip overlies the mid pelvis level; CVC is confirmed with tip at the right external iliac vein on CT performed immediately following this exam. *Rectal temperature probe noted. *No gross acute osseous abnormality.      1. Right femoral catheter tip overlies the mid pelvis level; CVC is confirmed with tip at the right external iliac vein on CT performed immediately following this exam. 2. Rectal temperature probe noted. 3. No gross acute osseous abnormality. XR CHEST PORTABLE    Result Date: 9/26/2022  EXAMINATION: ONE XRAY VIEW OF THE CHEST 9/26/2022 10:49 pm COMPARISON: Chest radiograph 05/18/2021 HISTORY: ORDERING SYSTEM PROVIDED HISTORY: Intubation FINDINGS: The cardiac silhouette is enlarged. The mediastinal and hilar silhouettes appear unremarkable. Chronic appearing coarse interstitial densities predominate perihilar regions and lung bases, typical of sequela from smoking or other previous infectious/inflammatory process. The lungs are hyperinflated with increased translucency both lung apices and tapering of the vasculature in the upper lungs. Bibasilar consolidative changes greater left than right with probable left pleural effusion. No pneumothorax is seen. No acute osseous abnormality is identified. Endotracheal tube tip projects over the trachea, tip just above the level of the aortic knob, 6.5 cm superior to the inna. NG tube extends below the left hemidiaphragm, out of the field of view. 1.  Life support appliances appear appropriately positioned. 2. Bibasilar consolidative changes greater left than right with probable left pleural effusion. Pneumonia is a consideration. 3.  Pulmonary sequela typical of that seen with smoking, including COPD. 4. Cardiomegaly. XR ABDOMEN FOR NG/OG/NE TUBE PLACEMENT    Result Date: 9/27/2022  EXAMINATION: ONE SUPINE XRAY VIEW(S) OF THE ABDOMEN 9/26/2022 10:49 pm COMPARISON: None. HISTORY: ORDERING SYSTEM PROVIDED HISTORY: Confirmation of course of NG/OG/NE tube and location of tip of tube TECHNOLOGIST PROVIDED HISTORY: Reason for exam:->Confirmation of course of NG/OG/NE tube and location of tip of tube Portable? ->Yes Reason for Exam: Confirmation of course of NG/OG/NE tube and location of tip of tube FINDINGS: Nasogastric tube tip terminates in the distal stomach.   The side port is beyond the GE junction. Mildly prominent small bowel loops measure up to 3.4 cm. No obvious free air or pneumatosis. No new osseous abnormality. 1. Nasogastric tube tip terminates in the distal stomach. 2. Nonspecific prominent loops of small bowel measuring up to 3.4 cm. CTA ABDOMEN PELVIS W WO CONTRAST    Result Date: 9/27/2022  EXAMINATION: CTA OF THE ABDOMEN AND PELVIS WITH AND WITHOUT CONTRAST 9/26/2022 11:40 pm: TECHNIQUE: CTA of the abdomen and pelvis was performed without and with the administration of intravenous contrast. Multiplanar reformatted images are provided for review. MIP images are provided for review. Automated exposure control, iterative reconstruction, and/or weight based adjustment of the mA/kV was utilized to reduce the radiation dose to as low as reasonably achievable. COMPARISON: February 26, 2017. February 8, 2016. HISTORY: ORDERING SYSTEM PROVIDED HISTORY: Lower GI Bleed TECHNOLOGIST PROVIDED HISTORY: Reason for exam:->Lower GI Bleed Reason for Exam: Lower GI Bleed FINDINGS: CTA vascular: The abdominal aorta demonstrates severe atherosclerosis. There is complete occlusion of the abdominal aorta after the takeoff of renal arteries. Minimal reconstitution of flow is seen involving the bilateral external iliac arteries. There is minimal to no opacification of the fem-fem bypass graft. The celiac trunk and its branches including the splenic artery, left gastric artery, and common hepatic artery are patent and normal in course and caliber. SMA is also patent and normal in course and caliber. The bilateral renal arteries are attenuated with patchy flow resulting in atrophy of the bilateral kidneys, right worse than left. No significant opacification of the graph that runs along the left hemithorax and abdomen. CT chest: Lung bases: Emphysematous changes are present. Bibasilar consolidations are noted. CT abdomen and pelvis: Organs:  The liver parenchyma, spleen, pancreas, and adrenal glands demonstrate no acute abnormality. Status post cholecystectomy. Pneumobilia is noted, presumably related to prior sphincterotomy. The bilateral kidneys are atrophic, right worse than left. No solid renal masses. No hydronephrosis or nephrolithiasis. GI/bowel: Nasogastric tube tip terminates in the distal stomach. A rectal tube is also present. The stomach, small bowel, and colon are normal in course caliber without evidence of wall thickening or obstruction. Normal appendix. Severe diverticulosis without evidence of acute diverticulitis. No evidence of bowel ischemia. Pelvic organs: Normal bladder. Prostate and seminal vesicles are unremarkable. Peritoneal cavity/retroperitoneum: No free fluid or free air. No pathologic lymphadenopathy. Bones/soft tissues: No acute or aggressive osseous lesion. 1. No GI bleed identified. 2. 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 There is minimal reconstitution of flow of the bilateral external iliac arteries and minimal to no flow identified in the fem-fem bypass, also chronic. 3. Bibasilar consolidation concerning for atelectasis with superimposed infection. Emphysema is also present. 4. Severe diverticulosis without evidence of acute diverticulitis. 5. Atrophic kidneys, right worse than left.        Electronically signed by Thomas Palacio MD on 9/27/2022 at 12:30 PM

## 2022-09-27 NOTE — PROGRESS NOTES
Critical Care Progress Note 9/27/2022        [facility]  1955  [id number]      Assessment/Plan:    Acute respiratory failure with hypoxia  Gastrointestinal bleeding, status post EGD transfusion 3 units of packed red cells  Acute blood loss anemia  History of COPD/emphysema  End-stage renal disease  Peripheral vascular disease (prior left BKA)    Discontinue propofol  Reseda low-dose Precedex infusion, spontaneous breathing trial anticipate extubation  PPI  Serial H&H  HD support per nephrology service  Bronchodilators    Complex medical decisions required for evaluation and management, reviewed during critical care rounds        Subjective:    No acute overnight issues. Patient remains on moderate level ventilatory support, FiO2 PEEP 45% 5 cm water pressure respectively. No significant secretions. Stable hemodynamics. Nursing reports no significant melena. Review of Systems   Unable to obtain review of systems in light of current circumstances. Physical Exam:         BP (!) 154/61   Pulse 82   Temp 97.2 °F (36.2 °C)   Resp 23   Wt 246 lb 14.6 oz (112 kg)   SpO2 100%   BMI 37.54 kg/m²       General: Chronically ill-appearing male orally intubated sedated vitals reviewed  Eyes: Reactive pupils  ENT: Unable to assess hearing. No obvious nasal lesions. Neck supple  Cardiovascular: Regular rate, faint systolic murmur  Respiratory: Clear to auscultation  Gastrointestinal: Soft, non tender, minimally distended bowel sounds throughout  Genitourinary: no suprapubic tenderness  Musculoskeletal: No edema. Left BKA. Extremities are warm to palpation. Skin: warm, dry  Neuro: Sedation precludes thorough evaluation, intact cough and gag. Psych: Mood unable to determine.     Current Medications:   sodium chloride flush  5-40 mL IntraVENous 2 times per day    chlorhexidine  15 mL Mouth/Throat BID    polyvinyl alcohol  1 drop Both Eyes Q4H    And    artificial tears   Both Eyes Q4H pantoprazole  40 mg Oral QAM AC       Labs, Imaging and Studies reviewed:    XR PELVIS (1-2 VIEWS)    Result Date: 9/26/2022  EXAMINATION: ONE XRAY VIEW OF THE PELVIS 9/26/2022 10:49 pm COMPARISON: None. HISTORY: ORDERING SYSTEM PROVIDED HISTORY: CVC placement Relevant Medical/Surgical History: Best possible image due to pt size and condition FINDINGS: *Right femoral catheter tip overlies the mid pelvis level; CVC is confirmed with tip at the right external iliac vein on CT performed immediately following this exam. *Rectal temperature probe noted. *No gross acute osseous abnormality. 1. Right femoral catheter tip overlies the mid pelvis level; CVC is confirmed with tip at the right external iliac vein on CT performed immediately following this exam. 2. Rectal temperature probe noted. 3. No gross acute osseous abnormality. XR CHEST PORTABLE    Result Date: 9/26/2022  EXAMINATION: ONE XRAY VIEW OF THE CHEST 9/26/2022 10:49 pm COMPARISON: Chest radiograph 05/18/2021 HISTORY: ORDERING SYSTEM PROVIDED HISTORY: Intubation FINDINGS: The cardiac silhouette is enlarged. The mediastinal and hilar silhouettes appear unremarkable. Chronic appearing coarse interstitial densities predominate perihilar regions and lung bases, typical of sequela from smoking or other previous infectious/inflammatory process. The lungs are hyperinflated with increased translucency both lung apices and tapering of the vasculature in the upper lungs. Bibasilar consolidative changes greater left than right with probable left pleural effusion. No pneumothorax is seen. No acute osseous abnormality is identified. Endotracheal tube tip projects over the trachea, tip just above the level of the aortic knob, 6.5 cm superior to the inna. NG tube extends below the left hemidiaphragm, out of the field of view. 1.  Life support appliances appear appropriately positioned.  2. Bibasilar consolidative changes greater left than right with probable left pleural effusion. Pneumonia is a consideration. 3.  Pulmonary sequela typical of that seen with smoking, including COPD. 4. Cardiomegaly. XR ABDOMEN FOR NG/OG/NE TUBE PLACEMENT    Result Date: 9/27/2022  EXAMINATION: ONE SUPINE XRAY VIEW(S) OF THE ABDOMEN 9/26/2022 10:49 pm COMPARISON: None. HISTORY: ORDERING SYSTEM PROVIDED HISTORY: Confirmation of course of NG/OG/NE tube and location of tip of tube TECHNOLOGIST PROVIDED HISTORY: Reason for exam:->Confirmation of course of NG/OG/NE tube and location of tip of tube Portable? ->Yes Reason for Exam: Confirmation of course of NG/OG/NE tube and location of tip of tube FINDINGS: Nasogastric tube tip terminates in the distal stomach. The side port is beyond the GE junction. Mildly prominent small bowel loops measure up to 3.4 cm. No obvious free air or pneumatosis. No new osseous abnormality. 1. Nasogastric tube tip terminates in the distal stomach. 2. Nonspecific prominent loops of small bowel measuring up to 3.4 cm. CTA ABDOMEN PELVIS W WO CONTRAST    Result Date: 9/27/2022  EXAMINATION: CTA OF THE ABDOMEN AND PELVIS WITH AND WITHOUT CONTRAST 9/26/2022 11:40 pm: TECHNIQUE: CTA of the abdomen and pelvis was performed without and with the administration of intravenous contrast. Multiplanar reformatted images are provided for review. MIP images are provided for review. Automated exposure control, iterative reconstruction, and/or weight based adjustment of the mA/kV was utilized to reduce the radiation dose to as low as reasonably achievable. COMPARISON: February 26, 2017. February 8, 2016. HISTORY: ORDERING SYSTEM PROVIDED HISTORY: Lower GI Bleed TECHNOLOGIST PROVIDED HISTORY: Reason for exam:->Lower GI Bleed Reason for Exam: Lower GI Bleed FINDINGS: CTA vascular: The abdominal aorta demonstrates severe atherosclerosis. There is complete occlusion of the abdominal aorta after the takeoff of renal arteries.  Minimal reconstitution of flow is seen involving the bilateral external iliac arteries. There is minimal to no opacification of the fem-fem bypass graft. The celiac trunk and its branches including the splenic artery, left gastric artery, and common hepatic artery are patent and normal in course and caliber. SMA is also patent and normal in course and caliber. The bilateral renal arteries are attenuated with patchy flow resulting in atrophy of the bilateral kidneys, right worse than left. No significant opacification of the graph that runs along the left hemithorax and abdomen. CT chest: Lung bases: Emphysematous changes are present. Bibasilar consolidations are noted. CT abdomen and pelvis: Organs: The liver parenchyma, spleen, pancreas, and adrenal glands demonstrate no acute abnormality. Status post cholecystectomy. Pneumobilia is noted, presumably related to prior sphincterotomy. The bilateral kidneys are atrophic, right worse than left. No solid renal masses. No hydronephrosis or nephrolithiasis. GI/bowel: Nasogastric tube tip terminates in the distal stomach. A rectal tube is also present. The stomach, small bowel, and colon are normal in course caliber without evidence of wall thickening or obstruction. Normal appendix. Severe diverticulosis without evidence of acute diverticulitis. No evidence of bowel ischemia. Pelvic organs: Normal bladder. Prostate and seminal vesicles are unremarkable. Peritoneal cavity/retroperitoneum: No free fluid or free air. No pathologic lymphadenopathy. Bones/soft tissues: No acute or aggressive osseous lesion. 1. No GI bleed identified. 2. 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 There is minimal reconstitution of flow of the bilateral external iliac arteries and minimal to no flow identified in the fem-fem bypass, also chronic. 3. Bibasilar consolidation concerning for atelectasis with superimposed infection. Emphysema is also present.  4. Severe diverticulosis without evidence of acute diverticulitis. 5. Atrophic kidneys, right worse than left.        Recent Results (from the past 24 hour(s))   POCT Glucose    Collection Time: 09/26/22  6:56 PM   Result Value Ref Range    POC Glucose 137 (H) 70 - 99 MG/DL   CBC with Auto Differential    Collection Time: 09/26/22  7:00 PM   Result Value Ref Range    WBC 18.0 (H) 4.0 - 10.5 K/CU MM    RBC 2.30 (L) 4.6 - 6.2 M/CU MM    Hemoglobin 7.4 (L) 13.5 - 18.0 GM/DL    Hematocrit 22.9 (L) 42 - 52 %    MCV 99.6 78 - 100 FL    MCH 32.2 (H) 27 - 31 PG    MCHC 32.3 32.0 - 36.0 %    RDW 14.1 11.7 - 14.9 %    Platelets 629 086 - 315 K/CU MM    MPV 9.7 7.5 - 11.1 FL    Differential Type AUTOMATED DIFFERENTIAL     Segs Relative 73.9 (H) 36 - 66 %    Lymphocytes % 18.4 (L) 24 - 44 %    Monocytes % 5.5 (H) 0 - 4 %    Eosinophils % 1.0 0 - 3 %    Basophils % 0.4 0 - 1 %    Segs Absolute 13.3 K/CU MM    Lymphocytes Absolute 3.3 K/CU MM    Monocytes Absolute 1.0 K/CU MM    Eosinophils Absolute 0.2 K/CU MM    Basophils Absolute 0.1 K/CU MM    Nucleated RBC % 0.0 %    Total Nucleated RBC 0.0 K/CU MM    Total Immature Neutrophil 0.14 K/CU MM    Immature Neutrophil % 0.8 (H) 0 - 0.43 %   Comprehensive Metabolic Panel    Collection Time: 09/26/22  7:00 PM   Result Value Ref Range    Sodium 135 135 - 145 MMOL/L    Potassium 3.4 (L) 3.5 - 5.1 MMOL/L    Chloride 98 (L) 99 - 110 mMol/L    CO2 15 (L) 21 - 32 MMOL/L    BUN 96 (H) 6 - 23 MG/DL    Creatinine 7.4 (H) 0.9 - 1.3 MG/DL    Glucose 152 (H) 70 - 99 MG/DL    Calcium 8.9 8.3 - 10.6 MG/DL    Albumin 3.6 3.4 - 5.0 GM/DL    Total Protein 5.9 (L) 6.4 - 8.2 GM/DL    Total Bilirubin 0.2 0.0 - 1.0 MG/DL    ALT 16 10 - 40 U/L    AST 16 15 - 37 IU/L    Alkaline Phosphatase 48 40 - 129 IU/L    GFR Non- 7 (L) >60 mL/min/1.73m2    GFR  9 (L) >60 mL/min/1.73m2    Anion Gap 22 (H) 4 - 16   Troponin    Collection Time: 09/26/22  7:00 PM   Result Value Ref Range    Troponin T <0.010 <0.01 NG/ML   Brain Natriuretic Peptide    Collection Time: 09/26/22  7:00 PM   Result Value Ref Range    Pro-BNP 3,489 (H) <300 PG/ML   Lipase    Collection Time: 09/26/22  7:00 PM   Result Value Ref Range    Lipase 52 13 - 60 IU/L   Lactic Acid    Collection Time: 09/26/22  7:00 PM   Result Value Ref Range    Lactate 3.8 (HH) 0.4 - 2.0 mMOL/L   TYPE AND SCREEN    Collection Time: 09/26/22  7:00 PM   Result Value Ref Range    ABO/Rh O NEGATIVE     Antibody Screen NEGATIVE     Unit Number [id number]     Component LEUKO-POOR RED CELLS     Unit Divison 00     Status ISSUED,FINAL     Transfusion Status OK TO TRANSFUSE     Crossmatch Result COMPATIBLE     Unit Number [id number]     Component LEUKO-POOR RED CELLS     Unit Divison 00     Status ISSUED,FINAL     Transfusion Status OK TO TRANSFUSE     Crossmatch Result COMPATIBLE     Unit Number [id number]     Component LEUKO-POOR RED CELLS     Unit Divison 00     Status ALLOCATED     Transfusion Status OK TO TRANSFUSE     Crossmatch Result COMPATIBLE     Unit Number [id number]     Component LEUKO-POOR RED CELLS     Unit Divison 00     Status ISSUED,FINAL     Transfusion Status OK TO TRANSFUSE     Crossmatch Result COMPATIBLE    Protime/INR & PTT    Collection Time: 09/26/22  7:00 PM   Result Value Ref Range    Protime 10.9 (L) 11.7 - 14.5 SECONDS    INR 0.85 INDEX    aPTT 30.3 25.1 - 37.1 SECONDS   TSH    Collection Time: 09/26/22  7:00 PM   Result Value Ref Range    TSH, High Sensitivity 4.230 (H) 0.270 - 4.20 uIu/ml   Magnesium    Collection Time: 09/26/22  7:00 PM   Result Value Ref Range    Magnesium 2.6 (H) 1.8 - 2.4 mg/dl   EKG 12 Lead    Collection Time: 09/26/22  7:05 PM   Result Value Ref Range    Ventricular Rate 98 BPM    Atrial Rate 98 BPM    P-R Interval 164 ms    QRS Duration 138 ms    Q-T Interval 418 ms    QTc Calculation (Bazett) 533 ms    P Axis 116 degrees    R Axis -43 degrees    T Axis 85 degrees    Diagnosis       Sinus rhythm with frequent premature ventricular complexes  Left axis deviation  Left ventricular hypertrophy with QRS widening  Cannot rule out Septal infarct , age undetermined  Abnormal ECG  When compared with ECG of 11-MAY-2021 06:15,  premature ventricular complexes are now present  QRS duration has increased  Minimal criteria for Septal infarct are now present  QT has lengthened     Blood gas, arterial    Collection Time: 09/26/22  9:30 PM   Result Value Ref Range    pH, Bld 7.10 (L) 7.34 - 7.45    pCO2, Arterial 62.0 (H) 32 - 45 MMHG    pO2, Arterial 60 (L) 75 - 100 MMHG    Base Excess 11 (H) 0 - 3.3    HCO3, Arterial 19.2 18 - 23 MMOL/L    CO2 Content 21.1 19 - 24 MMOL/L    O2 Sat 83.9 (L) 96 - 97 %    Carbon Monoxide, Blood 2.2 0 - 5 %    Methemoglobin, Arterial 0.9 <1.5 %    Comment AC/,PEEP 5 50%    Triglyceride    Collection Time: 09/26/22  9:53 PM   Result Value Ref Range    Triglycerides 195 (H) <150 MG/DL   Basic Metabolic Panel    Collection Time: 09/26/22  9:53 PM   Result Value Ref Range    Sodium 136 135 - 145 MMOL/L    Potassium 3.5 3.5 - 5.1 MMOL/L    Chloride 102 99 - 110 mMol/L    CO2 19 (L) 21 - 32 MMOL/L    Anion Gap 15 4 - 16    BUN 92 (H) 6 - 23 MG/DL    Creatinine 6.7 (H) 0.9 - 1.3 MG/DL    Glucose 128 (H) 70 - 99 MG/DL    Calcium 8.2 (L) 8.3 - 10.6 MG/DL    GFR Non- 8 (L) >60 mL/min/1.73m2    GFR  10 (L) >60 mL/min/1.73m2   Hepatic Function Panel    Collection Time: 09/26/22  9:53 PM   Result Value Ref Range    Albumin 4.0 3.4 - 5.0 GM/DL    Total Bilirubin 0.4 0.0 - 1.0 MG/DL    Bilirubin, Direct 0.2 0.0 - 0.3 MG/DL    Bilirubin, Indirect 0.2 0 - 0.7 MG/DL    Alkaline Phosphatase 52 40 - 129 IU/L    AST 19 15 - 37 IU/L    ALT 18 10 - 40 U/L    Total Protein 5.9 (L) 6.4 - 8.2 GM/DL   CBC    Collection Time: 09/26/22  9:53 PM   Result Value Ref Range    WBC 21.4 (H) 4.0 - 10.5 K/CU MM    RBC 3.51 (L) 4.6 - 6.2 M/CU MM    Hemoglobin 10.8 (L) 13.5 - 18.0 GM/DL Hematocrit 32.7 (L) 42 - 52 %    MCV 93.2 78 - 100 FL    MCH 30.8 27 - 31 PG    MCHC 33.0 32.0 - 36.0 %    RDW 16.8 (H) 11.7 - 14.9 %    Platelets 937 320 - 659 K/CU MM    MPV 9.3 7.5 - 11.1 FL   Protime/INR & PTT    Collection Time: 09/26/22  9:53 PM   Result Value Ref Range    Protime 10.3 (L) 11.7 - 14.5 SECONDS    INR 0.80 INDEX    aPTT 31.0 25.1 - 37.1 SECONDS   Fibrinogen    Collection Time: 09/26/22  9:53 PM   Result Value Ref Range    Fibrinogen 353 196.9 - 442.1 MG/DL   Lactic Acid    Collection Time: 09/26/22  9:53 PM   Result Value Ref Range    Lactate 0.5 0.4 - 2.0 mMOL/L   Troponin    Collection Time: 09/26/22  9:53 PM   Result Value Ref Range    Troponin T <0.010 <0.01 NG/ML   Blood gas, arterial    Collection Time: 09/27/22 12:45 AM   Result Value Ref Range    pH, Bld 7.32 (L) 7.34 - 7.45    pCO2, Arterial 36.0 32 - 45 MMHG    pO2, Arterial 59 (L) 75 - 100 MMHG    Base Excess 7 (H) 0 - 3.3    HCO3, Arterial 18.5 18 - 23 MMOL/L    CO2 Content 19.6 19 - 24 MMOL/L    O2 Sat 90.7 (L) 96 - 97 %    Carbon Monoxide, Blood 2.6 0 - 5 %    Methemoglobin, Arterial 0.9 <1.5 %    Comment AC20,450,45%,PEEP 5    POC CRITICAL CARE PROFILE    Collection Time: 09/27/22 12:52 AM   Result Value Ref Range    pH, Bld 7.19 (L) 7.34 - 7.45    pCO2, Arterial 48.7 (H) 32 - 45 MMHG    pO2, Arterial 58.6 (L) 75 - 100 MMHG    HCO3, Arterial 18.7 18 - 23 MMOL/L    Base Excess MINUS 0 - 3.3    Base Exc, Mixed 9.2 (H) 0 - 1.2    O2 Sat 83.0 (L) 96 - 97 %    CO2 20 (L) 21 - 32 MMOL/L    Sodium 138 138 - 146 MMOL/L    Potassium 3.8 3.5 - 4.5 MMOL/L    POC CALCIUM 1.14 1.12 - 1.32 MMOL/L    POC Glucose 118 (H) 70 - 99 MG/DL    Hematocrit 29.0 (L) 42 - 52 %    Hemoglobin 10.0 (L) 13.5 - 18.0 GM/DL    POC Chloride 110 (H) 98 - 109 MMOL/L    POC Creatinine 6.8 (H) 0.9 - 1.3 MG/DL    Source: Arterial    Comprehensive Metabolic Panel    Collection Time: 09/27/22  3:35 AM   Result Value Ref Range    Sodium 139 135 - 145 MMOL/L    Potassium 4.2 3.5 - 5.1 MMOL/L    Chloride 104 99 - 110 mMol/L    CO2 18 (L) 21 - 32 MMOL/L    BUN 89 (H) 6 - 23 MG/DL    Creatinine 8.1 (H) 0.9 - 1.3 MG/DL    Glucose 88 70 - 99 MG/DL    Calcium 8.2 (L) 8.3 - 10.6 MG/DL    Albumin 3.5 3.4 - 5.0 GM/DL    Total Protein 5.1 (L) 6.4 - 8.2 GM/DL    Total Bilirubin 0.3 0.0 - 1.0 MG/DL    ALT 14 10 - 40 U/L    AST 15 15 - 37 IU/L    Alkaline Phosphatase 49 40 - 128 IU/L    GFR Non- 7 (L) >60 mL/min/1.73m2    GFR  8 (L) >60 mL/min/1.73m2    Anion Gap 17 (H) 4 - 16   Lipase    Collection Time: 09/27/22  3:35 AM   Result Value Ref Range    Lipase 31 13 - 60 IU/L   Amylase    Collection Time: 09/27/22  3:35 AM   Result Value Ref Range    Amylase 80 25 - 115 U/L   Hemoglobin and Hematocrit    Collection Time: 09/27/22  3:35 AM   Result Value Ref Range    Hemoglobin 9.4 (L) 13.5 - 18.0 GM/DL    Hematocrit 28.2 (L) 42 - 52 %   CBC    Collection Time: 09/27/22  4:58 AM   Result Value Ref Range    WBC 17.7 (H) 4.0 - 10.5 K/CU MM    RBC 2.97 (L) 4.6 - 6.2 M/CU MM    Hemoglobin 9.1 (L) 13.5 - 18.0 GM/DL    Hematocrit 27.2 (L) 42 - 52 %    MCV 91.6 78 - 100 FL    MCH 30.6 27 - 31 PG    MCHC 33.5 32.0 - 36.0 %    RDW 18.3 (H) 11.7 - 14.9 %    Platelets 382 013 - 791 K/CU MM    MPV 9.6 7.5 - 11.1 FL   Basic Metabolic Panel    Collection Time: 09/27/22  4:58 AM   Result Value Ref Range    Sodium 138 135 - 145 MMOL/L    Potassium 3.8 3.5 - 5.1 MMOL/L    Chloride 104 99 - 110 mMol/L    CO2 18 (L) 21 - 32 MMOL/L    Anion Gap 16 4 - 16    BUN 90 (H) 6 - 23 MG/DL    Creatinine 8.2 (H) 0.9 - 1.3 MG/DL    Glucose 86 70 - 99 MG/DL    Calcium 8.0 (L) 8.3 - 10.6 MG/DL    GFR Non- 7 (L) >60 mL/min/1.73m2    GFR  8 (L) >60 mL/min/1.73m2   Magnesium    Collection Time: 09/27/22  4:58 AM   Result Value Ref Range    Magnesium 3.3 (H) 1.8 - 2.4 mg/dl   Phosphorus    Collection Time: 09/27/22  4:58 AM   Result Value Ref Range    Phosphorus 5.6 (H) 2.5 - 4.9 MG/DL       Electronically signed by Bon Garner DO on 9/27/2022 at 8:22 AM     [phone]

## 2022-09-27 NOTE — CONSULTS
[address] [place] [address], [address] [place] [address]                                  CONSULTATION    PATIENT NAME: Gonzalez Cook                     :        1955  MED REC NO:   [id number]                          ROOM:       2106  ACCOUNT NO:   [de-identified]                           ADMIT DATE: 2022  PROVIDER:     Laureen Hernandez MD    CONSULT DATE:  2022    CHIEF COMPLAINT:  Massive GI bleeding with hypovolemic shock. HISTORY OF PRESENT ILLNESS:  As follows. The patient is a 59-year-old  white gentleman, patient of Dr. Flora Munoz, with past medical history  significant for COPD, hypertension, hyperlipidemia, chronic kidney  disease on hemodialysis for the past one year and day, history of  gastroesophageal reflux disease, osteoarthritis/back pain, who presented  to the emergency room by the squad  after the patient was weak, dizzy,  and was constipated earlier today and subsequently had a bowel movement,  which contained blackish stools. The patient also complains of mild  upper abdominal discomfort. The patient upon arrival was hypotensive  and almost unresponsive. He was resuscitated with IV fluids and the  blood workup was done, which showed LFTs to be within normal limits. Electrolytes were abnormal and CBC showed a WBC count of 18, hemoglobin  7.4, platelet count of 242,070. The patient's INR was 0.85. The  patient does give a remote history of peptic ulcer disease for which he  underwent surgery in the remote past and also about a year and a half  ago he was admitted at [facility] and had a bleeding gastric ulcer,  which was treated endoscopically. The patient has been transfused with  3 units of packed RBCs in the emergency room. The patient is going to  be admitted to the intensive care unit for further workup and management  and emergency EGD is in order.   Surgical consult with Dr. Colette Gil has  been placed as well.    PAST MEDICAL HISTORY:  Significant for history of hypertension,  hyperlipidemia, chronic kidney disease on hemodialysis for the past one  year, COPD, osteoarthritis/back pain, gastroesophageal reflux disease,  history of gastric ulcer status post surgery in the remote past, and  also history of recurrent GI bleeding from gastric ulcer admitted at  [facility] and treated endoscopically. Also, the patient has history  of peripheral vascular disease. PAST SURGICAL HISTORY:  The patient has had axillofemoral bypass done in  the remote past, also had a trach and a G-tube placed, which was  subsequently removed, tonsillectomy and adenoidectomy, cholecystectomy,  wisdom tooth extraction, and left below-knee amputation. FAMILY HISTORY:  The patient's mother was diagnosed with carcinoma of  the breast and father with carcinoma of the prostate with bone  metastasis. SOCIOECONOMIC HISTORY:  The patient is a former smoker. The patient  drinks alcohol in moderation and there is also history of recreational  drug use. The patient smokes marijuana. CURRENT MEDICATIONS:  Please refer to the chart. The patient denies  taking NSAIDs or anticoagulants. ALLERGIES:  The patient is allergic to PENICILLIN and LORAZEPAM.    REVIEW OF SYSTEMS:  CENTRAL NERVOUS SYSTEM:  The patient denies headache or focal  sensorimotor symptoms. CARDIOVASCULAR SYSTEM:  History of chest pain. The patient complains of  shortness of breath and leg swelling. GENITOURINARY SYSTEM:  No history of dysuria, pyuria, or hematuria. MUSCULOSKELETAL:  The patient complains of generalized weakness. RESPIRATORY SYSTEM:  The patient complains of cough, shortness of  breath, but no hemoptysis, fever, or chills. PHYSICAL EXAMINATION:  GENERAL:  Examination shows a 71-year-old white male, who is obese,  lying flat in bed, short of breath. He is awake, alert, and oriented  and pleasant to talk with. VITAL SIGNS:  Stable.   HEENT: Examination shows skull to be atraumatic. NECK:  Supple. CHEST:  Shows diminished breath sounds. CARDIOVASCULAR:  S1, S2 is normal.  ABDOMEN:  Obese, soft, nontender, nondistended. Liver and spleen are  not palpable. Bowel sounds are present. RECTAL:  Exam is deferred. CNS:  Exam shows the patient to be awake, alert, and oriented. There  are no focal sensorimotor signs. MUSCULOSKELETAL SYSTEM:  Exam shows evidence of degenerative joint  disease changes and a left below-knee amputation. IMPRESSION:  This is a 51-year-old white male with multiple  comorbidities admitted with hypovolemic shock and melenic stools with  history of bleeding, peptic ulcer disease, rule out recurrent upper GI  bleeding. RECOMMENDATIONS:  1.  I agree with present management with Protonix. 2.  Monitor the patient's serial H and H and transfuse on a p.r.n. basis  to keep hemoglobin above 7 gm percent. 3.  We will proceed with emergency EGD. 4.  If the EGD is negative, the patient will need a colonoscopy and  small bowel evaluation also. 5.  A surgical consult with Dr. Tone Garvin in order. 6.  CTA and bleeding scan later if needed. 7.  Overall prognosis remains guarded at this point. 8.  The case and plan have been discussed in detail with the patient,  his wife, and the Intensivist as well.         Corinne Hager MD    D: 09/26/2022 20:28:09       T: 09/26/2022 20:31:01     [email or web address]  Job#: [id number]     Doc#: [id number]    CC:

## 2022-09-27 NOTE — PROGRESS NOTES
DOING MUCH BETTER EXTUBATED ON FULL LIQUID DIET HAD 2 BM TODAY DARK IN COLOR NO VOMITING NO ABD PAIN  VITALS STABLE   LABS NOTED HB 9.7 AFTER 3 UNITS  CTA NEGATIVE FOR ACTIVE BLEED  WILL CPM MAY TRANSFER TO FLOOR COLONOSCOPY  AS OUTPT AND SMALL BOWEL EVALUATION LATER WITH CE IF NEEDED D/W PT AND RN

## 2022-09-27 NOTE — PROCEDURES
Critical Care Intubation Note   Pre-Intubation       Reason for consultation: Pre-Procedure (EGD) intubation    Interventions prior to arrival: IV access, AmbuBag     Procedure: Endotracheal intubation   [X] Airway equipment was checked. [X] Suction available.   [X] Monitors including pulse oximetry, NIBP, and ECG monitoring in place or applied. [X] The patient was preoxygenated. Ventilation   Ventilation: Easy   Cricoid Pressure: Yes   Rapid sequence induction: Yes   Cervical collar present: No   In line stabilization or cervical collar left in place: No     Induction   Induction was performed with 20 mg of Etomidate, and 100 mg of Rocuronium for paralysis. Induction was smooth with minimal changes in vital signs     Endotracheal Intubation   Intubation: was successful on 3 attempt.    Route: Oral   Blade: Video Assisted Mac 4  Note: Edematous epiglottis with edematous vocal cords  Adjuncts used: None   View of cords: Grade 1 view   ETT Size: 7.5 cuffed   Depth: 24 cm at the teeth   Confirmation: Colormetric change on ETCO2 detector x6, quantitative ETCO2   Secured with: ETT Haile   Complications: None   Intubation performed by: Georgette Opitz, MD  Critical Care Attending

## 2022-09-27 NOTE — PROCEDURES
Arterial Catheter Insertion Procedure Note   Procedure: Insertion of Arterial Line   Procedure Clinician: Vanita Vicente MD   Procedure Assistant: None   Indications: Hemodynamic unstable, frequent blood draws   Size and location: 20G, Left radial artery   Attempts:1  Procedure Details:   Informed consent was obtained for the procedure, including sedation. Risks of hemorrhage, ischemia, infection, and adverse drug reaction were discussed. Under sterile conditions the skin above the Left radial artery was prepped with chlorhexidine. Local anesthesia was applied to the skin and subcutaneous tissues. An Arrow kit was used to insert a 20-gauge needle into the artery. A guide wire was then passed easily through the needle without resistance. A 20 gauge catheter was then inserted into the vessel over the guide wire. The guidewire was then removed with the tip intact. The catheter was then secured into place. Findings: There were no changes to vital signs. Catheter was flushed with 20 mL NS. Patient tolerated the procedure well.

## 2022-09-27 NOTE — PROGRESS NOTES
Patient seen and examined full note to follow presents with active GI bleed status posttransfusion EGD shows no active bleeding at this morning no more active bleeding noted possible diverticular disease and potassium stable oxygenating 100% on room air BUN elevated likely due to GI bleed missing dialysis Monday had last dialysis Friday. Is lucid awake has an access in right upper extremity and plan was to go to home hemodialysis soon. With above will stabilize today get dialysis in the morning and monitor for now. Discussed with family in detail and patient. Full note to follow. Will do hd in am and hold on hd today as not urgent need for now.

## 2022-09-27 NOTE — H&P
History and Physical 22        NAME: Jey Medellin  : 1955  MRN: [id number]      Assessment/Plan:  Jey Medellin is a 79 y.o. male with a history of HTN, ESRD-HD, COPD on Prem oxygen and Hx of previous GIB who presented to [facility] 2022 with Unstable GI bleed, requiring emergent EGD and Intubation for airway protection. Problem list:  GI bleed  Acute respiratory failure  COPD Emphysema  ESRD-HD    Neuro: No acute issues Aox3 moving all extremities prior to intubation. now intubated, sedated on propofol and fentanyl PRN, goal RAAS -1 to -2  Cardio: no acute issues at this time, hemodynamically table after resuscitation with blood products. Hx of Hypertension, resume home BP meds once appropriate after control of GIB  Resp: intubated for airway protection. Vent AC//16/5/50, CXR pending,   GI: presented with GIB, emergent EGD performed by GI no obvious source of bleeding noted. Mild gastritis. GI ppx Protonix daily. Awaiting CTA for evaluation of other sources of bleeding. GI and Gen surg consulted, recommendations appreciated. : Hx of ESRD-HD, last HD on , missed on  electrolyte appropriate at this time, no need for acute hemodialysis at this time, Nephrology consulted   Heme: Acute GI bleed, transfused with PRBC. Monitor and transfuse as needed for Hgb < 7 or acute hemorrhage, Holding chemcial DVT ppx at this time, in setting of GIB, continue with mechanical DVT ppx. ID: no indication for antibiotics at this time. Endo: No hx of DM, BG goal 140-180 mg/dL        Chief Complaint:  GI Bleed    History of Present Illness:    Mr Whitney Moore 80 yo M with PMH HTN, ESRD-HD MWF, COPD on home oxygen 2-3L, Left BKA from farming accident and history of GIB 1.5 years ago requiring intubation and emergent EGD who presented to the Ed with a 1 day history of black tarry stool.  Patient stated that he had been constipated for the last few days, attempted to take miralax at home had a bowel movement today that was noted to be black which lead them to call EMS. Upon arrival to the ED patient was noted to be hypotensive, had another large bloody bowel movement and was resuscitated with 2L Ns and 3 U PRBC with improvement of his vitals. Patient was seen and evaluated at bedside, anxious, complain of moderate epigastric abdominal pain, localized, non radiating, 5/10 with no alleviating or aggravating factors. He denies any ingestion of NSAIDS or use of steroids. PMH: as above  PSH: BKA, cholecystectomy, exploratory laparoscopy, AV fistula creation Right upper extremity, Axillo-femoral bypass,   Social: Endorses occasional marijuana, denies tobacco and alcohol.   Medication list reviewed and updated    PCP: Abdi Blackwood  Nephrologist Dr Margaret Marquis  ROS:    Review of Systems     Past Medical, Surgical, Social, Family History:   Past Medical History:   Diagnosis Date    Acid reflux     ARF (acute renal failure) ([facility] [address]     with admission 12/18/2015- consult done with Dr Fermin Colón    Atrophy of left kidney     Back pain     \"Lower Back\"    Chronic bronchitis ([facility] [address]     Chronic kidney disease     COPD (chronic obstructive pulmonary disease) ([facility] [address]     NO PULMONOLOGIST AT THIS TIME    Emphysema/COPD ([facility] [address]     NO PULMONOLOGIST AT THIS TIME    H. pylori infection Dx 1990's    Hemodialysis patient ([facility] [address]     Hiatal hernia     History of blood transfusion 1987    NO REACTION TO BLOOD TRANSFUSION RECEIVED    History of respiratory failure     following surgery 12/18/2015- pt developed resp failure- placed on ventilator-unable to wean of vent- had trachesotomy tube placed 1/3/2015- off vent 1/7/2015( discharged 1/13/2015)\"per wife went to [facility] after discharg and had to be sent to [facility] after had bleeding around the trach- they said the trach was to big- put back on ventilator for 24 hrs- then there for about a week then sent to ARU 2/15    [place] (hard of hearing)     Bilateral Ears    Hx of blood clots     HX OTHER MEDICAL Primary Care Physician Is Dr. Sergio Fisher Right Renal Artery    HX OTHER MEDICAL     \"Dr. Lis Hu One Of My Kidneys Is Dead\"    Cuco Emery     \"See Dr. Edelmira Bello For Blood Being Too Thick\"(per old chart pt dx with polycythemia in 1990's and has had several phlebotomies in the past(pc)( per wife Dr Jamison Menchaca now says he does not really have polycythemia just from the COPD\"    Cuco Emery 6*/25/2015    \"has drainage , clear drainage, since he was in ARU in Feb 2015, under left shoulder, arm pit area\"    Hyperlipidemia     Hypertension     Lung nodule \"MRI, PET SCAN\"    \"Right Lung\"    Pneumonia \"Last Episode Early 2000's\"    \"At Least Twice\"( with admission 12/18/2015)    Shortness of breath on exertion     Teeth missing     Upper And Lower    Wears glasses      Past Surgical History:   Procedure Laterality Date    CHOLECYSTECTOMY, LAPAROSCOPIC  02/27/2017    cholangiogram     COLONOSCOPY  2011    \"Couple Polyps Removed\"    ENDOSCOPY, COLON, DIAGNOSTIC  \"Once\"    GASTROSTOMY TUBE PLACEMENT Left 1/3/15    to gravity drain( removed g tube in Feb or jan per wife)    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    Farming Accident    OTHER SURGICAL HISTORY  Mid 1980's    \"Emergency Surgery For Ruptured Ulcer\"    [facility] N[facility]A 1/3/15    per old chart pt had trachesotomy tube, bronch , egd and g- tube placement done 1/13/2015    VASCULAR SURGERY      per old chart pt had left axillary to bifemoral bypass graft done 12/18/2014(pc)    WISDOM TOOTH EXTRACTION      All [place] Teeth Extracted In Past     Social History     Socioeconomic History    Marital status:      Spouse name: Not on file    Number of children: Not on file    Years of education: Not on file    Highest education level: Not on file   Occupational History    Not on file   Tobacco Use    Smoking status: Former     Packs/day: 2.00     Years: 43.00     Pack years: 86.00     Types: Cigarettes     Start date: 1971     Quit date: 12/15/2014     Years since quittin.7    Smokeless tobacco: Never   Vaping Use    Vaping Use: Never used   Substance and Sexual Activity    Alcohol use: Yes     Alcohol/week: 0.0 standard drinks     Comment: \"Occ\"\"average one time per week    Drug use: Yes     Types: Marijuana Dietra Due)     Comment: \"Occ\"\"last used 3 rd week 2015\"    Sexual activity: Yes     Partners: Female   Other Topics Concern    Not on file   Social History Narrative    Not on file     Social Determinants of Health     Financial Resource Strain: Not on file   Food Insecurity: Not on file   Transportation Needs: Not on file   Physical Activity: Not on file   Stress: Not on file   Social Connections: Not on file   Intimate Partner Violence: Not on file   Housing Stability: Not on file     Family History   Problem Relation Age of Onset    Cancer Mother         Breast Cancer    Arthritis Mother     High Blood Pressure Mother     High Cholesterol Mother     Vision Loss Mother         Wears Glasses    Cancer Father         Prostate And Bone Cancer    Heart Disease Father         \"Triple Bypass Surgery\"    Migraines Sister     Other Sister         \"Bad Back\"       Home Medications:  Prior to Admission medications    Medication Sig Start Date End Date Taking?  Authorizing Provider   DOXYCYCLINE PO Take by mouth 2 times daily    Historical Provider, MD   magnesium oxide (MAG-OX) 400 (241.3 Mg) MG TABS tablet TAKE ONE TABLET BY MOUTH DAILY 21   Historical Provider, MD   pantoprazole (PROTONIX) 40 MG tablet Take 40 mg by mouth daily    Historical Provider, MD   tamsulosin (FLOMAX) 0.4 MG capsule Take 0.8 mg by mouth daily    Historical Provider, MD   doxazosin (CARDURA) 2 MG tablet Take 1 tablet by mouth daily  Patient not taking: Reported on 2022   Brittany Sutton MD   atorvastatin (LIPITOR) 20 MG tablet Take 1 tablet by mouth nightly  Patient taking differently: Take 40 mg by mouth nightly 5/18/21   Susana Vivar MD   montelukast (SINGULAIR) 10 MG tablet Take 10 mg by mouth nightly  Patient not taking: No sig reported    Historical Provider, MD   HYDROcodone-acetaminophen (NORCO) 5-325 MG per tablet Take 1 tablet by mouth every 6 hours as needed for Pain. Historical Provider, MD   azelastine (ASTELIN) 0.1 % nasal spray 1 spray by Nasal route 2 times daily Use in each nostril as directed    Historical Provider, MD   ipratropium-albuterol (DUONEB) 0.5-2.5 (3) MG/3ML SOLN nebulizer solution Inhale 1 vial into the lungs every 4 hours    Historical Provider, MD   Cholecalciferol (VITAMIN D3) 2000 UNITS CAPS Take 1 capsule by mouth daily    Historical Provider, MD   carvedilol (COREG) 25 MG tablet Take 25 mg by mouth 2 times daily  Patient not taking: Reported on 9/20/2022    Historical Provider, MD   albuterol (PROVENTIL HFA;VENTOLIN HFA) 108 (90 BASE) MCG/ACT inhaler Inhale 2 puffs into the lungs every 6 hours as needed for Wheezing    Historical Provider, MD   ALPRAZolam (XANAX) 0.25 MG tablet Take 0.25 mg by mouth 2 times daily as needed for Sleep. Historical Provider, MD   amLODIPine (NORVASC) 10 MG tablet Take 10 mg by mouth daily  Patient not taking: Reported on 9/20/2022    Historical Provider, MD   citalopram (CELEXA) 20 MG tablet Take 1 tablet by mouth daily. 2/20/15   Vilma Pantoja MD   folic acid (FOLVITE) 1 MG tablet Take 1 tablet by mouth daily.  2/20/15   Vilma Pantoja MD         Physical Exam:    BP (!) 151/80   Pulse (!) 132   Temp 96.8 °F (36 °C) (Rectal)   Resp 14   Wt 246 lb 14.6 oz (112 kg)   SpO2 94%   BMI 37.54 kg/m²     General: NAD  Eyes: EOMI  ENT: neck supple  Cardiovascular: Regular rate, tachycardic, RUE AV fistula,   Respiratory: Clear to auscultation  Gastrointestinal: obese abdomen, soft, tender to palpation in epigastric region, no CVA tenderness  Genitourinary: no suprapubic tenderness  Musculoskeletal: No edema, Left BKA  Skin: warm, dry  Neuro: Alert. Psych: Mood appropriate. Labs, Imaging, and Studies reviewed:    IR REMOVE TUNNELED CVAD WO SQ PORT/PUMP    Result Date: 9/20/2022  PROCEDURE: IR REMOVAL TUNNELED CVC WO PORT PUMP 9/20/2022 HISTORY: ORDERING SYSTEM PROVIDED HISTORY: ESRD (end stage renal disease) ([facility] [address] TECHNIQUE: Maximum sterile barrier technique including hand hygiene, skin prep and sterile ultrasound technique utilized for procedure. Following informed consent, pause a confirm/time-out previously placed tunneled dialysis access catheter and entry site were prepped and draped in sterile fashion. 10 mL 1% lidocaine without epinephrine for local anesthesia. Catheter was loosened within subcutaneous tunnel and removed in total/intact. Direct pressure applied to puncture site and catheter entry site until hemostasis achieved. Dressing applied. Patient tolerated procedure well. CONTRAST: None SEDATION: None FLUOROSCOPY DOSE AND TYPE OR TIME AND EXPOSURES: None Number of images: None DESCRIPTION OF PROCEDURE: Informed consent was obtained after a detailed explanation of the procedure including risks, benefits, and alternatives. Universal protocol was observed. Sterile gowns, masks, hats and gloves utilized for maximal sterile barrier. As above in technique section FINDINGS: Removal of previously placed tunneled dialysis access catheter in total and intact. No complication suggested. Removal of previously placed tunneled dialysis access catheter in total and intact.  No complication suggested       CBC:   Recent Labs     09/26/22  1900 09/26/22  2153   WBC 18.0* 21.4*   HGB 7.4* 10.8*    223     BMP:    Recent Labs     09/26/22 1900      K 3.4*   CL 98*   CO2 15*   BUN 96*   CREATININE 7.4*   GLUCOSE 152*     Hepatic:   Recent Labs     09/26/22  1900   AST 16   ALT 16   BILITOT 0.2   ALKPHOS 48     Lipids:   Lab Results   Component Value Date/Time    CHOL 173 05/12/2021 05:07 AM

## 2022-09-27 NOTE — PROGRESS NOTES
General Surgery-Dr. Kimani Whitman Day: 2    Chief Complaint on Admission: GIB      Subjective: Intubated for airway protection. Had bedside EGD. Had transfusion in ED. No further bloody BM per pt's nurse. No other acute overnight events. Had CTA.      ROS:  Review of Systems   Unable to perform ROS: Intubated     Allergies  Penicillins and Lorazepam          Diagnosis Date    Acid reflux     ARF (acute renal failure) ([facility] [address]     with admission 12/18/2015- consult done with Dr Clare Dent    Atrophy of left kidney     Back pain     \"Lower Back\"    Chronic bronchitis ([facility] [address]     Chronic kidney disease     COPD (chronic obstructive pulmonary disease) ([facility] [address]     NO PULMONOLOGIST AT THIS TIME    Emphysema/COPD ([facility] [address]     NO PULMONOLOGIST AT THIS TIME    H. pylori infection Dx 1990's    Hemodialysis patient ([facility] [address]     Hiatal hernia     History of blood transfusion 1987    NO REACTION TO BLOOD TRANSFUSION RECEIVED    History of respiratory failure     following surgery 12/18/2015- pt developed resp failure- placed on ventilator-unable to wean of vent- had trachesotomy tube placed 1/3/2015- off vent 1/7/2015( discharged 1/13/2015)\"per wife went to [facility] after discharg and had to be sent to [facility] after had bleeding around the trach- they said the trach was to big- put back on ventilator for 24 hrs- then there for about a week then sent to ARU 2/15    [place] (hard of hearing)     Bilateral Ears    Hx of blood clots     HX OTHER MEDICAL     Primary Care Physician Is Dr. Heath Gutierrez Right Renal Artery    HX OTHER MEDICAL     \"Dr. Chris Sun One Of My Kidneys Is Dead\"    Sarah Candelaria     \"See Dr. Анна Park For Blood Being Too Thick\"(per old chart pt dx with polycythemia in 1990's and has had several phlebotomies in the past(pc)( per wife Dr Alfredo Ramírez now says he does not really have polycythemia just from the COPD\"    Sarah Candelaria 6*/25/2015    \"has drainage , clear drainage, since he was in ARU in 2015, under left shoulder, arm pit area\"    Hyperlipidemia     Hypertension     Lung nodule \"MRI, PET SCAN\"    \"Right Lung\"    Pneumonia \"Last Episode Early \"    \"At Least Twice\"( with admission 2015)    Shortness of breath on exertion     Teeth missing     Upper And Lower    Wears glasses        Objective:     Vitals:    22   BP:    Pulse: 82   Resp: 23   Temp:    SpO2: 100%       TEMPERATURE:  Current -Temp: 97.2 °F (36.2 °C); Max - Temp  Av °F (36.1 °C)  Min: 96.1 °F (35.6 °C)  Max: 97.8 °F (36.6 °C)    No intake/output data recorded. I/O last 3 completed shifts: In: 612.4 [I.V.:208.1; Blood:356; IV Piggyback:48.3]  Out: 300 [Emesis/NG output:300]      Physical Exam:  Physical Exam  Vitals reviewed. Constitutional:       Appearance: He is obese. HENT:      Head: Normocephalic and atraumatic. Eyes:      General:         Right eye: No discharge. Left eye: No discharge. Cardiovascular:      Rate and Rhythm: Normal rate. Pulmonary:      Comments: Intubated  +ETT  +OGT  Abdominal:      Palpations: Abdomen is soft. Tenderness: There is no abdominal tenderness. Skin:     General: Skin is warm.          Scheduled Meds:   sodium chloride flush  5-40 mL IntraVENous 2 times per day    chlorhexidine  15 mL Mouth/Throat BID    polyvinyl alcohol  1 drop Both Eyes Q4H    And    artificial tears   Both Eyes Q4H    pantoprazole  40 mg Oral QAM AC     ContinuousInfusions:   dexmedetomidine HCl in NaCl      sodium chloride      sodium chloride      propofol 30 mcg/kg/min (22 0753)     PRN Meds:sodium chloride, sodium chloride flush, sodium chloride, ondansetron **OR** ondansetron, acetaminophen **OR** [DISCONTINUED] acetaminophen, fentanNYL, albuterol      Labs/Imaging Results:   Lab Results   Component Value Date    WBC 17.7 (H) 2022    HGB 9.1 (L) 2022    HCT 27.2 (L) 2022    MCV 91.6 2022     2022 Lab Results   Component Value Date     09/27/2022    K 3.8 09/27/2022     09/27/2022    CO2 18 (L) 09/27/2022    BUN 90 (H) 09/27/2022    CREATININE 8.2 (H) 09/27/2022    GLUCOSE 86 09/27/2022    CALCIUM 8.0 (L) 09/27/2022    PROT 5.1 (L) 09/27/2022    LABALBU 3.5 09/27/2022    BILITOT 0.3 09/27/2022    ALKPHOS 49 09/27/2022    AST 15 09/27/2022    ALT 14 09/27/2022    LABGLOM 7 (L) 09/27/2022    GFRAA 8 (L) 09/27/2022     CTA A/P:  1. No GI bleed identified. 2. 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 There is minimal reconstitution of flow of the bilateral   external iliac arteries and minimal to no flow identified in the fem-fem   bypass, also chronic. 3. Bibasilar consolidation concerning for atelectasis with superimposed   infection. Emphysema is also present. 4. Severe diverticulosis without evidence of acute diverticulitis. 5. Atrophic kidneys, right worse than left. Assessment:       78 y/o M with GIB    Plan:       -Reviewed EGD - no active bleed or stigmata of PUD    -CTA without active bleeding    -Pt without any further blood per rectum    -Planned extubation this AM    -Monitor vitals and H/H    -Will continue to follow. Plan d/w pt's wife and nurse at bedside. ? Source but may have been diverticular given extensive diverticular disease seen on imaging, though per pt's wife the blood was not bright red or maroon but black which would keep more with UGI source.        Electronically signed by Rj Jose II, MD on 9/27/2022 at 8:03 AM

## 2022-09-27 NOTE — CONSULTS
Department of General Surgery   Surgical Service Dr. Cong Allison   Consult Note    Date of Consult: 9/26/22    Reason for Consult:  GI bleed    Requesting Physician:  ED Attending    CHIEF COMPLAINT:  As above    History Obtained From:  patient, EMR    HISTORY OF PRESENT ILLNESS:      The patient is a 79 y.o. male who presented to the ED with complaints described as:      Location: bleeding per rectum  Quality: n/a  Severity: denies on 10 pt scale  Duration: acute onset, ~ 2 h prior to arrival  Timing: acute  Context: hx of bleeding ulcer  Modifying factors: denies  Associated signs and symptoms: some abdominal tenderness    Pt presented to ED and was worked up with exam, labs, imaging. He was admitted to ICU. He was given RBCs. A c/s was placed to Gen Surg and GI. Currently pt is is ICU with planned EGD tonight by Dr. Genoveva Donnelly. Pt reports an open surgical procedure in the 1980s for bleeding ulcer. He states he does not smoke--stopped in 2014. He denies using NSAIDs. He denies being on PPI or H2 blocker at home. He was scoped at [facility] in 11/26/2020 by Dr. Narcisa Mays for bleeding ulcer and required injection with EPI and hemoclipping for duodenal bulb ulcer with visible vessels with gastritis.       Past Medical History:    Past Medical History:   Diagnosis Date    Acid reflux     ARF (acute renal failure) ([address]     with admission 12/18/2015- consult done with Dr Cornelio Alvarado    Atrophy of left kidney     Back pain     \"Lower Back\"    Chronic bronchitis ([address]     Chronic kidney disease     COPD (chronic obstructive pulmonary disease) ([address]     NO PULMONOLOGIST AT THIS TIME    Emphysema/COPD ([address]     NO PULMONOLOGIST AT THIS TIME    H. pylori infection Dx 1990's    Hemodialysis patient ([address]     Hiatal hernia     History of blood transfusion 1987    NO REACTION TO BLOOD TRANSFUSION RECEIVED    History of respiratory failure     following surgery 12/18/2015- pt developed resp failure- placed on ventilator-unable to wean of vent- had trachesotomy tube placed 1/3/2015- off vent 1/7/2015( discharged 1/13/2015)\"per wife went to [place] after discharg and had to be sent to [facility] after had bleeding around the trach- they said the trach was to big- put back on ventilator for 24 hrs- then there for about a week then sent to ARU 2/15    [place] (hard of hearing)     Bilateral Ears    Hx of blood clots     HX OTHER MEDICAL     Primary Care Physician Is Dr. Chrissie Gonzalez Right Renal Artery    HX OTHER MEDICAL     \"Dr. Cheryl Bynum One Of My Kidneys Is Dead\"    [employer]     \"See Dr. Parish Gabriel For Blood Being Too Thick\"(per old chart pt dx with polycythemia in 1990's and has had several phlebotomies in the past(pc)( per wife Dr Karen Cabello now says he does not really have polycythemia just from the COPD\"    [employer] 6*/25/2015    \"has drainage , clear drainage, since he was in ARU in Feb 2015, under left shoulder, arm pit area\"    Hyperlipidemia     Hypertension     Lung nodule \"MRI, PET SCAN\"    \"Right Lung\"    Pneumonia \"Last Episode Early 2000's\"    \"At Least Twice\"( with admission 12/18/2015)    Shortness of breath on exertion     Teeth missing     Upper And Lower    Wears glasses        Past Surgical History:    Past Surgical History:   Procedure Laterality Date    CHOLECYSTECTOMY, LAPAROSCOPIC  02/27/2017    cholangiogram     COLONOSCOPY  2011    \"Couple Polyps Removed\"    ENDOSCOPY, COLON, DIAGNOSTIC  \"Once\"    GASTROSTOMY TUBE PLACEMENT Left 1/3/15    to gravity drain( removed g tube in Feb or jan per wife)    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    Farming Accident    OTHER SURGICAL HISTORY  Mid 1980's    \"Emergency Surgery For Ruptured Ulcer\"    [facility] 1/3/15    per old chart pt had trachesotomy tube, bronch , egd and g- tube placement done 1/13/2015    VASCULAR SURGERY      per old chart pt had left axillary to bifemoral bypass graft done 2014(pc)    WISDOM TOOTH EXTRACTION      All Goodwell Teeth Extracted In Past       Current Medications:   Current Facility-Administered Medications   Medication Dose Route Frequency Provider Last Rate Last Admin    0.9 % sodium chloride infusion   IntraVENous PRN Lilly Pleasure, DO        pantoprazole (PROTONIX) 40 mg in sodium chloride 0.9 % 50 mL bolus  40 mg IntraVENous Once Lilly Pleasure, DO        pantoprazole (PROTONIX) 80 mg in sodium chloride 0.9 % 100 mL infusion  8 mg/hr IntraVENous Continuous Sarkis Hanna, DO        0.9 % sodium chloride bolus  1,000 mL IntraVENous Once Lilly Pleasure, DO        sodium chloride flush 0.9 % injection 5-40 mL  5-40 mL IntraVENous 2 times per day Lupe Dillon MD        sodium chloride flush 0.9 % injection 5-40 mL  5-40 mL IntraVENous PRN Lupe Dillon MD        0.9 % sodium chloride infusion   IntraVENous PRN Lupe Dillon MD        ondansetron (ZOFRAN-ODT) disintegrating tablet 4 mg  4 mg Oral Q8H PRN Lupe Dillon MD        Or    ondansetron (ZOFRAN) injection 4 mg  4 mg IntraVENous Q6H PRN Lupe Dillon MD        acetaminophen (TYLENOL) tablet 650 mg  650 mg Oral Q6H PRN Lupe Dillon MD        Or    acetaminophen (TYLENOL) suppository 650 mg  650 mg Rectal Q6H PRN Lupe Dillon MD        propofol injection  5-50 mcg/kg/min IntraVENous Continuous Lupe Dillon MD        midazolam (VERSED) 2 MG/2ML injection             propofol 1000 MG/100ML injection                Allergies:  Penicillins and Lorazepam    Social History:   Social History     Socioeconomic History    Marital status:    Tobacco Use    Smoking status: Former     Packs/day: 2.00     Years: 43.00     Pack years: 86.00     Types: Cigarettes     Start date: 1971     Quit date: 12/15/2014     Years since quittin.7    Smokeless tobacco: Never   Vaping Use    Vaping Use: Never used   Substance and Sexual Activity    Alcohol use:  Yes     Alcohol/week: 0.0 standard drinks Comment: \"Occ\"\"average one time per week    Drug use: Yes     Types: Marijuana Chandni Yobani)     Comment: \"Occ\"\"last used 3 rd week June 2015\"    Sexual activity: Yes     Partners: Female       Family History:   Family History   Problem Relation Age of Onset    Cancer Mother         Breast Cancer    Arthritis Mother     High Blood Pressure Mother     High Cholesterol Mother     Vision Loss Mother         Wears Glasses    Cancer Father         Prostate And Bone Cancer    Heart Disease Father         \"Triple Bypass Surgery\"    Migraines Sister     Other Sister         \"Bad Back\"       REVIEW OFSYSTEMS:    Review of Systems   Constitutional:  Positive for appetite change and fatigue. Gastrointestinal:  Positive for abdominal pain and anal bleeding. Musculoskeletal:  Positive for arthralgias. Neurological:  Positive for dizziness and weakness. All other systems reviewed and are negative. PHYSICAL EXAM:  Vitals:    09/26/22 2031 09/26/22 2033 09/26/22 2034 09/26/22 2044   BP: (!) 155/78 (!) 157/79 (!) 156/77 (!) 151/80   Pulse: 96 95 97 93   Resp: 20 19 20 20   Temp:    97.6 °F (36.4 °C)   TempSrc:    Oral   SpO2: 99% 98% 98% 99%   Weight:           Physical Exam  Vitals reviewed. Constitutional:       General: He is not in acute distress. Appearance: He is obese. He is not diaphoretic. HENT:      Head: Normocephalic and atraumatic. Right Ear: External ear normal.      Left Ear: External ear normal.      Nose: Nose normal.   Eyes:      General:         Right eye: No discharge. Left eye: No discharge. Cardiovascular:      Rate and Rhythm: Tachycardia present. Pulmonary:      Effort: No respiratory distress. Abdominal:      General: There is no distension. Palpations: Abdomen is soft. Tenderness: There is abdominal tenderness (mild epigastric). There is no guarding. Musculoskeletal:         General: No swelling. Skin:     General: Skin is warm.    Neurological:      General: No focal deficit present. Mental Status: He is alert.          DATA:    CBC:   Lab Results   Component Value Date/Time    WBC 18.0 09/26/2022 07:00 PM    RBC 2.30 09/26/2022 07:00 PM    HGB 7.4 09/26/2022 07:00 PM    HCT 22.9 09/26/2022 07:00 PM    MCV 99.6 09/26/2022 07:00 PM    MCH 32.2 09/26/2022 07:00 PM    MCHC 32.3 09/26/2022 07:00 PM    RDW 14.1 09/26/2022 07:00 PM     09/26/2022 07:00 PM    MPV 9.7 09/26/2022 07:00 PM     CBC with Differential:    Lab Results   Component Value Date/Time    WBC 18.0 09/26/2022 07:00 PM    RBC 2.30 09/26/2022 07:00 PM    HGB 7.4 09/26/2022 07:00 PM    HCT 22.9 09/26/2022 07:00 PM     09/26/2022 07:00 PM    MCV 99.6 09/26/2022 07:00 PM    MCH 32.2 09/26/2022 07:00 PM    MCHC 32.3 09/26/2022 07:00 PM    RDW 14.1 09/26/2022 07:00 PM    NRBC 2 12/28/2014 05:40 AM    SEGSPCT 73.9 09/26/2022 07:00 PM    BANDSPCT 17 05/11/2021 06:21 AM    BLASTSPCT 1 12/28/2014 05:40 AM    LYMPHOPCT 18.4 09/26/2022 07:00 PM    MONOPCT 5.5 09/26/2022 07:00 PM    MYELOPCT 1 03/02/2017 01:11 PM    BASOPCT 0.4 09/26/2022 07:00 PM    MONOSABS 1.0 09/26/2022 07:00 PM    LYMPHSABS 3.3 09/26/2022 07:00 PM    EOSABS 0.2 09/26/2022 07:00 PM    BASOSABS 0.1 09/26/2022 07:00 PM    DIFFTYPE AUTOMATED DIFFERENTIAL 09/26/2022 07:00 PM     WBC:    Lab Results   Component Value Date/Time    WBC 18.0 09/26/2022 07:00 PM     Platelets:    Lab Results   Component Value Date/Time     09/26/2022 07:00 PM     Hemoglobin/Hematocrit:    Lab Results   Component Value Date/Time    HGB 7.4 09/26/2022 07:00 PM    HCT 22.9 09/26/2022 07:00 PM     CMP:    Lab Results   Component Value Date/Time     09/26/2022 07:00 PM    K 3.4 09/26/2022 07:00 PM    CL 98 09/26/2022 07:00 PM    CO2 15 09/26/2022 07:00 PM    BUN 96 09/26/2022 07:00 PM    CREATININE 7.4 09/26/2022 07:00 PM    GFRAA 9 09/26/2022 07:00 PM    LABGLOM 7 09/26/2022 07:00 PM    GLUCOSE 152 09/26/2022 07:00 PM    PROT 5.9 09/26/2022 07:00 PM LABALBU 3.6 09/26/2022 07:00 PM    CALCIUM 8.9 09/26/2022 07:00 PM    BILITOT 0.2 09/26/2022 07:00 PM    ALKPHOS 48 09/26/2022 07:00 PM    AST 16 09/26/2022 07:00 PM    ALT 16 09/26/2022 07:00 PM     BMP:    Lab Results   Component Value Date/Time     09/26/2022 07:00 PM    K 3.4 09/26/2022 07:00 PM    CL 98 09/26/2022 07:00 PM    CO2 15 09/26/2022 07:00 PM    BUN 96 09/26/2022 07:00 PM    LABALBU 3.6 09/26/2022 07:00 PM    CREATININE 7.4 09/26/2022 07:00 PM    CALCIUM 8.9 09/26/2022 07:00 PM    GFRAA 9 09/26/2022 07:00 PM    LABGLOM 7 09/26/2022 07:00 PM    GLUCOSE 152 09/26/2022 07:00 PM     Sodium:    Lab Results   Component Value Date/Time     09/26/2022 07:00 PM     Potassium:    Lab Results   Component Value Date/Time    K 3.4 09/26/2022 07:00 PM     BUN/Creatinine:    Lab Results   Component Value Date/Time    BUN 96 09/26/2022 07:00 PM    CREATININE 7.4 09/26/2022 07:00 PM     Hepatic Function Panel:    Lab Results   Component Value Date/Time    ALKPHOS 48 09/26/2022 07:00 PM    ALT 16 09/26/2022 07:00 PM    AST 16 09/26/2022 07:00 PM    PROT 5.9 09/26/2022 07:00 PM    BILITOT 0.2 09/26/2022 07:00 PM    BILIDIR 0.2 02/27/2017 04:09 AM    IBILI 0.3 02/27/2017 04:09 AM    LABALBU 3.6 09/26/2022 07:00 PM     Albumin:    Lab Results   Component Value Date/Time    LABALBU 3.6 09/26/2022 07:00 PM     Calcium:    Lab Results   Component Value Date/Time    CALCIUM 8.9 09/26/2022 07:00 PM     Ionized Calcium:    Lab Results   Component Value Date/Time    IONCA 0.88 12/28/2014 11:40 AM     Magnesium:    Lab Results   Component Value Date/Time    MG 2.6 09/26/2022 07:00 PM     Phosphorus:    Lab Results   Component Value Date/Time    PHOS 6.1 08/17/2021 12:00 PM       IMPRESSION:        Patient Active Problem List:     Choledocholithiasis     Chronic kidney disease (CKD), stage III (moderate) (HCC)     Abnormality of lung on CXR     S/P BKA (below knee amputation) unilateral (HCC)     Cholangitis     Acute calculous cholecystitis     Abdominal adhesions     Atrophic kidney     COPD (chronic obstructive pulmonary disease) (HCC)     HTN (hypertension)     PAD (peripheral artery disease) (HCC)     Proteinuria     Chronic kidney disease (CKD) stage G3b/A3, moderately decreased glomerular filtration rate (GFR) between 30-44 mL/min/1.73 square meter and albuminuria creatinine ratio greater than 300 mg/g (HCC)     Metabolic acidosis     ASCVD (arteriosclerotic cardiovascular disease)     Acute on chronic respiratory failure with hypoxia (HCC)     Acute GI bleeding      78 y/o M with likely upper GI bleed    PLAN:    -ICU admit  -Evaluate for source of upper GIB. Planned endoscopy tonight with Dr. Genoveva Donnelly. Hx of duodenal ulcer requiring clipping / epi injection in past.   -If pt fails endoscopic treatment then will require IR consult for GDA embolization.  -Transfuse PRN per ICU.   -PPI drip.   -NPO, IVF. -Serial H/H.  -If pt fails endoscopic and IR treatment then would ultimately require surgical exploration. Pt fairly poor surgical candidate overall.   -Above plan d/w Dr. Genoveva Donnelly and Critical Care attending.   -Will continue to follow. Olu Harris II, MD    Addendum:    Observed EGD done by Dr. Genoveva Donnelly. No upper GI bleed. Proceed with w/u for lower GI bleed.     Darrell Lucas MD

## 2022-09-27 NOTE — PROGRESS NOTES
CTA abdomen pelvis w wo contrast started. Non-con phase of study completed without issue. Contrast administration started. Contrast visualized in descending aorta. Scanner table movement failed due to patient's body habitus. Table corrected in time for portal venous phase. Due to patient's GFR and critical condition, a radiologist was consulted for a wet read with patient still on scanner. Radiologist Adell Barthel advised this CT tech not to re-inject patient and that scan was complete with the acquired images. Scan ended.

## 2022-09-27 NOTE — PROGRESS NOTES
Patient ready for extubation. Follows commands, RSBI 20-30's, NIF -50. Patient extubated to 2lpm nc and sating 100%. Will continue to monitor.

## 2022-09-27 NOTE — OP NOTE
[address], [address] [place] [address]                                OPERATIVE REPORT    PATIENT NAME: Gonzalez Cook                     :        1955  MED REC NO:   [id number]                          ROOM:       2106  ACCOUNT NO:   [de-identified]                           ADMIT DATE: 2022  PROVIDER:     Laureen Hernandez MD    DATE OF PROCEDURE:  2022    PRIMARY CARE PROVIDER:  Flora Munoz DO. PREOPERATIVE DIAGNOSIS:  Massive GI bleeding; rule out upper GI bleeding  lesion. PREMEDICATION:  Please refer to the anesthesiologist's notes. PROCEDURE PERFORMED:  EGD. SURGEON:  Laureen Hernandez MD.    OPERATIVE PROCEDURE:  The patient was placed in the left lateral  decubitus position and the video Olympus gastroscope was introduced in  the back of throat and was advanced into the esophagus. ESOPHAGUS:  The mucosa of the esophagus was unremarkable. There was no  evidence of hyperemia, erosion, ulcer, stricture, Sanchez's esophagus,  mass lesion, Nuria-Evangelista tear or esophageal varices. STOMACH:  The fundus, cardia, body, antrum, lesser curvature, greater  curvature, and the pyloric regions of the stomach were examined. The  gastric mucosal folds were somewhat thickened, but no erosion or ulcers  were seen. The gastroscope was retroflexed and the fundus and cardia  was carefully examined, and there was no evidence of gastric varices. No blood, recent or old, was seen in the lumen of the stomach. DUODENUM:  The first, second, third, and fourth portions of the duodenum  were examined, and the mucosa was edematous and hyperemic, but no  erosion or ulcers were seen. Incidentally, a 2 cm submucosal lipoma was  noted in the second portion of the duodenum. Again, no blood, recent or  old, was seen in the lumen of the duodenum. POSTOPERATIVE DIAGNOSES:  1.  Mild gastritis and duodenitis.   2.  Incidentally, a lipoma noted in the second portion of the duodenum. 3.  No evidence of an upper GI bleeding lesion. RECOMMENDATIONS:  1. We will continue present management. 2.  Monitor the patient's H and H and transfuse on a p.r.n. basis to  keep hemoglobin above 7 gm percent. 3.  We will proceed with CTA. 4.  The patient will need a colonoscopy and a small bowel evaluation  later also for further workup of his GI bleeding. The patient tolerated the procedure well. There were no postop  complications. The blood loss during the procedure was nil. The case  and plan have been discussed in detail with the patient and the  patient's wife as well.         Camelia Urbina MD    D: 09/26/2022 21:28:10       T: 09/26/2022 21:30:29     [email or web address]  Job#: [id number]     Doc#: [id number]    CC:  Karol Alexandra DO

## 2022-09-27 NOTE — ANESTHESIA PRE PROCEDURE
Department of Anesthesiology  Preprocedure Note       Name:  Shaggy Roman   Age:  79 y.o.  :  1955                                          MRN:  [id number]         Date:  2022      Surgeon: Héctor Kimball):  Gomez Tinoco MD    Procedure: Procedure(s):  EGD ESOPHAGOGASTRODUODENOSCOPY    Medications prior to admission:   Prior to Admission medications    Medication Sig Start Date End Date Taking? Authorizing Provider   DOXYCYCLINE PO Take by mouth 2 times daily    Historical Provider, MD   magnesium oxide (MAG-OX) 400 (241.3 Mg) MG TABS tablet TAKE ONE TABLET BY MOUTH DAILY 21   Historical Provider, MD   pantoprazole (PROTONIX) 40 MG tablet Take 40 mg by mouth daily    Historical Provider, MD   tamsulosin (FLOMAX) 0.4 MG capsule Take 0.8 mg by mouth daily    Historical Provider, MD   doxazosin (CARDURA) 2 MG tablet Take 1 tablet by mouth daily  Patient not taking: Reported on 2022   Bucky Sales MD   atorvastatin (LIPITOR) 20 MG tablet Take 1 tablet by mouth nightly  Patient taking differently: Take 40 mg by mouth nightly 21   Amy Lyn MD   montelukast (SINGULAIR) 10 MG tablet Take 10 mg by mouth nightly  Patient not taking: No sig reported    Historical Provider, MD   HYDROcodone-acetaminophen (NORCO) 5-325 MG per tablet Take 1 tablet by mouth every 6 hours as needed for Pain.     Historical Provider, MD   azelastine (ASTELIN) 0.1 % nasal spray 1 spray by Nasal route 2 times daily Use in each nostril as directed    Historical Provider, MD   ipratropium-albuterol (DUONEB) 0.5-2.5 (3) MG/3ML SOLN nebulizer solution Inhale 1 vial into the lungs every 4 hours    Historical Provider, MD   Cholecalciferol (VITAMIN D3) 2000 UNITS CAPS Take 1 capsule by mouth daily    Historical Provider, MD   carvedilol (COREG) 25 MG tablet Take 25 mg by mouth 2 times daily  Patient not taking: Reported on 2022    Historical Provider, MD   albuterol (PROVENTIL HFA;VENTOLIN HFA) 108 (90 BASE) MCG/ACT inhaler Inhale 2 puffs into the lungs every 6 hours as needed for Wheezing    Historical Provider, MD   ALPRAZolam (XANAX) 0.25 MG tablet Take 0.25 mg by mouth 2 times daily as needed for Sleep. Historical Provider, MD   amLODIPine (NORVASC) 10 MG tablet Take 10 mg by mouth daily  Patient not taking: Reported on 9/20/2022    Historical Provider, MD   citalopram (CELEXA) 20 MG tablet Take 1 tablet by mouth daily. 2/20/15   Danny Cabrera MD   folic acid (FOLVITE) 1 MG tablet Take 1 tablet by mouth daily.  2/20/15   Danny Cabrera MD       Current medications:    Current Facility-Administered Medications   Medication Dose Route Frequency Provider Last Rate Last Admin    0.9 % sodium chloride infusion   IntraVENous PRN Mendel Freed, DO        pantoprazole (PROTONIX) 40 mg in sodium chloride 0.9 % 50 mL bolus  40 mg IntraVENous Once Mendel Freed, DO        pantoprazole (PROTONIX) 80 mg in sodium chloride 0.9 % 100 mL infusion  8 mg/hr IntraVENous Continuous Sarkis Hanna, DO        0.9 % sodium chloride bolus  1,000 mL IntraVENous Once Mendel Freed, DO        sodium chloride flush 0.9 % injection 5-40 mL  5-40 mL IntraVENous 2 times per day Shoaib Howard MD        sodium chloride flush 0.9 % injection 5-40 mL  5-40 mL IntraVENous PRN Shoaib Howard MD        0.9 % sodium chloride infusion   IntraVENous PRN Shoaib Howard MD        ondansetron (ZOFRAN-ODT) disintegrating tablet 4 mg  4 mg Oral Q8H PRN Shoaib Howard MD        Or    ondansetron (ZOFRAN) injection 4 mg  4 mg IntraVENous Q6H PRN Shoaib Howard MD        acetaminophen (TYLENOL) tablet 650 mg  650 mg Oral Q6H PRN Shoaib Howard MD        Or   [facility] acetaminophen (TYLENOL) suppository 650 mg  650 mg Rectal Q6H PRN Shoaib Howard MD         Current Outpatient Medications   Medication Sig Dispense Refill    DOXYCYCLINE PO Take by mouth 2 times daily      magnesium oxide (MAG-OX) 400 (241.3 Mg) MG TABS tablet TAKE ONE TABLET BY MOUTH DAILY      pantoprazole (PROTONIX) 40 MG tablet Take 40 mg by mouth daily      tamsulosin (FLOMAX) 0.4 MG capsule Take 0.8 mg by mouth daily      doxazosin (CARDURA) 2 MG tablet Take 1 tablet by mouth daily (Patient not taking: Reported on 9/20/2022) 30 tablet 3    atorvastatin (LIPITOR) 20 MG tablet Take 1 tablet by mouth nightly (Patient taking differently: Take 40 mg by mouth nightly) 30 tablet 3    montelukast (SINGULAIR) 10 MG tablet Take 10 mg by mouth nightly (Patient not taking: No sig reported)      HYDROcodone-acetaminophen (NORCO) 5-325 MG per tablet Take 1 tablet by mouth every 6 hours as needed for Pain.  azelastine (ASTELIN) 0.1 % nasal spray 1 spray by Nasal route 2 times daily Use in each nostril as directed      ipratropium-albuterol (DUONEB) 0.5-2.5 (3) MG/3ML SOLN nebulizer solution Inhale 1 vial into the lungs every 4 hours      Cholecalciferol (VITAMIN D3) 2000 UNITS CAPS Take 1 capsule by mouth daily      carvedilol (COREG) 25 MG tablet Take 25 mg by mouth 2 times daily (Patient not taking: Reported on 9/20/2022)      albuterol (PROVENTIL HFA;VENTOLIN HFA) 108 (90 BASE) MCG/ACT inhaler Inhale 2 puffs into the lungs every 6 hours as needed for Wheezing      ALPRAZolam (XANAX) 0.25 MG tablet Take 0.25 mg by mouth 2 times daily as needed for Sleep.  amLODIPine (NORVASC) 10 MG tablet Take 10 mg by mouth daily (Patient not taking: Reported on 9/20/2022)      citalopram (CELEXA) 20 MG tablet Take 1 tablet by mouth daily. 30 tablet 0    folic acid (FOLVITE) 1 MG tablet Take 1 tablet by mouth daily. 30 tablet 0       Allergies: Allergies   Allergen Reactions    Penicillins Swelling    Lorazepam      Other reaction(s):  Other - comment required  hallucination       Problem List:    Patient Active Problem List   Diagnosis Code    Choledocholithiasis K80.50    Chronic kidney disease (CKD), stage III (moderate) [facility] N18.30    Abnormality of lung on CXR R91.8    S/P BKA (below knee amputation) unilateral ([facility]) Z89.519    Cholangitis K83.09    Acute calculous cholecystitis K80.00    Abdominal adhesions K66.0    Atrophic kidney N26.1    COPD (chronic obstructive pulmonary disease) ([facility]) J44.9    HTN (hypertension) I10    PAD (peripheral artery disease) ([facility]) I73.9    Proteinuria R80.9    Chronic kidney disease (CKD) stage G3b/A3, moderately decreased glomerular filtration rate (GFR) between 30-44 mL/min/1.73 square meter and albuminuria creatinine ratio greater than 300 mg/g ([facility]) H69.92    Metabolic acidosis G95.7    ASCVD (arteriosclerotic cardiovascular disease) I25.10    Acute on chronic respiratory failure with hypoxia ([facility]) J96.21    Acute GI bleeding K92.2       Past Medical History:        Diagnosis Date    Acid reflux     ARF (acute renal failure) ([facility])     with admission 12/18/2015- consult done with Dr Felecia Lock Atrophy of left kidney     Back pain     \"Lower Back\"    [facility] bronchitis [facility]     Chronic kidney disease     COPD (chronic obstructive pulmonary disease) ([facility]     NO PULMONOLOGIST AT THIS TIME    Emphysema/COPD ([facility]     NO PULMONOLOGIST AT THIS TIME    H. pylori infection Dx 1990's    Hemodialysis patient ([facility]     Hiatal hernia     History of blood transfusion 1987    NO REACTION TO BLOOD TRANSFUSION RECEIVED    History of respiratory failure     following surgery 12/18/2015- pt developed resp failure- placed on ventilator-unable to wean of vent- had trachesotomy tube placed 1/3/2015- off vent 1/7/2015( discharged 1/13/2015)\"per wife went to [facility] after discharg and had to be sent to [facility] after had bleeding around the trach- they said the trach was to big- put back on ventilator for 24 hrs- then there for about a week then sent to ARU 2/15    [place] (hard of hearing)     Bilateral Ears    Hx of blood clots     HX OTHER MEDICAL     Primary Care Physician Is Dr. Barney Bolanos Renal Artery    HX OTHER MEDICAL     \"Dr. Lars Tavarez One Of My Kidneys Is Dead\"   Vik Garrick     \"See Dr. Bennett Miller For Blood Being Too Thick\"(per old chart pt dx with polycythemia in  and has had several phlebotomies in the past(pc)( per wife Dr Mckeonvenkatesh Mccraryon now says he does not really have polycythemia just from the COPD\"    [address] [place] [address]     \"has drainage , clear drainage, since he was in ARU in 2015, under left shoulder, arm pit area\"    Hyperlipidemia     Hypertension     Lung nodule \"MRI, PET SCAN\"    \"Right Lung\"    Pneumonia \"Last Episode Early \"    \"At Least Twice\"( with admission 2015)    Shortness of breath on exertion     Teeth missing     Upper And Lower    Wears glasses        Past Surgical History:        Procedure Laterality Date    CHOLECYSTECTOMY, LAPAROSCOPIC  2017    cholangiogram     COLONOSCOPY      \"Couple Polyps Removed\"    ENDOSCOPY, COLON, DIAGNOSTIC  \"Once\"    GASTROSTOMY TUBE PLACEMENT Left 1/3/15    to gravity drain( removed g tube in Feb or  per wife)    LEG AMPUTATION BELOW KNEE Left 87    Farming Accident    OTHER SURGICAL HISTORY  [facility] [address]    \"Emergency Surgery For Ruptured Ulcer\"   [address]    TRACHEOSTOMY N/A 1/3/15    per old chart pt had trachesotomy tube, bronch , egd and g- tube placement done 2015    VASCULAR SURGERY      per old chart pt had left axillary to bifemoral bypass graft done 2014(pc)    WISDOM TOOTH EXTRACTION      All [place] Teeth Extracted In Past       Social History:    Social History     Tobacco Use    Smoking status: Former     Packs/day: 2.00     Years: 43.00     Pack years: 86.00     Types: Cigarettes     Start date: 1971     Quit date: 12/15/2014     Years since quittin.7    Smokeless tobacco: Never   Substance Use Topics    Alcohol use:  Yes     Alcohol/week: 0.0 standard drinks     Comment: \"Occ\"\"average one time per week Counseling given: Not Answered      Vital Signs (Current):   Vitals:    09/26/22 2030 09/26/22 2031 09/26/22 2033 09/26/22 2034   BP: (!) 160/82 (!) 155/78 (!) 157/79 (!) 156/77   Pulse: 96 96 95 97   Resp: 20 20 19 20   Temp:       TempSrc:       SpO2: 100% 99% 98% 98%   Weight: 246 lb 14.6 oz (112 kg)                                                 BP Readings from Last 3 Encounters:   09/26/22 (!) 156/77   09/20/22 137/67   09/14/22 (!) 150/84       NPO Status:                                                                                 BMI:   Wt Readings from Last 3 Encounters:   09/26/22 246 lb 14.6 oz (112 kg)   09/14/22 246 lb 14.6 oz (112 kg)   06/06/22 245 lb (111.1 kg)     Body mass index is 37.54 kg/m².     CBC:   Lab Results   Component Value Date/Time    WBC 18.0 09/26/2022 07:00 PM    RBC 2.30 09/26/2022 07:00 PM    HGB 7.4 09/26/2022 07:00 PM    HCT 22.9 09/26/2022 07:00 PM    MCV 99.6 09/26/2022 07:00 PM    RDW 14.1 09/26/2022 07:00 PM     09/26/2022 07:00 PM       CMP:   Lab Results   Component Value Date/Time     09/26/2022 07:00 PM    K 3.4 09/26/2022 07:00 PM    CL 98 09/26/2022 07:00 PM    CO2 15 09/26/2022 07:00 PM    BUN 96 09/26/2022 07:00 PM    CREATININE 7.4 09/26/2022 07:00 PM    GFRAA 9 09/26/2022 07:00 PM    LABGLOM 7 09/26/2022 07:00 PM    GLUCOSE 152 09/26/2022 07:00 PM    PROT 5.9 09/26/2022 07:00 PM    CALCIUM 8.9 09/26/2022 07:00 PM    BILITOT 0.2 09/26/2022 07:00 PM    ALKPHOS 48 09/26/2022 07:00 PM    AST 16 09/26/2022 07:00 PM    ALT 16 09/26/2022 07:00 PM       POC Tests:   Recent Labs     09/26/22  1856   POCGLU 137*       Coags:   Lab Results   Component Value Date/Time    PROTIME 10.9 09/26/2022 07:00 PM    INR 0.85 09/26/2022 07:00 PM    APTT 30.3 09/26/2022 07:00 PM       HCG (If Applicable): No results found for: PREGTESTUR, PREGSERUM, HCG, HCGQUANT     ABGs:   Lab Results   Component Value Date/Time    PO2ART 115 02/27/2017 05:45 PM    BKC7DML 39.0 02/27/2017 05:45 PM    CMU0AHH 19.6 02/27/2017 05:45 PM        Type & Screen (If Applicable):  No results found for: LABABO, LABRH    Drug/Infectious Status (If Applicable):  Lab Results   Component Value Date/Time    HEPCAB 0.06 12/19/2014 03:40 AM       COVID-19 Screening (If Applicable):   Lab Results   Component Value Date/Time    COVID19 NOT DETECTED 05/11/2021 06:21 AM           Anesthesia Evaluation    Airway: Mallampati: II  TM distance: >3 FB     Mouth opening: > = 3 FB   Dental:    (+) edentulous      Pulmonary:   (+) COPD: severe,  shortness of breath: chronic,  rhonchi                            Cardiovascular:  Exercise tolerance: poor (<4 METS),   (+) hypertension:, WRIGHT:,       ECG reviewed  Rhythm: regular  Rate: normal                    Neuro/Psych:               GI/Hepatic/Renal:   (+) hiatal hernia, GERD:, renal disease: CRI,           Endo/Other:                     Abdominal:             Vascular: Other Findings:           Anesthesia Plan      general     ASA 4 - emergent     (Art and central line in-situ from intensivist)        Anesthetic plan and risks discussed with patient and spouse. Use of blood products discussed with patient and spouse whom.                      Michael Begum MD   9/26/2022

## 2022-09-27 NOTE — PROGRESS NOTES
PT extubated by RT Suzie. Pt's mouth suctioned and 2L NC applied. Pt's SPO2 98%. Aox4. Pt's wife at bedside.

## 2022-09-27 NOTE — PROGRESS NOTES
09/27/22 0748   Patient Observation   Heart Rate 75   Resp 17   SpO2 100 %   Breath Sounds   Right Upper Lobe Clear   Right Middle Lobe Diminished   Right Lower Lobe Diminished   Left Upper Lobe Clear   Left Lower Lobe Diminished   Vent Information   Vent Mode AC/VC   $Ventilation $Subsequent Day   Ventilator Settings   FiO2  45 %   Vt (Set, mL) 450 mL   Resp Rate (Set) 20 bmp   PEEP/CPAP (cmH2O) 5   Vent Patient Data (Readings)   Vt (Measured) 0 mL   Peak Inspiratory Pressure (cmH2O) 18 cmH2O   Rate Measured 25 br/min   Minute Volume (L/min) 11.3 Liters   Mean Airway Pressure (cmH2O) 9.1 cmH20   Plateau Pressure (cm H2O) 20 cm H2O   I:E Ratio 4.80:1   Static Compliance (L/cm H2O) 11   Vent Alarm Settings   High Pressure (cmH2O) 40 cmH2O   Low Minute Volume (lpm) 2.5 L/min   Low Exhaled Vt (ml) 250 mL   RR High (bpm) 40 br/min   Apnea (secs) 20 secs   Airway Clearance   Suction ET Tube   Suction Device Inline suction catheter   Sputum Method Obtained Endotracheal   Sputum Amount Scant   Sputum Color/Odor Clear   $Obtained Sample $Induced Sputum (charge not used for Bronchoscopy)   ETT (adult)   Placement Date/Time: 09/26/22 2115   Present on Admission/Arrival: No  Placed By: Licensed provider  Placement Verified By: Auscultation; Chest X-ray;Colorimetric ETCO2 device  Preoxygenation: Yes  Mask Ventilation: Ventilated by mask (1)  Airway Tube . ..    Secured At 23 cm   Measured From Lips   ETT Placement Right   Secured By Commercial tube guillermo

## 2022-09-28 LAB
ABO/RH: NORMAL
ANION GAP SERPL CALCULATED.3IONS-SCNC: 15 MMOL/L (ref 4–16)
ANTIBODY SCREEN: NEGATIVE
BACTERIA: NEGATIVE /HPF
BILIRUBIN URINE: NEGATIVE
BLOOD, URINE: NEGATIVE
BUN BLDV-MCNC: 81 MG/DL (ref 6–23)
CALCIUM SERPL-MCNC: 8.1 MG/DL (ref 8.3–10.6)
CHLORIDE BLD-SCNC: 103 MMOL/L (ref 99–110)
CLARITY: CLEAR
CO2: 15 MMOL/L (ref 21–32)
COLOR: YELLOW
COMPONENT: NORMAL
CREAT SERPL-MCNC: 8.1 MG/DL (ref 0.9–1.3)
CROSSMATCH RESULT: NORMAL
GFR AFRICAN AMERICAN: 8 ML/MIN/1.73M2
GFR NON-AFRICAN AMERICAN: 7 ML/MIN/1.73M2
GLUCOSE BLD-MCNC: 91 MG/DL (ref 70–99)
GLUCOSE, URINE: NEGATIVE MG/DL
HCT VFR BLD CALC: 25.9 % (ref 42–52)
HEMOGLOBIN: 8.8 GM/DL (ref 13.5–18)
KETONES, URINE: NEGATIVE MG/DL
LEUKOCYTE ESTERASE, URINE: NEGATIVE
MAGNESIUM: 3.2 MG/DL (ref 1.8–2.4)
MCH RBC QN AUTO: 30.7 PG (ref 27–31)
MCHC RBC AUTO-ENTMCNC: 34 % (ref 32–36)
MCV RBC AUTO: 90.2 FL (ref 78–100)
NITRITE URINE, QUANTITATIVE: NEGATIVE
PDW BLD-RTO: 17.5 % (ref 11.7–14.9)
PH, URINE: 8
PHOSPHORUS: 3.6 MG/DL (ref 2.5–4.9)
PLATELET # BLD: 197 K/CU MM (ref 140–440)
PMV BLD AUTO: 9.5 FL (ref 7.5–11.1)
POTASSIUM SERPL-SCNC: 3.8 MMOL/L (ref 3.5–5.1)
PROTEIN UA: 30 MG/DL
RBC # BLD: 2.87 M/CU MM (ref 4.6–6.2)
RBC URINE: <1 /HPF
SODIUM BLD-SCNC: 133 MMOL/L (ref 135–145)
SPECIFIC GRAVITY UA: 1.01
STATUS: NORMAL
TRANSFUSION STATUS: NORMAL
UNIT DIVISION: 0
UNIT NUMBER: NORMAL
UROBILINOGEN, URINE: 0.2 MG/DL
WBC # BLD: 14.4 K/CU MM (ref 4–10.5)
WBC UA: <1 /HPF

## 2022-09-28 PROCEDURE — 81001 URINALYSIS AUTO W/SCOPE: CPT

## 2022-09-28 PROCEDURE — 85027 COMPLETE CBC AUTOMATED: CPT

## 2022-09-28 PROCEDURE — 94761 N-INVAS EAR/PLS OXIMETRY MLT: CPT

## 2022-09-28 PROCEDURE — 6370000000 HC RX 637 (ALT 250 FOR IP): Performed by: STUDENT IN AN ORGANIZED HEALTH CARE EDUCATION/TRAINING PROGRAM

## 2022-09-28 PROCEDURE — 6370000000 HC RX 637 (ALT 250 FOR IP): Performed by: SPECIALIST

## 2022-09-28 PROCEDURE — 5A1D70Z PERFORMANCE OF URINARY FILTRATION, INTERMITTENT, LESS THAN 6 HOURS PER DAY: ICD-10-PCS | Performed by: INTERNAL MEDICINE

## 2022-09-28 PROCEDURE — 90935 HEMODIALYSIS ONE EVALUATION: CPT

## 2022-09-28 PROCEDURE — 94640 AIRWAY INHALATION TREATMENT: CPT

## 2022-09-28 PROCEDURE — 99232 SBSQ HOSP IP/OBS MODERATE 35: CPT | Performed by: SURGERY

## 2022-09-28 PROCEDURE — 83735 ASSAY OF MAGNESIUM: CPT

## 2022-09-28 PROCEDURE — 36415 COLL VENOUS BLD VENIPUNCTURE: CPT

## 2022-09-28 PROCEDURE — 84100 ASSAY OF PHOSPHORUS: CPT

## 2022-09-28 PROCEDURE — 2580000003 HC RX 258: Performed by: STUDENT IN AN ORGANIZED HEALTH CARE EDUCATION/TRAINING PROGRAM

## 2022-09-28 PROCEDURE — 1200000000 HC SEMI PRIVATE

## 2022-09-28 PROCEDURE — 6370000000 HC RX 637 (ALT 250 FOR IP): Performed by: INTERNAL MEDICINE

## 2022-09-28 PROCEDURE — 6360000002 HC RX W HCPCS: Performed by: STUDENT IN AN ORGANIZED HEALTH CARE EDUCATION/TRAINING PROGRAM

## 2022-09-28 PROCEDURE — 80048 BASIC METABOLIC PNL TOTAL CA: CPT

## 2022-09-28 RX ORDER — AZELASTINE 1 MG/ML
1 SPRAY, METERED NASAL 2 TIMES DAILY
Status: DISCONTINUED | OUTPATIENT
Start: 2022-09-28 | End: 2022-10-02 | Stop reason: HOSPADM

## 2022-09-28 RX ORDER — VITAMIN B COMPLEX
2000 TABLET ORAL DAILY
Status: DISCONTINUED | OUTPATIENT
Start: 2022-09-28 | End: 2022-10-02 | Stop reason: HOSPADM

## 2022-09-28 RX ORDER — ALBUTEROL SULFATE 90 UG/1
2 AEROSOL, METERED RESPIRATORY (INHALATION) 4 TIMES DAILY
Status: DISCONTINUED | OUTPATIENT
Start: 2022-09-28 | End: 2022-09-29

## 2022-09-28 RX ORDER — IPRATROPIUM BROMIDE AND ALBUTEROL SULFATE 2.5; .5 MG/3ML; MG/3ML
1 SOLUTION RESPIRATORY (INHALATION) EVERY 4 HOURS
Status: DISCONTINUED | OUTPATIENT
Start: 2022-09-28 | End: 2022-09-28

## 2022-09-28 RX ORDER — HYDROCODONE BITARTRATE AND ACETAMINOPHEN 5; 325 MG/1; MG/1
1 TABLET ORAL EVERY 6 HOURS PRN
Status: DISCONTINUED | OUTPATIENT
Start: 2022-09-28 | End: 2022-10-02 | Stop reason: HOSPADM

## 2022-09-28 RX ORDER — IPRATROPIUM BROMIDE AND ALBUTEROL SULFATE 2.5; .5 MG/3ML; MG/3ML
1 SOLUTION RESPIRATORY (INHALATION) EVERY 4 HOURS PRN
Status: DISCONTINUED | OUTPATIENT
Start: 2022-09-28 | End: 2022-09-29

## 2022-09-28 RX ORDER — CITALOPRAM 20 MG/1
20 TABLET ORAL DAILY
Status: DISCONTINUED | OUTPATIENT
Start: 2022-09-28 | End: 2022-10-02 | Stop reason: HOSPADM

## 2022-09-28 RX ORDER — FOLIC ACID 1 MG/1
1 TABLET ORAL DAILY
Status: DISCONTINUED | OUTPATIENT
Start: 2022-09-28 | End: 2022-10-02 | Stop reason: HOSPADM

## 2022-09-28 RX ORDER — PANTOPRAZOLE SODIUM 40 MG/1
40 TABLET, DELAYED RELEASE ORAL DAILY
Status: DISCONTINUED | OUTPATIENT
Start: 2022-09-28 | End: 2022-10-02 | Stop reason: HOSPADM

## 2022-09-28 RX ORDER — TAMSULOSIN HYDROCHLORIDE 0.4 MG/1
0.8 CAPSULE ORAL DAILY
Status: DISCONTINUED | OUTPATIENT
Start: 2022-09-28 | End: 2022-10-02 | Stop reason: HOSPADM

## 2022-09-28 RX ORDER — LANOLIN ALCOHOL/MO/W.PET/CERES
400 CREAM (GRAM) TOPICAL DAILY
Status: DISCONTINUED | OUTPATIENT
Start: 2022-09-28 | End: 2022-09-29

## 2022-09-28 RX ORDER — ATORVASTATIN CALCIUM 40 MG/1
40 TABLET, FILM COATED ORAL DAILY
Status: DISCONTINUED | OUTPATIENT
Start: 2022-09-28 | End: 2022-10-02 | Stop reason: HOSPADM

## 2022-09-28 RX ADMIN — HYDROCODONE BITARTRATE AND ACETAMINOPHEN 1 TABLET: 5; 325 TABLET ORAL at 17:57

## 2022-09-28 RX ADMIN — Medication 2 PUFF: at 16:04

## 2022-09-28 RX ADMIN — Medication 400 MG: at 13:53

## 2022-09-28 RX ADMIN — ONDANSETRON 4 MG: 2 INJECTION INTRAMUSCULAR; INTRAVENOUS at 17:48

## 2022-09-28 RX ADMIN — ALBUTEROL SULFATE 2 PUFF: 90 AEROSOL, METERED RESPIRATORY (INHALATION) at 20:26

## 2022-09-28 RX ADMIN — SODIUM CHLORIDE, PRESERVATIVE FREE 10 ML: 5 INJECTION INTRAVENOUS at 20:45

## 2022-09-28 RX ADMIN — Medication 2000 UNITS: at 13:53

## 2022-09-28 RX ADMIN — Medication 2 PUFF: at 20:23

## 2022-09-28 RX ADMIN — ALPRAZOLAM 0.25 MG: 0.25 TABLET ORAL at 20:45

## 2022-09-28 RX ADMIN — TAMSULOSIN HYDROCHLORIDE 0.8 MG: 0.4 CAPSULE ORAL at 13:53

## 2022-09-28 RX ADMIN — ATORVASTATIN CALCIUM 40 MG: 40 TABLET, FILM COATED ORAL at 13:53

## 2022-09-28 RX ADMIN — CITALOPRAM HYDROBROMIDE 20 MG: 20 TABLET ORAL at 13:53

## 2022-09-28 RX ADMIN — ALBUTEROL SULFATE 2 PUFF: 90 AEROSOL, METERED RESPIRATORY (INHALATION) at 16:02

## 2022-09-28 RX ADMIN — PANTOPRAZOLE SODIUM 40 MG: 40 TABLET, DELAYED RELEASE ORAL at 13:53

## 2022-09-28 RX ADMIN — PANTOPRAZOLE SODIUM 40 MG: 40 TABLET, DELAYED RELEASE ORAL at 06:39

## 2022-09-28 RX ADMIN — FOLIC ACID 1 MG: 1 TABLET ORAL at 13:53

## 2022-09-28 ASSESSMENT — PAIN DESCRIPTION - LOCATION
LOCATION: ABDOMEN

## 2022-09-28 ASSESSMENT — PAIN DESCRIPTION - ORIENTATION
ORIENTATION: MID
ORIENTATION: MID
ORIENTATION: UPPER;MID
ORIENTATION: MID

## 2022-09-28 ASSESSMENT — PAIN DESCRIPTION - PAIN TYPE
TYPE: ACUTE PAIN
TYPE: ACUTE PAIN;CHRONIC PAIN
TYPE: ACUTE PAIN

## 2022-09-28 ASSESSMENT — PAIN DESCRIPTION - FREQUENCY
FREQUENCY: CONTINUOUS

## 2022-09-28 ASSESSMENT — PAIN DESCRIPTION - ONSET
ONSET: ON-GOING
ONSET: ON-GOING
ONSET: GRADUAL

## 2022-09-28 ASSESSMENT — PAIN SCALES - GENERAL
PAINLEVEL_OUTOF10: 4
PAINLEVEL_OUTOF10: 0
PAINLEVEL_OUTOF10: 5
PAINLEVEL_OUTOF10: 6
PAINLEVEL_OUTOF10: 0
PAINLEVEL_OUTOF10: 3

## 2022-09-28 ASSESSMENT — PAIN DESCRIPTION - DESCRIPTORS
DESCRIPTORS: SHARP;STABBING
DESCRIPTORS: SHARP;STABBING
DESCRIPTORS: DULL
DESCRIPTORS: SHARP;STABBING

## 2022-09-28 ASSESSMENT — ENCOUNTER SYMPTOMS
BLOOD IN STOOL: 1
EYES NEGATIVE: 1
ALLERGIC/IMMUNOLOGIC NEGATIVE: 1
RESPIRATORY NEGATIVE: 1

## 2022-09-28 ASSESSMENT — PAIN - FUNCTIONAL ASSESSMENT
PAIN_FUNCTIONAL_ASSESSMENT: ACTIVITIES ARE NOT PREVENTED
PAIN_FUNCTIONAL_ASSESSMENT: ACTIVITIES ARE NOT PREVENTED

## 2022-09-28 NOTE — PROGRESS NOTES
Pt back on floor from dialysis. Pt tolerated dialysis well per dialysis team. Transport back to floor tolerated well. VS stable.

## 2022-09-28 NOTE — PROCEDURES
PATIENT COMPLETED A 3 HOUR HD TREATMENT, NO FLUID WAS REMOVED AS ORDERED. RIGHT AVF WAS USED FOR ACCESS, CANNULATED WITH NO DIFFICULTIES. POST TREATMENT NEEDLES WERE REMOVED, PRESSURE HELD FOR 10 MINUTES AND SITES DRESSED WITH GAUZE. NO MEDS GIVEN WITH TREATMENT. TREATMENT COMPLETED BY:  Gio Espinoza    Patient Name: Cassandar Squires  Patient : 1955  MRN: [id number]     Acct: [de-identified]  Date of Admission: 2022  Room/Bed: -A  Code Status:  Full Code  Allergies: Allergies   Allergen Reactions    Penicillins Swelling    Lorazepam      Other reaction(s): Other - comment required  hallucination     Diagnosis:    Patient Active Problem List   Diagnosis    Choledocholithiasis    Chronic kidney disease (CKD), stage III (moderate) (HCC)    Abnormality of lung on CXR    S/P BKA (below knee amputation) unilateral (HCC)    Cholangitis    Acute calculous cholecystitis    Abdominal adhesions    Atrophic kidney    COPD (chronic obstructive pulmonary disease) (HCC)    HTN (hypertension)    PAD (peripheral artery disease) (HCC)    Proteinuria    Chronic kidney disease (CKD) stage G3b/A3, moderately decreased glomerular filtration rate (GFR) between 30-44 mL/min/1.73 square meter and albuminuria creatinine ratio greater than 300 mg/g (HCC)    Metabolic acidosis    ASCVD (arteriosclerotic cardiovascular disease)    Acute on chronic respiratory failure with hypoxia (HCC)    Acute GI bleeding         Treatment:  Hemodilaysis 2:1  Priority: Routine  Location: Acute Room    Diabetic: No  NPO: No  Isolation Precautions: Dialysis     Consent for Treatment Verified: Yes  Blood Consent Verified: Not Applicable     Safety Verified: Identify (I), Consent (C), Equipment (E), HepB Status (B), Orders Complete (O), Access Verified (A), and Timeliness (T)  Time out performed prior to access at 0800 hours. Report Received from Primary RN at 0730 hours.   Primary RN (First Initial, Last Name, Title): CHUY SEVILLA RN  Incapacitated Nurse Education Completed: Not Applicable     HBsAg ONLY:  Date Drawn: September 2, 2022       Results: Negative  HBsAb:  Date Drawn:  September 28, 2022       Results: Unknown    Order  Dialysis Bath  K+ (Potassium): 3  Ca+ (Calcium): 2.5  Na+ (Sodium): 137  HCO3 (Bicarb): 30  Bicarbonate Concentrate Lot No.: [id number]  Acid Concentrate Lot No.: [id number]     Na+ Modeling: Not Applicable  Dialyzer: C185  Dialysate Temperature (C):  35  Blood Flow Rate (BFR):  300   Dialysate Flow Rate (DFR):   500        Access to be Utilized   Access: AVF  Location: Upper Extremity  Side: Right   Needle gauge:  16  + Bruit/Thrill: Yes    Site Assessment:  Signs and Symptoms of Infection/Inflammation: None  If yes: Not Applicable    Site Prep: Medical Aseptic Technique  Gauze Dressing?: Yes  Non Dialysis Use?: No  Comment:    10 SECOND ACCESS CHECK COMPLETED, BRUIT AND THRILL PRESENT, NO S/S OF REDNESS OR SWELLING. CANNULATED X2 WITH NO PROBLEMS NOTED. Flows: Good, Patent  If access problem, who was notified:     Pre and Post-Assessment  Patient Vitals for the past 8 hrs:   Level of Consciousness Heart Rhythm Respiratory Quality/Effort O2 Device Bilateral Breath Sounds Skin Condition/Temp Abdomen Inspection Bowel Sounds (All Quadrants) Edema   09/28/22 0742 0 -- Unlabored None (Room air) -- Dry;Warm Rounded;Obese; Soft -- --   09/28/22 0745 0 Regular Unlabored None (Room air) Diminished Dry; Warm Rounded;Obese; Soft Active None   09/28/22 0826 -- -- -- None (Room air) -- -- -- -- --   09/28/22 1115 0 Regular Unlabored None (Room air) Diminished Dry; Warm Rounded;Obese; Soft Active None   09/28/22 1127 -- -- -- None (Room air) -- -- -- -- --     Labs  Recent Labs     09/27/22  0458 09/27/22  1020 09/28/22  1008   WBC 17.7* 14.2* 14.4*   HGB 9.1* 9.7* 8.8*   HCT 27.2* 29.3* 25.9*    210 197                                                                  Recent Labs     09/27/22  0335 09/27/22  0458 09/28/22  0625    138 133*   K 4.2 3.8 3.8    104 103   CO2 18* 18* 15*   BUN 89* 90* 81*   CREATININE 8.1* 8.2* 8.1*   GLUCOSE 88 86 91     IV Drips and Rate/Dose   sodium chloride      sodium chloride        Safety - Before each treatment:   Dialysis Machine No.: [id number]  RO Machine Number: [id number]  Dialyzer Lot No.: [id number]  RO Machine Log Sheet Completed: Yes  Machine Alarm Self Test: Completed; Passed (09/28/22 0800)     Air Foam Detector: Tested, Proper Function  Extracorporeal Circuit Tested for Integrity: Yes  Machine Conductivity: 13.8  Manual Conductivity: 14     Bicarbonate Concentrate Lot No.: [id number]  Acid Concentrate Lot No.: [id number]  Manual Ph: 7.2  Bleach Test (Neg): Yes  Bath Temperature: 95 °F (35 °C)  Tubing Lot#: [id number]  Conductivity Meter Serial #: [id number]  All Connections Secure?: Yes  Venous Parameters Set?: Yes  Arterial Parameters Set?: Yes  Saline Line Double Clamped?: Yes  Air Foam Detector Engaged?: Yes  Machine Functioning Alarm Free?  Yes  Prime Given: 200ml    Chlorine Testing - Before each treatment and every 4 hours:   Treatment  Treatment Number: 1  Time On: 0803  Time Off: 1100  Treatment Goal: 3 HOUR, 500ML  Weight: 241 lb 6.5 oz (109.5 kg) (09/28/22 1100)  1st check: less than 0.1 ppm at: 0650 hours  2nd check: less than 0.1 ppm at: 1015 hours  3rd check: Not Applicable  (if greater than 0.1 ppm, then check every 30 minutes from secondary)    Access Flows and Pressures  Patient Vitals for the past 8 hrs:   Blood Flow Rate (ml/min) Ultrafiltration Rate (ml/hr) Arterial Pressure (mmHg) Venous Pressure (mmHg) TMP DFR Comments Access Visible   09/28/22 0803 300 ml/min 170 ml/hr -70 mmHg 180 50 500 TX STARTED, PRIME GIVEN, LINES SECURE AND CALL LIGHT WITHIN REACH Yes   09/28/22 0815 300 ml/min 170 ml/hr -70 mmHg 170 50 500 AWAKE AND ALERT Yes   09/28/22 0830 300 ml/min 70 ml/hr -70 mmHg 170 60 500 AWAKE AND ALERT Yes   09/28/22 0845 300 ml/min 170 ml/hr -70 mmHg 180 50 500 c/o stomach pain Yes   09/28/22 0900 300 ml/min 170 ml/hr -80 mmHg 170 60 500 uf off d/t stomach pain Yes   09/28/22 0915 300 ml/min 170 ml/hr -110 mmHg 160 60 500 uf on Yes   09/28/22 0930 300 ml/min 170 ml/hr -110 mmHg 160 60 500 AWAKE AND ALERT Yes   09/28/22 0945 300 ml/min 170 ml/hr -110 mmHg 170 60 500 AWAKE AND ALERT Yes   09/28/22 1000 300 ml/min 170 ml/hr -110 mmHg 170 60 500 AWAKE AND ALERT Yes   09/28/22 1015 300 ml/min 170 ml/hr -140 mmHg 170 60 500 AWAKE AND ALERT Yes   09/28/22 1030 300 ml/min 170 ml/hr -180 mmHg 150 60 500 AWAKE AND TALKING Yes   09/28/22 1045 300 ml/min 170 ml/hr -180 mmHg 150 60 500 AWAKE AND ALERT Yes   09/28/22 1100 200 ml/min 0 ml/hr -20 mmHg 60 60 500 TX ENDED, RINSEBACK GIVEN Yes     Vital Signs  Patient Vitals for the past 8 hrs:   BP Temp Pulse Resp SpO2 Weight Weight Method Percent Weight Change   09/28/22 0500 (!) 129/52 -- 73 18 98 % -- -- --   09/28/22 0600 133/74 -- 71 21 -- -- -- --   09/28/22 0700 131/61 -- 75 16 -- -- -- --   09/28/22 0742 (!) 142/70 97.6 °F (36.4 °C) 73 19 99 % -- -- --   09/28/22 0745 (!) 142/70 -- 73 14 94 % -- -- --   09/28/22 0803 (!) 141/71 98.1 °F (36.7 °C) 70 17 99 % 240 lb 8.4 oz (109.1 kg) Bed scale 5.82   09/28/22 0815 (!) 148/80 -- 79 19 -- -- -- --   09/28/22 0830 (!) 154/73 -- 71 19 -- -- -- --   09/28/22 0845 (!) 150/69 -- 76 19 -- -- -- --   09/28/22 0900 (!) 142/67 -- 78 17 -- -- -- --   09/28/22 0915 (!) 154/74 -- 85 16 -- -- -- --   09/28/22 0930 (!) 168/82 -- 85 16 -- -- -- --   09/28/22 0945 (!) 155/69 -- 85 16 -- -- -- --   09/28/22 1000 (!) 148/63 -- 87 18 -- -- -- --   09/28/22 1015 (!) 146/55 -- 83 16 -- -- -- --   09/28/22 1030 (!) 151/76 -- 86 16 -- -- -- --   09/28/22 1045 (!) 156/100 -- 90 21 -- -- -- --   09/28/22 1100 (!) 146/84 98.1 °F (36.7 °C) 87 17 99 % 241 lb 6.5 oz (109.5 kg) Bed scale 0.37   09/28/22 1115 (!) 142/75 -- 90 16 -- -- -- --   09/28/22 1127 (!) 152/66 -- 91 17 96 % -- -- --   09/28/22 1128 -- -- 91 11 -- -- -- --     Post-Dialysis  Machine Disinfection Process: Exterior Machine Disinfection  Rinseback Volume (ml): 300 ml  Blood Volume Processed (Liters): 48.3 l/min  Dialyzer Clearance: Lightly streaked  Duration of Treatment (minutes): 180 minutes     Hemodialysis Intake (ml): 500 ml  Hemodialysis Output (ml): 500 ml     Tolerated Treatment: Good  Patient Response to Treatment: TOLERATED WELL  Physician Notified: Yes       Provider Notification        Handoff complete and report given to Primary RN at 1110 hours.   Primary RN (First Initial, Last Name, Title):  LORNA SANDHU RN     Education  Person Educated: Patient   Knowledge Base: Substantial  Barriers to Learning?: None  Preferred method of Learning: Oral  Topic(s): Access Care and Procedural   Teaching Tools: Explanation   Response to Education: Verbalized Understanding     Electronically signed by Belinda Morrison RN on 9/28/2022 at 12:05 PM

## 2022-09-28 NOTE — PROGRESS NOTES
General Surgery-Dr. Margot Alvarado Day: 3    Chief Complaint on Admission: GIB      Subjective:     Extubated yesterday. Tolerated liquids. Denies abdominal pain. No N/V. States he did have BM that was partially black. ROS:  Review of Systems   Constitutional:  Negative for appetite change and fatigue. HENT: Negative. Eyes: Negative. Respiratory: Negative. Gastrointestinal:  Positive for blood in stool. Musculoskeletal:  Positive for arthralgias. Skin: Negative. Allergic/Immunologic: Negative. Neurological: Negative. All other systems reviewed and are negative.     Allergies  Penicillins and Lorazepam          Diagnosis Date    Acid reflux     ARF (acute renal failure) (HCC)     with admission 12/18/2015- consult done with Dr Jai Lozano    Atrophy of left kidney     Back pain     \"Lower Back\"    Chronic bronchitis ([facility]     Chronic kidney disease     COPD (chronic obstructive pulmonary disease) ([facility]     NO PULMONOLOGIST AT THIS TIME    Emphysema/COPD ([facility]     NO PULMONOLOGIST AT THIS TIME    H. pylori infection Dx 1990's    Hemodialysis patient ([facility]     Hiatal hernia     History of blood transfusion 1987    NO REACTION TO BLOOD TRANSFUSION RECEIVED    History of respiratory failure     following surgery 12/18/2015- pt developed resp failure- placed on ventilator-unable to wean of vent- had trachesotomy tube placed 1/3/2015- off vent 1/7/2015( discharged 1/13/2015)\"per wife went to [place] after discharg and had to be sent to [facility] after had bleeding around the trach- they said the trach was to big- put back on ventilator for 24 hrs- then there for about a week then sent to ARU 2/15    Habematolel (hard of hearing)     Bilateral Ears    Hx of blood clots     HX OTHER MEDICAL     Primary Care Physician Is Dr. Kuldip Narvaez Right Renal Artery    HX OTHER MEDICAL     \"Dr. Kailash Lazar One Of My Kidneys Is Dead\"    [facility]     \"See  Hesham Cortes For Blood Being Too Thick\"(per old chart pt dx with polycythemia in  and has had several phlebotomies in the past(pc)( per wife Dr Galindo Peguero now says he does not really have polycythemia just from the COPD\"    [facility]    \"has drainage , clear drainage, since he was in ARU in 2015, under left shoulder, arm pit area\"    Hyperlipidemia     Hypertension     Lung nodule \"MRI, PET SCAN\"    \"Right Lung\"    Pneumonia \"Last Episode Early \"    \"At Least Twice\"( with admission 2015)    Shortness of breath on exertion     Teeth missing     Upper And Lower    Wears glasses        Objective:     Vitals:    22 0700   BP: 131/61   Pulse: 75   Resp: 16   Temp:    SpO2:        TEMPERATURE:  Current -Temp: 98.5 °F (36.9 °C); Max - Temp  Av.1 °F (36.7 °C)  Min: 97.2 °F (36.2 °C)  Max: 98.5 °F (36.9 °C)    No intake/output data recorded. I/O last 3 completed shifts: In: 612.4 [I.V.:208.1; Blood:356; IV Piggyback:48.3]  Out: 2720 [Urine:2420; Emesis/NG output:300]      Physical Exam:  Physical Exam  Vitals reviewed. Constitutional:       Appearance: He is obese. HENT:      Head: Normocephalic and atraumatic. Eyes:      General:         Right eye: No discharge. Left eye: No discharge. Cardiovascular:      Rate and Rhythm: Normal rate. Pulmonary:      Comments: Intubated  +ETT  +OGT  Abdominal:      Palpations: Abdomen is soft. Tenderness: There is no abdominal tenderness. Skin:     General: Skin is warm. Neurological:      General: No focal deficit present.          Scheduled Meds:   cloNIDine  0.1 mg Oral BID    pantoprazole  40 mg Oral QAM AC    sodium chloride flush  5-40 mL IntraVENous 2 times per day     ContinuousInfusions:   sodium chloride      sodium chloride       PRN Meds:hydrALAZINE, ALPRAZolam, sodium chloride, sodium chloride flush, sodium chloride, ondansetron **OR** ondansetron, acetaminophen **OR** [DISCONTINUED] acetaminophen      Labs/Imaging Results:   Lab Results   Component Value Date    WBC 14.2 (H) 09/27/2022    HGB 9.7 (L) 09/27/2022    HCT 29.3 (L) 09/27/2022    MCV 92.4 09/27/2022     09/27/2022     Lab Results   Component Value Date     (L) 09/28/2022    K 3.8 09/28/2022     09/28/2022    CO2 15 (L) 09/28/2022    BUN 81 (H) 09/28/2022    CREATININE 8.1 (H) 09/28/2022    GLUCOSE 91 09/28/2022    CALCIUM 8.1 (L) 09/28/2022    PROT 5.1 (L) 09/27/2022    LABALBU 3.5 09/27/2022    BILITOT 0.3 09/27/2022    ALKPHOS 49 09/27/2022    AST 15 09/27/2022    ALT 14 09/27/2022    LABGLOM 7 (L) 09/28/2022    GFRAA 8 (L) 09/28/2022     CTA A/P:  1. No GI bleed identified. 2. 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 There is minimal reconstitution of flow of the bilateral   external iliac arteries and minimal to no flow identified in the fem-fem   bypass, also chronic. 3. Bibasilar consolidation concerning for atelectasis with superimposed   infection. Emphysema is also present. 4. Severe diverticulosis without evidence of acute diverticulitis. 5. Atrophic kidneys, right worse than left. Assessment:       78 y/o M with GIB    Plan:       -Did well with extubation.     -Vitals and labs have been stable. -Reviewed GI plans: colonoscopy and possible SB evaluation as outpatient.     -No acute Gen Surg intervention necessary at this point.    -Ok to transfer to step down or floor from our standpoint. -F/u with me as outpatient on PRN basis.     -Above plan d/w pt and all questions answered.        Electronically signed by Breana Mendez II, MD on 9/28/2022 at 7:28 AM

## 2022-09-28 NOTE — PROGRESS NOTES
Pt  transferred to room 3018. Pt left with all belongings including leg, phone, , and bag of clothing.

## 2022-09-28 NOTE — PROGRESS NOTES
V2.0  [facility] Hospitalist Progress Note      Name:  Carlos Blum /Age/Sex: 1955  (79 y.o. male)   MRN & CSN:  [id number] & [id number] Encounter Date/Time: 2022 12:30 PM EDT    Location:  -A PCP: [address] [place] [address] Day: 3    Assessment and Plan:   Carlos Blum is a 79 y.o. male  who presents with Acute GI bleeding    1. Acute GI bleed  -Patient presenting with dark tarry stools  -Status post transfusion of 3 units of PRBC  -Status post EGD which showed mild gastritis and duodenitis. -CT of the abdomen and pelvis showed No GI bleed identified. 2. 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  -Plan to do a colonoscopy at a later date  -Continue to monitor hemoglobin and transfuse if less than 7  -Continue Protonix  -Hemoglobin stable. Will monitor overnight    2. ESRD on hemodialysis  -Nephrology consulted and following  -Continue inpatient schedule per nephro recs    3. Acute on chronic respiratory failure secondary to acute blood loss due to GI bleed  -S/p extubation 2022  -Continue supplemental oxygen  -Breathing treatments as needed    4. COPD  -Not in acute exacerbation  -Continue breathing treatments  -Continue supplemental oxygen    5. Rest of chronic medical conditions. Hypertension. Hyperlipidemia. Continue medical management as appropriate    Diet ADULT DIET; Full Liquid   DVT Prophylaxis [] Lovenox, []  Heparin, [] SCDs, [] Ambulation,  [] Eliquis, [] Xarelto  [] Coumadin   Code Status Full Code   Disposition From: Home  Expected Disposition: Home  Estimated Date of Discharge: TBD  Patient requires continued admission due to GI bleed         Subjective:     Chief Complaint: Rectal Bleeding (Rectal bleeding x 2 hours/)     Patient seen and examined at bedside today. Reports poor sleep overnight. Heart hemodialysis session today. Denies any chest pain, shortness of breath, nausea vomiting, fever or chills. Reports bowel movement and denies any overt bleeding noted  Objective: Intake/Output Summary (Last 24 hours) at 9/28/2022 1450  Last data filed at 9/28/2022 1128  Gross per 24 hour   Intake 500 ml   Output 3020 ml   Net -2520 ml        Vitals:   Vitals:    09/28/22 1401   BP: 134/68   Pulse: 91   Resp: 18   Temp:    SpO2: 100%       Physical Exam:     General: NAD  Eyes: EOMI  ENT: neck supple  Cardiovascular: Regular rate. Respiratory: Clear to auscultation  Gastrointestinal: Soft, non tender  Genitourinary: no suprapubic tenderness  Musculoskeletal: No edema. Left BKA  Skin: warm, dry  Neuro: Alert. Psych: Mood appropriate.      Medications:   Medications:    atorvastatin  40 mg Oral Daily    azelastine  1 spray Each Nostril BID    folic acid  1 mg Oral Daily    tamsulosin  0.8 mg Oral Daily    pantoprazole  40 mg Oral Daily    magnesium oxide  400 mg Oral Daily    ipratropium-albuterol  1 vial Inhalation Q4H    citalopram  20 mg Oral Daily    Vitamin D  2,000 Units Oral Daily    cloNIDine  0.1 mg Oral BID    sodium chloride flush  5-40 mL IntraVENous 2 times per day      Infusions:    sodium chloride      sodium chloride       PRN Meds: HYDROcodone-acetaminophen, 1 tablet, Q6H PRN  ALPRAZolam, 0.25 mg, BID PRN  sodium chloride, , PRN  sodium chloride flush, 5-40 mL, PRN  sodium chloride, , PRN  ondansetron, 4 mg, Q8H PRN   Or  ondansetron, 4 mg, Q6H PRN  acetaminophen, 650 mg, Q6H PRN      Labs      Recent Results (from the past 24 hour(s))   Basic Metabolic Panel    Collection Time: 09/28/22  6:25 AM   Result Value Ref Range    Sodium 133 (L) 135 - 145 MMOL/L    Potassium 3.8 3.5 - 5.1 MMOL/L    Chloride 103 99 - 110 mMol/L    CO2 15 (L) 21 - 32 MMOL/L    Anion Gap 15 4 - 16    BUN 81 (H) 6 - 23 MG/DL    Creatinine 8.1 (H) 0.9 - 1.3 MG/DL    Glucose 91 70 - 99 MG/DL    Calcium 8.1 (L) 8.3 - 10.6 MG/DL    GFR Non- 7 (L) >60 mL/min/1.73m2    GFR  8 (L) >60 mL/min/1.73m2 Magnesium    Collection Time: 09/28/22  6:25 AM   Result Value Ref Range    Magnesium 3.2 (H) 1.8 - 2.4 mg/dl   Phosphorus    Collection Time: 09/28/22  6:25 AM   Result Value Ref Range    Phosphorus 3.6 2.5 - 4.9 MG/DL   CBC    Collection Time: 09/28/22 10:08 AM   Result Value Ref Range    WBC 14.4 (H) 4.0 - 10.5 K/CU MM    RBC 2.87 (L) 4.6 - 6.2 M/CU MM    Hemoglobin 8.8 (L) 13.5 - 18.0 GM/DL    Hematocrit 25.9 (L) 42 - 52 %    MCV 90.2 78 - 100 FL    MCH 30.7 27 - 31 PG    MCHC 34.0 32.0 - 36.0 %    RDW 17.5 (H) 11.7 - 14.9 %    Platelets 142 903 - 137 K/CU MM    MPV 9.5 7.5 - 11.1 FL        Imaging/Diagnostics Last 24 Hours   XR PELVIS (1-2 VIEWS)    Result Date: 9/26/2022  EXAMINATION: ONE XRAY VIEW OF THE PELVIS 9/26/2022 10:49 pm COMPARISON: None. HISTORY: ORDERING SYSTEM PROVIDED HISTORY: CVC placement Relevant Medical/Surgical History: Best possible image due to pt size and condition FINDINGS: *Right femoral catheter tip overlies the mid pelvis level; CVC is confirmed with tip at the right external iliac vein on CT performed immediately following this exam. *Rectal temperature probe noted. *No gross acute osseous abnormality. 1. Right femoral catheter tip overlies the mid pelvis level; CVC is confirmed with tip at the right external iliac vein on CT performed immediately following this exam. 2. Rectal temperature probe noted. 3. No gross acute osseous abnormality. XR CHEST PORTABLE    Result Date: 9/26/2022  EXAMINATION: ONE XRAY VIEW OF THE CHEST 9/26/2022 10:49 pm COMPARISON: Chest radiograph 05/18/2021 HISTORY: ORDERING SYSTEM PROVIDED HISTORY: Intubation FINDINGS: The cardiac silhouette is enlarged. The mediastinal and hilar silhouettes appear unremarkable. Chronic appearing coarse interstitial densities predominate perihilar regions and lung bases, typical of sequela from smoking or other previous infectious/inflammatory process.   The lungs are hyperinflated with increased translucency both lung apices and tapering of the vasculature in the upper lungs. Bibasilar consolidative changes greater left than right with probable left pleural effusion. No pneumothorax is seen. No acute osseous abnormality is identified. Endotracheal tube tip projects over the trachea, tip just above the level of the aortic knob, 6.5 cm superior to the inna. NG tube extends below the left hemidiaphragm, out of the field of view. 1.  Life support appliances appear appropriately positioned. 2. Bibasilar consolidative changes greater left than right with probable left pleural effusion. Pneumonia is a consideration. 3.  Pulmonary sequela typical of that seen with smoking, including COPD. 4. Cardiomegaly. XR ABDOMEN FOR NG/OG/NE TUBE PLACEMENT    Result Date: 9/27/2022  EXAMINATION: ONE SUPINE XRAY VIEW(S) OF THE ABDOMEN 9/26/2022 10:49 pm COMPARISON: None. HISTORY: ORDERING SYSTEM PROVIDED HISTORY: Confirmation of course of NG/OG/NE tube and location of tip of tube TECHNOLOGIST PROVIDED HISTORY: Reason for exam:->Confirmation of course of NG/OG/NE tube and location of tip of tube Portable? ->Yes Reason for Exam: Confirmation of course of NG/OG/NE tube and location of tip of tube FINDINGS: Nasogastric tube tip terminates in the distal stomach. The side port is beyond the GE junction. Mildly prominent small bowel loops measure up to 3.4 cm. No obvious free air or pneumatosis. No new osseous abnormality. 1. Nasogastric tube tip terminates in the distal stomach. 2. Nonspecific prominent loops of small bowel measuring up to 3.4 cm. CTA ABDOMEN PELVIS W WO CONTRAST    Result Date: 9/27/2022  EXAMINATION: CTA OF THE ABDOMEN AND PELVIS WITH AND WITHOUT CONTRAST 9/26/2022 11:40 pm: TECHNIQUE: CTA of the abdomen and pelvis was performed without and with the administration of intravenous contrast. Multiplanar reformatted images are provided for review. MIP images are provided for review.  Automated exposure control, iterative reconstruction, and/or weight based adjustment of the mA/kV was utilized to reduce the radiation dose to as low as reasonably achievable. COMPARISON: February 26, 2017. February 8, 2016. HISTORY: ORDERING SYSTEM PROVIDED HISTORY: Lower GI Bleed TECHNOLOGIST PROVIDED HISTORY: Reason for exam:->Lower GI Bleed Reason for Exam: Lower GI Bleed FINDINGS: CTA vascular: The abdominal aorta demonstrates severe atherosclerosis. There is complete occlusion of the abdominal aorta after the takeoff of renal arteries. Minimal reconstitution of flow is seen involving the bilateral external iliac arteries. There is minimal to no opacification of the fem-fem bypass graft. The celiac trunk and its branches including the splenic artery, left gastric artery, and common hepatic artery are patent and normal in course and caliber. SMA is also patent and normal in course and caliber. The bilateral renal arteries are attenuated with patchy flow resulting in atrophy of the bilateral kidneys, right worse than left. No significant opacification of the graph that runs along the left hemithorax and abdomen. CT chest: Lung bases: Emphysematous changes are present. Bibasilar consolidations are noted. CT abdomen and pelvis: Organs: The liver parenchyma, spleen, pancreas, and adrenal glands demonstrate no acute abnormality. Status post cholecystectomy. Pneumobilia is noted, presumably related to prior sphincterotomy. The bilateral kidneys are atrophic, right worse than left. No solid renal masses. No hydronephrosis or nephrolithiasis. GI/bowel: Nasogastric tube tip terminates in the distal stomach. A rectal tube is also present. The stomach, small bowel, and colon are normal in course caliber without evidence of wall thickening or obstruction. Normal appendix. Severe diverticulosis without evidence of acute diverticulitis. No evidence of bowel ischemia. Pelvic organs: Normal bladder.   Prostate and seminal vesicles are unremarkable. Peritoneal cavity/retroperitoneum: No free fluid or free air. No pathologic lymphadenopathy. Bones/soft tissues: No acute or aggressive osseous lesion. 1. No GI bleed identified. 2. 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 There is minimal reconstitution of flow of the bilateral external iliac arteries and minimal to no flow identified in the fem-fem bypass, also chronic. 3. Bibasilar consolidation concerning for atelectasis with superimposed infection. Emphysema is also present. 4. Severe diverticulosis without evidence of acute diverticulitis. 5. Atrophic kidneys, right worse than left.        Electronically signed by Thomas Palacio MD on 9/28/2022 at 2:50 PM

## 2022-09-28 NOTE — PROGRESS NOTES
Critical Care Progress Note 9/28/2022        Riri Heart  1955  [id number]      Assessment/Plan:    Acute respiratory failure with hypoxia  Gastrointestinal bleeding, status post EGD transfusion 3 units of packed red cells  Acute blood loss anemia  History of COPD/emphysema  End-stage renal disease  Peripheral vascular disease (prior left BKA)    Successfully extubated 09/27/2022  PPI  Serial H&H  HD support per nephrology service  Bronchodilators    Complex medical decisions required for evaluation and management, reviewed during critical care rounds. Nothing further to add from my perspective, transfer oiut of critical care per [facility] physician discretion. Subjective:    No acute overnight issues reported. Stable hemodynamics. Nursing reports no significant melena. Review of Systems   Unable to obtain review of systems in light of current circumstances. Physical Exam:         /61   Pulse 75   Temp 98.5 °F (36.9 °C) (Oral)   Resp 16   Ht 5' 8\" (1.727 m)   Wt 227 lb 4.7 oz (103.1 kg)   SpO2 98%   BMI 34.56 kg/m²       General: Chronically ill-appearing male,  vitals reviewed  Eyes: Reactive pupils  ENT: Intact hearing. No obvious nasal lesions. Neck supple  Cardiovascular: Regular rate, faint systolic murmur  Respiratory: Clear to auscultation  Gastrointestinal: Soft, non tender, minimally distended bowel sounds throughout  Genitourinary: no suprapubic tenderness  Musculoskeletal: No edema. Left BKA. Extremities are warm to palpation. Skin: warm, dry  Neuro: Follows commands, no obvious deficits. Psych: Mood unable to determine. Current Medications:   cloNIDine  0.1 mg Oral BID    pantoprazole  40 mg Oral QAM AC    sodium chloride flush  5-40 mL IntraVENous 2 times per day       Labs, Imaging and Studies reviewed:    XR PELVIS (1-2 VIEWS)    Result Date: 9/26/2022  EXAMINATION: ONE XRAY VIEW OF THE PELVIS 9/26/2022 10:49 pm COMPARISON: None.  HISTORY: ORDERING SYSTEM PROVIDED HISTORY: CVC placement Relevant Medical/Surgical History: Best possible image due to pt size and condition FINDINGS: *Right femoral catheter tip overlies the mid pelvis level; CVC is confirmed with tip at the right external iliac vein on CT performed immediately following this exam. *Rectal temperature probe noted. *No gross acute osseous abnormality. 1. Right femoral catheter tip overlies the mid pelvis level; CVC is confirmed with tip at the right external iliac vein on CT performed immediately following this exam. 2. Rectal temperature probe noted. 3. No gross acute osseous abnormality. XR CHEST PORTABLE    Result Date: 9/26/2022  EXAMINATION: ONE XRAY VIEW OF THE CHEST 9/26/2022 10:49 pm COMPARISON: Chest radiograph 05/18/2021 HISTORY: ORDERING SYSTEM PROVIDED HISTORY: Intubation FINDINGS: The cardiac silhouette is enlarged. The mediastinal and hilar silhouettes appear unremarkable. Chronic appearing coarse interstitial densities predominate perihilar regions and lung bases, typical of sequela from smoking or other previous infectious/inflammatory process. The lungs are hyperinflated with increased translucency both lung apices and tapering of the vasculature in the upper lungs. Bibasilar consolidative changes greater left than right with probable left pleural effusion. No pneumothorax is seen. No acute osseous abnormality is identified. Endotracheal tube tip projects over the trachea, tip just above the level of the aortic knob, 6.5 cm superior to the inna. NG tube extends below the left hemidiaphragm, out of the field of view. 1.  Life support appliances appear appropriately positioned. 2. Bibasilar consolidative changes greater left than right with probable left pleural effusion. Pneumonia is a consideration. 3.  Pulmonary sequela typical of that seen with smoking, including COPD. 4. Cardiomegaly.      XR ABDOMEN FOR NG/OG/NE TUBE PLACEMENT    Result Date: 9/27/2022  EXAMINATION: ONE SUPINE XRAY VIEW(S) OF THE ABDOMEN 9/26/2022 10:49 pm COMPARISON: None. HISTORY: ORDERING SYSTEM PROVIDED HISTORY: Confirmation of course of NG/OG/NE tube and location of tip of tube TECHNOLOGIST PROVIDED HISTORY: Reason for exam:->Confirmation of course of NG/OG/NE tube and location of tip of tube Portable? ->Yes Reason for Exam: Confirmation of course of NG/OG/NE tube and location of tip of tube FINDINGS: Nasogastric tube tip terminates in the distal stomach. The side port is beyond the GE junction. Mildly prominent small bowel loops measure up to 3.4 cm. No obvious free air or pneumatosis. No new osseous abnormality. 1. Nasogastric tube tip terminates in the distal stomach. 2. Nonspecific prominent loops of small bowel measuring up to 3.4 cm. CTA ABDOMEN PELVIS W WO CONTRAST    Result Date: 9/27/2022  EXAMINATION: CTA OF THE ABDOMEN AND PELVIS WITH AND WITHOUT CONTRAST 9/26/2022 11:40 pm: TECHNIQUE: CTA of the abdomen and pelvis was performed without and with the administration of intravenous contrast. Multiplanar reformatted images are provided for review. MIP images are provided for review. Automated exposure control, iterative reconstruction, and/or weight based adjustment of the mA/kV was utilized to reduce the radiation dose to as low as reasonably achievable. COMPARISON: February 26, 2017. February 8, 2016. HISTORY: ORDERING SYSTEM PROVIDED HISTORY: Lower GI Bleed TECHNOLOGIST PROVIDED HISTORY: Reason for exam:->Lower GI Bleed Reason for Exam: Lower GI Bleed FINDINGS: CTA vascular: The abdominal aorta demonstrates severe atherosclerosis. There is complete occlusion of the abdominal aorta after the takeoff of renal arteries. Minimal reconstitution of flow is seen involving the bilateral external iliac arteries. There is minimal to no opacification of the fem-fem bypass graft.  The celiac trunk and its branches including the splenic artery, left gastric artery, and common hepatic artery are patent and normal in course and caliber. SMA is also patent and normal in course and caliber. The bilateral renal arteries are attenuated with patchy flow resulting in atrophy of the bilateral kidneys, right worse than left. No significant opacification of the graph that runs along the left hemithorax and abdomen. CT chest: Lung bases: Emphysematous changes are present. Bibasilar consolidations are noted. CT abdomen and pelvis: Organs: The liver parenchyma, spleen, pancreas, and adrenal glands demonstrate no acute abnormality. Status post cholecystectomy. Pneumobilia is noted, presumably related to prior sphincterotomy. The bilateral kidneys are atrophic, right worse than left. No solid renal masses. No hydronephrosis or nephrolithiasis. GI/bowel: Nasogastric tube tip terminates in the distal stomach. A rectal tube is also present. The stomach, small bowel, and colon are normal in course caliber without evidence of wall thickening or obstruction. Normal appendix. Severe diverticulosis without evidence of acute diverticulitis. No evidence of bowel ischemia. Pelvic organs: Normal bladder. Prostate and seminal vesicles are unremarkable. Peritoneal cavity/retroperitoneum: No free fluid or free air. No pathologic lymphadenopathy. Bones/soft tissues: No acute or aggressive osseous lesion. 1. No GI bleed identified. 2. 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 There is minimal reconstitution of flow of the bilateral external iliac arteries and minimal to no flow identified in the fem-fem bypass, also chronic. 3. Bibasilar consolidation concerning for atelectasis with superimposed infection. Emphysema is also present. 4. Severe diverticulosis without evidence of acute diverticulitis. 5. Atrophic kidneys, right worse than left.        Recent Results (from the past 24 hour(s))   CBC    Collection Time: 09/27/22 10:20 AM   Result Value Ref Range    WBC 14.2 (H) 4.0 - 10.5 K/CU MM    RBC 3.17 (L) 4.6 - 6.2 M/CU MM    Hemoglobin 9.7 (L) 13.5 - 18.0 GM/DL    Hematocrit 29.3 (L) 42 - 52 %    MCV 92.4 78 - 100 FL    MCH 30.6 27 - 31 PG    MCHC 33.1 32.0 - 36.0 %    RDW 18.6 (H) 11.7 - 14.9 %    Platelets 337 497 - 677 K/CU MM    MPV 9.4 7.5 - 11.1 FL   Basic Metabolic Panel    Collection Time: 09/28/22  6:25 AM   Result Value Ref Range    Sodium 133 (L) 135 - 145 MMOL/L    Potassium 3.8 3.5 - 5.1 MMOL/L    Chloride 103 99 - 110 mMol/L    CO2 15 (L) 21 - 32 MMOL/L    Anion Gap 15 4 - 16    BUN 81 (H) 6 - 23 MG/DL    Creatinine 8.1 (H) 0.9 - 1.3 MG/DL    Glucose 91 70 - 99 MG/DL    Calcium 8.1 (L) 8.3 - 10.6 MG/DL    GFR Non- 7 (L) >60 mL/min/1.73m2    GFR  8 (L) >60 mL/min/1.73m2   Magnesium    Collection Time: 09/28/22  6:25 AM   Result Value Ref Range    Magnesium 3.2 (H) 1.8 - 2.4 mg/dl   Phosphorus    Collection Time: 09/28/22  6:25 AM   Result Value Ref Range    Phosphorus 3.6 2.5 - 4.9 MG/DL       Electronically signed by Trini Aguilar DO on 9/28/2022 at 7:48 AM     [phone]

## 2022-09-28 NOTE — PROGRESS NOTES
Pt HGB went from 9.7 yesterday at 1000 to 8.8 today at 1000. Perfect serve sent to Dr. Ashley Larios asking if still okay to transfer to [facility]. 1637: states yes it is okay for him to transfer to [facility]. States it was a critical care call and they were primary.

## 2022-09-28 NOTE — CARE COORDINATION
CM spoke with pt's wife re; discharge needs/concerns. Pt's wife works as an LPN at [employer]. Wife is expressing concern about loss of pay if pt needs increased care at discharge therefore  importance of getting pt up and moving as soon as able. Wife indicates that he can be little unstready at times even PTA. Cm asked pt's wife if pt would consider short rehab stay at [facility] given that she work there and would be there to check on him during day while working. Wife states that pt will not agree to SNF, even for short term rehab. Wife is aware that Homecare is an option and would be open to outpt or HC therapies. Pt has a rollator and std walker for use at home. Pt has prosthesis for old BKA. Perfect Serve to MD re; possible need for PT/OT evals when medically appropriate. Plan at this time is home with possible [facility]. and family support.

## 2022-09-28 NOTE — PROGRESS NOTES
Report given to dialysis nurse on pt status and plan of care. Pt tolerated transport well and was handed off to dialysis nurse.

## 2022-09-28 NOTE — PROGRESS NOTES
Nephrology Progress Note  9/28/2022 9:50 AM        Subjective:   Admit Date: 9/26/2022  PCP: Nighat Manzanares,     Interval History: Events leading up to, since admission briefly reviewed    Diet: Some    ROS: Epigastric pain, tolerating dialysis  No PND orthopnea or shortness of breath  Good urine output almost 2.42 L for the last 24 hours  No fever    Data:     Current meds:    cloNIDine  0.1 mg Oral BID    pantoprazole  40 mg Oral QAM AC    sodium chloride flush  5-40 mL IntraVENous 2 times per day      sodium chloride      sodium chloride           I/O last 3 completed shifts: In: 612.4 [I.V.:208.1; Blood:356; IV Piggyback:48.3]  Out: 2720 [Urine:2420; Emesis/NG output:300]    CBC:   Recent Labs     09/26/22 2153 09/27/22  0052 09/27/22  0335 09/27/22  0458 09/27/22  1020   WBC 21.4*  --   --  17.7* 14.2*   HGB 10.8*   < > 9.4* 9.1* 9.7*     --   --  193 210    < > = values in this interval not displayed. Recent Labs     09/27/22  0335 09/27/22  0458 09/28/22  0625    138 133*   K 4.2 3.8 3.8    104 103   CO2 18* 18* 15*   BUN 89* 90* 81*   CREATININE 8.1* 8.2* 8.1*   GLUCOSE 88 86 91       Lab Results   Component Value Date    CALCIUM 8.1 (L) 09/28/2022    PHOS 3.6 09/28/2022       Objective:     Vitals: BP (!) 155/69   Pulse 85   Temp 98.1 °F (36.7 °C)   Resp 16   Ht 5' 8\" (1.727 m)   Wt 240 lb 8.4 oz (109.1 kg)   SpO2 99%   BMI 36.57 kg/m² ,    General appearance: Alert, awake and oriented, tolerating dialysis although has some epigastric pain  HEENT: No gross conjunctival pallor or scleral icterus  Neck: Supple  Lungs:  Few rhonchi no crackles  Heart: Seems regular rate and rhythm  Abdomen: Soft, epigastric pain  Extremities: Left below-knee amputation, right leg no edema, right upper extremity AV graft with good thrill on palpation and good bruit auscultation      Problem List :         Impression :     End-stage kidney disease with incremental dialysis-still has good urine output and residual kidney function-we will dialyze today without any ultrafiltration  Underlying severe and diffuse atherosclerotic cardiovascular disease  Anemia requiring transfusion without clear-cut source-unless he bled from gastritis and rhinitis and rectally  Underlying COPD    Recommendation/Plan  :     Hemodialysis without ultrafiltration  Watch volume status closely and urine output  Watch for iatrogenic and nosocomial complication  Follow clinically      Kelsi Harrington MD MD

## 2022-09-29 LAB
ADENOVIRUS DETECTION BY PCR: NOT DETECTED
ANION GAP SERPL CALCULATED.3IONS-SCNC: 9 MMOL/L (ref 4–16)
BORDETELLA PARAPERTUSSIS BY PCR: NOT DETECTED
BORDETELLA PERTUSSIS PCR: NOT DETECTED
BUN BLDV-MCNC: 38 MG/DL (ref 6–23)
CALCIUM SERPL-MCNC: 8.5 MG/DL (ref 8.3–10.6)
CHLAMYDOPHILA PNEUMONIA PCR: NOT DETECTED
CHLORIDE BLD-SCNC: 106 MMOL/L (ref 99–110)
CO2: 27 MMOL/L (ref 21–32)
CORONAVIRUS 229E PCR: NOT DETECTED
CORONAVIRUS HKU1 PCR: NOT DETECTED
CORONAVIRUS NL63 PCR: NOT DETECTED
CORONAVIRUS OC43 PCR: NOT DETECTED
CREAT SERPL-MCNC: 6.1 MG/DL (ref 0.9–1.3)
GFR AFRICAN AMERICAN: 11 ML/MIN/1.73M2
GFR NON-AFRICAN AMERICAN: 9 ML/MIN/1.73M2
GLUCOSE BLD-MCNC: 93 MG/DL (ref 70–99)
HCT VFR BLD CALC: 26.3 % (ref 42–52)
HEMOGLOBIN: 8.4 GM/DL (ref 13.5–18)
HUMAN METAPNEUMOVIRUS PCR: NOT DETECTED
INFLUENZA A BY PCR: NOT DETECTED
INFLUENZA A H1 (2009) PCR: NOT DETECTED
INFLUENZA A H1 PANDEMIC PCR: NOT DETECTED
INFLUENZA A H3 PCR: NOT DETECTED
INFLUENZA B BY PCR: NOT DETECTED
MAGNESIUM: 2.6 MG/DL (ref 1.8–2.4)
MCH RBC QN AUTO: 30.3 PG (ref 27–31)
MCHC RBC AUTO-ENTMCNC: 31.9 % (ref 32–36)
MCV RBC AUTO: 94.9 FL (ref 78–100)
MYCOPLASMA PNEUMONIAE PCR: NOT DETECTED
PARAINFLUENZA 1 PCR: NOT DETECTED
PARAINFLUENZA 2 PCR: NOT DETECTED
PARAINFLUENZA 3 PCR: NOT DETECTED
PARAINFLUENZA 4 PCR: NOT DETECTED
PDW BLD-RTO: 17.5 % (ref 11.7–14.9)
PHOSPHORUS: 4.2 MG/DL (ref 2.5–4.9)
PLATELET # BLD: 179 K/CU MM (ref 140–440)
PMV BLD AUTO: 9.3 FL (ref 7.5–11.1)
POTASSIUM SERPL-SCNC: 3.6 MMOL/L (ref 3.5–5.1)
RBC # BLD: 2.77 M/CU MM (ref 4.6–6.2)
RHINOVIRUS ENTEROVIRUS PCR: NOT DETECTED
RSV PCR: NOT DETECTED
SARS-COV-2: NOT DETECTED
SODIUM BLD-SCNC: 142 MMOL/L (ref 135–145)
WBC # BLD: 9 K/CU MM (ref 4–10.5)

## 2022-09-29 PROCEDURE — 80048 BASIC METABOLIC PNL TOTAL CA: CPT

## 2022-09-29 PROCEDURE — 97165 OT EVAL LOW COMPLEX 30 MIN: CPT

## 2022-09-29 PROCEDURE — 1200000000 HC SEMI PRIVATE

## 2022-09-29 PROCEDURE — 94640 AIRWAY INHALATION TREATMENT: CPT

## 2022-09-29 PROCEDURE — 83735 ASSAY OF MAGNESIUM: CPT

## 2022-09-29 PROCEDURE — 94761 N-INVAS EAR/PLS OXIMETRY MLT: CPT

## 2022-09-29 PROCEDURE — 84100 ASSAY OF PHOSPHORUS: CPT

## 2022-09-29 PROCEDURE — 36415 COLL VENOUS BLD VENIPUNCTURE: CPT

## 2022-09-29 PROCEDURE — 97162 PT EVAL MOD COMPLEX 30 MIN: CPT

## 2022-09-29 PROCEDURE — 0202U NFCT DS 22 TRGT SARS-COV-2: CPT

## 2022-09-29 PROCEDURE — 6370000000 HC RX 637 (ALT 250 FOR IP): Performed by: INTERNAL MEDICINE

## 2022-09-29 PROCEDURE — 97116 GAIT TRAINING THERAPY: CPT

## 2022-09-29 PROCEDURE — 97530 THERAPEUTIC ACTIVITIES: CPT

## 2022-09-29 PROCEDURE — 85027 COMPLETE CBC AUTOMATED: CPT

## 2022-09-29 PROCEDURE — 6370000000 HC RX 637 (ALT 250 FOR IP): Performed by: STUDENT IN AN ORGANIZED HEALTH CARE EDUCATION/TRAINING PROGRAM

## 2022-09-29 PROCEDURE — 2580000003 HC RX 258: Performed by: STUDENT IN AN ORGANIZED HEALTH CARE EDUCATION/TRAINING PROGRAM

## 2022-09-29 PROCEDURE — 2700000000 HC OXYGEN THERAPY PER DAY

## 2022-09-29 RX ORDER — IPRATROPIUM BROMIDE AND ALBUTEROL SULFATE 2.5; .5 MG/3ML; MG/3ML
1 SOLUTION RESPIRATORY (INHALATION) 3 TIMES DAILY
Status: DISCONTINUED | OUTPATIENT
Start: 2022-09-29 | End: 2022-10-02 | Stop reason: HOSPADM

## 2022-09-29 RX ORDER — ALBUTEROL SULFATE 90 UG/1
2 AEROSOL, METERED RESPIRATORY (INHALATION) 3 TIMES DAILY
Status: DISCONTINUED | OUTPATIENT
Start: 2022-09-29 | End: 2022-09-29

## 2022-09-29 RX ORDER — ALBUTEROL SULFATE 90 UG/1
2 AEROSOL, METERED RESPIRATORY (INHALATION) 3 TIMES DAILY PRN
Status: DISCONTINUED | OUTPATIENT
Start: 2022-09-29 | End: 2022-10-02 | Stop reason: HOSPADM

## 2022-09-29 RX ADMIN — IPRATROPIUM BROMIDE AND ALBUTEROL SULFATE 3 ML: .5; 3 SOLUTION RESPIRATORY (INHALATION) at 19:37

## 2022-09-29 RX ADMIN — Medication 2 PUFF: at 08:19

## 2022-09-29 RX ADMIN — HYDROCODONE BITARTRATE AND ACETAMINOPHEN 1 TABLET: 5; 325 TABLET ORAL at 20:29

## 2022-09-29 RX ADMIN — AZELASTINE 1 SPRAY: 137 SPRAY, METERED NASAL at 08:06

## 2022-09-29 RX ADMIN — IPRATROPIUM BROMIDE AND ALBUTEROL SULFATE 3 ML: .5; 3 SOLUTION RESPIRATORY (INHALATION) at 14:56

## 2022-09-29 RX ADMIN — Medication 2000 UNITS: at 08:06

## 2022-09-29 RX ADMIN — CLONIDINE HYDROCHLORIDE 0.1 MG: 0.1 TABLET ORAL at 20:29

## 2022-09-29 RX ADMIN — CLONIDINE HYDROCHLORIDE 0.1 MG: 0.1 TABLET ORAL at 08:06

## 2022-09-29 RX ADMIN — Medication 400 MG: at 08:06

## 2022-09-29 RX ADMIN — CITALOPRAM HYDROBROMIDE 20 MG: 20 TABLET ORAL at 08:06

## 2022-09-29 RX ADMIN — SODIUM CHLORIDE, PRESERVATIVE FREE 10 ML: 5 INJECTION INTRAVENOUS at 08:06

## 2022-09-29 RX ADMIN — PANTOPRAZOLE SODIUM 40 MG: 40 TABLET, DELAYED RELEASE ORAL at 08:06

## 2022-09-29 RX ADMIN — TAMSULOSIN HYDROCHLORIDE 0.8 MG: 0.4 CAPSULE ORAL at 08:06

## 2022-09-29 RX ADMIN — SODIUM CHLORIDE, PRESERVATIVE FREE 10 ML: 5 INJECTION INTRAVENOUS at 20:30

## 2022-09-29 RX ADMIN — ATORVASTATIN CALCIUM 40 MG: 40 TABLET, FILM COATED ORAL at 08:06

## 2022-09-29 RX ADMIN — ALPRAZOLAM 0.25 MG: 0.25 TABLET ORAL at 20:29

## 2022-09-29 RX ADMIN — FOLIC ACID 1 MG: 1 TABLET ORAL at 08:06

## 2022-09-29 RX ADMIN — ALBUTEROL SULFATE 2 PUFF: 90 AEROSOL, METERED RESPIRATORY (INHALATION) at 08:19

## 2022-09-29 ASSESSMENT — PAIN DESCRIPTION - ORIENTATION: ORIENTATION: MID

## 2022-09-29 ASSESSMENT — PAIN DESCRIPTION - LOCATION
LOCATION: ABDOMEN
LOCATION: ABDOMEN

## 2022-09-29 ASSESSMENT — PAIN SCALES - GENERAL
PAINLEVEL_OUTOF10: 4
PAINLEVEL_OUTOF10: 6

## 2022-09-29 NOTE — PROGRESS NOTES
V2.0  [facility] Hospitalist Progress Note      Name:  Lopez Sargent /Age/Sex: 1955  (79 y.o. male)   MRN & CSN:  [id number] & [id number] Encounter Date/Time: 2022 12:30 PM EDT    Location:  7280/4809-B PCP: [address] [place] [address] Day: 4    Assessment and Plan:   Lopez Sargent is a 79 y.o. male  who presents with Acute GI bleeding    1. Acute GI bleed  -Patient presenting with dark tarry stools  -Status post transfusion of 3 units of PRBC  -Status post EGD which showed mild gastritis and duodenitis. -CT of the abdomen and pelvis showed No GI bleed identified. 2. 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  -Plan to do a colonoscopy 10/1  -Continue to monitor hemoglobin and transfuse if less than 7  -Continue Protonix  -Hemoglobin stable. Will monitor overnight    2. ESRD on hemodialysis  -Nephrology consulted and following  -Continue inpatient schedule per nephro recs    3. Acute on chronic respiratory failure secondary to acute blood loss due to GI bleed  -S/p extubation 2022  -Continue supplemental oxygen  -Breathing treatments as needed    4. COPD  -Not in acute exacerbation  -Continue breathing treatments  -Continue supplemental oxygen    5. Rest of chronic medical conditions. Hypertension. Hyperlipidemia. Continue medical management as appropriate    Diet ADULT DIET; Full Liquid   DVT Prophylaxis [] Lovenox, []  Heparin, [] SCDs, [] Ambulation,  [] Eliquis, [] Xarelto  [] Coumadin   Code Status Full Code   Disposition From: Home  Expected Disposition: Home  Estimated Date of Discharge: TBD  Patient requires continued admission due to GI bleed         Subjective:     Chief Complaint: Rectal Bleeding (Rectal bleeding x 2 hours/)     Patient seen and examined at bedside this morning. Reports some abdominal pain which he thinks is due to his ulcer. Denies any association with food or any alleviating factors.   Reports that it came on yesterday during dialysis and mainly located in the epigastric region. Reports history of testing positive for H. pylori and states that he was treated for it 1999. Denies chest pain, shortness of breath, nausea vomiting. Hemoglobin 8.4  Objective: Intake/Output Summary (Last 24 hours) at 9/29/2022 1252  Last data filed at 9/29/2022 4809  Gross per 24 hour   Intake --   Output 825 ml   Net -825 ml        Vitals:   Vitals:    09/29/22 0824   BP:    Pulse: 79   Resp: (!) 4   Temp:    SpO2: 93%       Physical Exam:     General: NAD  Eyes: EOMI  ENT: neck supple  Cardiovascular: Regular rate. Respiratory: Clear to auscultation  Gastrointestinal: Soft, non tender  Genitourinary: no suprapubic tenderness  Musculoskeletal: No edema. Left BKA  Skin: warm, dry  Neuro: Alert. Psych: Mood appropriate.      Medications:   Medications:    albuterol sulfate HFA  2 puff Inhalation TID    ipratropium  2 puff Inhalation TID    atorvastatin  40 mg Oral Daily    azelastine  1 spray Each Nostril BID    folic acid  1 mg Oral Daily    tamsulosin  0.8 mg Oral Daily    pantoprazole  40 mg Oral Daily    citalopram  20 mg Oral Daily    Vitamin D  2,000 Units Oral Daily    cloNIDine  0.1 mg Oral BID    sodium chloride flush  5-40 mL IntraVENous 2 times per day      Infusions:    sodium chloride      sodium chloride       PRN Meds: HYDROcodone-acetaminophen, 1 tablet, Q6H PRN  ipratropium-albuterol, 1 vial, Q4H PRN  ALPRAZolam, 0.25 mg, BID PRN  sodium chloride, , PRN  sodium chloride flush, 5-40 mL, PRN  sodium chloride, , PRN  ondansetron, 4 mg, Q8H PRN   Or  ondansetron, 4 mg, Q6H PRN  acetaminophen, 650 mg, Q6H PRN      Labs      Recent Results (from the past 24 hour(s))   Urinalysis    Collection Time: 09/28/22  8:45 PM   Result Value Ref Range    Color, UA YELLOW     Clarity, UA CLEAR     Glucose, Urine NEGATIVE MG/DL    Bilirubin Urine NEGATIVE     Ketones, Urine NEGATIVE MG/DL    Specific Gravity, UA 1.010     Blood, Urine NEGATIVE     pH, Urine 8.0     Protein, UA 30 MG/DL    Urobilinogen, Urine 0.2 MG/DL    Nitrite Urine, Quantitative NEGATIVE     Leukocyte Esterase, Urine NEGATIVE    Microscopic Urinalysis    Collection Time: 09/28/22  8:45 PM   Result Value Ref Range    RBC, UA <1 /HPF    WBC, UA <1 /HPF    Bacteria, UA NEGATIVE /HPF   Basic Metabolic Panel    Collection Time: 09/29/22  5:36 AM   Result Value Ref Range    Sodium 142 135 - 145 MMOL/L    Potassium 3.6 3.5 - 5.1 MMOL/L    Chloride 106 99 - 110 mMol/L    CO2 27 21 - 32 MMOL/L    Anion Gap 9 4 - 16    BUN 38 (H) 6 - 23 MG/DL    Creatinine 6.1 (H) 0.9 - 1.3 MG/DL    Glucose 93 70 - 99 MG/DL    Calcium 8.5 8.3 - 10.6 MG/DL    GFR Non- 9 (L) >60 mL/min/1.73m2    GFR  11 (L) >60 mL/min/1.73m2   Magnesium    Collection Time: 09/29/22  5:36 AM   Result Value Ref Range    Magnesium 2.6 (H) 1.8 - 2.4 mg/dl   Phosphorus    Collection Time: 09/29/22  5:36 AM   Result Value Ref Range    Phosphorus 4.2 2.5 - 4.9 MG/DL   CBC    Collection Time: 09/29/22  5:36 AM   Result Value Ref Range    WBC 9.0 4.0 - 10.5 K/CU MM    RBC 2.77 (L) 4.6 - 6.2 M/CU MM    Hemoglobin 8.4 (L) 13.5 - 18.0 GM/DL    Hematocrit 26.3 (L) 42 - 52 %    MCV 94.9 78 - 100 FL    MCH 30.3 27 - 31 PG    MCHC 31.9 (L) 32.0 - 36.0 %    RDW 17.5 (H) 11.7 - 14.9 %    Platelets 700 365 - 668 K/CU MM    MPV 9.3 7.5 - 11.1 FL        Imaging/Diagnostics Last 24 Hours   XR PELVIS (1-2 VIEWS)    Result Date: 9/26/2022  EXAMINATION: ONE XRAY VIEW OF THE PELVIS 9/26/2022 10:49 pm COMPARISON: None. HISTORY: ORDERING SYSTEM PROVIDED HISTORY: CVC placement Relevant Medical/Surgical History: Best possible image due to pt size and condition FINDINGS: *Right femoral catheter tip overlies the mid pelvis level; CVC is confirmed with tip at the right external iliac vein on CT performed immediately following this exam. *Rectal temperature probe noted. *No gross acute osseous abnormality.      1. Right femoral catheter tip overlies the mid pelvis level; CVC is confirmed with tip at the right external iliac vein on CT performed immediately following this exam. 2. Rectal temperature probe noted. 3. No gross acute osseous abnormality. XR CHEST PORTABLE    Result Date: 9/26/2022  EXAMINATION: ONE XRAY VIEW OF THE CHEST 9/26/2022 10:49 pm COMPARISON: Chest radiograph 05/18/2021 HISTORY: ORDERING SYSTEM PROVIDED HISTORY: Intubation FINDINGS: The cardiac silhouette is enlarged. The mediastinal and hilar silhouettes appear unremarkable. Chronic appearing coarse interstitial densities predominate perihilar regions and lung bases, typical of sequela from smoking or other previous infectious/inflammatory process. The lungs are hyperinflated with increased translucency both lung apices and tapering of the vasculature in the upper lungs. Bibasilar consolidative changes greater left than right with probable left pleural effusion. No pneumothorax is seen. No acute osseous abnormality is identified. Endotracheal tube tip projects over the trachea, tip just above the level of the aortic knob, 6.5 cm superior to the inna. NG tube extends below the left hemidiaphragm, out of the field of view. 1.  Life support appliances appear appropriately positioned. 2. Bibasilar consolidative changes greater left than right with probable left pleural effusion. Pneumonia is a consideration. 3.  Pulmonary sequela typical of that seen with smoking, including COPD. 4. Cardiomegaly. XR ABDOMEN FOR NG/OG/NE TUBE PLACEMENT    Result Date: 9/27/2022  EXAMINATION: ONE SUPINE XRAY VIEW(S) OF THE ABDOMEN 9/26/2022 10:49 pm COMPARISON: None. HISTORY: ORDERING SYSTEM PROVIDED HISTORY: Confirmation of course of NG/OG/NE tube and location of tip of tube TECHNOLOGIST PROVIDED HISTORY: Reason for exam:->Confirmation of course of NG/OG/NE tube and location of tip of tube Portable? ->Yes Reason for Exam: Confirmation of course of NG/OG/NE tube and location of tip of tube FINDINGS: Nasogastric tube tip terminates in the distal stomach. The side port is beyond the GE junction. Mildly prominent small bowel loops measure up to 3.4 cm. No obvious free air or pneumatosis. No new osseous abnormality. 1. Nasogastric tube tip terminates in the distal stomach. 2. Nonspecific prominent loops of small bowel measuring up to 3.4 cm. CTA ABDOMEN PELVIS W WO CONTRAST    Result Date: 9/27/2022  EXAMINATION: CTA OF THE ABDOMEN AND PELVIS WITH AND WITHOUT CONTRAST 9/26/2022 11:40 pm: TECHNIQUE: CTA of the abdomen and pelvis was performed without and with the administration of intravenous contrast. Multiplanar reformatted images are provided for review. MIP images are provided for review. Automated exposure control, iterative reconstruction, and/or weight based adjustment of the mA/kV was utilized to reduce the radiation dose to as low as reasonably achievable. COMPARISON: February 26, 2017. February 8, 2016. HISTORY: ORDERING SYSTEM PROVIDED HISTORY: Lower GI Bleed TECHNOLOGIST PROVIDED HISTORY: Reason for exam:->Lower GI Bleed Reason for Exam: Lower GI Bleed FINDINGS: CTA vascular: The abdominal aorta demonstrates severe atherosclerosis. There is complete occlusion of the abdominal aorta after the takeoff of renal arteries. Minimal reconstitution of flow is seen involving the bilateral external iliac arteries. There is minimal to no opacification of the fem-fem bypass graft. The celiac trunk and its branches including the splenic artery, left gastric artery, and common hepatic artery are patent and normal in course and caliber. SMA is also patent and normal in course and caliber. The bilateral renal arteries are attenuated with patchy flow resulting in atrophy of the bilateral kidneys, right worse than left. No significant opacification of the graph that runs along the left hemithorax and abdomen. CT chest: Lung bases: Emphysematous changes are present. Bibasilar consolidations are noted. CT abdomen and pelvis: Organs: The liver parenchyma, spleen, pancreas, and adrenal glands demonstrate no acute abnormality. Status post cholecystectomy. Pneumobilia is noted, presumably related to prior sphincterotomy. The bilateral kidneys are atrophic, right worse than left. No solid renal masses. No hydronephrosis or nephrolithiasis. GI/bowel: Nasogastric tube tip terminates in the distal stomach. A rectal tube is also present. The stomach, small bowel, and colon are normal in course caliber without evidence of wall thickening or obstruction. Normal appendix. Severe diverticulosis without evidence of acute diverticulitis. No evidence of bowel ischemia. Pelvic organs: Normal bladder. Prostate and seminal vesicles are unremarkable. Peritoneal cavity/retroperitoneum: No free fluid or free air. No pathologic lymphadenopathy. Bones/soft tissues: No acute or aggressive osseous lesion. 1. No GI bleed identified. 2. 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 There is minimal reconstitution of flow of the bilateral external iliac arteries and minimal to no flow identified in the fem-fem bypass, also chronic. 3. Bibasilar consolidation concerning for atelectasis with superimposed infection. Emphysema is also present. 4. Severe diverticulosis without evidence of acute diverticulitis. 5. Atrophic kidneys, right worse than left.        Electronically signed by Lela Mohamud MD on 9/29/2022 at 12:52 PM

## 2022-09-29 NOTE — RT PROTOCOL NOTE
RT Inhaler-Nebulizer Bronchodilator Protocol Note    There is a bronchodilator order in the chart from a provider indicating to follow the RT Bronchodilator Protocol and there is an Initiate RT Inhaler-Nebulizer Bronchodilator Protocol order as well (see protocol at bottom of note). CXR Findings:  No results found. The findings from the last RT Protocol Assessment were as follows:   History Pulmonary Disease: Chronic pulmonary disease  Respiratory Pattern: Regular pattern and RR 12-20 bpm  Breath Sounds: Inspiratory and expiratory or bilateral wheezing and/or rhonchi  Cough: Strong, productive  Indication for Bronchodilator Therapy: On home bronchodilators  Bronchodilator Assessment Score: 9    Aerosolized bronchodilator medication orders have been revised according to the RT Inhaler-Nebulizer Bronchodilator Protocol below. Respiratory Therapist to perform RT Therapy Protocol Assessment initially then follow the protocol. Repeat RT Therapy Protocol Assessment PRN for score 0-3 or on second treatment, BID, and PRN for scores above 3. No Indications - adjust the frequency to every 6 hours PRN wheezing or bronchospasm, if no treatments needed after 48 hours then discontinue using Per Protocol order mode. If indication present, adjust the RT bronchodilator orders based on the Bronchodilator Assessment Score as indicated below. Use Inhaler orders unless patient has one or more of the following: on home nebulizer, not able to hold breath for 10 seconds, is not alert and oriented, cannot activate and use MDI correctly, or respiratory rate 25 breaths per minute or more, then use the equivalent nebulizer order(s) with same Frequency and PRN reasons based on the score. If a patient is on this medication at home then do not decrease Frequency below that used at home.     0-3 - enter or revise RT bronchodilator order(s) to equivalent RT Bronchodilator order with Frequency of every 4 hours PRN for wheezing or increased work of breathing using Per Protocol order mode. 4-6 - enter or revise RT Bronchodilator order(s) to two equivalent RT bronchodilator orders with one order with BID Frequency and one order with Frequency of every 4 hours PRN wheezing or increased work of breathing using Per Protocol order mode. 7-10 - enter or revise RT Bronchodilator order(s) to two equivalent RT bronchodilator orders with one order with TID Frequency and one order with Frequency of every 4 hours PRN wheezing or increased work of breathing using Per Protocol order mode. 11-13 - enter or revise RT Bronchodilator order(s) to one equivalent RT bronchodilator order with QID Frequency and an Albuterol order with Frequency of every 4 hours PRN wheezing or increased work of breathing using Per Protocol order mode. Greater than 13 - enter or revise RT Bronchodilator order(s) to one equivalent RT bronchodilator order with every 4 hours Frequency and an Albuterol order with Frequency of every 2 hours PRN wheezing or increased work of breathing using Per Protocol order mode. RT to enter RT Home Evaluation for COPD & MDI Assessment order using Per Protocol order mode.     Electronically signed by Sue Krishna RCP on 9/29/2022 at 9:29 AM

## 2022-09-29 NOTE — PROGRESS NOTES
Nephrology Progress Note  9/29/2022 9:14 AM        Subjective:   Admit Date: 9/26/2022  PCP: Emiliano Chaudhari DO    Interval History: Tolerated dialysis without ultrafiltration yesterday  Also transferred out of ICU to medical floor now    Diet: Reasonable    ROS: Still has epigastric/abdominal pain, requiring pain medication  He still made almost 1.9 L of urine for the last 24 hours  No fever and acceptable blood pressure    Data:     Current meds:    atorvastatin  40 mg Oral Daily    azelastine  1 spray Each Nostril BID    folic acid  1 mg Oral Daily    tamsulosin  0.8 mg Oral Daily    pantoprazole  40 mg Oral Daily    magnesium oxide  400 mg Oral Daily    citalopram  20 mg Oral Daily    Vitamin D  2,000 Units Oral Daily    albuterol sulfate HFA  2 puff Inhalation 4x daily    ipratropium  2 puff Inhalation 4x daily    cloNIDine  0.1 mg Oral BID    sodium chloride flush  5-40 mL IntraVENous 2 times per day      sodium chloride      sodium chloride           I/O last 3 completed shifts:   In: 500   Out: 3495 [Urine:2995]    CBC:   Recent Labs     09/27/22  1020 09/28/22  1008 09/29/22  0536   WBC 14.2* 14.4* 9.0   HGB 9.7* 8.8* 8.4*    197 179          Recent Labs     09/27/22  0458 09/28/22  0625 09/29/22  0536    133* 142   K 3.8 3.8 3.6    103 106   CO2 18* 15* 27   BUN 90* 81* 38*   CREATININE 8.2* 8.1* 6.1*   GLUCOSE 86 91 93       Lab Results   Component Value Date    CALCIUM 8.5 09/29/2022    PHOS 4.2 09/29/2022       Objective:     Vitals: BP (!) 158/73   Pulse 79   Temp 98.2 °F (36.8 °C) (Oral)   Resp (!) 4   Ht 5' 8\" (1.727 m)   Wt 241 lb 14.4 oz (109.7 kg)   SpO2 93%   BMI 36.78 kg/m² ,    General appearance: Alert, awake and oriented  HEENT: Less conjunctival pallor  Neck: Supple  Lungs: Positive adventitious breath sound  Heart: Seems regular rate and rhythm, well-healed incision from previous sternotomy  Abdomen: Soft, tender epigastric area  Extremities: Left below-knee amputation, right lower extremity has no edema  Right upper extremity AV graft with good thrill on palpation group and auscultation      Problem List :         Impression :     End-stage kidney disease with good residual kidney function on incremental dialysis-excellent urine output-he will be dialyzed tomorrow  Abdominal pain little concerning-he does have risk for mesenteric ischemia-with diffuse atherosclerotic disease  Anemia requiring transfusion thought to be GI source  Sleep apnea and COPD    Recommendation/Plan  :     Hemodialysis tomorrow  Revisit abdominal pain and etiology  Watch for iatrogenic nosocomial complication  Follow clinically and biochemically      Medardo Rosas MD MD

## 2022-09-29 NOTE — PROGRESS NOTES
DOING FAIR C/O SOME ABD PAIN NO ACTIVE GIT BLEEDING NO BM TODAY  VITALS STABLE   LABS NOTED HB 8.4 AFTER 3 UNITS PRBCS  WILL CPM F/U H/H COLONOSCOPY 10/01/22 D/W RN

## 2022-09-29 NOTE — CONSULTS
[address] [facility],2[address] ACUTE CARE PHYSICAL THERAPY EVALUATION  Nathan Luevano, 1955, [id number], 9/29/2022    History  [place]:  The primary encounter diagnosis was Gastrointestinal hemorrhage, unspecified gastrointestinal hemorrhage type. A diagnosis of Hemorrhagic shock (HCC) was also pertinent to this visit. Patient  has a past medical history of Acid reflux, ARF (acute renal failure) (HCC), Atrophy of left kidney, Back pain, Chronic bronchitis (HCC), Chronic kidney disease, COPD (chronic obstructive pulmonary disease) ([facility] [address] Emphysema/COPD ([facility] [address] H. pylori infection, Hemodialysis patient ([facility] [address] Hiatal hernia, History of blood transfusion, History of respiratory failure, [place] (hard of hearing), Hx of blood clots, HX OTHER MEDICAL, HX OTHER MEDICAL, HX OTHER MEDICAL, HX OTHER MEDICAL, [address] [place] [address], Hyperlipidemia, Hypertension, Lung nodule, Pneumonia, Shortness of breath on exertion, Teeth missing, and Wears glasses. Patient  has a past surgical history that includes Leg amputation below knee (Left, 6-7-87); [place] tooth extraction; other surgical history (Mid 1980's); Endoscopy, colon, diagnostic (\"Once\"); Tonsillectomy (1962); tracheostomy (N/A, 1/3/15); Gastrostomy tube placement (Left, 1/3/15); vascular surgery; Colonoscopy (2011); Cholecystectomy, laparoscopic (02/27/2017); and Upper gastrointestinal endoscopy (N/A, 9/26/2022). Subjective:  Patient states:  \"I'm good to go home. I can prove it to you however you want. \"    Pain:  states stomach pain but does not rate.     Communication with other providers:  Handoff to RN, OT  Restrictions: general precautions, fall risk, O2    Home Setup/Prior level of function  Social/Functional History  Lives With: Spouse  Type of Home: House  Home Layout: One level  Home Access: Level entry  Bathroom Shower/Tub: Walk-in shower, Shower chair with back  Bathroom Toilet: Handicap height  Bathroom Equipment: Grab bars in shower, Grab bars around toilet  Home Equipment: Walker, rolling (prosthetic)  Has the patient had two or more falls in the past year or any fall with injury in the past year?: No  ADL Assistance: Independent  Homemaking Assistance: Needs assistance (wife assists)  Ambulation Assistance: Independent (typically no AD, walker PRN)  Transfer Assistance: Independent  Active : Yes  Occupation: Retired  Type of Occupation: [employer]  Additional Comments: supplemental O2 PRN 2-3 L    Examination of body systems (includes body structures/functions, activity/participation limitations):  Observation:  pt supine in bed upon arrival and agreeable to therapy  Vision:  [email or web address]  [facility]  Cardiopulmonary:  1.5 L O2, does not wear at baseline, 87% after ambulation, recovered to 93%  Cognition: WFL, see OT/SLP note for further evaluation. Musculoskeletal  ROM R/L:  WFL. Strength R/L:  4+/5, minimal impairment in function and endurance. Neuro:  WFL      Mobility:  Rolling L/R:  mod I  Supine to sit:  SBA  Transfers: pt completed STS from EOB and to chair CGA with safe seuqencing  Sitting balance:  good, pt donned prosthesis independently. Standing balance:  fair+. Gait: pt ambulated 30' without a device CGA with quick pavan. Pt denies any dizziness at this point but SOB noted. See cardiopulm section    [facility] 6 Clicks Inpatient Mobility:  AM-PAC Inpatient Mobility Raw Score : 19    Safety: patient left in chair with alarm on, call light within reach, RN notified, gait belt used. Assessment:  Pt is a 79 y.o. male admitted to the hospital for acute GI bleeding. Pt was intubated 9/26 and extubated 9/27/2022. Pt is typically independent with all ambulation and transfers without a device. Pt is currently performing bed mobility SBA, transferring CGA, and ambulating 30' with CGA and no AD. Pt is currently presenting with decreased endurance, impaired transfers, and impaired gait.  Pt would benefit from continued acute care PT as well as SNF placement upon discharge to continue to address impairments. Complexity: moderate    Prognosis: Good, no significant barriers to participation at this time.      Plan: 2-3 times per week/week     Equipment: none    Goals:  Short Term Goals  Time Frame for Short term goals: 1 week  Short term goal 1: pt to complete all bed mobility mod I  Short term goal 2: pt to complete all STS transfers to/from bed, commode, and chair mod I  Short term goal 3: pt to ambulate 100' with LRAD mod I       Treatment plan:  Bed mobility, transfers, balance, gait, TA, TX    Recommendations for NURSING mobility: amb with gait belt    Time:   Time in: 0907  Time out: 0926  Timed treatment minutes: 9  Total time: 19    Electronically signed by:    Dietrich Libman, PT  9/29/2022, 10:55 AM

## 2022-09-29 NOTE — PROGRESS NOTES
[address] [facility],[address] ACUTE CARE OCCUPATIONAL THERAPY EVALUATION    Cassandra Squires, 1955, [id number], 9/29/2022    Discharge Recommendation: home with 24 hr supervision/assistance    History:  [place]:  The primary encounter diagnosis was Gastrointestinal hemorrhage, unspecified gastrointestinal hemorrhage type. A diagnosis of Hemorrhagic shock (HCC) was also pertinent to this visit. Subjective:  Patient states: \"I'm just weak girls\", \"respiratory distress. It's the start of it. \"  Pain: 5/10 (pain in abdomen)  Communication with other providers: coeval with PT Ana High  Restrictions: general precautions, fall risk, L AKA with prosthetic    Home Setup/Prior level of function:  Social/Functional History  Lives With: Spouse  Type of Home: House  Home Layout: One level  Home Access: Level entry  Bathroom Shower/Tub: Walk-in shower, Shower chair with back  Bathroom Toilet: Handicap height  Bathroom Equipment: Grab bars in shower, Grab bars around toilet  Home Equipment: Walker, rolling (prosthetic)  Has the patient had two or more falls in the past year or any fall with injury in the past year?: No  ADL Assistance: Independent  Homemaking Assistance: Needs assistance (wife assists)  Ambulation Assistance: Independent (typically no AD, walker PRN)  Transfer Assistance: Independent  Active : Yes  Occupation: Retired  Type of Occupation: [employer]  Additional Comments: supplemental O2 PRN 2-3 L    Examination:  Observation: Pt was supine in bed asleep upon arrival, difficult to arouse but then agreeable to session  Vision: [facility]  Hearing: WFL  Objective Measures: O2 desat with activity, recovering quickly with cueing for breathing and rest    Body Systems and functions:  ROM: WFL in BUE  Strength: 4/5 in BUE  Sensation: WFL  Tone: normotonic in BUE  Coordination: movements fluid and coordinated  Activity Tolerance: fair+    Activities of Daily Living (ADLs):  Feeding: ind  Grooming: close SBA standing at sink  Toileting: SBA seated  UB dressing/bathing: ind  LB dressing/bathing: SBA seated    *pt ADL function inferred from gross functional assessment of mobility, balance, posture, safety awareness, activity tolerance (unless otherwise indicated)    Cognitive and Psychosocial Functioning:  Overall cognitive status: WNL, A/Ox4. Fair safety awareness/insight. Affect: flat    Functional Mobility:  Bed Mobility: SBA  Sit <> Stand: close SBA    Ambulation: CGA-close SBA using no AD      AM-PAC 6 click short form for inpatient daily activity:   How much help from another person does the patient currently need. .. Unable  Dep A Lot  Max A A Lot   Mod A A Little  Min A A Little   CGA  SBA None   Mod I  Indep  Sup   1. Putting on and taking off regular lower body clothing? [] 1    [] 2   [] 2   [] 3   [x] 3   [] 4      2. Bathing (including washing, rinsing, drying)? [] 1   [] 2   [] 2 [] 3 [x] 3 [] 4   3. Toileting, which includes using toilet, bedpan, or urinal? [] 1    [] 2   [] 2   [] 3   [x] 3   [] 4     4. Putting on and taking off regular upper body clothing? [] 1   [] 2   [] 2   [] 3   [] 3    [x] 4      5. Taking care of personal grooming such as brushing teeth? [] 1   [] 2    [] 2 [] 3    [] 3   [x] 4      6. Eating meals? [] 1   [] 2   [] 2   [] 3   [] 3   [x] 4        Raw Score:  21      24/24 = unimpaired  23/24 = 1-20% impaired   20/24-22/24 = 21-40% impaired  15/24-19/24 = 41-59% impaired   10/24-14/24 = 60%-79% impaired  7/24-9/24 = 80%-99% impaired  6/24 = 100% impaired     Treatment:    Therapeutic Activity Training (8 minutes): Therapeutic activity training was instructed today. Cues were given for safety, sequence, UE/LE placement, visual cues, and balance. Activities performed today included pt demo supine to sit EOB with SBA. Pt donning R sock and shoe, L prosthetic with SBA while seated EOB.  Pt standing with close SBA, ambulating short functional distance in room/hallway

## 2022-09-30 LAB
ALBUMIN SERPL-MCNC: 3.5 GM/DL (ref 3.4–5)
ALP BLD-CCNC: 57 IU/L (ref 40–128)
ALT SERPL-CCNC: 19 U/L (ref 10–40)
AMYLASE: 91 U/L (ref 25–115)
ANION GAP SERPL CALCULATED.3IONS-SCNC: 10 MMOL/L (ref 4–16)
APTT: 42 SECONDS (ref 25.1–37.1)
AST SERPL-CCNC: 19 IU/L (ref 15–37)
BILIRUB SERPL-MCNC: 0.3 MG/DL (ref 0–1)
BUN BLDV-MCNC: 37 MG/DL (ref 6–23)
CALCIUM SERPL-MCNC: 8.8 MG/DL (ref 8.3–10.6)
CHLORIDE BLD-SCNC: 103 MMOL/L (ref 99–110)
CO2: 25 MMOL/L (ref 21–32)
CREAT SERPL-MCNC: 6.5 MG/DL (ref 0.9–1.3)
GFR AFRICAN AMERICAN: 10 ML/MIN/1.73M2
GFR NON-AFRICAN AMERICAN: 9 ML/MIN/1.73M2
GLUCOSE BLD-MCNC: 95 MG/DL (ref 70–99)
HCT VFR BLD CALC: 26.1 % (ref 42–52)
HEMOGLOBIN: 8.3 GM/DL (ref 13.5–18)
INR BLD: 0.8 INDEX
LIPASE: 37 IU/L (ref 13–60)
MAGNESIUM: 2.7 MG/DL (ref 1.8–2.4)
MCH RBC QN AUTO: 30.9 PG (ref 27–31)
MCHC RBC AUTO-ENTMCNC: 31.8 % (ref 32–36)
MCV RBC AUTO: 97 FL (ref 78–100)
PDW BLD-RTO: 17.2 % (ref 11.7–14.9)
PHOSPHORUS: 4.3 MG/DL (ref 2.5–4.9)
PLATELET # BLD: 177 K/CU MM (ref 140–440)
PMV BLD AUTO: 9.2 FL (ref 7.5–11.1)
POTASSIUM SERPL-SCNC: 3.6 MMOL/L (ref 3.5–5.1)
PROTHROMBIN TIME: 10.3 SECONDS (ref 11.7–14.5)
RBC # BLD: 2.69 M/CU MM (ref 4.6–6.2)
SODIUM BLD-SCNC: 138 MMOL/L (ref 135–145)
TOTAL PROTEIN: 5.1 GM/DL (ref 6.4–8.2)
WBC # BLD: 9.4 K/CU MM (ref 4–10.5)

## 2022-09-30 PROCEDURE — 6370000000 HC RX 637 (ALT 250 FOR IP): Performed by: INTERNAL MEDICINE

## 2022-09-30 PROCEDURE — 85730 THROMBOPLASTIN TIME PARTIAL: CPT

## 2022-09-30 PROCEDURE — 36415 COLL VENOUS BLD VENIPUNCTURE: CPT

## 2022-09-30 PROCEDURE — 84100 ASSAY OF PHOSPHORUS: CPT

## 2022-09-30 PROCEDURE — 6370000000 HC RX 637 (ALT 250 FOR IP): Performed by: STUDENT IN AN ORGANIZED HEALTH CARE EDUCATION/TRAINING PROGRAM

## 2022-09-30 PROCEDURE — 1200000000 HC SEMI PRIVATE

## 2022-09-30 PROCEDURE — 80053 COMPREHEN METABOLIC PANEL: CPT

## 2022-09-30 PROCEDURE — 85027 COMPLETE CBC AUTOMATED: CPT

## 2022-09-30 PROCEDURE — 6370000000 HC RX 637 (ALT 250 FOR IP): Performed by: SPECIALIST

## 2022-09-30 PROCEDURE — 82150 ASSAY OF AMYLASE: CPT

## 2022-09-30 PROCEDURE — 2580000003 HC RX 258: Performed by: STUDENT IN AN ORGANIZED HEALTH CARE EDUCATION/TRAINING PROGRAM

## 2022-09-30 PROCEDURE — 2700000000 HC OXYGEN THERAPY PER DAY

## 2022-09-30 PROCEDURE — 85610 PROTHROMBIN TIME: CPT

## 2022-09-30 PROCEDURE — 83735 ASSAY OF MAGNESIUM: CPT

## 2022-09-30 PROCEDURE — 94761 N-INVAS EAR/PLS OXIMETRY MLT: CPT

## 2022-09-30 PROCEDURE — 83690 ASSAY OF LIPASE: CPT

## 2022-09-30 PROCEDURE — 94640 AIRWAY INHALATION TREATMENT: CPT

## 2022-09-30 RX ORDER — GUAIFENESIN 600 MG/1
600 TABLET, EXTENDED RELEASE ORAL 2 TIMES DAILY
Status: DISCONTINUED | OUTPATIENT
Start: 2022-09-30 | End: 2022-10-02 | Stop reason: HOSPADM

## 2022-09-30 RX ADMIN — CLONIDINE HYDROCHLORIDE 0.1 MG: 0.1 TABLET ORAL at 22:45

## 2022-09-30 RX ADMIN — GUAIFENESIN 600 MG: 600 TABLET, EXTENDED RELEASE ORAL at 22:45

## 2022-09-30 RX ADMIN — ATORVASTATIN CALCIUM 40 MG: 40 TABLET, FILM COATED ORAL at 10:40

## 2022-09-30 RX ADMIN — FOLIC ACID 1 MG: 1 TABLET ORAL at 10:40

## 2022-09-30 RX ADMIN — CLONIDINE HYDROCHLORIDE 0.1 MG: 0.1 TABLET ORAL at 10:40

## 2022-09-30 RX ADMIN — CITALOPRAM HYDROBROMIDE 20 MG: 20 TABLET ORAL at 10:40

## 2022-09-30 RX ADMIN — HYDROCODONE BITARTRATE AND ACETAMINOPHEN 1 TABLET: 5; 325 TABLET ORAL at 10:43

## 2022-09-30 RX ADMIN — PANTOPRAZOLE SODIUM 40 MG: 40 TABLET, DELAYED RELEASE ORAL at 10:43

## 2022-09-30 RX ADMIN — IPRATROPIUM BROMIDE AND ALBUTEROL SULFATE 3 ML: .5; 3 SOLUTION RESPIRATORY (INHALATION) at 14:24

## 2022-09-30 RX ADMIN — IPRATROPIUM BROMIDE AND ALBUTEROL SULFATE 3 ML: .5; 3 SOLUTION RESPIRATORY (INHALATION) at 21:28

## 2022-09-30 RX ADMIN — HYDROCODONE BITARTRATE AND ACETAMINOPHEN 1 TABLET: 5; 325 TABLET ORAL at 17:56

## 2022-09-30 RX ADMIN — AZELASTINE 1 SPRAY: 137 SPRAY, METERED NASAL at 10:46

## 2022-09-30 RX ADMIN — TAMSULOSIN HYDROCHLORIDE 0.8 MG: 0.4 CAPSULE ORAL at 10:40

## 2022-09-30 RX ADMIN — Medication 2000 UNITS: at 10:43

## 2022-09-30 RX ADMIN — IPRATROPIUM BROMIDE AND ALBUTEROL SULFATE 3 ML: .5; 3 SOLUTION RESPIRATORY (INHALATION) at 07:13

## 2022-09-30 RX ADMIN — POLYETHYLENE GLYCOL-3350 AND ELECTROLYTES 4000 ML: 236; 6.74; 5.86; 2.97; 22.74 POWDER, FOR SOLUTION ORAL at 17:59

## 2022-09-30 RX ADMIN — ALPRAZOLAM 0.25 MG: 0.25 TABLET ORAL at 17:56

## 2022-09-30 RX ADMIN — ALPRAZOLAM 0.25 MG: 0.25 TABLET ORAL at 22:45

## 2022-09-30 RX ADMIN — GUAIFENESIN 600 MG: 600 TABLET, EXTENDED RELEASE ORAL at 10:40

## 2022-09-30 RX ADMIN — SODIUM CHLORIDE, PRESERVATIVE FREE 10 ML: 5 INJECTION INTRAVENOUS at 21:30

## 2022-09-30 RX ADMIN — SODIUM CHLORIDE, PRESERVATIVE FREE 10 ML: 5 INJECTION INTRAVENOUS at 10:47

## 2022-09-30 ASSESSMENT — PAIN DESCRIPTION - ORIENTATION: ORIENTATION: MID

## 2022-09-30 ASSESSMENT — PAIN DESCRIPTION - LOCATION: LOCATION: ABDOMEN

## 2022-09-30 ASSESSMENT — PAIN SCALES - GENERAL
PAINLEVEL_OUTOF10: 0
PAINLEVEL_OUTOF10: 5
PAINLEVEL_OUTOF10: 5

## 2022-09-30 ASSESSMENT — PAIN DESCRIPTION - DESCRIPTORS: DESCRIPTORS: ACHING

## 2022-09-30 ASSESSMENT — PAIN - FUNCTIONAL ASSESSMENT: PAIN_FUNCTIONAL_ASSESSMENT: ACTIVITIES ARE NOT PREVENTED

## 2022-09-30 NOTE — PROGRESS NOTES
Nephrology Progress Note  9/30/2022 8:04 AM        Subjective:   Admit Date: 9/26/2022  PCP: Abdi Estes, DO    Interval History: Still complain of abdominal pain    Diet: Some    ROS: Apparently getting colonoscopy tomorrow  He has good urine output and does not want to be dialyzed today  He made 1.8 L of urine for the last 24 hours  No fever and acceptable blood pressure    Data:     Current meds:    guaiFENesin  600 mg Oral BID    ipratropium-albuterol  1 vial Inhalation TID    polyethylene glycol  4,000 mL Oral Once    atorvastatin  40 mg Oral Daily    azelastine  1 spray Each Nostril BID    folic acid  1 mg Oral Daily    tamsulosin  0.8 mg Oral Daily    pantoprazole  40 mg Oral Daily    citalopram  20 mg Oral Daily    Vitamin D  2,000 Units Oral Daily    cloNIDine  0.1 mg Oral BID    sodium chloride flush  5-40 mL IntraVENous 2 times per day      sodium chloride      sodium chloride           I/O last 3 completed shifts:  In: -   Out: 300 [Urine:300]    CBC:   Recent Labs     09/28/22  1008 09/29/22  0536 09/30/22  0427   WBC 14.4* 9.0 9.4   HGB 8.8* 8.4* 8.3*    179 177          Recent Labs     09/28/22  0625 09/29/22  0536 09/30/22  0427   * 142 138   K 3.8 3.6 3.6    106 103   CO2 15* 27 25   BUN 81* 38* 37*   CREATININE 8.1* 6.1* 6.5*   GLUCOSE 91 93 95       Lab Results   Component Value Date    CALCIUM 8.8 09/30/2022    PHOS 4.3 09/30/2022       Objective:     Vitals: /72   Pulse 77   Temp 98 °F (36.7 °C) (Oral)   Resp 12   Ht 5' 8\" (1.727 m)   Wt 242 lb 4.8 oz (109.9 kg)   SpO2 100%   BMI 36.84 kg/m² ,    General appearance: Alert, awake and oriented  HEENT: No conjunctival pallor or scleral icterus  Neck: Supple  Lungs: Positive rhonchi  Heart: Regular rate and rhythm, well-healed incision from previous sternotomy  Abdomen: Soft, tenderness in the epigastric area, positive bowel sound  Extremities: Left below-knee amputation, otherwise no leg edema  Right upper extremity AV graft, with good thrill on palpation and good bruit auscultation      Problem List :         Impression :     End-stage kidney disease with good residual kidney function and on incremental dialysis-his fluid status is acceptable we will skip dialysis today  Abdominal pain-mesenteric ischemia suspected-I will discuss with Dr. Medina Severe  Underlying diffuse and widespread atherosclerotic cardiovascular disease-recent anemia-of unknown source  Underlying COPD/sleep apnea and other chronic condition    Recommendation/Plan  :      We will skip hemodialysis and ultrafiltration today  Plan to do on Monday unless he needs earlier  Discussion with   Reviewed CT angiogram  Follow clinically      Lauri Porras MD MD

## 2022-09-30 NOTE — PROGRESS NOTES
DOING FAIR C/O UPPER ABD PAIN WHICH IS CHRONIC AND PRESENT EVEN PRIOR TO COMING TO THE HOSP NO VOMITING NO BM  NO ACTIVE GIT BLEEDING   VITALS STABLE   LABS NOTED HB 8.3  PT HAD AXILLO-FEMORAL GRAFT PLACED BY DR FRANZ IN THE PAST  WHICH GOT INFECTED AND PT  WENT TO [facility] FOR TX CTA NOW SHOWS OCCLUSION OF AORTA --CHRONIC INTESTINAL ISCHEMIA MIGHT BE CONTRIBUTING TO PTS ABD PAIN AS WELL WOULD RECOMMEND VASCULAR SURGERY CONSULT  COLONOSCOPY SCHEDULED FOR AM D/W DR Melgar [facility]

## 2022-09-30 NOTE — CARE COORDINATION
LSW follow up with pt regarding his discharge plans. LSW informed pt of PT recommendation for SNF and OPT recommendation for HC. Pt got very angry with this LSW when pt was told of the recommendation for SNF. Pt stated I am not going to a SNF and no one can make me. Pt stated that he wants PT to come in his room so he can talk with them about say pt needs to go to a SNF. LSW explained to pt that PT/OT are doing their job and they are just recommending and the pt has his own choice on what he plans to have after discharge. Pt stated that he is NOT going to a SNF. LSW spoke with pt about HC. Pt stated he does not need HC and he will be going home and he does not need anything. Pt stated that we all need to think about the pt and read a pt's medical history before making recommendations. LSW again explained that PT/OT are doing their job and so is this LSW.   and pt has a right to refuse.

## 2022-09-30 NOTE — PROGRESS NOTES
V2.0  [facility] [place], [place] Hospitalist Progress Note      Name:  Riri Rios /Age/Sex: 1955  (79 y.o. male)   MRN & CSN:  [id number] & [id number] Encounter Date/Time: 2022 12:30 PM EDT    Location:  9091/2544- PCP: [address] [place] [address] Day: 5    Assessment and Plan:   Riri Rios is a 79 y.o. male  who presents with Acute GI bleeding    1. Acute GI bleed  -Patient presenting with dark tarry stools  -Status post transfusion of 3 units of PRBC  -Status post EGD which showed mild gastritis and duodenitis. -CT of the abdomen and pelvis showed No GI bleed identified. 2. 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  -Plan to do a colonoscopy 10/1  -Continue to monitor hemoglobin and transfuse if less than 7  -Continue Protonix  -Hemoglobin stable. Will continue to monitor    2. ESRD on hemodialysis  -Nephrology consulted and following  -Continue inpatient schedule per nephro recs    3. Acute on chronic respiratory failure secondary to acute blood loss due to GI bleed  -S/p extubation 2022  -Continue supplemental oxygen  -Breathing treatments as needed    4. COPD  -Not in acute exacerbation  -Continue breathing treatments  -Continue supplemental oxygen    5. Rest of chronic medical conditions. Hypertension. Hyperlipidemia. Continue medical management as appropriate    Diet ADULT DIET; Clear Liquid  Diet NPO   DVT Prophylaxis [] Lovenox, []  Heparin, [] SCDs, [] Ambulation,  [] Eliquis, [] Xarelto  [] Coumadin   Code Status Full Code   Disposition From: Home  Expected Disposition: Home  Estimated Date of Discharge: TBD  Patient requires continued admission due to GI bleed         Subjective:     Chief Complaint: Rectal Bleeding (Rectal bleeding x 2 hours/)     Patient seen and examined at bedside this morning. No acute overnight events reported. Denies any complaints at this time.   Reports that his abdominal pain is provoked by hunger and relieved after eating. Patient is pending colonoscopy tomorrow  Objective:   No intake or output data in the 24 hours ending 09/30/22 0903       Vitals:   Vitals:    09/30/22 0717   BP:    Pulse:    Resp:    Temp:    SpO2: 100%       Physical Exam:     General: NAD  Eyes: EOMI  ENT: neck supple  Cardiovascular: Regular rate. Respiratory: Clear to auscultation  Gastrointestinal: Soft, non tender  Genitourinary: no suprapubic tenderness  Musculoskeletal: No edema. Left BKA  Skin: warm, dry  Neuro: Alert. Psych: Mood appropriate.      Medications:   Medications:    guaiFENesin  600 mg Oral BID    ipratropium-albuterol  1 vial Inhalation TID    polyethylene glycol  4,000 mL Oral Once    atorvastatin  40 mg Oral Daily    azelastine  1 spray Each Nostril BID    folic acid  1 mg Oral Daily    tamsulosin  0.8 mg Oral Daily    pantoprazole  40 mg Oral Daily    citalopram  20 mg Oral Daily    Vitamin D  2,000 Units Oral Daily    cloNIDine  0.1 mg Oral BID    sodium chloride flush  5-40 mL IntraVENous 2 times per day      Infusions:    sodium chloride      sodium chloride       PRN Meds: ipratropium, 2 puff, TID PRN  albuterol sulfate HFA, 2 puff, TID PRN  HYDROcodone-acetaminophen, 1 tablet, Q6H PRN  ALPRAZolam, 0.25 mg, BID PRN  sodium chloride, , PRN  sodium chloride flush, 5-40 mL, PRN  sodium chloride, , PRN  ondansetron, 4 mg, Q8H PRN   Or  ondansetron, 4 mg, Q6H PRN  acetaminophen, 650 mg, Q6H PRN      Labs      Recent Results (from the past 24 hour(s))   Respiratory Panel, Molecular, with COVID-19 (Restricted: peds pts or suitable admitted adults)    Collection Time: 09/29/22 10:10 AM    Specimen: Nasopharyngeal   Result Value Ref Range    Adenovirus Detection by PCR NOT DETECTED NOT DETECTED    Coronavirus 229E PCR NOT DETECTED NOT DETECTED    Coronavirus HKU1 PCR NOT DETECTED NOT DETECTED    Coronavirus NL63 PCR NOT DETECTED NOT DETECTED    Coronavirus OC43 PCR NOT DETECTED NOT DETECTED    SARS-CoV-2 NOT DETECTED NOT DETECTED    Human Metapneumovirus PCR NOT DETECTED NOT DETECTED    Rhinovirus Enterovirus PCR NOT DETECTED NOT DETECTED    Influenza A by PCR NOT DETECTED NOT DETECTED    Influenza A H1 Pandemic PCR NOT DETECTED NOT DETECTED    Influenza A H1 (2009) PCR NOT DETECTED NOT DETECTED    Influenza A H3 PCR NOT DETECTED NOT DETECTED    Influenza B by PCR NOT DETECTED NOT DETECTED    Parainfluenza 1 PCR NOT DETECTED NOT DETECTED    Parainfluenza 2 PCR NOT DETECTED NOT DETECTED    Parainfluenza 3 PCR NOT DETECTED NOT DETECTED    Parainfluenza 4 PCR NOT DETECTED NOT DETECTED    RSV PCR NOT DETECTED NOT DETECTED    Bordetella parapertussis by PCR NOT DETECTED NOT DETECTED    B Pertussis by PCR NOT DETECTED NOT DETECTED    Chlamydophila Pneumonia PCR NOT DETECTED NOT DETECTED    Mycoplasma pneumo by PCR NOT DETECTED NOT DETECTED   Magnesium    Collection Time: 09/30/22  4:27 AM   Result Value Ref Range    Magnesium 2.7 (H) 1.8 - 2.4 mg/dl   Phosphorus    Collection Time: 09/30/22  4:27 AM   Result Value Ref Range    Phosphorus 4.3 2.5 - 4.9 MG/DL   CBC    Collection Time: 09/30/22  4:27 AM   Result Value Ref Range    WBC 9.4 4.0 - 10.5 K/CU MM    RBC 2.69 (L) 4.6 - 6.2 M/CU MM    Hemoglobin 8.3 (L) 13.5 - 18.0 GM/DL    Hematocrit 26.1 (L) 42 - 52 %    MCV 97.0 78 - 100 FL    MCH 30.9 27 - 31 PG    MCHC 31.8 (L) 32.0 - 36.0 %    RDW 17.2 (H) 11.7 - 14.9 %    Platelets 354 673 - 869 K/CU MM    MPV 9.2 7.5 - 11.1 FL   Comprehensive Metabolic Panel    Collection Time: 09/30/22  4:27 AM   Result Value Ref Range    Sodium 138 135 - 145 MMOL/L    Potassium 3.6 3.5 - 5.1 MMOL/L    Chloride 103 99 - 110 mMol/L    CO2 25 21 - 32 MMOL/L    BUN 37 (H) 6 - 23 MG/DL    Creatinine 6.5 (H) 0.9 - 1.3 MG/DL    Glucose 95 70 - 99 MG/DL    Calcium 8.8 8.3 - 10.6 MG/DL    Albumin 3.5 3.4 - 5.0 GM/DL    Total Protein 5.1 (L) 6.4 - 8.2 GM/DL    Total Bilirubin 0.3 0.0 - 1.0 MG/DL    ALT 19 10 - 40 U/L    AST 19 15 - 37 IU/L    Alkaline Phosphatase 57 40 - 128 IU/L    GFR Non- 9 (L) >60 mL/min/1.73m2    GFR  10 (L) >60 mL/min/1.73m2    Anion Gap 10 4 - 16   Lipase    Collection Time: 09/30/22  4:27 AM   Result Value Ref Range    Lipase 37 13 - 60 IU/L   Amylase    Collection Time: 09/30/22  4:27 AM   Result Value Ref Range    Amylase 91 25 - 115 U/L   Protime/INR & PTT    Collection Time: 09/30/22  4:27 AM   Result Value Ref Range    Protime 10.3 (L) 11.7 - 14.5 SECONDS    INR 0.80 INDEX    aPTT 42.0 (H) 25.1 - 37.1 SECONDS        Imaging/Diagnostics Last 24 Hours   XR PELVIS (1-2 VIEWS)    Result Date: 9/26/2022  EXAMINATION: ONE XRAY VIEW OF THE PELVIS 9/26/2022 10:49 pm COMPARISON: None. HISTORY: ORDERING SYSTEM PROVIDED HISTORY: CVC placement Relevant Medical/Surgical History: Best possible image due to pt size and condition FINDINGS: *Right femoral catheter tip overlies the mid pelvis level; CVC is confirmed with tip at the right external iliac vein on CT performed immediately following this exam. *Rectal temperature probe noted. *No gross acute osseous abnormality. 1. Right femoral catheter tip overlies the mid pelvis level; CVC is confirmed with tip at the right external iliac vein on CT performed immediately following this exam. 2. Rectal temperature probe noted. 3. No gross acute osseous abnormality. XR CHEST PORTABLE    Result Date: 9/26/2022  EXAMINATION: ONE XRAY VIEW OF THE CHEST 9/26/2022 10:49 pm COMPARISON: Chest radiograph 05/18/2021 HISTORY: ORDERING SYSTEM PROVIDED HISTORY: Intubation FINDINGS: The cardiac silhouette is enlarged. The mediastinal and hilar silhouettes appear unremarkable. Chronic appearing coarse interstitial densities predominate perihilar regions and lung bases, typical of sequela from smoking or other previous infectious/inflammatory process. The lungs are hyperinflated with increased translucency both lung apices and tapering of the vasculature in the upper lungs. Bibasilar consolidative changes greater left than right with probable left pleural effusion. No pneumothorax is seen. No acute osseous abnormality is identified. Endotracheal tube tip projects over the trachea, tip just above the level of the aortic knob, 6.5 cm superior to the inna. NG tube extends below the left hemidiaphragm, out of the field of view. 1.  Life support appliances appear appropriately positioned. 2. Bibasilar consolidative changes greater left than right with probable left pleural effusion. Pneumonia is a consideration. 3.  Pulmonary sequela typical of that seen with smoking, including COPD. 4. Cardiomegaly. XR ABDOMEN FOR NG/OG/NE TUBE PLACEMENT    Result Date: 9/27/2022  EXAMINATION: ONE SUPINE XRAY VIEW(S) OF THE ABDOMEN 9/26/2022 10:49 pm COMPARISON: None. HISTORY: ORDERING SYSTEM PROVIDED HISTORY: Confirmation of course of NG/OG/NE tube and location of tip of tube TECHNOLOGIST PROVIDED HISTORY: Reason for exam:->Confirmation of course of NG/OG/NE tube and location of tip of tube Portable? ->Yes Reason for Exam: Confirmation of course of NG/OG/NE tube and location of tip of tube FINDINGS: Nasogastric tube tip terminates in the distal stomach. The side port is beyond the GE junction. Mildly prominent small bowel loops measure up to 3.4 cm. No obvious free air or pneumatosis. No new osseous abnormality. 1. Nasogastric tube tip terminates in the distal stomach. 2. Nonspecific prominent loops of small bowel measuring up to 3.4 cm. CTA ABDOMEN PELVIS W WO CONTRAST    Result Date: 9/27/2022  EXAMINATION: CTA OF THE ABDOMEN AND PELVIS WITH AND WITHOUT CONTRAST 9/26/2022 11:40 pm: TECHNIQUE: CTA of the abdomen and pelvis was performed without and with the administration of intravenous contrast. Multiplanar reformatted images are provided for review. MIP images are provided for review.  Automated exposure control, iterative reconstruction, and/or weight based adjustment of the mA/kV was utilized to reduce the radiation dose to as low as reasonably achievable. COMPARISON: February 26, 2017. February 8, 2016. HISTORY: ORDERING SYSTEM PROVIDED HISTORY: Lower GI Bleed TECHNOLOGIST PROVIDED HISTORY: Reason for exam:->Lower GI Bleed Reason for Exam: Lower GI Bleed FINDINGS: CTA vascular: The abdominal aorta demonstrates severe atherosclerosis. There is complete occlusion of the abdominal aorta after the takeoff of renal arteries. Minimal reconstitution of flow is seen involving the bilateral external iliac arteries. There is minimal to no opacification of the fem-fem bypass graft. The celiac trunk and its branches including the splenic artery, left gastric artery, and common hepatic artery are patent and normal in course and caliber. SMA is also patent and normal in course and caliber. The bilateral renal arteries are attenuated with patchy flow resulting in atrophy of the bilateral kidneys, right worse than left. No significant opacification of the graph that runs along the left hemithorax and abdomen. CT chest: Lung bases: Emphysematous changes are present. Bibasilar consolidations are noted. CT abdomen and pelvis: Organs: The liver parenchyma, spleen, pancreas, and adrenal glands demonstrate no acute abnormality. Status post cholecystectomy. Pneumobilia is noted, presumably related to prior sphincterotomy. The bilateral kidneys are atrophic, right worse than left. No solid renal masses. No hydronephrosis or nephrolithiasis. GI/bowel: Nasogastric tube tip terminates in the distal stomach. A rectal tube is also present. The stomach, small bowel, and colon are normal in course caliber without evidence of wall thickening or obstruction. Normal appendix. Severe diverticulosis without evidence of acute diverticulitis. No evidence of bowel ischemia. Pelvic organs: Normal bladder. Prostate and seminal vesicles are unremarkable. Peritoneal cavity/retroperitoneum: No free fluid or free air. No pathologic lymphadenopathy. Bones/soft tissues: No acute or aggressive osseous lesion. 1. No GI bleed identified. 2. 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 There is minimal reconstitution of flow of the bilateral external iliac arteries and minimal to no flow identified in the fem-fem bypass, also chronic. 3. Bibasilar consolidation concerning for atelectasis with superimposed infection. Emphysema is also present. 4. Severe diverticulosis without evidence of acute diverticulitis. 5. Atrophic kidneys, right worse than left.        Electronically signed by Shantanu Mo MD on 9/30/2022 at 9:03 AM

## 2022-10-01 ENCOUNTER — ANESTHESIA (OUTPATIENT)
Dept: ENDOSCOPY | Age: 67
DRG: 377 | End: 2022-10-01
Payer: MEDICARE

## 2022-10-01 ENCOUNTER — ANESTHESIA EVENT (OUTPATIENT)
Dept: ENDOSCOPY | Age: 67
DRG: 377 | End: 2022-10-01
Payer: MEDICARE

## 2022-10-01 LAB
ALBUMIN SERPL-MCNC: 3.4 GM/DL (ref 3.4–5)
ALP BLD-CCNC: 65 IU/L (ref 40–128)
ALT SERPL-CCNC: 17 U/L (ref 10–40)
AMYLASE: 71 U/L (ref 25–115)
ANION GAP SERPL CALCULATED.3IONS-SCNC: 12 MMOL/L (ref 4–16)
AST SERPL-CCNC: 17 IU/L (ref 15–37)
BASOPHILS ABSOLUTE: 0.1 K/CU MM
BASOPHILS RELATIVE PERCENT: 0.6 % (ref 0–1)
BILIRUB SERPL-MCNC: 0.2 MG/DL (ref 0–1)
BUN BLDV-MCNC: 31 MG/DL (ref 6–23)
CALCIUM SERPL-MCNC: 8.2 MG/DL (ref 8.3–10.6)
CHLORIDE BLD-SCNC: 105 MMOL/L (ref 99–110)
CO2: 24 MMOL/L (ref 21–32)
CREAT SERPL-MCNC: 6.8 MG/DL (ref 0.9–1.3)
DIFFERENTIAL TYPE: ABNORMAL
EOSINOPHILS ABSOLUTE: 0.3 K/CU MM
EOSINOPHILS RELATIVE PERCENT: 3.2 % (ref 0–3)
GFR AFRICAN AMERICAN: 10 ML/MIN/1.73M2
GFR NON-AFRICAN AMERICAN: 8 ML/MIN/1.73M2
GLUCOSE BLD-MCNC: 138 MG/DL (ref 70–99)
HCT VFR BLD CALC: 25.4 % (ref 42–52)
HEMOGLOBIN: 8.1 GM/DL (ref 13.5–18)
IMMATURE NEUTROPHIL %: 0.5 % (ref 0–0.43)
LIPASE: 29 IU/L (ref 13–60)
LYMPHOCYTES ABSOLUTE: 0.7 K/CU MM
LYMPHOCYTES RELATIVE PERCENT: 7.8 % (ref 24–44)
MAGNESIUM: 2.5 MG/DL (ref 1.8–2.4)
MCH RBC QN AUTO: 30.8 PG (ref 27–31)
MCHC RBC AUTO-ENTMCNC: 31.9 % (ref 32–36)
MCV RBC AUTO: 96.6 FL (ref 78–100)
MONOCYTES ABSOLUTE: 0.7 K/CU MM
MONOCYTES RELATIVE PERCENT: 7.7 % (ref 0–4)
NUCLEATED RBC %: 0 %
PDW BLD-RTO: 17.1 % (ref 11.7–14.9)
PHOSPHORUS: 3.1 MG/DL (ref 2.5–4.9)
PLATELET # BLD: 167 K/CU MM (ref 140–440)
PMV BLD AUTO: 9.3 FL (ref 7.5–11.1)
POTASSIUM SERPL-SCNC: 3.6 MMOL/L (ref 3.5–5.1)
RBC # BLD: 2.63 M/CU MM (ref 4.6–6.2)
SEGMENTED NEUTROPHILS ABSOLUTE COUNT: 6.9 K/CU MM
SEGMENTED NEUTROPHILS RELATIVE PERCENT: 80.2 % (ref 36–66)
SODIUM BLD-SCNC: 141 MMOL/L (ref 135–145)
TOTAL IMMATURE NEUTOROPHIL: 0.04 K/CU MM
TOTAL NUCLEATED RBC: 0 K/CU MM
TOTAL PROTEIN: 5 GM/DL (ref 6.4–8.2)
WBC # BLD: 8.6 K/CU MM (ref 4–10.5)

## 2022-10-01 PROCEDURE — 94761 N-INVAS EAR/PLS OXIMETRY MLT: CPT

## 2022-10-01 PROCEDURE — 6370000000 HC RX 637 (ALT 250 FOR IP): Performed by: INTERNAL MEDICINE

## 2022-10-01 PROCEDURE — 3700000001 HC ADD 15 MINUTES (ANESTHESIA): Performed by: SPECIALIST

## 2022-10-01 PROCEDURE — 2500000003 HC RX 250 WO HCPCS: Performed by: NURSE ANESTHETIST, CERTIFIED REGISTERED

## 2022-10-01 PROCEDURE — 85025 COMPLETE CBC W/AUTO DIFF WBC: CPT

## 2022-10-01 PROCEDURE — 6360000002 HC RX W HCPCS: Performed by: NURSE ANESTHETIST, CERTIFIED REGISTERED

## 2022-10-01 PROCEDURE — 6370000000 HC RX 637 (ALT 250 FOR IP): Performed by: STUDENT IN AN ORGANIZED HEALTH CARE EDUCATION/TRAINING PROGRAM

## 2022-10-01 PROCEDURE — 83690 ASSAY OF LIPASE: CPT

## 2022-10-01 PROCEDURE — 88305 TISSUE EXAM BY PATHOLOGIST: CPT | Performed by: PATHOLOGY

## 2022-10-01 PROCEDURE — 1200000000 HC SEMI PRIVATE

## 2022-10-01 PROCEDURE — 80053 COMPREHEN METABOLIC PANEL: CPT

## 2022-10-01 PROCEDURE — 2580000003 HC RX 258: Performed by: STUDENT IN AN ORGANIZED HEALTH CARE EDUCATION/TRAINING PROGRAM

## 2022-10-01 PROCEDURE — 82150 ASSAY OF AMYLASE: CPT

## 2022-10-01 PROCEDURE — 3609010600 HC COLONOSCOPY POLYPECTOMY SNARE/COLD BIOPSY: Performed by: SPECIALIST

## 2022-10-01 PROCEDURE — 36415 COLL VENOUS BLD VENIPUNCTURE: CPT

## 2022-10-01 PROCEDURE — 3700000000 HC ANESTHESIA ATTENDED CARE: Performed by: SPECIALIST

## 2022-10-01 PROCEDURE — 83735 ASSAY OF MAGNESIUM: CPT

## 2022-10-01 PROCEDURE — 0DBH8ZX EXCISION OF CECUM, VIA NATURAL OR ARTIFICIAL OPENING ENDOSCOPIC, DIAGNOSTIC: ICD-10-PCS | Performed by: FAMILY MEDICINE

## 2022-10-01 PROCEDURE — 94640 AIRWAY INHALATION TREATMENT: CPT

## 2022-10-01 PROCEDURE — 84100 ASSAY OF PHOSPHORUS: CPT

## 2022-10-01 PROCEDURE — 0DJD8ZZ INSPECTION OF LOWER INTESTINAL TRACT, VIA NATURAL OR ARTIFICIAL OPENING ENDOSCOPIC: ICD-10-PCS | Performed by: FAMILY MEDICINE

## 2022-10-01 PROCEDURE — 2709999900 HC NON-CHARGEABLE SUPPLY: Performed by: SPECIALIST

## 2022-10-01 RX ORDER — SODIUM CHLORIDE 9 MG/ML
INJECTION, SOLUTION INTRAVENOUS CONTINUOUS PRN
Status: DISCONTINUED | OUTPATIENT
Start: 2022-10-01 | End: 2022-10-01 | Stop reason: SDUPTHER

## 2022-10-01 RX ORDER — LIDOCAINE HYDROCHLORIDE 20 MG/ML
INJECTION, SOLUTION EPIDURAL; INFILTRATION; INTRACAUDAL; PERINEURAL PRN
Status: DISCONTINUED | OUTPATIENT
Start: 2022-10-01 | End: 2022-10-01 | Stop reason: SDUPTHER

## 2022-10-01 RX ORDER — PROPOFOL 10 MG/ML
INJECTION, EMULSION INTRAVENOUS PRN
Status: DISCONTINUED | OUTPATIENT
Start: 2022-10-01 | End: 2022-10-01 | Stop reason: SDUPTHER

## 2022-10-01 RX ADMIN — ALPRAZOLAM 0.25 MG: 0.25 TABLET ORAL at 18:09

## 2022-10-01 RX ADMIN — FOLIC ACID 1 MG: 1 TABLET ORAL at 10:31

## 2022-10-01 RX ADMIN — ATORVASTATIN CALCIUM 40 MG: 40 TABLET, FILM COATED ORAL at 10:31

## 2022-10-01 RX ADMIN — GUAIFENESIN 600 MG: 600 TABLET, EXTENDED RELEASE ORAL at 10:30

## 2022-10-01 RX ADMIN — PROPOFOL 350 MG: 10 INJECTION, EMULSION INTRAVENOUS at 08:23

## 2022-10-01 RX ADMIN — CITALOPRAM HYDROBROMIDE 20 MG: 20 TABLET ORAL at 10:31

## 2022-10-01 RX ADMIN — LIDOCAINE HYDROCHLORIDE 100 MG: 20 INJECTION, SOLUTION EPIDURAL; INFILTRATION; INTRACAUDAL; PERINEURAL at 08:23

## 2022-10-01 RX ADMIN — CLONIDINE HYDROCHLORIDE 0.1 MG: 0.1 TABLET ORAL at 20:18

## 2022-10-01 RX ADMIN — CLONIDINE HYDROCHLORIDE 0.1 MG: 0.1 TABLET ORAL at 10:31

## 2022-10-01 RX ADMIN — PANTOPRAZOLE SODIUM 40 MG: 40 TABLET, DELAYED RELEASE ORAL at 10:31

## 2022-10-01 RX ADMIN — GUAIFENESIN 600 MG: 600 TABLET, EXTENDED RELEASE ORAL at 20:18

## 2022-10-01 RX ADMIN — AZELASTINE 1 SPRAY: 137 SPRAY, METERED NASAL at 10:56

## 2022-10-01 RX ADMIN — SODIUM CHLORIDE: 9 INJECTION, SOLUTION INTRAVENOUS at 08:05

## 2022-10-01 RX ADMIN — Medication 2000 UNITS: at 10:31

## 2022-10-01 RX ADMIN — PHENYLEPHRINE HYDROCHLORIDE 100 MCG: 10 INJECTION INTRAVENOUS at 08:28

## 2022-10-01 RX ADMIN — IPRATROPIUM BROMIDE AND ALBUTEROL SULFATE 3 ML: .5; 3 SOLUTION RESPIRATORY (INHALATION) at 20:38

## 2022-10-01 RX ADMIN — TAMSULOSIN HYDROCHLORIDE 0.8 MG: 0.4 CAPSULE ORAL at 10:31

## 2022-10-01 RX ADMIN — SODIUM CHLORIDE, PRESERVATIVE FREE 10 ML: 5 INJECTION INTRAVENOUS at 10:31

## 2022-10-01 RX ADMIN — IPRATROPIUM BROMIDE AND ALBUTEROL SULFATE 3 ML: .5; 3 SOLUTION RESPIRATORY (INHALATION) at 15:49

## 2022-10-01 ASSESSMENT — PAIN SCALES - GENERAL: PAINLEVEL_OUTOF10: 0

## 2022-10-01 NOTE — PROGRESS NOTES
Patient transferred to [address] [place] [address], room 3018 by bed with portable cardiac monitor, portable oxygen via [place] with chart accompanied by transporter.  Report was called to Erika Reyes

## 2022-10-01 NOTE — BRIEF OP NOTE
Brief Postoperative Note      Maynor Clarke is a 79 y.o. male     Pre-operative Diagnosis: HEMATOCHEZIA    Post-operative Diagnosis:  COLON POLYPS X 3--D.  COLI--HDS---STOOL PRESENT- NO BLOOD NOTED    Procedure: COLONOSCOPY WITH POLYPECTOMY    Anesthesia: MAC    Surgeons/Assistants:Jose Jang MD     Estimated Blood Loss: NIL    Complications: None    Specimens: WERE obtained  REC--SMALL BOWEL EVALUATION AS OUTPT / F/U COLONOSCOPY IN 6 MONTHS BECAUSE OF STOOL    Martha De La Garza MD   10/1/2022   9:00 AM

## 2022-10-01 NOTE — PROGRESS NOTES
Nephrology Progress Note  10/1/2022 12:14 PM        Subjective:   Admit Date: 9/26/2022  PCP: Germaine Jay DO    Interval History: Patient seen after his colonoscopy-    Diet: N.p.o. for colonoscopy    ROS: Colonoscopy was uneventful, no findings I discussed with Dr. Staci Oliver  He still has abdominal pain  No fever and acceptable blood pressure  Urine output recorded only 300 cc    Data:     Current meds:    guaiFENesin  600 mg Oral BID    ipratropium-albuterol  1 vial Inhalation TID    atorvastatin  40 mg Oral Daily    azelastine  1 spray Each Nostril BID    folic acid  1 mg Oral Daily    tamsulosin  0.8 mg Oral Daily    pantoprazole  40 mg Oral Daily    citalopram  20 mg Oral Daily    Vitamin D  2,000 Units Oral Daily    cloNIDine  0.1 mg Oral BID    sodium chloride flush  5-40 mL IntraVENous 2 times per day      sodium chloride      sodium chloride           I/O last 3 completed shifts:  In: -   Out: 300 [Urine:300]    CBC:   Recent Labs     09/29/22  0536 09/30/22  0427   WBC 9.0 9.4   HGB 8.4* 8.3*    177          Recent Labs     09/29/22  0536 09/30/22  0427    138   K 3.6 3.6    103   CO2 27 25   BUN 38* 37*   CREATININE 6.1* 6.5*   GLUCOSE 93 95       Lab Results   Component Value Date    CALCIUM 8.8 09/30/2022    PHOS 4.3 09/30/2022       Objective:     Vitals: /73   Pulse 89   Temp 97.5 °F (36.4 °C) (Oral)   Resp 16   Ht 5' 8\" (1.727 m)   Wt 242 lb 4.8 oz (109.9 kg)   SpO2 97%   BMI 36.84 kg/m² ,    General appearance: He still drowsy, as he just came out of the colonoscopy  HEENT: No gross conjunctival pallor or scleral icterus  Neck: Seems supple  Lungs: Positive admission breath sound on the limited exam  Heart: Regular rate and rhythm  Abdomen: Soft, tender on palpation  Extremities: Left below-knee amputation otherwise no edema, good thrill and bruit on palpation auscultation respectively of right upper extremity AV graft      Problem List :         Impression : End-stage kidney disease with good residual kidney function-he does not want to be dialyzed until Monday as it makes his abdominal pain worse-we will watch his volume status closely  Abdominal pain worsened by dialysis-I suspect his mesenteric blood flow compromised or reduced by dialysis process-which is quite possible physiologically-I suspect he has mesenteric ischemia-with his underlying atherosclerotic cardiovascular disease-which probably worsening-and accentuated by dialysis  Widespread, diffuse atherosclerotic cardiovascular disease probably involving all vascular beds -also COPD and sleep apnea and other chronic condition  Hypertension he is on clonidine-which would not be my first choice-but okay for now    Recommendation/Plan  :     Colonoscopy was unremarkable  I will try dialysis on Monday with minimal ultrafiltration unless I have to  I do not know a good solution for his abdominal pain-prior intervention did not help actually harmed him-but he might have to revisit  Follow clinically      Peggy Davis MD MD

## 2022-10-01 NOTE — PROGRESS NOTES
V2.0  [facility] Hospitalist Progress Note      Name:  Chon Pierre /Age/Sex: 1955  (79 y.o. male)   MRN & CSN:  [id number] & [id number] Encounter Date/Time: 10/1/2022 12:30 PM EDT    Location:  [address] [place] [address] PCP: [address] [place] [address] Day: 6    Assessment and Plan:   Chon Pierre is a 79 y.o. male  who presents with Acute GI bleeding    1. Acute GI bleed  -Patient presenting with dark tarry stools  -Status post transfusion of 3 units of PRBC  -Status post EGD which showed mild gastritis and duodenitis. -CT of the abdomen and pelvis showed No GI bleed identified. 2. 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  -That is post colonoscopy with polypectomy. Plan for outpatient small bowel eval  -Continue to monitor hemoglobin and transfuse if less than 7  -Continue Protonix  -Hemoglobin stable. Will continue to monitor    2. ESRD on hemodialysis  -Nephrology consulted and following  -Continue inpatient schedule per nephro recs    3. Acute on chronic respiratory failure secondary to acute blood loss due to GI bleed  -S/p extubation 2022  -Continue supplemental oxygen  -Breathing treatments as needed    4. COPD  -Not in acute exacerbation  -Continue breathing treatments  -Continue supplemental oxygen    5. Rest of chronic medical conditions. Hypertension. Hyperlipidemia. Continue medical management as appropriate    Diet ADULT DIET; Regular; No Added Salt (3-4 gm)   DVT Prophylaxis [] Lovenox, []  Heparin, [] SCDs, [] Ambulation,  [] Eliquis, [] Xarelto  [] Coumadin   Code Status Full Code   Disposition From: Home  Expected Disposition: Home  Estimated Date of Discharge: TBD  Patient requires continued admission due to GI bleed         Subjective:     Chief Complaint: Rectal Bleeding (Rectal bleeding x 2 hours/)     Patient seen and examined at bedside this morning. No acute overnight events. Colonoscopy planned today.   Denies any chest pain, shortness of breath, nausea vomiting, fever or chills   Objective: Intake/Output Summary (Last 24 hours) at 10/1/2022 1436  Last data filed at 10/1/2022 0906  Gross per 24 hour   Intake 300 ml   Output 600 ml   Net -300 ml          Vitals:   Vitals:    10/01/22 1025   BP: 125/73   Pulse: 89   Resp: 16   Temp: 97.5 °F (36.4 °C)   SpO2: 97%       Physical Exam:     General: NAD  Eyes: EOMI  ENT: neck supple  Cardiovascular: Regular rate. Respiratory: Clear to auscultation  Gastrointestinal: Soft, non tender  Genitourinary: no suprapubic tenderness  Musculoskeletal: No edema. Left BKA  Skin: warm, dry  Neuro: Alert. Psych: Mood appropriate.      Medications:   Medications:    guaiFENesin  600 mg Oral BID    ipratropium-albuterol  1 vial Inhalation TID    atorvastatin  40 mg Oral Daily    azelastine  1 spray Each Nostril BID    folic acid  1 mg Oral Daily    tamsulosin  0.8 mg Oral Daily    pantoprazole  40 mg Oral Daily    citalopram  20 mg Oral Daily    Vitamin D  2,000 Units Oral Daily    cloNIDine  0.1 mg Oral BID    sodium chloride flush  5-40 mL IntraVENous 2 times per day      Infusions:    sodium chloride      sodium chloride       PRN Meds: ipratropium, 2 puff, TID PRN  albuterol sulfate HFA, 2 puff, TID PRN  HYDROcodone-acetaminophen, 1 tablet, Q6H PRN  ALPRAZolam, 0.25 mg, BID PRN  sodium chloride, , PRN  sodium chloride flush, 5-40 mL, PRN  sodium chloride, , PRN  ondansetron, 4 mg, Q8H PRN   Or  ondansetron, 4 mg, Q6H PRN  acetaminophen, 650 mg, Q6H PRN      Labs      Recent Results (from the past 24 hour(s))   Magnesium    Collection Time: 10/01/22 12:13 PM   Result Value Ref Range    Magnesium 2.5 (H) 1.8 - 2.4 mg/dl   Phosphorus    Collection Time: 10/01/22 12:13 PM   Result Value Ref Range    Phosphorus 3.1 2.5 - 4.9 MG/DL   CBC with Auto Differential    Collection Time: 10/01/22 12:13 PM   Result Value Ref Range    WBC 8.6 4.0 - 10.5 K/CU MM    RBC 2.63 (L) 4.6 - 6.2 M/CU MM    Hemoglobin 8.1 (L) 13.5 - 18.0 GM/DL    Hematocrit 25.4 (L) 42 - 52 %    MCV 96.6 78 - 100 FL    MCH 30.8 27 - 31 PG    MCHC 31.9 (L) 32.0 - 36.0 %    RDW 17.1 (H) 11.7 - 14.9 %    Platelets 513 501 - 564 K/CU MM    MPV 9.3 7.5 - 11.1 FL    Differential Type AUTOMATED DIFFERENTIAL     Segs Relative 80.2 (H) 36 - 66 %    Lymphocytes % 7.8 (L) 24 - 44 %    Monocytes % 7.7 (H) 0 - 4 %    Eosinophils % 3.2 (H) 0 - 3 %    Basophils % 0.6 0 - 1 %    Segs Absolute 6.9 K/CU MM    Lymphocytes Absolute 0.7 K/CU MM    Monocytes Absolute 0.7 K/CU MM    Eosinophils Absolute 0.3 K/CU MM    Basophils Absolute 0.1 K/CU MM    Nucleated RBC % 0.0 %    Total Nucleated RBC 0.0 K/CU MM    Total Immature Neutrophil 0.04 K/CU MM    Immature Neutrophil % 0.5 (H) 0 - 0.43 %   Comprehensive Metabolic Panel    Collection Time: 10/01/22 12:13 PM   Result Value Ref Range    Sodium 141 135 - 145 MMOL/L    Potassium 3.6 3.5 - 5.1 MMOL/L    Chloride 105 99 - 110 mMol/L    CO2 24 21 - 32 MMOL/L    BUN 31 (H) 6 - 23 MG/DL    Creatinine 6.8 (H) 0.9 - 1.3 MG/DL    Glucose 138 (H) 70 - 99 MG/DL    Calcium 8.2 (L) 8.3 - 10.6 MG/DL    Albumin 3.4 3.4 - 5.0 GM/DL    Total Protein 5.0 (L) 6.4 - 8.2 GM/DL    Total Bilirubin 0.2 0.0 - 1.0 MG/DL    ALT 17 10 - 40 U/L    AST 17 15 - 37 IU/L    Alkaline Phosphatase 65 40 - 128 IU/L    GFR Non- 8 (L) >60 mL/min/1.73m2    GFR  10 (L) >60 mL/min/1.73m2    Anion Gap 12 4 - 16   Amylase    Collection Time: 10/01/22 12:13 PM   Result Value Ref Range    Amylase 71 25 - 115 U/L   Lipase    Collection Time: 10/01/22 12:13 PM   Result Value Ref Range    Lipase 29 13 - 60 IU/L        Imaging/Diagnostics Last 24 Hours   XR PELVIS (1-2 VIEWS)    Result Date: 9/26/2022  EXAMINATION: ONE XRAY VIEW OF THE PELVIS 9/26/2022 10:49 pm COMPARISON: None.  HISTORY: ORDERING SYSTEM PROVIDED HISTORY: CVC placement Relevant Medical/Surgical History: Best possible image due to pt size and condition FINDINGS: *Right femoral catheter tip overlies the mid pelvis level; CVC is confirmed with tip at the right external iliac vein on CT performed immediately following this exam. *Rectal temperature probe noted. *No gross acute osseous abnormality. 1. Right femoral catheter tip overlies the mid pelvis level; CVC is confirmed with tip at the right external iliac vein on CT performed immediately following this exam. 2. Rectal temperature probe noted. 3. No gross acute osseous abnormality. XR CHEST PORTABLE    Result Date: 9/26/2022  EXAMINATION: ONE XRAY VIEW OF THE CHEST 9/26/2022 10:49 pm COMPARISON: Chest radiograph 05/18/2021 HISTORY: ORDERING SYSTEM PROVIDED HISTORY: Intubation FINDINGS: The cardiac silhouette is enlarged. The mediastinal and hilar silhouettes appear unremarkable. Chronic appearing coarse interstitial densities predominate perihilar regions and lung bases, typical of sequela from smoking or other previous infectious/inflammatory process. The lungs are hyperinflated with increased translucency both lung apices and tapering of the vasculature in the upper lungs. Bibasilar consolidative changes greater left than right with probable left pleural effusion. No pneumothorax is seen. No acute osseous abnormality is identified. Endotracheal tube tip projects over the trachea, tip just above the level of the aortic knob, 6.5 cm superior to the inna. NG tube extends below the left hemidiaphragm, out of the field of view. 1.  Life support appliances appear appropriately positioned. 2. Bibasilar consolidative changes greater left than right with probable left pleural effusion. Pneumonia is a consideration. 3.  Pulmonary sequela typical of that seen with smoking, including COPD. 4. Cardiomegaly. XR ABDOMEN FOR NG/OG/NE TUBE PLACEMENT    Result Date: 9/27/2022  EXAMINATION: ONE SUPINE XRAY VIEW(S) OF THE ABDOMEN 9/26/2022 10:49 pm COMPARISON: None.  HISTORY: ORDERING SYSTEM PROVIDED HISTORY: Confirmation of course of NG/OG/NE tube and location of tip of tube TECHNOLOGIST PROVIDED HISTORY: Reason for exam:->Confirmation of course of NG/OG/NE tube and location of tip of tube Portable? ->Yes Reason for Exam: Confirmation of course of NG/OG/NE tube and location of tip of tube FINDINGS: Nasogastric tube tip terminates in the distal stomach. The side port is beyond the GE junction. Mildly prominent small bowel loops measure up to 3.4 cm. No obvious free air or pneumatosis. No new osseous abnormality. 1. Nasogastric tube tip terminates in the distal stomach. 2. Nonspecific prominent loops of small bowel measuring up to 3.4 cm. CTA ABDOMEN PELVIS W WO CONTRAST    Result Date: 9/27/2022  EXAMINATION: CTA OF THE ABDOMEN AND PELVIS WITH AND WITHOUT CONTRAST 9/26/2022 11:40 pm: TECHNIQUE: CTA of the abdomen and pelvis was performed without and with the administration of intravenous contrast. Multiplanar reformatted images are provided for review. MIP images are provided for review. Automated exposure control, iterative reconstruction, and/or weight based adjustment of the mA/kV was utilized to reduce the radiation dose to as low as reasonably achievable. COMPARISON: February 26, 2017. February 8, 2016. HISTORY: ORDERING SYSTEM PROVIDED HISTORY: Lower GI Bleed TECHNOLOGIST PROVIDED HISTORY: Reason for exam:->Lower GI Bleed Reason for Exam: Lower GI Bleed FINDINGS: CTA vascular: The abdominal aorta demonstrates severe atherosclerosis. There is complete occlusion of the abdominal aorta after the takeoff of renal arteries. Minimal reconstitution of flow is seen involving the bilateral external iliac arteries. There is minimal to no opacification of the fem-fem bypass graft. The celiac trunk and its branches including the splenic artery, left gastric artery, and common hepatic artery are patent and normal in course and caliber. SMA is also patent and normal in course and caliber.  The bilateral renal arteries are attenuated with patchy flow resulting in atrophy of the bilateral kidneys, right worse than left. No significant opacification of the graph that runs along the left hemithorax and abdomen. CT chest: Lung bases: Emphysematous changes are present. Bibasilar consolidations are noted. CT abdomen and pelvis: Organs: The liver parenchyma, spleen, pancreas, and adrenal glands demonstrate no acute abnormality. Status post cholecystectomy. Pneumobilia is noted, presumably related to prior sphincterotomy. The bilateral kidneys are atrophic, right worse than left. No solid renal masses. No hydronephrosis or nephrolithiasis. GI/bowel: Nasogastric tube tip terminates in the distal stomach. A rectal tube is also present. The stomach, small bowel, and colon are normal in course caliber without evidence of wall thickening or obstruction. Normal appendix. Severe diverticulosis without evidence of acute diverticulitis. No evidence of bowel ischemia. Pelvic organs: Normal bladder. Prostate and seminal vesicles are unremarkable. Peritoneal cavity/retroperitoneum: No free fluid or free air. No pathologic lymphadenopathy. Bones/soft tissues: No acute or aggressive osseous lesion. 1. No GI bleed identified. 2. 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 There is minimal reconstitution of flow of the bilateral external iliac arteries and minimal to no flow identified in the fem-fem bypass, also chronic. 3. Bibasilar consolidation concerning for atelectasis with superimposed infection. Emphysema is also present. 4. Severe diverticulosis without evidence of acute diverticulitis. 5. Atrophic kidneys, right worse than left.        Electronically signed by Kevin Ramos MD on 10/1/2022 at 2:36 PM

## 2022-10-01 NOTE — ANESTHESIA POSTPROCEDURE EVALUATION
Department of Anesthesiology  Postprocedure Note    Patient: Cassandra Squires  MRN: [id number]  YOB: 1955  Date of evaluation: 10/1/2022      Procedure Summary     Date: 10/01/22 Room / Location: [address]    Anesthesia Start: Bairon Boone Anesthesia Stop: 7049    Procedure: COLONOSCOPY POLYPECTOMY SNARE/COLD BIOPSY Diagnosis:       Gastrointestinal hemorrhage, unspecified gastrointestinal hemorrhage type      (Gastrointestinal hemorrhage, unspecified gastrointestinal hemorrhage type [K92.2])    Surgeons: Suad Guevara MD Responsible Provider: Hossein Doan MD    Anesthesia Type: MAC ASA Status: 4          Anesthesia Type: No value filed.     Deann Phase I:  10    Deann Phase II:  10      Anesthesia Post Evaluation    Patient location during evaluation: bedside  Patient participation: complete - patient participated  Level of consciousness: awake and alert  Pain score: 0  Airway patency: patent  Nausea & Vomiting: no nausea and no vomiting  Complications: no  Cardiovascular status: hemodynamically stable  Respiratory status: acceptable, spontaneous ventilation, nonlabored ventilation and nasal cannula  Hydration status: euvolemic

## 2022-10-01 NOTE — OP NOTE
[address], [address] [place] [address]                                OPERATIVE REPORT    PATIENT NAME: Leonor Ceja                     :        1955  MED REC NO:   [id number]                          ROOM:  ACCOUNT NO:   [de-identified]                           ADMIT DATE: 2022  PROVIDER:     Dudley Mejia MD    DATE OF PROCEDURE:  10/01/2022    PROCEDURE:  Colonoscopy with polypectomy. PRIMARY PHYSICIAN:  Jonetta Dubin, DO    PREOPERATIVE DIAGNOSES:  History of hematochezia; rule out lower GI  bleeding. PREMEDICATION:  Please refer to the anesthesiologist's notes. PROCEDURE DETAILS:  The patient was placed in the left lateral decubitus  position and rectal examination was performed. RECTAL EXAMINATION:  Rectal examination revealed evidence of external  hemorrhoids, but no fissures or fistulas were seen and the rectal mucosa  felt was unremarkable. The video Olympus colonoscope was subsequently introduced into the  rectum and was advanced all the way in the cecum. The ileocecal valve  and appendiceal orifice were identified. Small-to-moderate amount of  liquid and semisolid stool was noted in different portions of the colon. A fairly large number of diverticula were noted scattered throughout the  colon. No blood recent or old was seen in the lumen of the colon. Three diminutive colon polyps were noted as described below. 1.  A 2-mm polyp noted in the cecum; polyp completely resected with the  biopsy forceps and retrieved. 2.  Two 5-mm each polyps noted near the hepatic flexure; cold  polypectomy performed with a snare and the polyps were completely  resected and retrieved. The colonoscope was retroflexed in the rectum and the anorectal junction  was carefully examined. Grade 1 to 2 internal hemorrhoids were seen. POSTOPERATIVE DIAGNOSES:  1.   Three diminutive colon polyps noted as described above.  2.  Diffuse diverticulosis coli. 3.  Mixed hemorrhoids. 4.  Small-to-moderate amount of stool present. 5.  No blood, recent or old, seen in the lumen of the colon. RECOMMENDATIONS:  1. We will follow up on the above path report. 2.  Followup colonoscopy in about 6 months' time in view of presence of  stool during today's exam.  3.  We would like to do a small bowel evaluation as an outpatient for  further workup of the patient's GI bleeding. The patient tolerated the procedure well. There were no postop  complications. Blood loss during the procedure was nil.         Dakota Nguyen MD    D: 10/01/2022 9:05:05       T: 10/01/2022 9:07:31     [id number]  Job#: [id number]     Doc#: [id number]    CC:  Jessy Granger DO

## 2022-10-02 VITALS
HEIGHT: 68 IN | RESPIRATION RATE: 15 BRPM | OXYGEN SATURATION: 93 % | SYSTOLIC BLOOD PRESSURE: 150 MMHG | TEMPERATURE: 97.9 F | HEART RATE: 87 BPM | WEIGHT: 237 LBS | DIASTOLIC BLOOD PRESSURE: 78 MMHG | BODY MASS INDEX: 35.92 KG/M2

## 2022-10-02 LAB
CULTURE: NORMAL
CULTURE: NORMAL
Lab: NORMAL
Lab: NORMAL
SPECIMEN: NORMAL
SPECIMEN: NORMAL

## 2022-10-02 PROCEDURE — 6370000000 HC RX 637 (ALT 250 FOR IP): Performed by: STUDENT IN AN ORGANIZED HEALTH CARE EDUCATION/TRAINING PROGRAM

## 2022-10-02 PROCEDURE — 6370000000 HC RX 637 (ALT 250 FOR IP): Performed by: INTERNAL MEDICINE

## 2022-10-02 PROCEDURE — 2700000000 HC OXYGEN THERAPY PER DAY

## 2022-10-02 PROCEDURE — 2580000003 HC RX 258: Performed by: STUDENT IN AN ORGANIZED HEALTH CARE EDUCATION/TRAINING PROGRAM

## 2022-10-02 PROCEDURE — 94640 AIRWAY INHALATION TREATMENT: CPT

## 2022-10-02 PROCEDURE — 80053 COMPREHEN METABOLIC PANEL: CPT

## 2022-10-02 PROCEDURE — 94761 N-INVAS EAR/PLS OXIMETRY MLT: CPT

## 2022-10-02 RX ORDER — CLONIDINE HYDROCHLORIDE 0.1 MG/1
0.1 TABLET ORAL 2 TIMES DAILY
Qty: 60 TABLET | Refills: 3 | Status: SHIPPED | OUTPATIENT
Start: 2022-10-02

## 2022-10-02 RX ADMIN — SODIUM CHLORIDE, PRESERVATIVE FREE 10 ML: 5 INJECTION INTRAVENOUS at 08:34

## 2022-10-02 RX ADMIN — CITALOPRAM HYDROBROMIDE 20 MG: 20 TABLET ORAL at 08:33

## 2022-10-02 RX ADMIN — GUAIFENESIN 600 MG: 600 TABLET, EXTENDED RELEASE ORAL at 08:33

## 2022-10-02 RX ADMIN — FOLIC ACID 1 MG: 1 TABLET ORAL at 08:34

## 2022-10-02 RX ADMIN — TAMSULOSIN HYDROCHLORIDE 0.8 MG: 0.4 CAPSULE ORAL at 08:33

## 2022-10-02 RX ADMIN — ATORVASTATIN CALCIUM 40 MG: 40 TABLET, FILM COATED ORAL at 08:34

## 2022-10-02 RX ADMIN — AZELASTINE 1 SPRAY: 137 SPRAY, METERED NASAL at 08:36

## 2022-10-02 RX ADMIN — Medication 2000 UNITS: at 08:34

## 2022-10-02 RX ADMIN — IPRATROPIUM BROMIDE AND ALBUTEROL SULFATE 3 ML: .5; 3 SOLUTION RESPIRATORY (INHALATION) at 07:31

## 2022-10-02 RX ADMIN — PANTOPRAZOLE SODIUM 40 MG: 40 TABLET, DELAYED RELEASE ORAL at 08:33

## 2022-10-02 RX ADMIN — CLONIDINE HYDROCHLORIDE 0.1 MG: 0.1 TABLET ORAL at 08:34

## 2022-10-02 ASSESSMENT — PAIN SCALES - GENERAL: PAINLEVEL_OUTOF10: 0

## 2022-10-02 NOTE — PROGRESS NOTES
Nephrology Progress Note  10/2/2022 10:04 AM        Subjective:   Admit Date: 9/26/2022  PCP: Tae Lange, DO    Interval History: abdominal pain better and wants to go home    Diet: Reasonable    ROS: No shortness of breath  No fever and acceptable blood pressure    Data:     Current meds:    guaiFENesin  600 mg Oral BID    ipratropium-albuterol  1 vial Inhalation TID    atorvastatin  40 mg Oral Daily    azelastine  1 spray Each Nostril BID    folic acid  1 mg Oral Daily    tamsulosin  0.8 mg Oral Daily    pantoprazole  40 mg Oral Daily    citalopram  20 mg Oral Daily    Vitamin D  2,000 Units Oral Daily    cloNIDine  0.1 mg Oral BID    sodium chloride flush  5-40 mL IntraVENous 2 times per day      sodium chloride      sodium chloride           I/O last 3 completed shifts:   In: 300 [I.V.:300]  Out: 2050 [Urine:2050]    CBC:   Recent Labs     09/30/22  0427 10/01/22  1213   WBC 9.4 8.6   HGB 8.3* 8.1*    167          Recent Labs     09/30/22 0427 10/01/22  1213    141   K 3.6 3.6    105   CO2 25 24   BUN 37* 31*   CREATININE 6.5* 6.8*   GLUCOSE 95 138*       Lab Results   Component Value Date    CALCIUM 8.2 (L) 10/01/2022    PHOS 3.1 10/01/2022       Objective:     Vitals: BP (!) 150/78   Pulse 87   Temp 97.9 °F (36.6 °C) (Oral)   Resp 15   Ht 5' 8\" (1.727 m)   Wt 237 lb (107.5 kg)   SpO2 93%   BMI 36.04 kg/m² ,    General appearance: Alert, awake and oriented  HEENT: No gross conjunctival pallor  Neck: Supple  Lungs: Positive rhonchi  Heart: Regular rate and rhythm, well-healed scar from previous sternotomy  Abdomen: Soft, minimally tender on palpation now  Extremities: No leg edema, left below-knee amputation  Right upper extremity AV graft which is brachiocephalic, with good thrill on palpation good bruit auscultation      Problem List :         Impression :     End-stage kidney disease with good residual kidney function and intermittent dialysis-he is due for tomorrow  Recent anemia of unknown source of blood loss-s/p several transfusion-conventional work-up negative for acute bleeding  Diffuse and severe atherosclerotic cardiovascular disease involving all vascular beds  Underlying COPD sleep apnea and hypertension    Recommendation/Plan  :     Okay to discharge she feels safe enough from GI standpoint  He wants to go home  I advised him in that case to go for outpatient dialysis  I will adjust his antihypertensive medication  Close outpatient follow-up and work-up      Kelsi Harrington MD MD

## 2022-10-02 NOTE — PROGRESS NOTES
V2.0  Discharge Summary    Name:  Tere Disla /Age/Sex: 1955 (36 y.o. 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 Patient stated that he had been constipated for the last few days, attempted to take miralax at home had a bowel movement today that was noted to be black which lead them to call EMS. Upon arrival to the ED patient was noted to be hypotensive, had another large bloody bowel movement and was resuscitated with 2L Ns and 3 U PRBC with improvement of his vitals. Patient was seen and evaluated at bedside, anxious, complain of moderate epigastric abdominal pain, localized, non radiating, 5/10 with no alleviating or aggravating factors. He denies any ingestion of NSAIDS or use of steroids. Brief Problem Based Course:      1. Acute GI bleed  -Patient presenting with dark tarry stools  -Status post transfusion of 3 units of PRBC  -Status post EGD which showed mild gastritis and duodenitis. -CT of the abdomen and pelvis showed No GI bleed identified. 2. 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  -That is post colonoscopy with polypectomy. Plan for outpatient small bowel FT  -Hemoglobin stable. Outpatient f/u  -Continue Protonix       2. ESRD on hemodialysis  -Nephrology consulted and following  -Continue outpatient schedule     3. Acute on chronic respiratory failure secondary to acute blood loss due to GI bleed  -S/p extubation 2022  -Continue supplemental oxygen       4.   COPD  -Not in acute exacerbation  -Continue breathing treatments  -Continue supplemental oxygen     5. Rest of chronic medical conditions. Hypertension. Hyperlipidemia. Continue medical management     The patient expressed appropriate understanding of, and agreement with the discharge recommendations, medications, and plan. Consults this admission:  IP CONSULT TO GI  IP CONSULT TO GENERAL SURGERY  IP CONSULT TO HOSPITALIST  IP CONSULT TO GI  IP CONSULT TO NEPHROLOGY    Discharge Diagnosis:   Acute GI bleeding        Discharge Instruction:   Follow up appointments:   Primary care physician: Brian Melendrez DO within 2 weeks  Diet: regular diet   Activity: activity as tolerated  Disposition: Discharged to:   [x]Home, [][facility], []SNF, []Acute Rehab, []Hospice   Condition on discharge: Stable  Labs and Tests to be Followed up as an outpatient by PCP or Specialist:     Discharge Medications:        Medication List        START taking these medications      cloNIDine 0.1 MG tablet  Commonly known as: CATAPRES  Take 1 tablet by mouth 2 times daily            CHANGE how you take these medications      atorvastatin 40 MG tablet  Commonly known as: LIPITOR  What changed: Another medication with the same name was removed. Continue taking this medication, and follow the directions you see here. CONTINUE taking these medications      albuterol sulfate  (90 Base) MCG/ACT inhaler  Commonly known as: PROVENTIL;VENTOLIN;PROAIR     ALPRAZolam 0.25 MG tablet  Commonly known as: XANAX     azelastine 0.1 % nasal spray  Commonly known as: ASTELIN     b complex-C-folic acid 1 MG capsule     citalopram 20 MG tablet  Commonly known as: CELEXA  Take 1 tablet by mouth daily. folic acid 1 MG tablet  Commonly known as: FOLVITE  Take 1 tablet by mouth daily.      HYDROcodone-acetaminophen 5-325 MG per tablet  Commonly known as: NORCO     ipratropium-albuterol 0.5-2.5 (3) MG/3ML Soln nebulizer solution  Commonly known as: DUONEB     magnesium oxide 400 (241.3 Mg) MG Tabs tablet  Commonly known as: MAG-OX     pantoprazole 40 MG tablet  Commonly known as: PROTONIX     tamsulosin 0.4 MG capsule  Commonly known as: FLOMAX     Vitamin D3 50 MCG (2000 UT) Caps            STOP taking these medications      amLODIPine 10 MG tablet  Commonly known as: NORVASC     carvedilol 25 MG tablet  Commonly known as: COREG     doxazosin 2 MG tablet  Commonly known as: Cardura     DOXYCYCLINE PO     montelukast 10 MG tablet  Commonly known as: SINGULAIR               Where to Get Your Medications        These medications were sent to [facility], [address] [phone]  [facility], [address] [place] [address] Rd      Phone: [phone]   cloNIDine 0.1 MG tablet        Objective Findings at Discharge:   BP (!) 150/78   Pulse 87   Temp 97.9 °F (36.6 °C) (Oral)   Resp 15   Ht 5' 8\" (1.727 m)   Wt 237 lb (107.5 kg)   SpO2 93%   BMI 36.04 kg/m²       Physical Exam:   General: NAD  Eyes: EOMI  ENT: neck supple  Cardiovascular: Regular rate. Respiratory: Clear to auscultation  Gastrointestinal: Soft, non tender  Genitourinary: no suprapubic tenderness  Musculoskeletal: No edema  Skin: warm, dry  Neuro: Alert. Psych: Mood appropriate. Labs and Imaging   XR PELVIS (1-2 VIEWS)    Result Date: 9/26/2022  EXAMINATION: ONE XRAY VIEW OF THE PELVIS 9/26/2022 10:49 pm COMPARISON: None. HISTORY: ORDERING SYSTEM PROVIDED HISTORY: CVC placement Relevant Medical/Surgical History: Best possible image due to pt size and condition FINDINGS: *Right femoral catheter tip overlies the mid pelvis level; CVC is confirmed with tip at the right external iliac vein on CT performed immediately following this exam. *Rectal temperature probe noted. *No gross acute osseous abnormality.      1. Right femoral catheter tip overlies the mid pelvis level; CVC is confirmed with tip at the right external iliac vein on CT performed immediately following this exam. 2. Rectal temperature probe noted. 3. No gross acute osseous abnormality. XR CHEST PORTABLE    Result Date: 9/26/2022  EXAMINATION: ONE XRAY VIEW OF THE CHEST 9/26/2022 10:49 pm COMPARISON: Chest radiograph 05/18/2021 HISTORY: ORDERING SYSTEM PROVIDED HISTORY: Intubation FINDINGS: The cardiac silhouette is enlarged. The mediastinal and hilar silhouettes appear unremarkable. Chronic appearing coarse interstitial densities predominate perihilar regions and lung bases, typical of sequela from smoking or other previous infectious/inflammatory process. The lungs are hyperinflated with increased translucency both lung apices and tapering of the vasculature in the upper lungs. Bibasilar consolidative changes greater left than right with probable left pleural effusion. No pneumothorax is seen. No acute osseous abnormality is identified. Endotracheal tube tip projects over the trachea, tip just above the level of the aortic knob, 6.5 cm superior to the inna. NG tube extends below the left hemidiaphragm, out of the field of view. 1.  Life support appliances appear appropriately positioned. 2. Bibasilar consolidative changes greater left than right with probable left pleural effusion. Pneumonia is a consideration. 3.  Pulmonary sequela typical of that seen with smoking, including COPD. 4. Cardiomegaly. XR ABDOMEN FOR NG/OG/NE TUBE PLACEMENT    Result Date: 9/27/2022  EXAMINATION: ONE SUPINE XRAY VIEW(S) OF THE ABDOMEN 9/26/2022 10:49 pm COMPARISON: None. HISTORY: ORDERING SYSTEM PROVIDED HISTORY: Confirmation of course of NG/OG/NE tube and location of tip of tube TECHNOLOGIST PROVIDED HISTORY: Reason for exam:->Confirmation of course of NG/OG/NE tube and location of tip of tube Portable? ->Yes Reason for Exam: Confirmation of course of NG/OG/NE tube and location of tip of tube FINDINGS: Nasogastric tube tip terminates in the distal stomach.   The side port is beyond the GE junction. Mildly prominent small bowel loops measure up to 3.4 cm. No obvious free air or pneumatosis. No new osseous abnormality. 1. Nasogastric tube tip terminates in the distal stomach. 2. Nonspecific prominent loops of small bowel measuring up to 3.4 cm. CTA ABDOMEN PELVIS W WO CONTRAST    Result Date: 9/28/2022  EXAMINATION: CTA OF THE ABDOMEN AND PELVIS WITH AND WITHOUT CONTRAST 9/26/2022 11:40 pm: TECHNIQUE: CTA of the abdomen and pelvis was performed without and with the administration of intravenous contrast. Multiplanar reformatted images are provided for review. MIP images are provided for review. Automated exposure control, iterative reconstruction, and/or weight based adjustment of the mA/kV was utilized to reduce the radiation dose to as low as reasonably achievable. COMPARISON: February 26, 2017. February 8, 2016. HISTORY: ORDERING SYSTEM PROVIDED HISTORY: Lower GI Bleed TECHNOLOGIST PROVIDED HISTORY: Reason for exam:->Lower GI Bleed Reason for Exam: Lower GI Bleed FINDINGS: CTA vascular: The abdominal aorta demonstrates severe atherosclerosis. There is complete occlusion of the abdominal aorta after the takeoff of renal arteries. Minimal reconstitution of flow is seen involving the bilateral external iliac arteries. There is minimal to no opacification of the fem-fem bypass graft. The celiac trunk and its branches including the splenic artery, left gastric artery, and common hepatic artery are patent and normal in course and caliber. SMA is also patent and normal in course and caliber. The bilateral renal arteries are attenuated with patchy flow resulting in atrophy of the bilateral kidneys, right worse than left. No significant opacification of the graph that runs along the left hemithorax and abdomen. CT chest: Lung bases: Emphysematous changes are present. Bibasilar consolidations are noted. CT abdomen and pelvis: Organs:  The liver parenchyma, spleen, pancreas, and adrenal glands demonstrate no acute abnormality. Status post cholecystectomy. Pneumobilia is noted, presumably related to prior sphincterotomy. The bilateral kidneys are atrophic, right worse than left. No solid renal masses. No hydronephrosis or nephrolithiasis. GI/bowel: Nasogastric tube tip terminates in the distal stomach. A rectal tube is also present. The stomach, small bowel, and colon are normal in course caliber without evidence of wall thickening or obstruction. Normal appendix. Severe diverticulosis without evidence of acute diverticulitis. No evidence of bowel ischemia. Pelvic organs: Normal bladder. Prostate and seminal vesicles are unremarkable. Peritoneal cavity/retroperitoneum: No free fluid or free air. No pathologic lymphadenopathy. Bones/soft tissues: No acute or aggressive osseous lesion. 1. No GI bleed identified. 2. 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 There is minimal reconstitution of flow of the bilateral external iliac arteries and minimal to no flow identified in the fem-fem bypass, also chronic. 3. Bibasilar consolidation concerning for atelectasis with superimposed infection. Emphysema is also present. 4. Severe diverticulosis without evidence of acute diverticulitis. 5. Atrophic kidneys, right worse than left.        CBC:   Recent Labs     09/30/22  0427 10/01/22  1213   WBC 9.4 8.6   HGB 8.3* 8.1*    167     BMP:    Recent Labs     09/30/22  0427 10/01/22  1213    141   K 3.6 3.6    105   CO2 25 24   BUN 37* 31*   CREATININE 6.5* 6.8*   GLUCOSE 95 138*     Hepatic:   Recent Labs     09/30/22  0427 10/01/22  1213   AST 19 17   ALT 19 17   BILITOT 0.3 0.2   ALKPHOS 57 65     Lipids:   Lab Results   Component Value Date/Time    CHOL 173 05/12/2021 05:07 AM     05/12/2021 05:07 AM    TRIG 195 09/26/2022 09:53 PM     Hemoglobin A1C:   Lab Results   Component Value Date/Time    LABA1C 5.4 12/19/2014 03:40 AM     TSH: No results found for: TSH  Troponin:   Lab Results   Component Value Date/Time    TROPONINT <0.010 09/26/2022 09:53 PM    TROPONINT <0.010 09/26/2022 07:00 PM    TROPONINT 0.018 05/11/2021 02:22 PM     Lactic Acid: No results for input(s): LACTA in the last 72 hours. BNP: No results for input(s): PROBNP in the last 72 hours.   UA:  Lab Results   Component Value Date/Time    NITRU NEGATIVE 09/28/2022 08:45 PM    NITRU Negative 12/06/2019 01:33 PM    COLORU YELLOW 09/28/2022 08:45 PM    PHUR 6.0 12/06/2019 01:33 PM    WBCUA <1 09/28/2022 08:45 PM    RBCUA <1 09/28/2022 08:45 PM    MUCUS RARE 05/11/2021 01:10 PM    TRICHOMONAS NONE SEEN 05/17/2021 09:00 AM    YEAST FEW 01/09/2015 02:00 PM    BACTERIA NEGATIVE 09/28/2022 08:45 PM    CLARITYU CLEAR 09/28/2022 08:45 PM    SPECGRAV 1.010 09/28/2022 08:45 PM    LEUKOCYTESUR NEGATIVE 09/28/2022 08:45 PM    UROBILINOGEN 0.2 09/28/2022 08:45 PM    BILIRUBINUR NEGATIVE 09/28/2022 08:45 PM    BLOODU NEGATIVE 09/28/2022 08:45 PM    GLUCOSEU Negative 12/06/2019 01:33 PM    KETUA NEGATIVE 09/28/2022 08:45 PM     Urine Cultures: No results found for: LABURIN  Blood Cultures: No results found for: BC  No results found for: BLOODCULT2  Organism:   Lab Results   Component Value Date/Time    ORG SCNG 06/26/2015 01:00 PM       Time Spent Discharging patient 35 minutes    Electronically signed by Thomas Palacio MD on 10/2/2022 at 8:38 AM

## 2022-10-02 NOTE — PROGRESS NOTES
DOING WELL NO ABD COMPLAINTS NO GROSS GIT BLEEDING  VITALS STABLE   LABS NOTED HB 8.1  POSSIBLE D/C TODAY PT TO CALL FOR OUTPT SBFT D/W RN ALSO

## 2022-11-11 NOTE — PROGRESS NOTES
IR Procedure at [facility]: Spoke with patient and he will arrive at [address] [place] [address] Rd[address] at [facility] on 11/14/2022 for his procedure at 0830. Also went over below instructions. NPO at [address]       2. Follow your directions as prescribed by the doctor for your procedure and medications. 3.   Consult your provider as to when to stop blood thinner  4. Do not take any pain medication within 6 hours of your procedure  5. Do not drink any alcoholic beverages or use any street drugs 24 hours before procedure. 6.   Please wear simple, loose fitting clothing to the hospital.  Do not bring valuables (money,             credit cards, checkbooks, etc.)     7. If you  have a Living Will and Durable Power of  for Healthcare, please bring in a copy. 8.   Please bring picture ID,  insurance card, paperwork from the doctors office            (H & P, Consent,  & card for implantable devices). 9.   Report to the information desk on the ground floor. 10. Take a shower the night before or morning of your procedure, do not apply any lotion, oil or powder. 11. If you are going to be sedated for the procedure, you will need a responsible adult to drive you home.

## 2022-11-14 ENCOUNTER — HOSPITAL ENCOUNTER (OUTPATIENT)
Dept: INTERVENTIONAL RADIOLOGY/VASCULAR | Age: 67
Discharge: HOME | End: 2022-11-14
Payer: MEDICARE

## 2022-11-14 VITALS
HEIGHT: 68 IN | DIASTOLIC BLOOD PRESSURE: 83 MMHG | WEIGHT: 240 LBS | BODY MASS INDEX: 36.37 KG/M2 | RESPIRATION RATE: 18 BRPM | OXYGEN SATURATION: 95 % | SYSTOLIC BLOOD PRESSURE: 162 MMHG | TEMPERATURE: 97.8 F | HEART RATE: 94 BPM

## 2022-11-14 DIAGNOSIS — N18.6 ESRD (END STAGE RENAL DISEASE) (HCC): ICD-10-CM

## 2022-11-14 LAB
APTT: 34.4 SECONDS (ref 25.1–37.1)
HCT VFR BLD CALC: 36 % (ref 42–52)
HEMOGLOBIN: 11.7 GM/DL (ref 13.5–18)
INR BLD: 0.82 INDEX
MCH RBC QN AUTO: 31.5 PG (ref 27–31)
MCHC RBC AUTO-ENTMCNC: 32.5 % (ref 32–36)
MCV RBC AUTO: 97 FL (ref 78–100)
PDW BLD-RTO: 15.1 % (ref 11.7–14.9)
PLATELET # BLD: 215 K/CU MM (ref 140–440)
PMV BLD AUTO: 10 FL (ref 7.5–11.1)
PROTHROMBIN TIME: 10.5 SECONDS (ref 11.7–14.5)
RBC # BLD: 3.71 M/CU MM (ref 4.6–6.2)
WBC # BLD: 9.4 K/CU MM (ref 4–10.5)

## 2022-11-14 PROCEDURE — 85027 COMPLETE CBC AUTOMATED: CPT

## 2022-11-14 PROCEDURE — 7100000010 HC PHASE II RECOVERY - FIRST 15 MIN

## 2022-11-14 PROCEDURE — 85730 THROMBOPLASTIN TIME PARTIAL: CPT

## 2022-11-14 PROCEDURE — 36902 INTRO CATH DIALYSIS CIRCUIT: CPT

## 2022-11-14 PROCEDURE — 85610 PROTHROMBIN TIME: CPT

## 2022-11-14 PROCEDURE — 2709999900 IR FISTULAGRAM

## 2022-11-14 PROCEDURE — 6360000002 HC RX W HCPCS

## 2022-11-14 PROCEDURE — 7100000011 HC PHASE II RECOVERY - ADDTL 15 MIN

## 2022-11-14 PROCEDURE — 6360000004 HC RX CONTRAST MEDICATION: Performed by: RADIOLOGY

## 2022-11-14 RX ORDER — SODIUM CHLORIDE 0.9 % (FLUSH) 0.9 %
10 SYRINGE (ML) INJECTION PRN
Status: DISCONTINUED | OUTPATIENT
Start: 2022-11-14 | End: 2022-11-15 | Stop reason: HOSPADM

## 2022-11-14 RX ADMIN — IOPAMIDOL 35 ML: 755 INJECTION, SOLUTION INTRAVENOUS at 10:55

## 2022-11-14 ASSESSMENT — PAIN SCALES - GENERAL: PAINLEVEL_OUTOF10: 0

## 2022-11-14 ASSESSMENT — PAIN - FUNCTIONAL ASSESSMENT: PAIN_FUNCTIONAL_ASSESSMENT: 0-10

## 2022-11-14 NOTE — DISCHARGE INSTRUCTIONS
Kareem Huerta with COVID-19 may have no symptoms, mild symptoms, such as fever, cough, and shortness of breath or they may have more severe illness, developing severe and fatal pneumonia. As a result, Advance Care Planning with attention to naming a health care decision maker (someone you trust to make healthcare decisions for you if you could not speak for yourself) and sharing other health care preferences is important BEFORE a possible health crisis. Please contact your Primary Care Provider to discuss Advance Care Planning. Preventing the Spread of Coronavirus Disease 2019 in Homes and Residential Communities  For the most recent information go to [employer]    Prevention steps for People with confirmed or suspected COVID-19 (including persons under investigation) who do not need to be hospitalized  and   People with confirmed COVID-19 who were hospitalized and determined to be medically stable to go home    Your healthcare provider and public health staff will evaluate whether you can be cared for at home. If it is determined that you do not need to be hospitalized and can be isolated at home, you will be monitored by staff from your local or state health department. You should follow the prevention steps below until a healthcare provider or local or state health department says you can return to your normal activities. Stay home except to get medical care  People who are mildly ill with COVID-19 are able to isolate at home during their illness. You should restrict activities outside your home, except for getting medical care. Do not go to work, school, or public areas. Avoid using public transportation, ride-sharing, or taxis.   Separate yourself from other people and animals in your home  People: As much as possible, you should stay in a specific room and away from other people in your home. Also, you should use a separate bathroom, if available. Animals: You should restrict contact with pets and other animals while you are sick with COVID-19, just like you would around other people. Although there have not been reports of pets or other animals becoming sick with COVID-19, it is still recommended that people sick with COVID-19 limit contact with animals until more information is known about the virus. When possible, have another member of your household care for your animals while you are sick. If you are sick with COVID-19, avoid contact with your pet, including petting, snuggling, being kissed or licked, and sharing food. If you must care for your pet or be around animals while you are sick, wash your hands before and after you interact with pets and wear a facemask. Call ahead before visiting your doctor  If you have a medical appointment, call the healthcare provider and tell them that you have or may have COVID-19. This will help the healthcare providers office take steps to keep other people from getting infected or exposed. Wear a facemask  You should wear a facemask when you are around other people (e.g., sharing a room or vehicle) or pets and before you enter a healthcare providers office. If you are not able to wear a facemask (for example, because it causes trouble breathing), then people who live with you should not stay in the same room with you, or they should wear a facemask if they enter your room. Cover your coughs and sneezes  Cover your mouth and nose with a tissue when you cough or sneeze. Throw used tissues in a lined trash can. Immediately wash your hands with soap and water for at least 20 seconds or, if soap and water are not available, clean your hands with an alcohol-based hand  that contains at least 60% alcohol.   Clean your hands often  Wash your hands often with soap and water for at least 20 seconds, especially after blowing your nose, coughing, or sneezing; going to the bathroom; and before eating or preparing food. If soap and water are not readily available, use an alcohol-based hand  with at least 60% alcohol, covering all surfaces of your hands and rubbing them together until they feel dry. Soap and water are the best option if hands are visibly dirty. Avoid touching your eyes, nose, and mouth with unwashed hands. Avoid sharing personal household items  You should not share dishes, drinking glasses, cups, eating utensils, towels, or bedding with other people or pets in your home. After using these items, they should be washed thoroughly with soap and water. Clean all high-touch surfaces everyday  High touch surfaces include counters, tabletops, doorknobs, bathroom fixtures, toilets, phones, keyboards, tablets, and bedside tables. Also, clean any surfaces that may have blood, stool, or body fluids on them. Use a household cleaning spray or wipe, according to the label instructions. Labels contain instructions for safe and effective use of the cleaning product including precautions you should take when applying the product, such as wearing gloves and making sure you have good ventilation during use of the product. Monitor your symptoms  Seek prompt medical attention if your illness is worsening (e.g., difficulty breathing). Before seeking care, call your healthcare provider and tell them that you have, or are being evaluated for, COVID-19. Put on a facemask before you enter the facility. These steps will help the healthcare providers office to keep other people in the office or waiting room from getting infected or exposed. Ask your healthcare provider to call the local or [facility] health department. Persons who are placed under active monitoring or facilitated self-monitoring should follow instructions provided by their local health department or occupational health professionals, as appropriate.  When working with your local health department check their available hours. If you have a medical emergency and need to call 911, notify the dispatch personnel that you have, or are being evaluated for COVID-19. If possible, put on a facemask before emergency medical services arrive. Discontinuing home isolation  Patients with confirmed COVID-19 should remain under home isolation precautions until the risk of secondary transmission to others is thought to be low. The decision to discontinue home isolation precautions should be made on a case-by-case basis, in consultation with healthcare providers and state and local health departments.

## 2022-11-14 NOTE — PROGRESS NOTES
1004 Pt received from PACU and report received from [facility]. Pt denies c/o. Strong bruit and thrill present right upper arm fistula. Dressing right upper arm dry and intact. Right upper arm puncture sites soft and no hematoma. 1013 DR. Craey in room to check on fistula and he placed band aid over second puncture site right upper arm. 1016 Discharge instructions given to pt and wife both verbalized understanding of instructions,. 1020 Pt ready to get dressed to go home. Wife at bedside. Call light in reach. Pt declined beverage offer. Pt denies c/o. 1024 Pt discharged to home via [address].

## 2022-11-14 NOTE — OR NURSING
Fistulagram by Dr Jericho Haynes    PT TRANSPORTED FROM:     [facility]                               TO THE IR ROOM:  Large        BARRIER PRECAUTIONS & STERILE TECHNIQUE:               Pt placed on VS Monitor. Pt prepped and draped in a sterile fashion with chlorhexadine.     PAIN/LOCAL ANESTHESIA/SEDATION MANAGEMENT:           Local: Lidocaine 1% given by Dr          Sedation: None             Fentanyl:             Versed:     INTRAOPERATIVE:           ACCESS TIME:           US/FLUORO: Fluoro guided          WIRE USED: [address]          SHEATH USED: 6Fr short; pulled @ 0928 by Dr; re-accessed arterial @ 6326 with 6FR short; pulled @ 0938          CATHETER USED:           FINAL IMAGE TAKEN TO CONFIRM PLACEMENT OF:           CONTRAST/CC: Isovue 370          Balloon: 5X2 135cm cutting [employer]    STERILE DRESSINGS: Gauze with tegaderm    SPECIMENS: NA    EBL: <57IE           COMPLICATIONS/ OUTCOME: None          REPORT CALLED TO: Tracee Porter RN for Елена in [facility]

## 2022-12-01 NOTE — PROGRESS NOTES
IR Procedure at [facility]:  Patient will arrive at 0930 at [facility] on 12/5/2022 for his procedure at 1130, Also went over below instructions. NPO at [address]       2. Follow your directions as prescribed by the doctor for your procedure and medications. 3.   Consult your provider as to when to stop blood thinner  4. Do not take any pain medication within 6 hours of your procedure  5. Do not drink any alcoholic beverages or use any street drugs 24 hours before procedure. 6.   Please wear simple, loose fitting clothing to the hospital.  Do not bring valuables (money,             credit cards, checkbooks, etc.)     7. If you  have a Living Will and Durable Power of  for Healthcare, please bring in a copy. 8.   Please bring picture ID,  insurance card, paperwork from the doctors office            (H & P, Consent,  & card for implantable devices). 9.   Report to the information desk on the ground floor. 10. Take a shower the night before or morning of your procedure, do not apply any lotion, oil or powder. 11. If you are going to be sedated for the procedure, you will need a responsible adult to drive you home.

## 2022-12-05 ENCOUNTER — HOSPITAL ENCOUNTER (OUTPATIENT)
Dept: INTERVENTIONAL RADIOLOGY/VASCULAR | Age: 67
Discharge: HOME | End: 2022-12-05
Payer: MEDICARE

## 2022-12-05 VITALS
OXYGEN SATURATION: 100 % | RESPIRATION RATE: 16 BRPM | SYSTOLIC BLOOD PRESSURE: 177 MMHG | HEIGHT: 68 IN | TEMPERATURE: 98.2 F | HEART RATE: 87 BPM | WEIGHT: 242.51 LBS | DIASTOLIC BLOOD PRESSURE: 86 MMHG | BODY MASS INDEX: 36.75 KG/M2

## 2022-12-05 DIAGNOSIS — Z99.2 DEPENDENCE ON RENAL DIALYSIS (HCC): ICD-10-CM

## 2022-12-05 DIAGNOSIS — N18.6 END STAGE RENAL DISEASE (HCC): ICD-10-CM

## 2022-12-05 PROCEDURE — 6360000004 HC RX CONTRAST MEDICATION

## 2022-12-05 PROCEDURE — 7100000010 HC PHASE II RECOVERY - FIRST 15 MIN

## 2022-12-05 PROCEDURE — 36907 BALO ANGIOP CTR DIALYSIS SEG: CPT

## 2022-12-05 PROCEDURE — C1769 GUIDE WIRE: HCPCS

## 2022-12-05 PROCEDURE — 36902 INTRO CATH DIALYSIS CIRCUIT: CPT

## 2022-12-05 PROCEDURE — 6370000000 HC RX 637 (ALT 250 FOR IP)

## 2022-12-05 PROCEDURE — 7100000011 HC PHASE II RECOVERY - ADDTL 15 MIN

## 2022-12-05 PROCEDURE — 6360000004 HC RX CONTRAST MEDICATION: Performed by: RADIOLOGY

## 2022-12-05 RX ADMIN — IOPAMIDOL 45 ML: 755 INJECTION, SOLUTION INTRAVENOUS at 12:55

## 2022-12-05 ASSESSMENT — PAIN - FUNCTIONAL ASSESSMENT
PAIN_FUNCTIONAL_ASSESSMENT: 0-10
PAIN_FUNCTIONAL_ASSESSMENT: PREVENTS OR INTERFERES SOME ACTIVE ACTIVITIES AND ADLS

## 2022-12-05 ASSESSMENT — PAIN DESCRIPTION - DESCRIPTORS: DESCRIPTORS: TENDER;ACHING

## 2022-12-05 ASSESSMENT — PAIN SCALES - GENERAL
PAINLEVEL_OUTOF10: 4
PAINLEVEL_OUTOF10: 4

## 2022-12-05 NOTE — OR NURSING
PROCEDURE PERFORMED: RUE fistulagram    PRIMARY INDICATION FOR PROCEDURE: high venous pressures    INFORMED CONSENT:  Obtained prior to procedure. Consent placed in chart. COLLETTE SCORE PRE PROCEDURE:   10     PT TRANSPORTED FROM:  [facility] [place]                                  TO THE IR ROOM:  Large                      ASSESSMENT: Pt alert & oriented on RA, able to move all extremities. BARRIER PRECAUTIONS & STERILE TECHNIQUE:               Pt transferred to the table and positioned supine for comfort. Pt placed on 2LNC O2 for comfort. Warm blankets given. Pt placed on Cardiac Monitor. Pt prepped and draped in a sterile fashion with chlorhexadine. PAIN/LOCAL ANESTHESIA/SEDATION MANAGEMENT:           Local: Lidocaine 1% given by Dr. Chandler Brothers:           ACCESS TIME: 4665          US/FLUORO: US 3 images FT: 2.8  AK: 109          ACCESS USED: micropuncture          WIRE USED: 0.035\" Stiffglide 180cm          SHEATH USED: 6Fr          CONTRAST/CC: Osovue 370 / 45cc          CATHETER USED: 4Fr Kumpe                  7.0 x 40mm balloon angioplasty @ 9393, 1208, 3891 & 1911    STERILE DRESSINGS: gelfoam, guaze & tegaderm    SPECIMENS: None    EBL:  <3cc        FOLLOW- UP X-RAY: None    PROCEDURE ENDED:  Sheath removed @ 1224. Light pressure held for 5 mins.      COMPLICATIONS/ OUTCOME: Pt tolerated well    COLLETTE SCORE POST PROCEDURE:  10       REPORT CALLED TO: [facility] [place]

## 2022-12-05 NOTE — PROGRESS NOTES
1240- Patient arrived back to [facility]. Report given to this nurse from [place] [id number] A&O site CDI,  No C/O pain beverage of choice offered to patient. Call light in reach and bed in lowest position. 1303- Discharge instructions given to patient understanding verbalized. Patient sitting on side of bed getting dressed   [phone]- Patient escorted to lobby via W/C.

## 2022-12-31 ENCOUNTER — HOSPITAL ENCOUNTER (INPATIENT)
Age: 67
LOS: 2 days | Discharge: HOME | DRG: 377 | End: 2023-01-03
Attending: EMERGENCY MEDICINE | Admitting: STUDENT IN AN ORGANIZED HEALTH CARE EDUCATION/TRAINING PROGRAM
Payer: MEDICARE

## 2022-12-31 ENCOUNTER — APPOINTMENT (OUTPATIENT)
Dept: GENERAL RADIOLOGY | Age: 67
DRG: 377 | End: 2022-12-31
Payer: MEDICARE

## 2022-12-31 DIAGNOSIS — R53.1 GENERAL WEAKNESS: ICD-10-CM

## 2022-12-31 DIAGNOSIS — E87.6 HYPOKALEMIA: ICD-10-CM

## 2022-12-31 DIAGNOSIS — D64.9 ANEMIA, UNSPECIFIED TYPE: Primary | ICD-10-CM

## 2022-12-31 DIAGNOSIS — K92.2 GASTROINTESTINAL HEMORRHAGE, UNSPECIFIED GASTROINTESTINAL HEMORRHAGE TYPE: ICD-10-CM

## 2022-12-31 LAB
BASE EXCESS: 2 (ref 0–3.3)
BASOPHILS ABSOLUTE: 0.1 K/CU MM
BASOPHILS RELATIVE PERCENT: 0.6 % (ref 0–1)
COMMENT: ABNORMAL
DIFFERENTIAL TYPE: ABNORMAL
EOSINOPHILS ABSOLUTE: 0.2 K/CU MM
EOSINOPHILS RELATIVE PERCENT: 2.2 % (ref 0–3)
HCO3 VENOUS: 24.3 MMOL/L (ref 19–25)
HCT VFR BLD CALC: 19.6 % (ref 42–52)
HEMOGLOBIN: 6.3 GM/DL (ref 13.5–18)
IMMATURE NEUTROPHIL %: 0.6 % (ref 0–0.43)
LYMPHOCYTES ABSOLUTE: 2 K/CU MM
LYMPHOCYTES RELATIVE PERCENT: 17.6 % (ref 24–44)
MCH RBC QN AUTO: 32.3 PG (ref 27–31)
MCHC RBC AUTO-ENTMCNC: 32.1 % (ref 32–36)
MCV RBC AUTO: 100.5 FL (ref 78–100)
MONOCYTES ABSOLUTE: 0.7 K/CU MM
MONOCYTES RELATIVE PERCENT: 6.2 % (ref 0–4)
NUCLEATED RBC %: 0 %
O2 SAT, VEN: 93.7 % (ref 50–70)
PCO2, VEN: 45 MMHG (ref 38–52)
PDW BLD-RTO: 15.6 % (ref 11.7–14.9)
PH VENOUS: 7.34 (ref 7.32–7.42)
PLATELET # BLD: 221 K/CU MM (ref 140–440)
PMV BLD AUTO: 9.1 FL (ref 7.5–11.1)
PO2, VEN: 139 MMHG (ref 28–48)
RBC # BLD: 1.95 M/CU MM (ref 4.6–6.2)
SEGMENTED NEUTROPHILS ABSOLUTE COUNT: 8.1 K/CU MM
SEGMENTED NEUTROPHILS RELATIVE PERCENT: 72.8 % (ref 36–66)
TOTAL IMMATURE NEUTOROPHIL: 0.07 K/CU MM
TOTAL NUCLEATED RBC: 0 K/CU MM
WBC # BLD: 11.2 K/CU MM (ref 4–10.5)

## 2022-12-31 PROCEDURE — 71045 X-RAY EXAM CHEST 1 VIEW: CPT

## 2022-12-31 PROCEDURE — 84484 ASSAY OF TROPONIN QUANT: CPT

## 2022-12-31 PROCEDURE — 85025 COMPLETE CBC W/AUTO DIFF WBC: CPT

## 2022-12-31 PROCEDURE — 93005 ELECTROCARDIOGRAM TRACING: CPT | Performed by: EMERGENCY MEDICINE

## 2022-12-31 PROCEDURE — 82805 BLOOD GASES W/O2 SATURATION: CPT

## 2022-12-31 PROCEDURE — 99285 EMERGENCY DEPT VISIT HI MDM: CPT

## 2022-12-31 PROCEDURE — 80053 COMPREHEN METABOLIC PANEL: CPT

## 2022-12-31 PROCEDURE — 83690 ASSAY OF LIPASE: CPT

## 2022-12-31 PROCEDURE — 83880 ASSAY OF NATRIURETIC PEPTIDE: CPT

## 2022-12-31 RX ORDER — FENTANYL CITRATE 50 UG/ML
50 INJECTION, SOLUTION INTRAMUSCULAR; INTRAVENOUS ONCE
Status: COMPLETED | OUTPATIENT
Start: 2022-12-31 | End: 2023-01-01

## 2022-12-31 ASSESSMENT — PAIN - FUNCTIONAL ASSESSMENT: PAIN_FUNCTIONAL_ASSESSMENT: 0-10

## 2022-12-31 ASSESSMENT — PAIN SCALES - GENERAL: PAINLEVEL_OUTOF10: 7

## 2023-01-01 PROBLEM — K92.1 MELENA: Status: ACTIVE | Noted: 2023-01-01

## 2023-01-01 LAB
ALBUMIN SERPL-MCNC: 3.8 GM/DL (ref 3.4–5)
ALP BLD-CCNC: 72 IU/L (ref 40–129)
ALT SERPL-CCNC: 18 U/L (ref 10–40)
ANION GAP SERPL CALCULATED.3IONS-SCNC: 10 MMOL/L (ref 4–16)
ANION GAP SERPL CALCULATED.3IONS-SCNC: 18 MMOL/L (ref 4–16)
ANION GAP SERPL CALCULATED.3IONS-SCNC: 8 MMOL/L (ref 4–16)
AST SERPL-CCNC: 17 IU/L (ref 15–37)
BASOPHILS ABSOLUTE: 0 K/CU MM
BASOPHILS RELATIVE PERCENT: 0.4 % (ref 0–1)
BILIRUB SERPL-MCNC: 0.2 MG/DL (ref 0–1)
BUN BLDV-MCNC: 49 MG/DL (ref 6–23)
BUN BLDV-MCNC: 51 MG/DL (ref 6–23)
BUN BLDV-MCNC: 54 MG/DL (ref 6–23)
CALCIUM SERPL-MCNC: 8 MG/DL (ref 8.3–10.6)
CALCIUM SERPL-MCNC: 8.1 MG/DL (ref 8.3–10.6)
CALCIUM SERPL-MCNC: 8.1 MG/DL (ref 8.3–10.6)
CHLORIDE BLD-SCNC: 100 MMOL/L (ref 99–110)
CHLORIDE BLD-SCNC: 100 MMOL/L (ref 99–110)
CHLORIDE BLD-SCNC: 90 MMOL/L (ref 99–110)
CO2: 21 MMOL/L (ref 21–32)
CO2: 22 MMOL/L (ref 21–32)
CO2: 24 MMOL/L (ref 21–32)
CREAT SERPL-MCNC: 6.6 MG/DL (ref 0.9–1.3)
CREAT SERPL-MCNC: 6.9 MG/DL (ref 0.9–1.3)
CREAT SERPL-MCNC: 7.2 MG/DL (ref 0.9–1.3)
DIFFERENTIAL TYPE: ABNORMAL
EKG ATRIAL RATE: 85 BPM
EKG ATRIAL RATE: 93 BPM
EKG DIAGNOSIS: NORMAL
EKG DIAGNOSIS: NORMAL
EKG P AXIS: 94 DEGREES
EKG P-R INTERVAL: 146 MS
EKG P-R INTERVAL: 148 MS
EKG Q-T INTERVAL: 416 MS
EKG Q-T INTERVAL: 416 MS
EKG QRS DURATION: 134 MS
EKG QRS DURATION: 134 MS
EKG QTC CALCULATION (BAZETT): 495 MS
EKG QTC CALCULATION (BAZETT): 517 MS
EKG R AXIS: -26 DEGREES
EKG R AXIS: -47 DEGREES
EKG T AXIS: 32 DEGREES
EKG T AXIS: 69 DEGREES
EKG VENTRICULAR RATE: 85 BPM
EKG VENTRICULAR RATE: 93 BPM
EOSINOPHILS ABSOLUTE: 0.1 K/CU MM
EOSINOPHILS RELATIVE PERCENT: 1.4 % (ref 0–3)
GFR SERPL CREATININE-BSD FRML MDRD: 8 ML/MIN/1.73M2
GFR SERPL CREATININE-BSD FRML MDRD: 8 ML/MIN/1.73M2
GFR SERPL CREATININE-BSD FRML MDRD: 9 ML/MIN/1.73M2
GLUCOSE BLD-MCNC: 105 MG/DL (ref 70–99)
GLUCOSE BLD-MCNC: 110 MG/DL (ref 70–99)
GLUCOSE BLD-MCNC: 111 MG/DL (ref 70–99)
HCT VFR BLD CALC: 19.3 % (ref 42–52)
HCT VFR BLD CALC: 25.1 % (ref 42–52)
HEMOGLOBIN: 6.3 GM/DL (ref 13.5–18)
HEMOGLOBIN: 8.3 GM/DL (ref 13.5–18)
IMMATURE NEUTROPHIL %: 0.3 % (ref 0–0.43)
LACTATE: 0.9 MMOL/L (ref 0.5–1.9)
LIPASE: 55 IU/L (ref 13–60)
LYMPHOCYTES ABSOLUTE: 1 K/CU MM
LYMPHOCYTES RELATIVE PERCENT: 10.7 % (ref 24–44)
MCH RBC QN AUTO: 32.3 PG (ref 27–31)
MCHC RBC AUTO-ENTMCNC: 32.6 % (ref 32–36)
MCV RBC AUTO: 99 FL (ref 78–100)
MONOCYTES ABSOLUTE: 0.6 K/CU MM
MONOCYTES RELATIVE PERCENT: 6.4 % (ref 0–4)
NUCLEATED RBC %: 0 %
PDW BLD-RTO: 16.3 % (ref 11.7–14.9)
PLATELET # BLD: 178 K/CU MM (ref 140–440)
PMV BLD AUTO: 9.3 FL (ref 7.5–11.1)
POTASSIUM SERPL-SCNC: 2.9 MMOL/L (ref 3.5–5.1)
POTASSIUM SERPL-SCNC: 4 MMOL/L (ref 3.5–5.1)
POTASSIUM SERPL-SCNC: 7 MMOL/L (ref 3.5–5.1)
PRO-BNP: 1593 PG/ML
RBC # BLD: 1.95 M/CU MM (ref 4.6–6.2)
SEGMENTED NEUTROPHILS ABSOLUTE COUNT: 7.7 K/CU MM
SEGMENTED NEUTROPHILS RELATIVE PERCENT: 80.8 % (ref 36–66)
SODIUM BLD-SCNC: 129 MMOL/L (ref 135–145)
SODIUM BLD-SCNC: 130 MMOL/L (ref 135–145)
SODIUM BLD-SCNC: 134 MMOL/L (ref 135–145)
TOTAL IMMATURE NEUTOROPHIL: 0.03 K/CU MM
TOTAL NUCLEATED RBC: 0 K/CU MM
TOTAL PROTEIN: 6.3 GM/DL (ref 6.4–8.2)
TROPONIN T: 0.02 NG/ML
TROPONIN T: <0.01 NG/ML
WBC # BLD: 9.5 K/CU MM (ref 4–10.5)

## 2023-01-01 PROCEDURE — C9113 INJ PANTOPRAZOLE SODIUM, VIA: HCPCS | Performed by: STUDENT IN AN ORGANIZED HEALTH CARE EDUCATION/TRAINING PROGRAM

## 2023-01-01 PROCEDURE — 85610 PROTHROMBIN TIME: CPT

## 2023-01-01 PROCEDURE — 36415 COLL VENOUS BLD VENIPUNCTURE: CPT

## 2023-01-01 PROCEDURE — 96374 THER/PROPH/DIAG INJ IV PUSH: CPT

## 2023-01-01 PROCEDURE — A4216 STERILE WATER/SALINE, 10 ML: HCPCS | Performed by: STUDENT IN AN ORGANIZED HEALTH CARE EDUCATION/TRAINING PROGRAM

## 2023-01-01 PROCEDURE — 6360000002 HC RX W HCPCS: Performed by: EMERGENCY MEDICINE

## 2023-01-01 PROCEDURE — 86850 RBC ANTIBODY SCREEN: CPT

## 2023-01-01 PROCEDURE — 85014 HEMATOCRIT: CPT

## 2023-01-01 PROCEDURE — 6360000002 HC RX W HCPCS: Performed by: STUDENT IN AN ORGANIZED HEALTH CARE EDUCATION/TRAINING PROGRAM

## 2023-01-01 PROCEDURE — 6370000000 HC RX 637 (ALT 250 FOR IP): Performed by: STUDENT IN AN ORGANIZED HEALTH CARE EDUCATION/TRAINING PROGRAM

## 2023-01-01 PROCEDURE — 86901 BLOOD TYPING SEROLOGIC RH(D): CPT

## 2023-01-01 PROCEDURE — 2580000003 HC RX 258: Performed by: STUDENT IN AN ORGANIZED HEALTH CARE EDUCATION/TRAINING PROGRAM

## 2023-01-01 PROCEDURE — 80048 BASIC METABOLIC PNL TOTAL CA: CPT

## 2023-01-01 PROCEDURE — 85025 COMPLETE CBC W/AUTO DIFF WBC: CPT

## 2023-01-01 PROCEDURE — 84484 ASSAY OF TROPONIN QUANT: CPT

## 2023-01-01 PROCEDURE — 93010 ELECTROCARDIOGRAM REPORT: CPT | Performed by: INTERNAL MEDICINE

## 2023-01-01 PROCEDURE — 36430 TRANSFUSION BLD/BLD COMPNT: CPT

## 2023-01-01 PROCEDURE — 2140000000 HC CCU INTERMEDIATE R&B

## 2023-01-01 PROCEDURE — 96375 TX/PRO/DX INJ NEW DRUG ADDON: CPT

## 2023-01-01 PROCEDURE — 2500000003 HC RX 250 WO HCPCS: Performed by: INTERNAL MEDICINE

## 2023-01-01 PROCEDURE — P9016 RBC LEUKOCYTES REDUCED: HCPCS

## 2023-01-01 PROCEDURE — 93005 ELECTROCARDIOGRAM TRACING: CPT | Performed by: EMERGENCY MEDICINE

## 2023-01-01 PROCEDURE — 83605 ASSAY OF LACTIC ACID: CPT

## 2023-01-01 PROCEDURE — 85018 HEMOGLOBIN: CPT

## 2023-01-01 PROCEDURE — 86900 BLOOD TYPING SEROLOGIC ABO: CPT

## 2023-01-01 PROCEDURE — 86922 COMPATIBILITY TEST ANTIGLOB: CPT

## 2023-01-01 PROCEDURE — 6370000000 HC RX 637 (ALT 250 FOR IP): Performed by: EMERGENCY MEDICINE

## 2023-01-01 RX ORDER — DEXTROSE MONOHYDRATE 100 MG/ML
INJECTION, SOLUTION INTRAVENOUS CONTINUOUS PRN
Status: DISCONTINUED | OUTPATIENT
Start: 2023-01-01 | End: 2023-01-03 | Stop reason: HOSPADM

## 2023-01-01 RX ORDER — FOLIC ACID 1 MG/1
1 TABLET ORAL DAILY
Status: DISCONTINUED | OUTPATIENT
Start: 2023-01-01 | End: 2023-01-03 | Stop reason: HOSPADM

## 2023-01-01 RX ORDER — PANTOPRAZOLE SODIUM 40 MG/10ML
40 INJECTION, POWDER, LYOPHILIZED, FOR SOLUTION INTRAVENOUS 2 TIMES DAILY
Status: DISCONTINUED | OUTPATIENT
Start: 2023-01-01 | End: 2023-01-01 | Stop reason: CLARIF

## 2023-01-01 RX ORDER — ACETAMINOPHEN 650 MG/1
650 SUPPOSITORY RECTAL EVERY 6 HOURS PRN
Status: DISCONTINUED | OUTPATIENT
Start: 2023-01-01 | End: 2023-01-03 | Stop reason: HOSPADM

## 2023-01-01 RX ORDER — HYDROCODONE BITARTRATE AND ACETAMINOPHEN 5; 325 MG/1; MG/1
1 TABLET ORAL EVERY 6 HOURS PRN
Status: DISCONTINUED | OUTPATIENT
Start: 2023-01-01 | End: 2023-01-03 | Stop reason: HOSPADM

## 2023-01-01 RX ORDER — POTASSIUM CHLORIDE 7.45 MG/ML
10 INJECTION INTRAVENOUS ONCE
Status: COMPLETED | OUTPATIENT
Start: 2023-01-01 | End: 2023-01-01

## 2023-01-01 RX ORDER — SODIUM CHLORIDE 9 MG/ML
INJECTION, SOLUTION INTRAVENOUS PRN
Status: DISCONTINUED | OUTPATIENT
Start: 2023-01-01 | End: 2023-01-03 | Stop reason: HOSPADM

## 2023-01-01 RX ORDER — ALPRAZOLAM 0.25 MG/1
0.25 TABLET ORAL 2 TIMES DAILY PRN
Status: DISCONTINUED | OUTPATIENT
Start: 2023-01-01 | End: 2023-01-03 | Stop reason: HOSPADM

## 2023-01-01 RX ORDER — SODIUM CHLORIDE 0.9 % (FLUSH) 0.9 %
5-40 SYRINGE (ML) INJECTION EVERY 12 HOURS SCHEDULED
Status: DISCONTINUED | OUTPATIENT
Start: 2023-01-01 | End: 2023-01-03 | Stop reason: HOSPADM

## 2023-01-01 RX ORDER — ONDANSETRON 2 MG/ML
4 INJECTION INTRAMUSCULAR; INTRAVENOUS EVERY 6 HOURS PRN
Status: DISCONTINUED | OUTPATIENT
Start: 2023-01-01 | End: 2023-01-03 | Stop reason: HOSPADM

## 2023-01-01 RX ORDER — ACETAMINOPHEN 325 MG/1
650 TABLET ORAL EVERY 6 HOURS PRN
Status: DISCONTINUED | OUTPATIENT
Start: 2023-01-01 | End: 2023-01-03 | Stop reason: HOSPADM

## 2023-01-01 RX ORDER — ONDANSETRON 4 MG/1
4 TABLET, ORALLY DISINTEGRATING ORAL EVERY 8 HOURS PRN
Status: DISCONTINUED | OUTPATIENT
Start: 2023-01-01 | End: 2023-01-03 | Stop reason: HOSPADM

## 2023-01-01 RX ORDER — TAMSULOSIN HYDROCHLORIDE 0.4 MG/1
0.8 CAPSULE ORAL DAILY
Status: DISCONTINUED | OUTPATIENT
Start: 2023-01-01 | End: 2023-01-03 | Stop reason: HOSPADM

## 2023-01-01 RX ORDER — ATORVASTATIN CALCIUM 40 MG/1
40 TABLET, FILM COATED ORAL DAILY
Status: DISCONTINUED | OUTPATIENT
Start: 2023-01-01 | End: 2023-01-03 | Stop reason: HOSPADM

## 2023-01-01 RX ORDER — CALCIUM GLUCONATE 20 MG/ML
1000 INJECTION, SOLUTION INTRAVENOUS ONCE
Status: COMPLETED | OUTPATIENT
Start: 2023-01-01 | End: 2023-01-01

## 2023-01-01 RX ORDER — CITALOPRAM 20 MG/1
20 TABLET ORAL DAILY
Status: DISCONTINUED | OUTPATIENT
Start: 2023-01-01 | End: 2023-01-03 | Stop reason: HOSPADM

## 2023-01-01 RX ORDER — SODIUM CHLORIDE 9 MG/ML
INJECTION, SOLUTION INTRAVENOUS PRN
Status: COMPLETED | OUTPATIENT
Start: 2023-01-01 | End: 2023-01-02

## 2023-01-01 RX ORDER — SODIUM CHLORIDE 0.9 % (FLUSH) 0.9 %
5-40 SYRINGE (ML) INJECTION PRN
Status: DISCONTINUED | OUTPATIENT
Start: 2023-01-01 | End: 2023-01-03 | Stop reason: HOSPADM

## 2023-01-01 RX ADMIN — SODIUM CHLORIDE, PRESERVATIVE FREE 10 ML: 5 INJECTION INTRAVENOUS at 11:56

## 2023-01-01 RX ADMIN — ALPRAZOLAM 0.25 MG: 0.25 TABLET ORAL at 10:59

## 2023-01-01 RX ADMIN — ATORVASTATIN CALCIUM 40 MG: 40 TABLET, FILM COATED ORAL at 08:58

## 2023-01-01 RX ADMIN — CITALOPRAM HYDROBROMIDE 20 MG: 20 TABLET ORAL at 08:58

## 2023-01-01 RX ADMIN — ONDANSETRON 4 MG: 2 INJECTION INTRAMUSCULAR; INTRAVENOUS at 06:34

## 2023-01-01 RX ADMIN — TAMSULOSIN HYDROCHLORIDE 0.8 MG: 0.4 CAPSULE ORAL at 08:58

## 2023-01-01 RX ADMIN — SODIUM CHLORIDE, PRESERVATIVE FREE 40 MG: 5 INJECTION INTRAVENOUS at 11:55

## 2023-01-01 RX ADMIN — FOLIC ACID 1 MG: 1 TABLET ORAL at 08:58

## 2023-01-01 RX ADMIN — SODIUM CHLORIDE, PRESERVATIVE FREE 40 MG: 5 INJECTION INTRAVENOUS at 20:13

## 2023-01-01 RX ADMIN — CALCIUM GLUCONATE 1000 MG: 20 INJECTION, SOLUTION INTRAVENOUS at 11:11

## 2023-01-01 RX ADMIN — FENTANYL CITRATE 50 MCG: 50 INJECTION INTRAMUSCULAR; INTRAVENOUS at 00:56

## 2023-01-01 RX ADMIN — HYDROCODONE BITARTRATE AND ACETAMINOPHEN 1 TABLET: 5; 325 TABLET ORAL at 20:28

## 2023-01-01 RX ADMIN — POTASSIUM BICARBONATE 40 MEQ: 782 TABLET, EFFERVESCENT ORAL at 01:45

## 2023-01-01 RX ADMIN — HYDROCODONE BITARTRATE AND ACETAMINOPHEN 1 TABLET: 5; 325 TABLET ORAL at 04:56

## 2023-01-01 RX ADMIN — SODIUM CHLORIDE, PRESERVATIVE FREE 40 MG: 5 INJECTION INTRAVENOUS at 03:25

## 2023-01-01 RX ADMIN — POTASSIUM CHLORIDE 10 MEQ: 7.46 INJECTION, SOLUTION INTRAVENOUS at 01:51

## 2023-01-01 ASSESSMENT — PAIN DESCRIPTION - DESCRIPTORS: DESCRIPTORS: ACHING

## 2023-01-01 ASSESSMENT — ENCOUNTER SYMPTOMS
NAUSEA: 0
EYE DISCHARGE: 0
ABDOMINAL PAIN: 0
EYE PAIN: 0
VOMITING: 0
COUGH: 0
BACK PAIN: 0
SHORTNESS OF BREATH: 0
SORE THROAT: 0

## 2023-01-01 ASSESSMENT — PAIN DESCRIPTION - ORIENTATION: ORIENTATION: MID;UPPER

## 2023-01-01 ASSESSMENT — PAIN - FUNCTIONAL ASSESSMENT: PAIN_FUNCTIONAL_ASSESSMENT: PREVENTS OR INTERFERES SOME ACTIVE ACTIVITIES AND ADLS

## 2023-01-01 ASSESSMENT — PAIN DESCRIPTION - LOCATION
LOCATION: ABDOMEN

## 2023-01-01 ASSESSMENT — PAIN SCALES - GENERAL
PAINLEVEL_OUTOF10: 6
PAINLEVEL_OUTOF10: 8
PAINLEVEL_OUTOF10: 4
PAINLEVEL_OUTOF10: 7
PAINLEVEL_OUTOF10: 5

## 2023-01-01 ASSESSMENT — PAIN DESCRIPTION - ONSET: ONSET: ON-GOING

## 2023-01-01 ASSESSMENT — PAIN DESCRIPTION - PAIN TYPE: TYPE: ACUTE PAIN

## 2023-01-01 ASSESSMENT — PAIN DESCRIPTION - FREQUENCY: FREQUENCY: CONTINUOUS

## 2023-01-01 NOTE — ED NOTES
ED TO INPATIENT SBAR HANDOFF    Patient Name: Nick Walden   :  1955  79 y.o. MRN:  [id number]  Preferred Name  [facility]  ED Room #:  VV90/MT-82  Family/Caregiver Present no   Restraints no   Sitter no   Sepsis Risk Score Sepsis Risk Score: 1.43    Situation  Code Status: Full Code No additional code details. Allergies: Penicillins and Lorazepam  Weight: Patient Vitals for the past 96 hrs (Last 3 readings):   Weight   22 2351 240 lb (108.9 kg)     Arrived from: home  Chief Complaint:   Chief Complaint   Patient presents with    Chest Pain     X2 hours ago, Does dialysis 4 times a week     Hospital Problem/Diagnosis:  Principal Problem:    Melena  Resolved Problems:    * No resolved hospital problems. *    Imaging:   XR CHEST PORTABLE   Final Result   Left basilar consolidation.            Abnormal labs:   Abnormal Labs Reviewed   COMPREHENSIVE METABOLIC PANEL - Abnormal; Notable for the following components:       Result Value    Sodium 129 (*)     Potassium 2.9 (*)     Chloride 90 (*)     BUN 49 (*)     Creatinine 6.6 (*)     Est, Glom Filt Rate 9 (*)     Glucose 105 (*)     Calcium 8.1 (*)     Total Protein 6.3 (*)     Anion Gap 18 (*)     All other components within normal limits   CBC WITH AUTO DIFFERENTIAL - Abnormal; Notable for the following components:    WBC 11.2 (*)     RBC 1.95 (*)     Hemoglobin 6.3 (*)     Hematocrit 19.6 (*)     .5 (*)     MCH 32.3 (*)     RDW 15.6 (*)     Segs Relative 72.8 (*)     Lymphocytes % 17.6 (*)     Monocytes % 6.2 (*)     Immature Neutrophil % 0.6 (*)     All other components within normal limits   TROPONIN - Abnormal; Notable for the following components:    Troponin T 0.017 (*)     All other components within normal limits   BRAIN NATRIURETIC PEPTIDE - Abnormal; Notable for the following components:    Pro-BNP 1,593 (*)     All other components within normal limits   BLOOD GAS, VENOUS - Abnormal; Notable for the following components:    pO2, Luke 139 (*)     O2 Sat, Luke 93.7 (*)     All other components within normal limits     Critical values: yes     Abnormal Assessment Findings:   Palor skin tone, generalized fatigue, abdominal pain, nausea    Background  History:   Past Medical History:   Diagnosis Date    Acid reflux     ARF (acute renal failure) ([facility]     with admission 12/18/2015- consult done with Dr Danie Arriola    Atrophy of left kidney     Back pain     \"Lower Back\"    Chronic bronchitis ([facility]     Chronic kidney disease     COPD (chronic obstructive pulmonary disease) ([facility]     NO PULMONOLOGIST AT THIS TIME    Emphysema/COPD ([facility]     NO PULMONOLOGIST AT THIS TIME    H. pylori infection Dx 1990's    Hemodialysis patient ([facility]     Hiatal hernia     History of blood transfusion 1987    NO REACTION TO BLOOD TRANSFUSION RECEIVED    History of respiratory failure     following surgery 12/18/2015- pt developed resp failure- placed on ventilator-unable to wean of vent- had trachesotomy tube placed 1/3/2015- off vent 1/7/2015( discharged 1/13/2015)\"per wife went to [place] after discharg and had to be sent to [facility] after had bleeding around the trach- they said the trach was to big- put back on ventilator for 24 hrs- then there for about a week then sent to ARU 2/15    [place] (hard of hearing)     Bilateral Ears    Hx of blood clots     HX OTHER MEDICAL     Primary Care Physician Is Dr. Levar Morton Right Renal Artery    HX OTHER MEDICAL     \"Dr. Robin Bernstein One Of My Kidneys Is Dead\"    Alina Gutierrez     \"See Dr. Roberson Spotted For Blood Being Too Thick\"(per old chart pt dx with polycythemia in 1990's and has had several phlebotomies in the past(pc)( per wife Dr Nadja Hope now says he does not really have polycythemia just from the COPD\"    Alina Gutierrez 6*/25/2015    \"has drainage , clear drainage, since he was in ARU in Feb 2015, under left shoulder, arm pit area\"    Hyperlipidemia     Hypertension     Lung nodule \"MRI, PET SCAN\"    \"Right Lung\"    Pneumonia \"Last Episode Early 2000's\"    \"At Least Twice\"( with admission 12/18/2015)    Shortness of breath on exertion     Teeth missing     Upper And Lower    Wears glasses        Assessment    Vitals/MEWS: MEWS Score: 1  Level of Consciousness: Alert (0)   Vitals:    01/01/23 0400 01/01/23 0403 01/01/23 0432 01/01/23 0632   BP: (!) 143/67 137/89 136/68 (!) 143/90   Pulse: 98 91 92 93   Resp:       Temp:       TempSrc:       SpO2:   99%    Weight:       Height:         FiO2 (%): nasal cannula for respiratory distress  O2 Flow Rate:   O2 Flow Rate (L/min): 2 L/min  Cardiac Rhythm:    Pain Assessment: 5   [] Verbal [] Digna Croak Scale  Pain Scale: Pain Assessment  Pain Assessment: 0-10  Pain Level: 8  Pain Location: Abdomen  Last documented pain score (0-10 scale) Pain Level: 8  Last documented pain medication administered: 0456  Mental Status: oriented, alert, thought processes intact, and able to concentrate and follow conversation  NIH Score:    C-SSRS: Risk of Suicide: No Risk  Bedside swallow:    Timo Coma Scale (GCS): Timo Coma Scale  Eye Opening: Spontaneous  Best Verbal Response: Oriented  Best Motor Response: Obeys commands  Timo Coma Scale Score: 15  Active LDA's:   Peripheral IV 12/31/22 Left;Ventral;Anterior Forearm (Active)       Peripheral IV 01/01/23 Proximal;Right;Posterior Forearm (Active)     PO Status: Ice Chips  Pertinent or High Risk Medications/Drips: no   If Yes, please provide details: NA  Pending Blood Product Administration: no     You may also review the ED PT Care Timeline found under the Summary Nursing Index tab. Recommendation    Pending orders: all inpatient orders which have not been completed during stay in ED    Plan for Discharge (if known): Additional Comments:   Patient comes from home and ambulates self. Pt has a left leg amputation and has a prosthetic at bedside.  Pt is a dialysis patient with treatment's x4 days a week, pt still generates urine. Pt's potassium was 2.9 and replacement was given. Hemoglobin was 6.3 and one unit was given to pt, labs are in the process of being redrawn at this time (1917). Pt is alert and oriented x4.     If any further questions, please [place] S[place] RN at [phone]    Electronically signed by: Electronically signed by Roman Bocanegra RN on 1/1/2023 at 7:30 AM       Roman Bocanegra RN  01/01/23 8222

## 2023-01-01 NOTE — ED NOTES
Called back blood back to receive unit of blood, no answer at this time.      Huang Anguiano, LUDA  01/01/23 0566

## 2023-01-01 NOTE — PROGRESS NOTES
Pt admit for gi bleed prbc and gi work up  [employer] stool 1 week and now lightheaded  Better this am  Last hd Friday due Monday  Fu bmp and may need replete K  Pt seen and examined in er and full note to follow

## 2023-01-01 NOTE — PROGRESS NOTES
I have discussed with the patient and spouse the rationale for blood component transfusion; its benefits in treating or preventing fatigue, organ damage, or death; and its risk which includes mild transfusion reactions, rare risk of blood borne infection, or more serious but rare reactions. I have discussed the alternatives to transfusion, including the risk and consequences of not receiving transfusion. The patient and spouse had an opportunity to ask questions and had agreed to proceed with transfusion of blood components.

## 2023-01-01 NOTE — ED PROVIDER NOTES
[address] ENCOUNTER      Pt Name: Nkechi Sierra  MRN: [id number]  [place] 1955  Date of evaluation: 12/31/2022  Provider: Cecile Guzman MD    CHIEF COMPLAINT       Chief Complaint   Patient presents with    Chest Pain     X2 hours ago, Does dialysis 4 times a week         HISTORY OF PRESENT ILLNESS      Nkechi Sierra is a 79 y.o. male who presents to the emergency department  for   Chief Complaint   Patient presents with    Chest Pain     X2 hours ago, Does dialysis 4 times a week       75-year-old male presents reporting feeling generally unwell. Endorses some generalized weakness. He states he is having some trouble ambulating at home. He has a complicated medical history. He has a history of ESRD on hemodialysis 4 days a week. He has a history of COPD and chronic respiratory failure. He states he wears oxygen as needed. He states he has been feeling \"unwell\" over the last couple of days. Denies any sick exposures. Does deny that he is having any cough or sputum production. He states that his breathing and respiratory status seems to be at baseline. He does endorse some pain in his legs. He also endorses that his legs feel weak. Is of a history of left-sided BKA. Denies any falls or injuries. States he has not missed any dialysis. States his dialysis sessions have been overall typical in terms amount of fluid has been taken off. From previous medical record, he does have a history of GI bleed. He denies any hematemesis, hematuria, melena or hematochezia. He presents with a GCS of 15. He did undergo fistulogram and angioplasty of his fistula in early December, 2022. He is endorsing some mild substernal chest pain at this time. Pain is nonradiating. Nursing Notes, Triage Notes & Vital Signs were reviewed.       REVIEW OF SYSTEMS    (2-9 systems for level 4, 10 or more for level 5)     Review of Systems   Constitutional:  Negative for chills and fever. HENT:  Negative for congestion and sore throat. Eyes:  Negative for pain and discharge. Respiratory:  Negative for cough and shortness of breath. Cardiovascular:  Negative for chest pain and palpitations. Gastrointestinal:  Negative for abdominal pain, nausea and vomiting. Endocrine: Negative for polydipsia and polyuria. Genitourinary:  Negative for dysuria and flank pain. Musculoskeletal:  Negative for back pain and neck pain. Skin:  Negative for pallor and wound. Neurological:  Positive for weakness. Psychiatric/Behavioral:  Negative for confusion. Except as noted above the remainder of the review of systems was reviewed and negative.        PAST MEDICAL HISTORY     Past Medical History:   Diagnosis Date    Acid reflux     ARF (acute renal failure) ([facility] [address]     with admission 12/18/2015- consult done with Dr Kaity Conrad    Atrophy of left kidney     Back pain     \"Lower Back\"    Chronic bronchitis ([facility] [address]     Chronic kidney disease     COPD (chronic obstructive pulmonary disease) ([facility] [address]     NO PULMONOLOGIST AT THIS TIME    Emphysema/COPD ([facility] [address]     NO PULMONOLOGIST AT THIS TIME    H. pylori infection Dx 1990's    Hemodialysis patient ([facility] [address]     Hiatal hernia     History of blood transfusion 1987    NO REACTION TO BLOOD TRANSFUSION RECEIVED    History of respiratory failure     following surgery 12/18/2015- pt developed resp failure- placed on ventilator-unable to wean of vent- had trachesotomy tube placed 1/3/2015- off vent 1/7/2015( discharged 1/13/2015)\"per wife went to [place] after discharg and had to be sent to [facility] after had bleeding around the trach- they said the trach was to big- put back on ventilator for 24 hrs- then there for about a week then sent to ARU 2/15    [place] (hard of hearing)     Bilateral Ears    Hx of blood clots     HX OTHER MEDICAL     Primary Care Physician Is Dr. Vlad Mckinley Aorta And Right Renal Artery    HX OTHER MEDICAL     \"Dr. Keegan Johnson One Of My Kidneys Is Dead\"    [place]     \"See Dr. Valencia Stallworth For Blood Being Too Thick\"(per old chart pt dx with polycythemia in 1990's and has had several phlebotomies in the past(pc)( per wife Dr Mei Dawson now says he does not really have polycythemia just from the COPD\"    [place] 6*/25/2015    \"has drainage , clear drainage, since he was in ARU in Feb 2015, under left shoulder, arm pit area\"    Hyperlipidemia     Hypertension     Lung nodule \"MRI, PET SCAN\"    \"Right Lung\"    Pneumonia \"Last Episode Early 2000's\"    \"At Least Twice\"( with admission 12/18/2015)    Shortness of breath on exertion     Teeth missing     Upper And Lower    Wears glasses        Prior to Admission medications    Medication Sig Start Date End Date Taking? Authorizing Provider   Fluticasone-Umeclidin-Vilant ([place]) Inhale into the lungs    Historical Provider, MD   TORSEMIDE PO Take 1 tablet by mouth 2 times daily    Historical Provider, MD   atorvastatin (LIPITOR) 40 MG tablet TAKE ONE TABLET BY MOUTH DAILY 8/29/22   Historical Provider, MD   b complex-C-folic acid (NEPHROCAPS) 1 MG capsule Take 1 mg by mouth daily 8/11/21   Historical Provider, MD   magnesium oxide (MAG-OX) 400 (241.3 Mg) MG TABS tablet TAKE ONE TABLET BY MOUTH DAILY 6/7/21   Historical Provider, MD   pantoprazole (PROTONIX) 40 MG tablet Take 40 mg by mouth daily    Historical Provider, MD   tamsulosin (FLOMAX) 0.4 MG capsule Take 0.8 mg by mouth daily    Historical Provider, MD   HYDROcodone-acetaminophen (NORCO) 5-325 MG per tablet Take 1 tablet by mouth every 6 hours as needed for Pain.     Historical Provider, MD   azelastine (ASTELIN) 0.1 % nasal spray 1 spray by Nasal route 2 times daily Use in each nostril as directed    Historical Provider, MD   ipratropium-albuterol (DUONEB) 0.5-2.5 (3) MG/3ML SOLN nebulizer solution Inhale 1 vial into the lungs every 4 hours Historical Provider, MD   Cholecalciferol (VITAMIN D3) 2000 UNITS CAPS Take 1 capsule by mouth daily    Historical Provider, MD   albuterol (PROVENTIL HFA;VENTOLIN HFA) 108 (90 BASE) MCG/ACT inhaler Inhale 2 puffs into the lungs every 6 hours as needed for Wheezing    Historical Provider, MD   ALPRAZolam (XANAX) 0.25 MG tablet Take 0.25 mg by mouth 2 times daily as needed for Sleep. Historical Provider, MD   citalopram (CELEXA) 20 MG tablet Take 1 tablet by mouth daily. 2/20/15   Arcadio Villar MD   folic acid (FOLVITE) 1 MG tablet Take 1 tablet by mouth daily.  2/20/15   Arcadio Villar MD        Patient Active Problem List   Diagnosis    Choledocholithiasis    Chronic kidney disease (CKD), stage III (moderate) (HCC)    Abnormality of lung on CXR    S/P BKA (below knee amputation) unilateral (HCC)    Cholangitis    Acute calculous cholecystitis    Abdominal adhesions    Atrophic kidney    COPD (chronic obstructive pulmonary disease) (HCC)    HTN (hypertension)    PAD (peripheral artery disease) (HCC)    Proteinuria    Chronic kidney disease (CKD) stage G3b/A3, moderately decreased glomerular filtration rate (GFR) between 30-44 mL/min/1.73 square meter and albuminuria creatinine ratio greater than 300 mg/g (HCC)    Metabolic acidosis    ASCVD (arteriosclerotic cardiovascular disease)    Acute on chronic respiratory failure with hypoxia ([facility] [address]    Acute GI bleeding    Melena         SURGICAL HISTORY       Past Surgical History:   Procedure Laterality Date    CHOLECYSTECTOMY, LAPAROSCOPIC  02/27/2017    cholangiogram     COLONOSCOPY  2011    \"Couple Polyps Removed\"    COLONOSCOPY N/A 10/1/2022    COLONOSCOPY POLYPECTOMY SNARE/COLD BIOPSY performed by Shena Nick MD at [address] [place] [address], COLON, DIAGNOSTIC  \"Once\"    GASTROSTOMY TUBE PLACEMENT Left 1/3/15    to gravity drain( removed g tube in Feb or jan per wife)    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    Farming Accident    OTHER SURGICAL HISTORY  Mid 1980's    \"Emergency Surgery For Ruptured Ulcer\"    TONSILLECTOMY  1962    TRACHEOSTOMY N/A 1/3/15    per old chart pt had trachesotomy tube, bronch , egd and g- tube placement done 1/13/2015    UPPER GASTROINTESTINAL ENDOSCOPY N/A 9/26/2022    EGD DIAGNOSTIC ONLY performed by Deuce Guillen MD at [facility]      per old chart pt had left axillary to bifemoral bypass graft done 12/18/2014(pc)    WISDOM TOOTH EXTRACTION      All [place] Teeth Extracted In Past         CURRENT MEDICATIONS       Previous Medications    ALBUTEROL (PROVENTIL HFA;VENTOLIN HFA) 108 (90 BASE) MCG/ACT INHALER    Inhale 2 puffs into the lungs every 6 hours as needed for Wheezing    ALPRAZOLAM (XANAX) 0.25 MG TABLET    Take 0.25 mg by mouth 2 times daily as needed for Sleep. ATORVASTATIN (LIPITOR) 40 MG TABLET    TAKE ONE TABLET BY MOUTH DAILY    AZELASTINE (ASTELIN) 0.1 % NASAL SPRAY    1 spray by Nasal route 2 times daily Use in each nostril as directed    B COMPLEX-C-FOLIC ACID (NEPHROCAPS) 1 MG CAPSULE    Take 1 mg by mouth daily    CHOLECALCIFEROL (VITAMIN D3) 2000 UNITS CAPS    Take 1 capsule by mouth daily    CITALOPRAM (CELEXA) 20 MG TABLET    Take 1 tablet by mouth daily. FLUTICASONE-UMECLIDIN-VILANT (TRELEGY ELLIPTA IN)    Inhale into the lungs    FOLIC ACID (FOLVITE) 1 MG TABLET    Take 1 tablet by mouth daily. HYDROCODONE-ACETAMINOPHEN (NORCO) 5-325 MG PER TABLET    Take 1 tablet by mouth every 6 hours as needed for Pain.     IPRATROPIUM-ALBUTEROL (DUONEB) 0.5-2.5 (3) MG/3ML SOLN NEBULIZER SOLUTION    Inhale 1 vial into the lungs every 4 hours    MAGNESIUM OXIDE (MAG-OX) 400 (241.3 MG) MG TABS TABLET    TAKE ONE TABLET BY MOUTH DAILY    PANTOPRAZOLE (PROTONIX) 40 MG TABLET    Take 40 mg by mouth daily    TAMSULOSIN (FLOMAX) 0.4 MG CAPSULE    Take 0.8 mg by mouth daily    TORSEMIDE PO    Take 1 tablet by mouth 2 times daily       ALLERGIES     Penicillins and Lorazepam    FAMILY HISTORY       Family History   Problem Relation Age of Onset    Cancer Mother         Breast Cancer    Arthritis Mother     High Blood Pressure Mother     High Cholesterol Mother     Vision Loss Mother         Wears Glasses    Cancer Father         Prostate And Bone Cancer    Heart Disease Father         \"Triple Bypass Surgery\"    Migraines Sister     Other Sister         \"Bad Back\"          SOCIAL HISTORY       Social History     Socioeconomic History    Marital status:      Spouse name: None    Number of children: None    Years of education: None    Highest education level: None   Tobacco Use    Smoking status: Former     Packs/day: 2.00     Years: 43.00     Pack years: 86.00     Types: Cigarettes     Start date: 1971     Quit date: 12/15/2014     Years since quittin.0    Smokeless tobacco: Never   Vaping Use    Vaping Use: Never used   Substance and Sexual Activity    Alcohol use: Yes     Alcohol/week: 0.0 standard drinks     Comment: \"Occ\"\"average one time per week    Drug use: Yes     Types: Marijuana Nayely Postin)     Comment: \"Occ\"\"last used 3  week 2015\"    Sexual activity: Yes     Partners: Female       SCREENINGS    Timo Coma Scale  Eye Opening: Spontaneous  Best Verbal Response: Oriented  Best Motor Response: Obeys commands  Timo Coma Scale Score: 15          PHYSICAL EXAM    (up to 7 for level 4, 8 or more for level 5)     ED Triage Vitals [22 2250]   BP Temp Temp src Heart Rate Resp SpO2 Height Weight   (!) 124/54 98.1 °F (36.7 °C) -- 93 18 96 % -- --       Physical Exam  Vitals reviewed. HENT:      Head: Normocephalic. Nose: No congestion or rhinorrhea. Mouth/Throat:      Mouth: Mucous membranes are moist.   Eyes:      Extraocular Movements: Extraocular movements intact. Pupils: Pupils are equal, round, and reactive to light. Cardiovascular:      Rate and Rhythm: Normal rate. Heart sounds: No friction rub. No gallop.    Pulmonary:      Effort: Pulmonary effort is normal.      Breath sounds: No stridor. No rhonchi. Abdominal:      Palpations: Abdomen is soft. Tenderness: There is no abdominal tenderness. There is no guarding. Musculoskeletal:         General: No deformity. Normal range of motion. Cervical back: Normal range of motion and neck supple. Comments: Left bka   Skin:     Capillary Refill: Capillary refill takes 2 to 3 seconds. Coloration: Skin is pale. Neurological:      General: No focal deficit present. Mental Status: He is alert. Mental status is at baseline. DIAGNOSTIC RESULTS     Labs Reviewed   COMPREHENSIVE METABOLIC PANEL - Abnormal; Notable for the following components:       Result Value    Sodium 129 (*)     Potassium 2.9 (*)     Chloride 90 (*)     BUN 49 (*)     Creatinine 6.6 (*)     Est, Glom Filt Rate 9 (*)     Glucose 105 (*)     Calcium 8.1 (*)     Total Protein 6.3 (*)     Anion Gap 18 (*)     All other components within normal limits   CBC WITH AUTO DIFFERENTIAL - Abnormal; Notable for the following components:    WBC 11.2 (*)     RBC 1.95 (*)     Hemoglobin 6.3 (*)     Hematocrit 19.6 (*)     .5 (*)     MCH 32.3 (*)     RDW 15.6 (*)     Segs Relative 72.8 (*)     Lymphocytes % 17.6 (*)     Monocytes % 6.2 (*)     Immature Neutrophil % 0.6 (*)     All other components within normal limits   TROPONIN - Abnormal; Notable for the following components:    Troponin T 0.017 (*)     All other components within normal limits   BRAIN NATRIURETIC PEPTIDE - Abnormal; Notable for the following components:    Pro-BNP 1,593 (*)     All other components within normal limits   BLOOD GAS, VENOUS - Abnormal; Notable for the following components:    pO2, Luke 139 (*)     O2 Sat, Luke 93.7 (*)     All other components within normal limits   LIPASE   BASIC METABOLIC PANEL W/ REFLEX TO MG FOR LOW K   CBC WITH AUTO DIFFERENTIAL   PROTIME-INR   TROPONIN   TYPE AND SCREEN   PREPARE RBC (CROSSMATCH)          EKG:  All EKG's are interpreted by the Emergency Department Physician who either signs or Co-signs this chart in the absence of a cardiologist.       EKG Interpretation    Interpreted by emergency department physician    EKG is interpreted by me. EKG shows sinus rhythm at 93 bpm, axis appears nondeviated, no remarkable ST segment elevations or depressions, T waves are unremarkable, RI interval of 146, QRS duration of 134, QTC of 517. Final impression, nonspecific EKG. David Mcdermott MD     RADIOLOGY:     Non-plain film images such as CT, Ultrasound and MRI are read by the radiologist. Plain radiographic images are visualized and preliminarily interpreted by the emergency physician. Interpretation per the Radiologist below, if available at the time of this note:    XR CHEST PORTABLE   Final Result   Left basilar consolidation.                ED BEDSIDE ULTRASOUND:   Performed by ED Physician David Mcdermott MD       LABS:  Labs Reviewed   COMPREHENSIVE METABOLIC PANEL - Abnormal; Notable for the following components:       Result Value    Sodium 129 (*)     Potassium 2.9 (*)     Chloride 90 (*)     BUN 49 (*)     Creatinine 6.6 (*)     Est, Glom Filt Rate 9 (*)     Glucose 105 (*)     Calcium 8.1 (*)     Total Protein 6.3 (*)     Anion Gap 18 (*)     All other components within normal limits   CBC WITH AUTO DIFFERENTIAL - Abnormal; Notable for the following components:    WBC 11.2 (*)     RBC 1.95 (*)     Hemoglobin 6.3 (*)     Hematocrit 19.6 (*)     .5 (*)     MCH 32.3 (*)     RDW 15.6 (*)     Segs Relative 72.8 (*)     Lymphocytes % 17.6 (*)     Monocytes % 6.2 (*)     Immature Neutrophil % 0.6 (*)     All other components within normal limits   TROPONIN - Abnormal; Notable for the following components:    Troponin T 0.017 (*)     All other components within normal limits   BRAIN NATRIURETIC PEPTIDE - Abnormal; Notable for the following components:    Pro-BNP 1,593 (*)     All other components within normal limits   BLOOD GAS, VENOUS - Abnormal; Notable for the following components:    pO2, Luke 139 (*)     O2 Sat, Luke 93.7 (*)     All other components within normal limits   LIPASE   BASIC METABOLIC PANEL W/ REFLEX TO MG FOR LOW K   CBC WITH AUTO DIFFERENTIAL   PROTIME-INR   TROPONIN   TYPE AND SCREEN   PREPARE RBC (CROSSMATCH)       All other labs were within normal range or not returned as of this dictation. EMERGENCY DEPARTMENT COURSE and DIFFERENTIAL DIAGNOSIS/MDM:   Vitals:    Vitals:    01/01/23 0130 01/01/23 0150 01/01/23 0205 01/01/23 0217   BP: (!) 147/63 (!) 144/70 (!) 145/64 (!) 152/73   Pulse: 96 90 84 84   Resp:       Temp:       SpO2: 93% 96% 97% 97%   Weight:       Height:               MDM  Number of Diagnoses or Management Options  Anemia, unspecified type  Gastrointestinal hemorrhage, unspecified gastrointestinal hemorrhage type  General weakness  Hypokalemia  Diagnosis management comments: 49-year-old male with history of ESRD on hemodialysis 4 days a week, chronic respiratory failure and COPD who wears oxygen as needed, history of GI bleed presents reporting generalized weakness, leg pain at chest pain. He states has been feeling unwell for the past couple of days. He also endorses some difficulty ambulating due to weakness in his leg. He does have a history of left BKA. He did undergo a fistulogram in early December, 2022 as well as an angioplasty on his fistula. He is denying any radiation of chest pain. Denies any constitutional factors symptoms. Is present with a GCS of 15. He denies any missed dialysis. He presents with unremarkable vital signs. No tachycardia. Respirations are within normal limits. Oxygen saturations are in the mid 90s. Do have him on several liters in the emergency department. EKG is obtained and is interpreted by me. EKG interpretation from above is incorporated. EKG is overall nondiagnostic showing no acute signs of ischemia or arrhythmia.   Labs and chest x-ray are obtained. His labs showed anemia with hemoglobin of 6.3. I did speak with patient again after this anemia was found. He does endorse having some \"dark stools. \"  I did perform a rectal exam.  There are some external hemorrhoids. No micheline bleeding. No external bleeding at the hemorrhoids or anal fissures or tears. He was Hemoccult positive. From review of gastroenterology notes, he has undergone prior colonoscopy as well as EGD. Those were done in October and September 2022 respectively. There is no evidence of bleeding at that time. He does state he has been transfused before. He is agreeable to transfusion. Labs otherwise show hypokalemia with a potassium of 2.9. Serum creatinine is 6.6 which is in his baseline range. His BUN is a bit more elevated spent in the past.  I am replacing him with IV potassium as well as oral potassium. He has been ordered packed red blood cell infusion. He also complains of some leg pain. I am ordering a dose of IV fentanyl for his leg pain. He will be admitted for his acute on chronic anemia, possible GI consultation as well as treatment of his electrolyte abnormalities. He is agreeable to admission. Hospitalist been consulted and he is admitted for further evaluation and management. He is remained hemodynamically stable in the emergency department. Amount and/or Complexity of Data Reviewed  Decide to obtain previous medical records or to obtain history from someone other than the patient: yes        -  Patient seen and evaluated in the emergency department. -  Triage and nursing notes reviewed and incorporated. -  Old chart records reviewed and incorporated. -  Work-up included:  See above  -  Results discussed with patient. CONSULTS:  IP CONSULT TO GI  IP CONSULT TO NEPHROLOGY    PROCEDURES:  None performed unless otherwise noted below     Procedures        FINAL IMPRESSION      1. Anemia, unspecified type    2. General weakness    3. Gastrointestinal hemorrhage, unspecified gastrointestinal hemorrhage type    4. Hypokalemia          DISPOSITION/PLAN   DISPOSITION Admitted 01/01/2023 01:53:20 AM      PATIENT REFERRED TO:  No follow-up provider specified. DISCHARGE MEDICATIONS:  New Prescriptions    No medications on file       ED Provider Disposition Time  DISPOSITION Admitted 01/01/2023 01:53:20 AM      Appropriate personal protective equipment was worn during the patient's evaluation. These included surgical, eye protection, surgical mask or in 95 respirator and gloves. The patient was also placed in a surgical mask for the prevention of possible spread of respiratory viral illnesses. The Patient was instructed to read the package inserts with any medication that was prescribed. Major potential reactions and medication interactions were discussed. The Patient understands that there are numerous possible adverse reactions not covered. The patient was also instructed to arrange follow-up with his or her primary care provider for review of any pending labwork or incidental findings on any radiology results that were obtained. All efforts were made to discuss any incidental findings that require further monitoring. Controlled Substances Monitoring:     No flowsheet data found.     (Please note that portions of this note were completed with a voice recognition program.  Efforts were made to edit the dictations but occasionally words are mis-transcribed.)    Bereket Gray MD (electronically signed)  Attending Emergency Physician            Bereket Gray MD  01/01/23 0011

## 2023-01-01 NOTE — ED NOTES
Patient was adjusted in bed and is asleep at this time. Pt presents with no distress.      Myriam Hall RN  01/01/23 0328
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
## 2023-01-01 NOTE — PROGRESS NOTES
4 Eyes Skin Assessment     NAME:  Kam Botello  YOB: 1955  MEDICAL RECORD NUMBER:  [id number]    The patient is being assessed for  Admission    I agree that One RN have performed a thorough Head to Toe Skin Assessment on the patient. ALL assessment sites listed below have been assessed. Areas assessed by both nurses:    Head, Face, Ears, Shoulders, Back, Chest, Arms, Elbows, Hands, Sacrum. Buttock, Coccyx, Ischium, and Legs. Feet and Heels        Does the Patient have a Wound?  No noted wound(s)       [place] Prevention initiated by RN: No   Wound Care Orders initiated by RN: No    Pressure Injury (Stage 3,4, Unstageable, DTI, NWPT, and Complex wounds) if present place referral order by RN under : No    New and Established Ostomies, if present place, referral order under : No      Nurse 1 eSignature: Electronically signed by Marnie Meigs, RN on 1/1/23 at 4:29 PM EST    **SHARE this note so that the co-signing nurse is able to place an eSignature**    Nurse 2 eSignature: {Esignature:[id number]}

## 2023-01-01 NOTE — ACP (ADVANCE CARE PLANNING)
Patient does not have any ACP documents/Medical Power of . LSW notes hospital will follow [place]'s Next of Kin hierarchy in the following descending order for priority:    Guardian  Spouse  [de-identified] of adult Children  Parents  [de-identified] of adult Siblings  Nearest Relative not described above    Per [place] Next of Kin hierarchy:  Patients' spouse will be Primary Healthcare Decision Maker.

## 2023-01-01 NOTE — PROGRESS NOTES
Seen and examined in the morning. Patient accompanied by his wife. Complaining of midepigastric pain. Patient has not had any further episodes of melanotic stools. Denies nausea, vomiting, hematemesis, lightheadedness and dizziness. Received 1 unit PRBC. Repeat hemoglobin showing globin 6.3G/DL. Will transfuse 1 unit. Earlier BMP drawn showed K of 7 mmol/L. Stat BMP rechecked. Came back within normal limit. We will continue to monitor.

## 2023-01-02 ENCOUNTER — ANESTHESIA (OUTPATIENT)
Dept: ENDOSCOPY | Age: 68
DRG: 377 | End: 2023-01-02
Payer: MEDICARE

## 2023-01-02 ENCOUNTER — ANESTHESIA EVENT (OUTPATIENT)
Dept: ENDOSCOPY | Age: 68
DRG: 377 | End: 2023-01-02
Payer: MEDICARE

## 2023-01-02 LAB
ABO/RH: NORMAL
ALBUMIN SERPL-MCNC: 3.7 GM/DL (ref 3.4–5)
ALP BLD-CCNC: 62 IU/L (ref 40–129)
ALT SERPL-CCNC: 15 U/L (ref 10–40)
ANION GAP SERPL CALCULATED.3IONS-SCNC: 11 MMOL/L (ref 4–16)
ANION GAP SERPL CALCULATED.3IONS-SCNC: 11 MMOL/L (ref 4–16)
ANTIBODY SCREEN: NEGATIVE
APTT: 26.6 SECONDS (ref 25.1–37.1)
AST SERPL-CCNC: 15 IU/L (ref 15–37)
BASOPHILS ABSOLUTE: 0.1 K/CU MM
BASOPHILS RELATIVE PERCENT: 0.5 % (ref 0–1)
BILIRUB SERPL-MCNC: 0.7 MG/DL (ref 0–1)
BUN BLDV-MCNC: 25 MG/DL (ref 6–23)
BUN BLDV-MCNC: 51 MG/DL (ref 6–23)
CALCIUM SERPL-MCNC: 8.5 MG/DL (ref 8.3–10.6)
CALCIUM SERPL-MCNC: 8.7 MG/DL (ref 8.3–10.6)
CHLORIDE BLD-SCNC: 100 MMOL/L (ref 99–110)
CHLORIDE BLD-SCNC: 105 MMOL/L (ref 99–110)
CO2: 22 MMOL/L (ref 21–32)
CO2: 28 MMOL/L (ref 21–32)
COMPONENT: NORMAL
COMPONENT: NORMAL
CREAT SERPL-MCNC: 4.5 MG/DL (ref 0.9–1.3)
CREAT SERPL-MCNC: 7.1 MG/DL (ref 0.9–1.3)
CROSSMATCH RESULT: NORMAL
CROSSMATCH RESULT: NORMAL
DIFFERENTIAL TYPE: ABNORMAL
EOSINOPHILS ABSOLUTE: 0.2 K/CU MM
EOSINOPHILS RELATIVE PERCENT: 2 % (ref 0–3)
GFR SERPL CREATININE-BSD FRML MDRD: 14 ML/MIN/1.73M2
GFR SERPL CREATININE-BSD FRML MDRD: 8 ML/MIN/1.73M2
GLUCOSE BLD-MCNC: 106 MG/DL (ref 70–99)
GLUCOSE BLD-MCNC: 90 MG/DL (ref 70–99)
GLUCOSE BLD-MCNC: 93 MG/DL (ref 70–99)
HBV SURFACE AB TITR SER: 5.99 {TITER}
HCT VFR BLD CALC: 26.8 % (ref 42–52)
HEMOGLOBIN: 8.7 GM/DL (ref 13.5–18)
HEMOGLOBIN: 9.1 GM/DL (ref 13.5–18)
HEPATITIS B SURFACE ANTIGEN: NON REACTIVE
IMMATURE NEUTROPHIL %: 0.5 % (ref 0–0.43)
INR BLD: 0.77 INDEX
LIPASE: 33 IU/L (ref 13–60)
LYMPHOCYTES ABSOLUTE: 1.7 K/CU MM
LYMPHOCYTES RELATIVE PERCENT: 14.3 % (ref 24–44)
MCH RBC QN AUTO: 31.2 PG (ref 27–31)
MCHC RBC AUTO-ENTMCNC: 32.5 % (ref 32–36)
MCV RBC AUTO: 96.1 FL (ref 78–100)
MONOCYTES ABSOLUTE: 0.8 K/CU MM
MONOCYTES RELATIVE PERCENT: 6.3 % (ref 0–4)
NUCLEATED RBC %: 0 %
PDW BLD-RTO: 18.6 % (ref 11.7–14.9)
PLATELET # BLD: 221 K/CU MM (ref 140–440)
POTASSIUM SERPL-SCNC: 3.7 MMOL/L (ref 3.5–5.1)
POTASSIUM SERPL-SCNC: 3.7 MMOL/L (ref 3.5–5.1)
PROTHROMBIN TIME: 9.9 SECONDS (ref 11.7–14.5)
RBC # BLD: 2.79 M/CU MM (ref 4.6–6.2)
SEGMENTED NEUTROPHILS ABSOLUTE COUNT: 9 K/CU MM
SEGMENTED NEUTROPHILS RELATIVE PERCENT: 76.4 % (ref 36–66)
SODIUM BLD-SCNC: 138 MMOL/L (ref 135–145)
SODIUM BLD-SCNC: 139 MMOL/L (ref 135–145)
STATUS: NORMAL
STATUS: NORMAL
TOTAL IMMATURE NEUTOROPHIL: 0.06 K/CU MM
TOTAL NUCLEATED RBC: 0 K/CU MM
TOTAL PROTEIN: 5.6 GM/DL (ref 6.4–8.2)
TRANSFUSION STATUS: NORMAL
TRANSFUSION STATUS: NORMAL
UNIT DIVISION: 0
UNIT DIVISION: 0
UNIT NUMBER: NORMAL
UNIT NUMBER: NORMAL
WBC # BLD: 11.8 K/CU MM (ref 4–10.5)

## 2023-01-02 PROCEDURE — 5A1D70Z PERFORMANCE OF URINARY FILTRATION, INTERMITTENT, LESS THAN 6 HOURS PER DAY: ICD-10-PCS | Performed by: INTERNAL MEDICINE

## 2023-01-02 PROCEDURE — 85025 COMPLETE CBC W/AUTO DIFF WBC: CPT

## 2023-01-02 PROCEDURE — 82962 GLUCOSE BLOOD TEST: CPT

## 2023-01-02 PROCEDURE — 0DJ08ZZ INSPECTION OF UPPER INTESTINAL TRACT, VIA NATURAL OR ARTIFICIAL OPENING ENDOSCOPIC: ICD-10-PCS | Performed by: SPECIALIST

## 2023-01-02 PROCEDURE — 3700000000 HC ANESTHESIA ATTENDED CARE: Performed by: SPECIALIST

## 2023-01-02 PROCEDURE — 80048 BASIC METABOLIC PNL TOTAL CA: CPT

## 2023-01-02 PROCEDURE — 86706 HEP B SURFACE ANTIBODY: CPT

## 2023-01-02 PROCEDURE — 83690 ASSAY OF LIPASE: CPT

## 2023-01-02 PROCEDURE — 2709999900 HC NON-CHARGEABLE SUPPLY: Performed by: SPECIALIST

## 2023-01-02 PROCEDURE — 2700000000 HC OXYGEN THERAPY PER DAY

## 2023-01-02 PROCEDURE — 86704 HEP B CORE ANTIBODY TOTAL: CPT

## 2023-01-02 PROCEDURE — 85730 THROMBOPLASTIN TIME PARTIAL: CPT

## 2023-01-02 PROCEDURE — 2140000000 HC CCU INTERMEDIATE R&B

## 2023-01-02 PROCEDURE — 3609017100 HC EGD: Performed by: SPECIALIST

## 2023-01-02 PROCEDURE — 2500000003 HC RX 250 WO HCPCS

## 2023-01-02 PROCEDURE — 87340 HEPATITIS B SURFACE AG IA: CPT

## 2023-01-02 PROCEDURE — 6360000002 HC RX W HCPCS

## 2023-01-02 PROCEDURE — 6370000000 HC RX 637 (ALT 250 FOR IP): Performed by: STUDENT IN AN ORGANIZED HEALTH CARE EDUCATION/TRAINING PROGRAM

## 2023-01-02 PROCEDURE — 90935 HEMODIALYSIS ONE EVALUATION: CPT

## 2023-01-02 PROCEDURE — 2580000003 HC RX 258: Performed by: EMERGENCY MEDICINE

## 2023-01-02 PROCEDURE — 80053 COMPREHEN METABOLIC PANEL: CPT

## 2023-01-02 PROCEDURE — 85018 HEMOGLOBIN: CPT

## 2023-01-02 PROCEDURE — 2580000003 HC RX 258: Performed by: STUDENT IN AN ORGANIZED HEALTH CARE EDUCATION/TRAINING PROGRAM

## 2023-01-02 PROCEDURE — 36415 COLL VENOUS BLD VENIPUNCTURE: CPT

## 2023-01-02 PROCEDURE — 94761 N-INVAS EAR/PLS OXIMETRY MLT: CPT

## 2023-01-02 PROCEDURE — 85610 PROTHROMBIN TIME: CPT

## 2023-01-02 PROCEDURE — 6360000002 HC RX W HCPCS: Performed by: INTERNAL MEDICINE

## 2023-01-02 PROCEDURE — 3700000001 HC ADD 15 MINUTES (ANESTHESIA): Performed by: SPECIALIST

## 2023-01-02 RX ORDER — LIDOCAINE HYDROCHLORIDE 20 MG/ML
INJECTION, SOLUTION INFILTRATION; PERINEURAL PRN
Status: DISCONTINUED | OUTPATIENT
Start: 2023-01-02 | End: 2023-01-02 | Stop reason: SDUPTHER

## 2023-01-02 RX ORDER — PROPOFOL 10 MG/ML
INJECTION, EMULSION INTRAVENOUS PRN
Status: DISCONTINUED | OUTPATIENT
Start: 2023-01-02 | End: 2023-01-02 | Stop reason: SDUPTHER

## 2023-01-02 RX ADMIN — SODIUM CHLORIDE: 9 INJECTION, SOLUTION INTRAVENOUS at 16:12

## 2023-01-02 RX ADMIN — ALPRAZOLAM 0.25 MG: 0.25 TABLET ORAL at 19:56

## 2023-01-02 RX ADMIN — LIDOCAINE HYDROCHLORIDE 100 MG: 20 INJECTION, SOLUTION INFILTRATION; PERINEURAL at 16:15

## 2023-01-02 RX ADMIN — PROPOFOL 240 MG: 10 INJECTION, EMULSION INTRAVENOUS at 16:15

## 2023-01-02 RX ADMIN — SODIUM CHLORIDE, PRESERVATIVE FREE 10 ML: 5 INJECTION INTRAVENOUS at 19:56

## 2023-01-02 RX ADMIN — EPOETIN ALFA-EPBX 8000 UNITS: 4000 INJECTION, SOLUTION INTRAVENOUS; SUBCUTANEOUS at 10:37

## 2023-01-02 NOTE — CONSULTS
[address] [place] [address], [address] [facility]                                  CONSULTATION    PATIENT NAME: Katharine Davila                     :        1955  MED REC NO:   [id number]                          ROOM:  ACCOUNT NO:   [de-identified]                           ADMIT DATE: 2022  PROVIDER:     Pita Connell MD    CONSULT DATE:  2023    CHIEF COMPLAINT:  History of anemia with melenic stools; rule out upper  GI bleeding. HISTORY OF PRESENT ILLNESS:  As follows. The patient is a 71-year-old  white gentleman, patient known to me from his last hospitalization, seen  in consult on 2022, with past medical history significant for  hypertension, hyperlipidemia, chronic kidney disease on hemodialysis for  the past one year, COPD, osteoarthritis/back pain, gastroesophageal  reflux disease, history of gastric ulcer status post surgery in the  remote past, and also history of recurrent GI bleeding from gastric  ulcer admitted at [facility] and treated endoscopically and  peripheral vascular disease. The patient presented to the emergency  room yesterday with one to two-week history of generalized weakness,  fatigue, tiredness, and \"blackish stools. \"  In the ER, the patient was  evaluated and CBC showed a hemoglobin of 6.3. The patient's last  hemoglobin on 2022, 11.7 gm%. The patient has been transfused  with one unit of packed RBC. The patient's Chem profile was remarkable  for sodium of 129, potassium was 2.9, BUN of 46, and creatinine 6.6. LFTs were within normal limits. The patient was transfused with one  unit of packed RBC and he was admitted for further workup and  management. The patient is on Protonix and is hemodynamically stable.    The patient did have approximately four or five EGDs done in the past,  the last EGD was by me on 2022, and the patient was noted to have  gastritis/duodenitis and lipoma in the duodenum with no upper GI  bleeding lesion noted. The patient also has had four or five  colonoscopies done, the last colonoscopy was on 10/01/2022, and the  patient had three colon polyps removed along with diverticulosis coli,  hemorrhoids, and stool was present. I had the path report on the polyps  were consistent with tubular adenoma. In view of presence of stool, the  patient was requested to have a followup colonoscopy in six months'  time. The patient was also requested to have a small bowel evaluation  done as an outpatient in view of his anemia and GI bleeding, but so far  the patient has not had the procedure performed. The patient is  hemodynamically stable. PAST MEDICAL HISTORY:  Significant for history of hypertension,  hyperlipidemia, chronic kidney disease on hemodialysis for the past one  year, COPD, osteoarthritis/back pain, gastroesophageal reflux disease,  history of gastric ulcer status post surgery in the remote past and also  has recurrent GI bleeding from gastric ulcer admitted at [facility],  treated endoscopically, and peripheral vascular disease. PAST SURGICAL HISTORY:  The patient has had axillofemoral bypass done,  tracheostomy and G-tube, which were subsequently removed, tonsillectomy  and adenoidectomy, cholecystectomy, wisdom tooth extraction, left  below-knee amputation. FAMILY HISTORY:  The patient's mother was diagnosed with carcinoma of  the breast.  Father with carcinoma of the prostate and bone metastasis. SOCIOECONOMIC HISTORY:  The patient is a former smoker. The patient  drinks alcohol socially and also there is history of recreational drug  use. The patient uses marijuana. CURRENT MEDICATIONS:  Please refer to the chart (the patient takes only  one baby aspirin per day otherwise denies taking NSAIDs or  anticoagulants).     ALLERGIES:  The patient is allergic to PENICILLIN and LORAZEPAM.    REVIEW OF SYSTEMS:  [address] EXAMINATION:  The patient denies headache or  focal sensorimotor symptoms. CARDIOVASCULAR SYSTEM:  No history of chest pain, shortness of breath,  or leg swelling. GENITOURINARY SYSTEM:  No history of dysuria, pyuria, or hematuria. MUSCULOSKELETAL SYSTEM:  The patient complains of generalized weakness. RESPIRATORY SYSTEM:  No history of cough, hemoptysis, fever, or chills. PHYSICAL EXAMINATION:  GENERAL:  Shows a 70-year white male, who is obese, lying flat in bed,  in no acute distress. He is awake, alert and oriented and pleasant to  talk with. VITAL SIGNS:  Stable. HEENT:  Conjunctivae to be pale. NECK:  Supple. CHEST:   Clear. CARDIOVASCULAR:  S1 and S2 is normal.  ABDOMEN:  Soft, obese, nontender. Liver and spleen are not palpable. Bowel sounds present. RECTAL:  Exam is deferred. CNS:  Exam shows the patient to be awake, alert, and oriented. There  are no focal sensorimotor signs. MUSCULOSKELETAL SYSTEM:  Exam shows evidence of degenerative joint  disease changes and status post left below-knee amputation. LABORATORY DATA:  As above mentioned. IMPRESSION:  A 66-year-old white male with multiple comorbidities  presents with one to two-week history of generalized weakness, fatigue,  dizziness, and melenic stools, rule out GI bleeding source most likely  upper gastrointestinal tract; however, a lower GI bleeding lesion cannot  be entirely ruled out. RECOMMENDATIONS:  1. Agree with present management with Protonix. 2.  Monitor the patient's serial H and H and transfuse on a p.r.n. basis  to keep hemoglobin above 7 gm%. 3.  We will proceed with EGD in a.m.  4.  The patient has been instructed to have a followup colonoscopy also  in three months' time because of presence of stool during last  colonoscopy. 5.  If the EGD is negative, the patient will need a small bowel  evaluation also in the form of capsule endoscopy for further workup of  the patient's anemia and GI bleeding.   6. The case and plan have been discussed in detail with the patient and  was also discussed with the patient's bedside RN Jazmyn Gutierrez and all their  questions were answered.         Carlos Flores MD    D: 01/01/2023 14:11:48       T: 01/01/2023 14:14:19     [id number]  Job#: [id number]     Doc#: [id number]    CC:  Lenny Tran MD

## 2023-01-02 NOTE — PROGRESS NOTES
REPORT CALLED TO [facility] [address]. PT MAY BE ON A RENAL DIET PER DR BROOKE. PT NEEDS TO CALL DR BROOKE'S OFFICE FOR A FOLLOW UP COLONOSCOPY IN 3 MONTHS.

## 2023-01-02 NOTE — ANESTHESIA POSTPROCEDURE EVALUATION
Department of Anesthesiology  Postprocedure Note    Patient: Jey Salvador  MRN: [id number]  YOB: 1955  Date of evaluation: 1/2/2023      Procedure Summary     Date: 01/02/23 Room / Location: [address]    Anesthesia Start: 1612 Anesthesia Stop: 1640    Procedures:       EGD DIAGNOSTIC ONLY      Procedure Not Yet Scheduled Diagnosis:       Gastrointestinal hemorrhage, unspecified gastrointestinal hemorrhage type      (Gastrointestinal hemorrhage, unspecified gastrointestinal hemorrhage type [K92.2])    Surgeons: Deuce Guillen MD Responsible Provider: JAYDEN Bearden CRNA    Anesthesia Type: MAC ASA Status: 4          Anesthesia Type: No value filed.     Deann Phase I:      Deann Phase II:        Anesthesia Post Evaluation    Patient location during evaluation: bedside  Patient participation: complete - patient participated  Level of consciousness: awake and alert  Pain score: 0  Airway patency: patent  Nausea & Vomiting: no vomiting and no nausea  Complications: no  Cardiovascular status: hemodynamically stable  Respiratory status: nasal cannula  Hydration status: stable

## 2023-01-02 NOTE — ANESTHESIA PRE PROCEDURE
Department of Anesthesiology  Preprocedure Note       Name:  Tammi Tyler   Age:  79 y.o.  :  1955                                          MRN:  [id number]         Date:  2023      Surgeon: Jessee Albarado):  Radha Carvajal MD    Procedure: Procedure(s):  EGD DIAGNOSTIC ONLY    Medications prior to admission:   Prior to Admission medications    Medication Sig Start Date End Date Taking? Authorizing Provider   Fluticasone-Umeclidin-Vilant ([facility] [place]) Inhale into the lungs    Historical Provider, MD   TORSEMIDE PO Take 1 tablet by mouth 2 times daily    Historical Provider, MD   atorvastatin (LIPITOR) 40 MG tablet TAKE ONE TABLET BY MOUTH DAILY 22   Historical Provider, MD jennings complex-C-folic acid (NEPHROCAPS) 1 MG capsule Take 1 mg by mouth daily 21   Historical Provider, MD   magnesium oxide (MAG-OX) 400 (241.3 Mg) MG TABS tablet TAKE ONE TABLET BY MOUTH DAILY 21   Historical Provider, MD   pantoprazole (PROTONIX) 40 MG tablet Take 40 mg by mouth daily    Historical Provider, MD   tamsulosin (FLOMAX) 0.4 MG capsule Take 0.8 mg by mouth daily    Historical Provider, MD   HYDROcodone-acetaminophen (NORCO) 5-325 MG per tablet Take 1 tablet by mouth every 6 hours as needed for Pain. Historical Provider, MD   azelastine (ASTELIN) 0.1 % nasal spray 1 spray by Nasal route 2 times daily Use in each nostril as directed    Historical Provider, MD   ipratropium-albuterol (DUONEB) 0.5-2.5 (3) MG/3ML SOLN nebulizer solution Inhale 1 vial into the lungs every 4 hours    Historical Provider, MD   Cholecalciferol (VITAMIN D3) 2000 UNITS CAPS Take 1 capsule by mouth daily    Historical Provider, MD   albuterol (PROVENTIL HFA;VENTOLIN HFA) 108 (90 BASE) MCG/ACT inhaler Inhale 2 puffs into the lungs every 6 hours as needed for Wheezing    Historical Provider, MD   ALPRAZolam (XANAX) 0.25 MG tablet Take 0.25 mg by mouth 2 times daily as needed for Sleep.      Historical Provider, MD   citalopram (CELEXA) 20 MG tablet Take 1 tablet by mouth daily. 2/20/15   Kinga Mckoy MD   folic acid (FOLVITE) 1 MG tablet Take 1 tablet by mouth daily. 2/20/15   Kinga Mckoy MD       Current medications:    No current facility-administered medications for this visit. No current outpatient medications on file.      Facility-Administered Medications Ordered in Other Visits   Medication Dose Route Frequency Provider Last Rate Last Admin    propofol injection   IntraVENous PRN Cresenciano Jose Enrique, APRN - CRNA   240 mg at 01/02/23 1615    lidocaine 2 % injection   IntraVENous PRN Crescencio [place], APRN - CRNA   100 mg at 01/02/23 1615    [START ON 1/3/2023] pantoprazole (PROTONIX) 40 mg in sodium chloride (PF) 0.9 % 10 mL injection  40 mg IntraVENous Daily Patito Maravilla MD        ALPRAZolam Padmini Boyer) tablet 0.25 mg  0.25 mg Oral BID PRN Raji Mendoza MD   0.25 mg at 01/01/23 1059    atorvastatin (LIPITOR) tablet 40 mg  40 mg Oral Daily Raji Mendoza MD   40 mg at 01/01/23 0858    citalopram (CELEXA) tablet 20 mg  20 mg Oral Daily Raji Mendoza MD   20 mg at 27/23/04 7499    folic acid (FOLVITE) tablet 1 mg  1 mg Oral Daily Raji Mendoza MD   1 mg at 01/01/23 0858    HYDROcodone-acetaminophen (NORCO) 5-325 MG per tablet 1 tablet  1 tablet Oral Q6H PRN Raji Mendoza MD   1 tablet at 01/01/23 2028    tamsulosin (FLOMAX) capsule 0.8 mg  0.8 mg Oral Daily Raji Mendoza MD   0.8 mg at 01/01/23 0858    sodium chloride flush 0.9 % injection 5-40 mL  5-40 mL IntraVENous 2 times per day Raji Mendoza MD   10 mL at 01/01/23 1156    sodium chloride flush 0.9 % injection 5-40 mL  5-40 mL IntraVENous PRN Raji Mendoza MD        0.9 % sodium chloride infusion   IntraVENous PRN Raji Mendoza MD        ondansetron (ZOFRAN-ODT) disintegrating tablet 4 mg  4 mg Oral Q8H PRN Raji Mendoza MD        Or    ondansetron (ZOFRAN) injection 4 mg  4 mg IntraVENous Q6H PRN Raji Mendoza MD   4 mg at 01/01/23 0634    acetaminophen (TYLENOL) tablet 650 mg  650 mg Oral Q6H PRN Rubia Esteban MD        Or    acetaminophen (TYLENOL) suppository 650 mg  650 mg Rectal Q6H PRN Rubia Esteban MD        glucose chewable tablet 16 g  4 tablet Oral PRN Rodo Tripathi MD        dextrose bolus 10% 125 mL  125 mL IntraVENous PRN Rodo Tripathi MD        Or    dextrose bolus 10% 250 mL  250 mL IntraVENous PRN Rodo Tripathi MD        glucagon (rDNA) injection 1 mg  1 mg SubCUTAneous PRN Rodo Tripathi MD        dextrose 10 % infusion   IntraVENous Continuous PRN Rodo Tripathi MD        0.9 % sodium chloride infusion   IntraVENous PRN Rodo Tripathi MD           Allergies: Allergies   Allergen Reactions    Penicillins Swelling    Lorazepam      Other reaction(s):  Other - comment required  hallucination       Problem List:    Patient Active Problem List   Diagnosis Code    Choledocholithiasis K80.50    Chronic kidney disease (CKD), stage III (moderate) ([facility]) N18.30    Abnormality of lung on CXR R91.8    S/P BKA (below knee amputation) unilateral ([facility] Z89.519    Cholangitis K83.09    Acute calculous cholecystitis K80.00    Abdominal adhesions K66.0    Atrophic kidney N26.1    COPD (chronic obstructive pulmonary disease) ([facility] J44.9    HTN (hypertension) I10    PAD (peripheral artery disease) ([facility]) I73.9    Proteinuria R80.9    Chronic kidney disease (CKD) stage G3b/A3, moderately decreased glomerular filtration rate (GFR) between 30-44 mL/min/1.73 square meter and albuminuria creatinine ratio greater than 300 mg/g ([facility]) I04.60    Metabolic acidosis Z79.64    ASCVD (arteriosclerotic cardiovascular disease) I25.10    Acute on chronic respiratory failure with hypoxia ([facility]) J96.21    Acute GI bleeding K92.2    Melena K92.1       Past Medical History:        Diagnosis Date    Acid reflux     ARF (acute renal failure) ([facility]     with admission 12/18/2015- consult done with Dr Prerna Driver Willis    Atrophy of left kidney     Back pain     \"Lower Back\"    Chronic bronchitis ([facility]     Chronic kidney disease     COPD (chronic obstructive pulmonary disease) ([facility]     NO PULMONOLOGIST AT THIS TIME    Emphysema/COPD ([facility]     NO PULMONOLOGIST AT THIS TIME    H. pylori infection Dx 1's    Hemodialysis patient ([facility]     Hiatal hernia     History of blood transfusion 1987    NO REACTION TO BLOOD TRANSFUSION RECEIVED    History of respiratory failure     following surgery 12/18/2015- pt developed resp failure- placed on ventilator-unable to wean of vent- had trachesotomy tube placed 1/3/2015- off vent 1/7/2015( discharged 1/13/2015)\"per wife went to [place] after discharg and had to be sent to [facility] after had bleeding around the trach- they said the trach was to big- put back on ventilator for 24 hrs- then there for about a week then sent to ARU 2/15    [place] (hard of hearing)     Bilateral Ears    Hx of blood clots     HX OTHER MEDICAL     Primary Care Physician Is Dr. Joanne Allen Right Renal Artery    HX OTHER MEDICAL     \"Dr. Skinny Rhodes One Of My Kidneys Is Dead\"   Caprice Leyden     \"See Dr. Priscilla Rosario For Blood Being Too Thick\"(per old chart pt dx with polycythemia in 1990's and has had several phlebotomies in the past(pc)( per wife Dr Татьяна Appiah now says he does not really have polycythemia just from the COPD\"    HX OTHER MEDICAL 6*/25/2015    \"has drainage , clear drainage, since he was in ARU in Feb 2015, under left shoulder, arm pit area\"    Hyperlipidemia     Hypertension     Lung nodule \"MRI, PET SCAN\"    \"Right Lung\"    Pneumonia \"Last Episode Early 2000's\"    \"At Least Twice\"( with admission 12/18/2015)    Shortness of breath on exertion     Teeth missing     Upper And Lower    Wears glasses        Past Surgical History:        Procedure Laterality Date    CHOLECYSTECTOMY, LAPAROSCOPIC  02/27/2017    cholangiogram     COLONOSCOPY     \"Couple Polyps Removed\"    COLONOSCOPY N/A 10/1/2022    COLONOSCOPY POLYPECTOMY SNARE/COLD BIOPSY performed by Luis Li MD at [address] [place] [address], COLON, DIAGNOSTIC  \"Once\"    GASTROSTOMY TUBE PLACEMENT Left 1/3/15    to gravity drain( removed g tube in Feb or  per wife)    LEG AMPUTATION BELOW KNEE Left 87    Farming Accident    OTHER SURGICAL HISTORY  [address]    \"Emergency Surgery For Ruptured Ulcer\"   [address]    TRACHEOSTOMY N/A 1/3/15    per old chart pt had trachesotomy tube, bronch , egd and g- tube placement done 2015    UPPER GASTROINTESTINAL ENDOSCOPY N/A 2022    EGD DIAGNOSTIC ONLY performed by Luis Li MD at [facility]. [address]      per old chart pt had left axillary to bifemoral bypass graft done 2014(pc)    WISDOM TOOTH EXTRACTION      All [place] Teeth Extracted In Past       Social History:    Social History     Tobacco Use    Smoking status: Former     Packs/day: 2.00     Years: 43.00     Pack years: 86.00     Types: Cigarettes     Start date: 1971     Quit date: 12/15/2014     Years since quittin.0    Smokeless tobacco: Never   Substance Use Topics    Alcohol use: Yes     Alcohol/week: 0.0 standard drinks     Comment: \"Occ\"\"average one time per week                                Counseling given: Not Answered      Vital Signs (Current): There were no vitals filed for this visit.                                            BP Readings from Last 3 Encounters:   23 (!) 173/82   22 (!) 177/86   22 (!) 162/83       NPO Status:                                                                                 BMI:   Wt Readings from Last 3 Encounters:   23 239 lb 8 oz (108.6 kg)   22 242 lb 8.1 oz (110 kg)   22 240 lb (108.9 kg)     There is no height or weight on file to calculate BMI.    CBC:   Lab Results   Component Value Date/Time    WBC 11.8 2023 02:45 AM    RBC 2.79 2023 02:45 AM    HGB 9.1 2023 11:37 AM    HCT 26.8 2023 02:45 AM    MCV 96.1 2023 02:45 AM    RDW 18.6 2023 02:45 AM     2023 02:45 AM       CMP:   Lab Results   Component Value Date/Time     2023 01:46 PM    K 3.7 2023 01:46 PM     2023 01:46 PM    CO2 28 2023 01:46 PM    BUN 25 2023 01:46 PM    CREATININE 4.5 2023 01:46 PM    GFRAA 10 10/01/2022 12:13 PM    LABGLOM 14 2023 01:46 PM    GLUCOSE 90 2023 01:46 PM    PROT 5.6 2023 02:45 AM    CALCIUM 8.7 2023 01:46 PM    BILITOT 0.7 2023 02:45 AM    ALKPHOS 62 2023 02:45 AM    AST 15 2023 02:45 AM    ALT 15 2023 02:45 AM       POC Tests:   Recent Labs     23  0816   POCGLU 106*       Coags:   Lab Results   Component Value Date/Time    PROTIME 9.9 2023 02:45 AM    INR 0.77 2023 02:45 AM    APTT 26.6 2023 02:45 AM       HCG (If Applicable): No results found for: PREGTESTUR, PREGSERUM, HCG, HCGQUANT     ABGs:   Lab Results   Component Value Date/Time    PO2ART 58.6 2022 12:52 AM    YQF2SWZ 48.7 2022 12:52 AM    NIT0CTP 18.7 2022 12:52 AM        Type & Screen (If Applicable):  No results found for: LABABO, LABRH    Drug/Infectious Status (If Applicable):  Lab Results   Component Value Date/Time    HEPCAB 0.06 2014 03:40 AM       COVID-19 Screening (If Applicable):   Lab Results   Component Value Date/Time    COVID19 NOT DETECTED 2022 10:10 AM           Anesthesia Evaluation  Patient summary reviewed  Airway: Mallampati: IV  TM distance: >3 FB   Neck ROM: full  Mouth opening: > = 3 FB   Dental: normal exam         Pulmonary:   (+) COPD:                            ROS comment: Smoking Status: Former - 86 pack years  Quit Smokin/15/14    CXR 2022:  Impression  Left basilar consolidation.     History of respiratory failure following surgery 2015- pt developed resp failure- placed on ventilator-unable to wean of vent- had trachesotomy tube placed 1/3/2015- off vent 1/7/2015    Cardiovascular:    (+) hypertension:, hyperlipidemia      ECG reviewed      Echocardiogram reviewed         Beta Blocker:  Not on Beta Blocker      ROS comment: Echo 5/2021:   Summary   Left ventricular systolic function is normal.   Ejection fraction is visually estimated at 50 %. Moderate left ventricular hypertrophy. Mildly dilated left atrium. Heavily calcified right coronary cusp; Mean PG 8 mmHg. No evidence of any pericardial effusion. Neuro/Psych:   (+) depression/anxiety             GI/Hepatic/Renal:   (+) hiatal hernia, GERD:, renal disease: ESRD and dialysis, morbid obesity         ROS comment: CHIEF COMPLAINT:  History of anemia with melenic stools; rule out upper  GI bleeding. - s/p 1 U PRBC    end-stage renal disease on dialysis Monday Wednesday Friday. Endo/Other:    (+) blood dyscrasia: anemia:., .                 Abdominal:             Vascular:   + PVD, aortic or cerebral, . Other Findings:             Anesthesia Plan      MAC     ASA 4       Induction: intravenous. Anesthetic plan and risks discussed with patient. Plan discussed with surgical team and attending. Attending anesthesiologist reviewed and agrees with Preprocedure content                JAYDEN Mak - CRNA   1/2/2023         Pre Anesthesia Assessment complete.  Chart reviewed on 1/2/2023

## 2023-01-02 NOTE — ANESTHESIA PRE PROCEDURE
Department of Anesthesiology  Preprocedure Note       Name:  Kam Botello   Age:  79 y.o.  :  1955                                          MRN:  [id number]         Date:  2023      Surgeon: Jeff Capps):  Sivan Wu MD    Procedure: Procedure(s):  EGD DIAGNOSTIC ONLY    Medications prior to admission:   Prior to Admission medications    Medication Sig Start Date End Date Taking? Authorizing Provider   Fluticasone-Umeclidin-Vilant ([place]) Inhale into the lungs    Historical Provider, MD   TORSEMIDE PO Take 1 tablet by mouth 2 times daily    Historical Provider, MD   atorvastatin (LIPITOR) 40 MG tablet TAKE ONE TABLET BY MOUTH DAILY 22   Historical Provider, MD jennings complex-C-folic acid (NEPHROCAPS) 1 MG capsule Take 1 mg by mouth daily 21   Historical Provider, MD   magnesium oxide (MAG-OX) 400 (241.3 Mg) MG TABS tablet TAKE ONE TABLET BY MOUTH DAILY 21   Historical Provider, MD   pantoprazole (PROTONIX) 40 MG tablet Take 40 mg by mouth daily    Historical Provider, MD   tamsulosin (FLOMAX) 0.4 MG capsule Take 0.8 mg by mouth daily    Historical Provider, MD   HYDROcodone-acetaminophen (NORCO) 5-325 MG per tablet Take 1 tablet by mouth every 6 hours as needed for Pain. Historical Provider, MD   azelastine (ASTELIN) 0.1 % nasal spray 1 spray by Nasal route 2 times daily Use in each nostril as directed    Historical Provider, MD   ipratropium-albuterol (DUONEB) 0.5-2.5 (3) MG/3ML SOLN nebulizer solution Inhale 1 vial into the lungs every 4 hours    Historical Provider, MD   Cholecalciferol (VITAMIN D3) 2000 UNITS CAPS Take 1 capsule by mouth daily    Historical Provider, MD   albuterol (PROVENTIL HFA;VENTOLIN HFA) 108 (90 BASE) MCG/ACT inhaler Inhale 2 puffs into the lungs every 6 hours as needed for Wheezing    Historical Provider, MD   ALPRAZolam (XANAX) 0.25 MG tablet Take 0.25 mg by mouth 2 times daily as needed for Sleep.      Historical Provider, MD   citalopram (CELEXA) 20 MG tablet Take 1 tablet by mouth daily. 2/20/15   Anshul Gil MD   folic acid (FOLVITE) 1 MG tablet Take 1 tablet by mouth daily.  2/20/15   Anshul Gil MD       Current medications:    Current Facility-Administered Medications   Medication Dose Route Frequency Provider Last Rate Last Admin    0.9 % sodium chloride infusion   IntraVENous PRN Gaviota Crouch MD        ALPRAZolam Welford Bolognese) tablet 0.25 mg  0.25 mg Oral BID PRN Stacia Valverde MD   0.25 mg at 01/01/23 1059    atorvastatin (LIPITOR) tablet 40 mg  40 mg Oral Daily Stacia Valverde MD   40 mg at 01/01/23 0858    citalopram (CELEXA) tablet 20 mg  20 mg Oral Daily Stacia Valverde MD   20 mg at 04/74/22 5784    folic acid (FOLVITE) tablet 1 mg  1 mg Oral Daily Stacia Valverde MD   1 mg at 01/01/23 0858    HYDROcodone-acetaminophen (Warnell Bills) 5-325 MG per tablet 1 tablet  1 tablet Oral Q6H PRN Stacia Valverde MD   1 tablet at 01/01/23 2028    tamsulosin (FLOMAX) capsule 0.8 mg  0.8 mg Oral Daily Stacia Valverde MD   0.8 mg at 01/01/23 0858    sodium chloride flush 0.9 % injection 5-40 mL  5-40 mL IntraVENous 2 times per day Stacai Valverde MD   10 mL at 01/01/23 1156    sodium chloride flush 0.9 % injection 5-40 mL  5-40 mL IntraVENous PRN Stacia Valverde MD        0.9 % sodium chloride infusion   IntraVENous PRN Stacia Valverde MD        ondansetron (ZOFRAN-ODT) disintegrating tablet 4 mg  4 mg Oral Q8H PRN Stacia Valverde MD        Or    ondansetron (ZOFRAN) injection 4 mg  4 mg IntraVENous Q6H PRN Stacia Valverde MD   4 mg at 01/01/23 0634    acetaminophen (TYLENOL) tablet 650 mg  650 mg Oral Q6H PRN Stacia Valverde MD        Or    acetaminophen (TYLENOL) suppository 650 mg  650 mg Rectal Q6H PRN Stacia Valverde MD        pantoprazole (PROTONIX) 40 mg in sodium chloride (PF) 0.9 % 10 mL injection  40 mg IntraVENous BID Stacia Valverde MD   40 mg at 01/01/23 2013    glucose chewable tablet 16 g  4 tablet Oral PRN Pedro Barragan MD        dextrose bolus 10% 125 mL  125 mL IntraVENous PRN Pedro Barragan MD        Or    dextrose bolus 10% 250 mL  250 mL IntraVENous PRN Pedro Barragan MD        glucagon (rDNA) injection 1 mg  1 mg SubCUTAneous PRN Pedro Barragan MD        dextrose 10 % infusion   IntraVENous Continuous PRN Pedro Barragan MD        0.9 % sodium chloride infusion   IntraVENous PRN Pedro Barragan MD           Allergies: Allergies   Allergen Reactions    Penicillins Swelling    Lorazepam      Other reaction(s):  Other - comment required  hallucination       Problem List:    Patient Active Problem List   Diagnosis Code    Choledocholithiasis K80.50    Chronic kidney disease (CKD), stage III (moderate) ([facility]) N18.30    Abnormality of lung on CXR R91.8    S/P BKA (below knee amputation) unilateral ([facility] Z89.519    Cholangitis K83.09    Acute calculous cholecystitis K80.00    Abdominal adhesions K66.0    Atrophic kidney N26.1    COPD (chronic obstructive pulmonary disease) ([facility]) J44.9    HTN (hypertension) I10    PAD (peripheral artery disease) ([facility]) I73.9    Proteinuria R80.9    Chronic kidney disease (CKD) stage G3b/A3, moderately decreased glomerular filtration rate (GFR) between 30-44 mL/min/1.73 square meter and albuminuria creatinine ratio greater than 300 mg/g ([facility]) K33.11    Metabolic acidosis I08.31    ASCVD (arteriosclerotic cardiovascular disease) I25.10    Acute on chronic respiratory failure with hypoxia ([facility]) J96.21    Acute GI bleeding K92.2    Melena K92.1       Past Medical History:        Diagnosis Date    Acid reflux     ARF (acute renal failure) ([facility]     with admission 12/18/2015- consult done with Dr Kaylan Green Atrophy of left kidney     Back pain     \"Lower Back\"    Chronic bronchitis ([facility]     Chronic kidney disease     COPD (chronic obstructive pulmonary disease) ([facility]     NO PULMONOLOGIST AT THIS TIME    Emphysema/COPD ([facility] NO PULMONOLOGIST AT THIS TIME    H. pylori infection Dx 1's    Hemodialysis patient ([address]     Hiatal hernia     History of blood transfusion 1987    NO REACTION TO BLOOD TRANSFUSION RECEIVED    History of respiratory failure     following surgery 12/18/2015- pt developed resp failure- placed on ventilator-unable to wean of vent- had trachesotomy tube placed 1/3/2015- off vent 1/7/2015( discharged 1/13/2015)\"per wife went to [place] after discharg and had to be sent to [facility] after had bleeding around the trach- they said the trach was to big- put back on ventilator for 24 hrs- then there for about a week then sent to ARU 2/15    [place] (hard of hearing)     Bilateral Ears    Hx of blood clots     HX OTHER MEDICAL     Primary Care Physician Is Dr. Matthew Mcduffie Right Renal Artery    HX OTHER MEDICAL     \"Dr. Alessandra Essex One Of My Kidneys Is Dead\"   Angi Cowart     \"See Dr. Cm Maria For Blood Being Too Thick\"(per old chart pt dx with polycythemia in 1990's and has had several phlebotomies in the past(pc)( per wife Dr Johnie Grover now says he does not really have polycythemia just from the COPD\"    [address] [place] [address] 6*/25/2015    \"has drainage , clear drainage, since he was in ARU in Feb 2015, under left shoulder, arm pit area\"    Hyperlipidemia     Hypertension     Lung nodule \"MRI, PET SCAN\"    \"Right Lung\"    Pneumonia \"Last Episode Early 2000's\"    \"At Least Twice\"( with admission 12/18/2015)    Shortness of breath on exertion     Teeth missing     Upper And Lower    Wears glasses        Past Surgical History:        Procedure Laterality Date    CHOLECYSTECTOMY, LAPAROSCOPIC  02/27/2017    cholangiogram     COLONOSCOPY  2011    \"Couple Polyps Removed\"    COLONOSCOPY N/A 10/1/2022    COLONOSCOPY POLYPECTOMY SNARE/COLD BIOPSY performed by Yogi Sutton MD at [address] [place] [address], COLON, DIAGNOSTIC  \"Once\"    GASTROSTOMY TUBE PLACEMENT Left 1/3/15 to gravity drain( removed g tube in Feb or  per wife)    LEG AMPUTATION BELOW KNEE Left 87    Farming Accident    OTHER SURGICAL HISTORY  Mid 'T    \"Emergency Surgery For Ruptured Ulcer\"   [address]    TRACHEOSTOMY N/A 1/3/15    per old chart pt had trachesotomy tube, bronch , egd and g- tube placement done 2015    UPPER GASTROINTESTINAL ENDOSCOPY N/A 2022    EGD DIAGNOSTIC ONLY [facility]      per old chart pt had left axillary to bifemoral bypass graft done 2014(pc)    WISDOM TOOTH EXTRACTION      All Vincent Teeth Extracted In Past       Social History:    Social History     Tobacco Use    Smoking status: Former     Packs/day: 2.00     Years: 43.00     Pack years: 86.00     Types: Cigarettes     Start date: 1971     Quit date: 12/15/2014     Years since quittin.0    Smokeless tobacco: Never   Substance Use Topics    Alcohol use: Yes     Alcohol/week: 0.0 standard drinks     Comment: \"Occ\"\"average one time per week                                Counseling given: Not Answered      Vital Signs (Current):   Vitals:    23 1100 23 1115 23 1136 23 1145   BP: (!) 166/75 (!) 151/83 (!) 173/82 (!) 173/82   Pulse: 83 82 93 94   Resp: 19 18 16 18   Temp:   37.3 °C (99.2 °F)    TempSrc:       SpO2:       Weight:   239 lb 8 oz (108.6 kg)    Height:                                                  BP Readings from Last 3 Encounters:   23 (!) 173/82   22 (!) 177/86   22 (!) 162/83       NPO Status:                                                                                 BMI:   Wt Readings from Last 3 Encounters:   23 239 lb 8 oz (108.6 kg)   22 242 lb 8.1 oz (110 kg)   22 240 lb (108.9 kg)     Body mass index is 36.42 kg/m².     CBC:   Lab Results   Component Value Date/Time    WBC 11.8 2023 02:45 AM    RBC 2.79 2023 02:45 AM    HGB 9.1 2023 11:37 AM    HCT 26.8 2023 02:45 AM    MCV 96.1 2023 02:45 AM    RDW 18.6 2023 02:45 AM     2023 02:45 AM       CMP:   Lab Results   Component Value Date/Time     2023 02:45 AM    K 3.7 2023 02:45 AM     2023 02:45 AM    CO2 22 2023 02:45 AM    BUN 51 2023 02:45 AM    CREATININE 7.1 2023 02:45 AM    GFRAA 10 10/01/2022 12:13 PM    LABGLOM 8 2023 02:45 AM    GLUCOSE 93 2023 02:45 AM    PROT 5.6 2023 02:45 AM    CALCIUM 8.5 2023 02:45 AM    BILITOT 0.7 2023 02:45 AM    ALKPHOS 62 2023 02:45 AM    AST 15 2023 02:45 AM    ALT 15 2023 02:45 AM       POC Tests:   Recent Labs     23  0816   POCGLU 106*       Coags:   Lab Results   Component Value Date/Time    PROTIME 9.9 2023 02:45 AM    INR 0.77 2023 02:45 AM    APTT 26.6 2023 02:45 AM       HCG (If Applicable): No results found for: PREGTESTUR, PREGSERUM, HCG, HCGQUANT     ABGs:   Lab Results   Component Value Date/Time    PO2ART 58.6 2022 12:52 AM    BQO6GKP 48.7 2022 12:52 AM    RIX6KJC 18.7 2022 12:52 AM        Type & Screen (If Applicable):  No results found for: LABABO, LABRH    Drug/Infectious Status (If Applicable):  Lab Results   Component Value Date/Time    HEPCAB 0.06 2014 03:40 AM       COVID-19 Screening (If Applicable):   Lab Results   Component Value Date/Time    COVID19 NOT DETECTED 2022 10:10 AM           Anesthesia Evaluation  Patient summary reviewed  Airway:           Dental:          Pulmonary:   (+) COPD:                            ROS comment: Smoking Status: Former - 80 pack years  Quit Smokin/15/14    CXR 2022:  Impression  Left basilar consolidation.     History of respiratory failure following surgery 2015- pt developed resp failure- placed on ventilator-unable to wean of vent- had trachesotomy tube placed 1/3/2015- off vent 2015 Cardiovascular:    (+) hypertension:, hyperlipidemia         Beta Blocker:  Not on Beta Blocker      ROS comment: Echo 5/2021:   Summary   Left ventricular systolic function is normal.   Ejection fraction is visually estimated at 50 %. Moderate left ventricular hypertrophy. Mildly dilated left atrium. Heavily calcified right coronary cusp; Mean PG 8 mmHg. No evidence of any pericardial effusion. Neuro/Psych:   (+) depression/anxiety             GI/Hepatic/Renal:   (+) hiatal hernia, GERD:, renal disease: ESRD and dialysis, morbid obesity         ROS comment: CHIEF COMPLAINT:  History of anemia with melenic stools; rule out upper  GI bleeding. - s/p 1 U PRBC    end-stage renal disease on dialysis Monday Wednesday Friday. Endo/Other:    (+) blood dyscrasia: anemia:., .                 Abdominal:             Vascular:   + PVD, aortic or cerebral, . Other Findings:           Anesthesia Plan      MAC     ASA 4       Induction: intravenous. JAYDEN Blair - CRNA   1/2/2023         Pre Anesthesia Assessment complete.  Chart reviewed on 1/2/2023

## 2023-01-02 NOTE — PROGRESS NOTES
1555: PT ALERT AND ORIENTED X 4. PRE-OP QUESTIONS ASKED AND ANSWERED APPROPRIATELY BY PT. NAME, , ARMBAND VERIFIED, PROCEDURE, ALLERGIE, IMPLANTS, LAST PO INTAKE, HISTORY, MEDS.

## 2023-01-02 NOTE — BRIEF OP NOTE
Brief Postoperative Note      Nick Devine is a 79 y.o. male     Pre-operative Diagnosis: ANEMIA / MELENA    Post-operative Diagnosis: GASTRITIS WITH RECENT BLEEDING/ LIPOMA IN DUODENUM    Procedure: EGD     Anesthesia: MAC    Surgeons/Assistants:Jose Jang MD     Estimated Blood Loss: NIL    Complications: None    Specimens: were not obtained  REC-- CPM / F/U COLONOSCOPY IN 3 MONTHS /  CAPSULE ENDOSCOPY AS OUTPT/ F/U H/H    Marci Li MD   1/2/2023   4:26 PM

## 2023-01-02 NOTE — PROGRESS NOTES
V2.0  [facility] Hospitalist Progress Note      Name:  Huan Maurer /Age/Sex: 1955  (79 y.o. male)   MRN & CSN:  [id number] & [id number] Encounter Date/Time: 2023 2:29 PM EST    Location:  7421/0935-Y PCP: [address] [place] [address] Day: 3    Assessment and Plan:   Huan Maurer is a 79 y.o. male with pmh of ESRD, who presents with Melena    #Acute on chronic anemia  # Melena  - Presented after having multiple episodes of melena over past few days. No abdominal pain. No bleeding during HD. No NSAIDs. EGD on  with mild gastritis and duodenitis. Colonoscopy on 10/2 with diffuse diverticulosis coli and mixed hemorrhoids but no bleeding. Plan for SB evaluation outpatient.   - Hg on presentation 6.3, from 11.7 on .  - Received x2 u pRBC in the ED with PPI BID. -Hemoglobin stable. Plan for EGD today     # ESRD on HD MWF  - Last HD on .  - No bleeding during HD or abdominal pain to suggest ischemic bowel. - Nephrology consulted for HD. # NSTEMI, type II  - Denied any CP.  -Likely demand ischemia, non-MI related  - Initial Tn mildly elevated. ECG without acute ischemic changes. -Repeat troponin normal.     # Hypokalemia  - Moderate, repleted in ED.  - Follow-up repeat labs. # BPH  - Continue Flomax. # Anxiety  - Continue Celexa with prn Xanax. Diet Diet NPO Exceptions are: Ice Chips, Sips of Water with Meds   DVT Prophylaxis [] Lovenox, []  Heparin, [] SCDs, [] Ambulation,  [] Eliquis, [] Xarelto  [] Coumadin   Code Status Full Code   Disposition From:   Expected Disposition:   Estimated Date of Discharge:   Patient requires continued admission due to melena    Surrogate Decision Maker/ POA      Subjective:     Chief Complaint: Chest Pain (X2 hours ago, Does dialysis 4 times a week)       Huan Maurer is a 79 y.o. male who presents with melena. Seen and examined in the morning postdialysis. Patient did not resting comfortably. Has no complaints today. Had 1 episode of bowel movement last night which appeared dark. Had no bowel movements today. Denies nausea or vomiting or hematemesis. Review of Systems:    Review of Systems        Objective: Intake/Output Summary (Last 24 hours) at 1/2/2023 1429  Last data filed at 1/2/2023 1136  Gross per 24 hour   Intake 897.97 ml   Output 1300 ml   Net -402.03 ml        Vitals:   Vitals:    01/02/23 1145   BP: (!) 173/82   Pulse: 94   Resp: 18   Temp:    SpO2:        Physical Exam:     General: NAD  Eyes: EOMI  ENT: neck supple  Cardiovascular: Regular rate. Respiratory: Clear to auscultation  Gastrointestinal: Soft, non tender  Genitourinary: no suprapubic tenderness  Musculoskeletal: No edema  Skin: warm, dry  Neuro: Alert. Psych: Mood appropriate.      Medications:   Medications:    atorvastatin  40 mg Oral Daily    citalopram  20 mg Oral Daily    folic acid  1 mg Oral Daily    tamsulosin  0.8 mg Oral Daily    sodium chloride flush  5-40 mL IntraVENous 2 times per day    pantoprazole (PROTONIX) 40 mg injection  40 mg IntraVENous BID      Infusions:    sodium chloride      sodium chloride      dextrose      sodium chloride       PRN Meds: sodium chloride, , PRN  ALPRAZolam, 0.25 mg, BID PRN  HYDROcodone-acetaminophen, 1 tablet, Q6H PRN  sodium chloride flush, 5-40 mL, PRN  sodium chloride, , PRN  ondansetron, 4 mg, Q8H PRN   Or  ondansetron, 4 mg, Q6H PRN  acetaminophen, 650 mg, Q6H PRN   Or  acetaminophen, 650 mg, Q6H PRN  glucose, 4 tablet, PRN  dextrose bolus, 125 mL, PRN   Or  dextrose bolus, 250 mL, PRN  glucagon (rDNA), 1 mg, PRN  dextrose, , Continuous PRN  sodium chloride, , PRN        Labs      Recent Results (from the past 24 hour(s))   Lactic Acid    Collection Time: 01/01/23  7:18 PM   Result Value Ref Range    Lactate 0.9 0.5 - 1.9 mMOL/L   Hemoglobin and Hematocrit    Collection Time: 01/01/23 11:02 PM   Result Value Ref Range    Hemoglobin 8.3 (L) 13.5 - 18.0 GM/DL    Hematocrit 25.1 (L) 42 - 52 %   Hepatitis B Surface Antibody    Collection Time: 01/02/23  2:12 AM   Result Value Ref Range    Hep B S Ab 5.99    Hepatitis B Surface Antigen    Collection Time: 01/02/23  2:12 AM   Result Value Ref Range    Hepatitis B Surface Ag NON REACTIVE NON REACTIVE   CBC with Auto Differential    Collection Time: 01/02/23  2:45 AM   Result Value Ref Range    WBC 11.8 (H) 4.0 - 10.5 K/CU MM    RBC 2.79 (L) 4.6 - 6.2 M/CU MM    Hemoglobin 8.7 (L) 13.5 - 18.0 GM/DL    Hematocrit 26.8 (L) 42 - 52 %    MCV 96.1 78 - 100 FL    MCH 31.2 (H) 27 - 31 PG    MCHC 32.5 32.0 - 36.0 %    RDW 18.6 (H) 11.7 - 14.9 %    Platelets 695 266 - 774 K/CU MM    Differential Type AUTOMATED DIFFERENTIAL     Segs Relative 76.4 (H) 36 - 66 %    Lymphocytes % 14.3 (L) 24 - 44 %    Monocytes % 6.3 (H) 0 - 4 %    Eosinophils % 2.0 0 - 3 %    Basophils % 0.5 0 - 1 %    Segs Absolute 9.0 K/CU MM    Lymphocytes Absolute 1.7 K/CU MM    Monocytes Absolute 0.8 K/CU MM    Eosinophils Absolute 0.2 K/CU MM    Basophils Absolute 0.1 K/CU MM    Nucleated RBC % 0.0 %    Total Nucleated RBC 0.0 K/CU MM    Total Immature Neutrophil 0.06 K/CU MM    Immature Neutrophil % 0.5 (H) 0 - 0.43 %   Comprehensive Metabolic Panel    Collection Time: 01/02/23  2:45 AM   Result Value Ref Range    Sodium 138 135 - 145 MMOL/L    Potassium 3.7 3.5 - 5.1 MMOL/L    Chloride 105 99 - 110 mMol/L    CO2 22 21 - 32 MMOL/L    BUN 51 (H) 6 - 23 MG/DL    Creatinine 7.1 (H) 0.9 - 1.3 MG/DL    Est, Glom Filt Rate 8 (L) >60 mL/min/1.73m2    Glucose 93 70 - 99 MG/DL    Calcium 8.5 8.3 - 10.6 MG/DL    Albumin 3.7 3.4 - 5.0 GM/DL    Total Protein 5.6 (L) 6.4 - 8.2 GM/DL    Total Bilirubin 0.7 0.0 - 1.0 MG/DL    ALT 15 10 - 40 U/L    AST 15 15 - 37 IU/L    Alkaline Phosphatase 62 40 - 129 IU/L    Anion Gap 11 4 - 16   Lipase    Collection Time: 01/02/23  2:45 AM   Result Value Ref Range    Lipase 33 13 - 60 IU/L   Protime/INR & PTT    Collection Time: 01/02/23  2:45 AM   Result Value Ref Range Protime 9.9 (L) 11.7 - 14.5 SECONDS    INR 0.77 INDEX    aPTT 26.6 25.1 - 37.1 SECONDS   POCT Glucose    Collection Time: 01/02/23  8:16 AM   Result Value Ref Range    POC Glucose 106 (H) 70 - 99 MG/DL   Hemoglobin    Collection Time: 01/02/23 11:37 AM   Result Value Ref Range    Hemoglobin 9.1 (L) 13.5 - 18.0 GM/DL        Imaging/Diagnostics Last 24 Hours   XR CHEST PORTABLE    Result Date: 12/31/2022  EXAMINATION: ONE XRAY VIEW OF THE CHEST 12/31/2022 11:30 pm COMPARISON: 09/26/2022 HISTORY: ORDERING SYSTEM PROVIDED HISTORY: chest pain FINDINGS: Stable cardiomediastinal silhouette. There is left basilar consolidation. Emphysematous changes are redemonstrated. No pneumothorax. Left basilar consolidation.        Electronically signed by Srinivasa Harris MD on 1/2/2023 at 2:29 PM

## 2023-01-02 NOTE — PROGRESS NOTES
Nephrology Progress Note  1/2/2023 1:58 PM  Subjective: Interval History: Jey Salvador is a 79 y.o. male with  overall doing better seen on dialysis and tolerating treatment well hemoglobin improved plan on endoscopy later today        Data:   Scheduled Meds:   atorvastatin  40 mg Oral Daily    citalopram  20 mg Oral Daily    folic acid  1 mg Oral Daily    tamsulosin  0.8 mg Oral Daily    sodium chloride flush  5-40 mL IntraVENous 2 times per day    pantoprazole (PROTONIX) 40 mg injection  40 mg IntraVENous BID     Continuous Infusions:   sodium chloride      sodium chloride      dextrose      sodium chloride           CBC   Recent Labs     12/31/22 2325 01/01/23  1158 01/01/23  2302 01/02/23  0245 01/02/23  1137   WBC 11.2* 9.5  --  11.8*  --    HGB 6.3* 6.3* 8.3* 8.7* 9.1*   HCT 19.6* 19.3* 25.1* 26.8*  --     178  --  221  --       BMP   Recent Labs     01/01/23  0735 01/01/23  0908 01/02/23  0245   * 134* 138   K 7.0* 4.0 3.7    100 105   CO2 22 24 22   BUN 51* 54* 51*   CREATININE 6.9* 7.2* 7.1*     Hepatic:   Recent Labs     12/31/22 2325 01/02/23  0245   AST 17 15   ALT 18 15   BILITOT 0.2 0.7   ALKPHOS 72 62     Troponin: No results for input(s): TROPONINI in the last 72 hours. BNP: No results for input(s): BNP in the last 72 hours. Lipids: No results for input(s): CHOL, HDL in the last 72 hours.     Invalid input(s): LDLCALCU  ABGs:   Lab Results   Component Value Date/Time    PO2ART 58.6 09/27/2022 12:52 AM    HFR1GIW 48.7 09/27/2022 12:52 AM     INR:   Recent Labs     01/02/23  0245   INR 0.77     Renal Labs  Albumin:    Lab Results   Component Value Date/Time    LABALBU 3.7 01/02/2023 02:45 AM     Calcium:    Lab Results   Component Value Date/Time    CALCIUM 8.5 01/02/2023 02:45 AM     Phosphorus:    Lab Results   Component Value Date/Time    PHOS 3.1 10/01/2022 12:13 PM     U/A:    Lab Results   Component Value Date/Time    NITRU NEGATIVE 09/28/2022 08:45 PM    NITRU Negative 12/06/2019 01:33 PM    COLORU YELLOW 09/28/2022 08:45 PM    PHUR 6.0 12/06/2019 01:33 PM    WBCUA <1 09/28/2022 08:45 PM    RBCUA <1 09/28/2022 08:45 PM    MUCUS RARE 05/11/2021 01:10 PM    TRICHOMONAS NONE SEEN 05/17/2021 09:00 AM    YEAST FEW 01/09/2015 02:00 PM    BACTERIA NEGATIVE 09/28/2022 08:45 PM    CLARITYU CLEAR 09/28/2022 08:45 PM    SPECGRAV 1.010 09/28/2022 08:45 PM    UROBILINOGEN 0.2 09/28/2022 08:45 PM    BILIRUBINUR NEGATIVE 09/28/2022 08:45 PM    BLOODU NEGATIVE 09/28/2022 08:45 PM    GLUCOSEU Negative 12/06/2019 01:33 PM    KETUA NEGATIVE 09/28/2022 08:45 PM     ABG:    Lab Results   Component Value Date/Time    MWA6IZN 48.7 09/27/2022 12:52 AM    PO2ART 58.6 09/27/2022 12:52 AM    WFG0VBJ 18.7 09/27/2022 12:52 AM     HgBA1c:    Lab Results   Component Value Date/Time    LABA1C 5.4 12/19/2014 03:40 AM     Microalbumen/Creatinine ratio:  No components found for: RUCREAT  TSH:  No results found for: TSH  IRON:    Lab Results   Component Value Date/Time    IRON 66 08/25/2014 10:54 AM     Iron Saturation:  No components found for: PERCENTFE  TIBC:    Lab Results   Component Value Date/Time    TIBC 293 08/25/2014 10:54 AM     FERRITIN:    Lab Results   Component Value Date/Time    FERRITIN 292 08/25/2014 10:54 AM     RPR:  No results found for: RPR  SRINI:    Lab Results   Component Value Date/Time    SRINI Negative 01/02/2018 10:34 AM     24 Hour Urine for Creatinine Clearance:  No components found for: CREAT4, UHRS10, UTV10      Objective:   I/O: 01/01 0701 - 01/02 0700  In: 398   Out: 800 [Urine:800]  I/O last 3 completed shifts: In: 723 [Blood:723]  Out: 9575 [GJAWA:3052]  I/O this shift: In: 500   Out: 500   Vitals: BP (!) 173/82   Pulse 94   Temp 99.2 °F (37.3 °C)   Resp 18   Ht 5' 8\" (1.727 m)   Wt 239 lb 8 oz (108.6 kg)   SpO2 96%   BMI 36.42 kg/m²  {  General appearance: awake weak  HEENT: Head: Normal, normocephalic, atraumatic.   Neck: supple, symmetrical, trachea midline  Lungs: diminished breath sounds bilaterally  Heart: S1, S2 normal  Abdomen: abnormal findings:  soft nt  Extremities: edema trace  Neurologic: Mental status: alertness: alert        Assessment and Plan:      IMP:  #1 end-stage renal disease on dialysis Monday Wednesday Friday  #2 anemia with GI bleed  #3 low potassium  #4 hypertension  #5 hyponatremia    Plan      #1 do next dialysis Wednesday tolerated dialysis well today   #2 hemoglobin improved after transfusion follow-up with GI work-up and endoscopy   #3 potassium improved   #4 blood pressure variable somewhat anxious resume blood pressure meds   #5 sodium stable     follow-up GI work-up and discharge when GI clears patient for no active bleeding           Bee Christine MD, MD

## 2023-01-02 NOTE — PROGRESS NOTES
Patient tolerated 2.5 hr hemodialysis treatment well today. No fluid was removed as per pt request. MD Nilo Fitzpatrick notified and okay 'd order. AVG in upper right extremity was cannulated without difficulty. During HD Retacrit was administered as per order. At the end of HD blood was rinsed back to pt successfully. Osakis removed from AVG and pressure applied to areas for less than 10 mins each. Paper tape and gauze dressing applied. HD txt overseen by TRINITY Harris RN            Patient Name: Serene Corado  Patient : 1955  MRN: [id number]     Acct: [de-identified]  Date of Admission: 2022  Room/Bed: 3112/3112-A  Code Status:  Full Code  Allergies: Allergies   Allergen Reactions    Penicillins Swelling    Lorazepam      Other reaction(s):  Other - comment required  hallucination     Diagnosis:    Patient Active Problem List   Diagnosis    Choledocholithiasis    Chronic kidney disease (CKD), stage III (moderate) (HCC)    Abnormality of lung on CXR    S/P BKA (below knee amputation) unilateral (HCC)    Cholangitis    Acute calculous cholecystitis    Abdominal adhesions    Atrophic kidney    COPD (chronic obstructive pulmonary disease) (HCC)    HTN (hypertension)    PAD (peripheral artery disease) (HCC)    Proteinuria    Chronic kidney disease (CKD) stage G3b/A3, moderately decreased glomerular filtration rate (GFR) between 30-44 mL/min/1.73 square meter and albuminuria creatinine ratio greater than 300 mg/g (HCC)    Metabolic acidosis    ASCVD (arteriosclerotic cardiovascular disease)    Acute on chronic respiratory failure with hypoxia (HCC)    Acute GI bleeding    Melena         Treatment:  Hemodilaysis 2:1  Priority: Routine  Location: Acute Room    Diabetic: No  NPO: No  Isolation Precautions: None     Consent for Treatment Verified: Yes  Blood Consent Verified: Not Applicable     Safety Verified: Identify (I), Consent (C), Equipment (E), HepB Status (B), Orders Complete (O), Access Verified (A), and Timeliness (T)  Time out performed prior to access at 0900 hours. Report Received from Primary RN at 0715 hours. Primary RN (First Initial, Last Name, Title): Aline Estes RN  Incapacitated Nurse Education Completed: Not Applicable     HBsAg ONLY:  Date Drawn: January 2, 2023       Results: Negative  HBsAb:  Date Drawn:  January 2, 2023       Results: Susceptible <10    Order  Dialysis Bath  K+ (Potassium): 3  Ca+ (Calcium): 2.5  Na+ (Sodium): 138  HCO3 (Bicarb): 35  Bicarbonate Concentrate Lot No.: [id number]  Acid Concentrate Lot No.: [id number]     Na+ Modeling: Not Applicable  Dialyzer: [id number]  Dialysate Temperature (C):  35  Blood Flow Rate (BFR):  300   Dialysate Flow Rate (DFR):   600        Access to be Utilized   Access: AVG  Location: Upper Extremity  Side: Right   Needle gauge:  15  + Bruit/Thrill: Yes    First Use X-ray Verified: Not Applicable  OK to use line order: Not Applicable    Site Assessment:  Signs and Symptoms of Infection/Inflammation: None  If yes: Not Applicable  Dressing: Dry and Intact  Site Prep: Medical Aseptic Technique  Dressing Changed this Treatment:  n/a  If yes, by whom:  n/a  Date of Last Dressing Change: Not Applicable  Antimicrobial Patch in place?:  n/a  Red Alcohol Caps in place?:  n/a  Gauze Dressing?:  n/a  Non Dialysis Use?: No  Comment:    Flows: Good, Patent  If access problem, who was notified:     Pre and Post-Assessment  Patient Vitals for the past 8 hrs:   Level of Consciousness Oriented X Heart Rhythm Respiratory Quality/Effort O2 Device Bilateral Breath Sounds Skin Color Skin Condition/Temp Abdomen Inspection Bowel Sounds (All Quadrants) Edema RUE Edema   01/02/23 0819 0 -- -- -- Nasal cannula -- Pale -- -- -- Right upper extremity +2   01/02/23 0845 0 4 Regular Unlabored Nasal cannula Clear;Diminished Pale Dry; Warm Rounded Active Right upper extremity +2   01/02/23 1145 0 4 Regular Unlabored Nasal cannula Clear;Diminished Pale Dry; Warm Rounded Active Right upper extremity +2     Labs  Recent Labs     12/31/22  2325 01/01/23  1158 01/01/23  2302 01/02/23  0245 01/02/23  1137   WBC 11.2* 9.5  --  11.8*  --    HGB 6.3* 6.3* 8.3* 8.7* 9.1*   HCT 19.6* 19.3* 25.1* 26.8*  --     178  --  221  --                                                                   Recent Labs     01/01/23  0735 01/01/23  0908 01/02/23  0245   * 134* 138   K 7.0* 4.0 3.7    100 105   CO2 22 24 22   BUN 51* 54* 51*   CREATININE 6.9* 7.2* 7.1*   GLUCOSE 110* 111* 93     IV Drips and Rate/Dose   sodium chloride      sodium chloride      dextrose      sodium chloride        Safety - Before each treatment:   Dialysis Machine No.: [id number]   Machine Number: [id number]  Dialyzer Lot No.: [id number]  RO Machine Log Sheet Completed: Yes  Machine Alarm Self Test: Completed; Passed (01/02/23 0900)     Air Foam Detector: Tested, Proper Function  Extracorporeal Circuit Tested for Integrity: Yes  Machine Conductivity: 13.9  Manual Conductivity: 13.8     Bicarbonate Concentrate Lot No.: [id number]  Acid Concentrate Lot No.: [id number]  Manual Ph: 7.4  Bleach Test (Neg): Yes  Bath Temperature: 95 °F (35 °C)  Tubing Lot#: [id number]  Conductivity Meter Serial #: [id number]  All Connections Secure?: Yes  Venous Parameters Set?: Yes  Arterial Parameters Set?: Yes  Saline Line Double Clamped?: Yes  Air Foam Detector Engaged?: Yes  Machine Functioning Alarm Free?  Yes  Prime Given: 200ml    Chlorine Testing - Before each treatment and every 4 hours:   Treatment  Time On: 0903  Time Off: 1136  Treatment Goal: 1-2L in 2.5  Weight: 239 lb 8 oz (108.6 kg) (01/02/23 1136)  1st check: less than 0.1 ppm at: 0700 hours  2nd check: less than 0.1 ppm at: 1020 hours  3rd check: Not Applicable  (if greater than 0.1 ppm, then check every 30 minutes from secondary)    Access Flows and Pressures  Patient Vitals for the past 8 hrs:   Blood Flow Rate (ml/min) Ultrafiltration Rate (ml/hr) Arterial Pressure (mmHg) Venous Pressure (mmHg) TMP DFR Comments Access Visible   01/02/23 0903 200 ml/min 200 ml/hr -10 mmHg 100 30 600 txt started Yes   01/02/23 0915 200 ml/min 200 ml/hr -10 mmHg 100 40 600 watching tv Yes   01/02/23 0930 250 ml/min 200 ml/hr -20 mmHg 130 40 600 lines secure Yes   01/02/23 0945 250 ml/min 200 ml/hr -20 mmHg 130 40 600 no distress Yes   01/02/23 1000 300 ml/min 200 ml/hr -40 mmHg 160 30 600 denies needs Yes   01/02/23 1015 300 ml/min 200 ml/hr -50 mmHg 160 40 600 no complaints Yes   01/02/23 1030 300 ml/min 200 ml/hr -50 mmHg 160 40 600 new acid Yes   01/02/23 1045 300 ml/min 200 ml/hr -40 mmHg 170 30 600 new bicarb Yes   01/02/23 1100 300 ml/min 200 ml/hr -40 mmHg 180 30 600 alert Yes   01/02/23 1115 300 ml/min 200 ml/hr -30 mmHg 170 30 600 no change Yes   01/02/23 1136 300 ml/min -- -- -- -- -- txt completed --     Vital Signs  Patient Vitals for the past 8 hrs:   BP Temp Pulse Resp SpO2 Weight Weight Method Percent Weight Change   01/02/23 0819 (!) 168/74 97.6 °F (36.4 °C) 78 21 96 % -- -- --   01/02/23 0845 (!) 168/74 -- 75 16 -- -- -- --   01/02/23 0903 (!) 174/63 98.6 °F (37 °C) 76 13 -- 239 lb 14.4 oz (108.8 kg) Bed scale -0.04   01/02/23 0915 (!) 161/75 -- 77 14 -- -- -- --   01/02/23 0930 (!) 162/77 -- 75 15 -- -- -- --   01/02/23 0945 (!) 156/70 -- 80 18 -- -- -- --   01/02/23 1000 (!) 165/72 -- 83 19 -- -- -- --   01/02/23 1015 (!) 158/84 -- 83 17 -- -- -- --   01/02/23 1030 (!) 163/82 -- 81 18 -- -- -- --   01/02/23 1045 (!) 164/79 -- 92 18 -- -- -- --   01/02/23 1100 (!) 166/75 -- 83 19 -- -- -- --   01/02/23 1115 (!) 151/83 -- 82 18 -- -- -- --   01/02/23 1136 (!) 173/82 99.2 °F (37.3 °C) 93 16 -- 239 lb 8 oz (108.6 kg) Bed scale -0.17   01/02/23 1145 (!) 173/82 -- 94 18 -- -- -- --     Post-Dialysis  Arterial Catheter Locking Solution: Not Applicable  Venous Catheter Locking Solution: Not Applicable  Post-Treatment Procedures: Blood returned, Catheter Capped, clamped with Saline x2 ports  Machine Disinfection Process: Exterior Machine Disinfection  Rinseback Volume (ml): 300 ml  Blood Volume Processed (Liters): 43.4 l/min  Dialyzer Clearance: Lightly streaked  Duration of Treatment (minutes): 180 minutes     Hemodialysis Intake (ml): 500 ml  Hemodialysis Output (ml): 500 ml     Tolerated Treatment: Good  Patient Response to Treatment: good  Physician Notified: No       Provider Notification        Handoff complete and report given to Primary RN at 1136 hours.   Primary RN (First Initial, Last Name, Title):  Bandar Raymundo RN     Education  Person Educated: Patient   Knowledge Base: Substantial  Barriers to Learning?: None  Preferred method of Learning: Oral  Topic(s): Access Care, Signs and Symptoms of Infection, Fluid Management, and Medications   Teaching Tools: Explanation   Response to Education: Verbalized Understanding     Electronically signed by Charlie Ahumada LPN on 1/8/6482 at 37:69 PM

## 2023-01-03 VITALS
DIASTOLIC BLOOD PRESSURE: 66 MMHG | RESPIRATION RATE: 12 BRPM | HEART RATE: 81 BPM | HEIGHT: 68 IN | SYSTOLIC BLOOD PRESSURE: 149 MMHG | BODY MASS INDEX: 36.92 KG/M2 | WEIGHT: 243.6 LBS | OXYGEN SATURATION: 97 % | TEMPERATURE: 98 F

## 2023-01-03 LAB
ANION GAP SERPL CALCULATED.3IONS-SCNC: 13 MMOL/L (ref 4–16)
BASOPHILS ABSOLUTE: 0.1 K/CU MM
BASOPHILS RELATIVE PERCENT: 0.5 % (ref 0–1)
BUN BLDV-MCNC: 32 MG/DL (ref 6–23)
CALCIUM SERPL-MCNC: 8.7 MG/DL (ref 8.3–10.6)
CHLORIDE BLD-SCNC: 103 MMOL/L (ref 99–110)
CO2: 25 MMOL/L (ref 21–32)
CREAT SERPL-MCNC: 5.5 MG/DL (ref 0.9–1.3)
DIFFERENTIAL TYPE: ABNORMAL
EOSINOPHILS ABSOLUTE: 0.2 K/CU MM
EOSINOPHILS RELATIVE PERCENT: 1.7 % (ref 0–3)
GFR SERPL CREATININE-BSD FRML MDRD: 11 ML/MIN/1.73M2
GLUCOSE BLD-MCNC: 95 MG/DL (ref 70–99)
HCT VFR BLD CALC: 29 % (ref 42–52)
HEMOGLOBIN: 9.3 GM/DL (ref 13.5–18)
IMMATURE NEUTROPHIL %: 0.4 % (ref 0–0.43)
LYMPHOCYTES ABSOLUTE: 1.4 K/CU MM
LYMPHOCYTES RELATIVE PERCENT: 13.8 % (ref 24–44)
MAGNESIUM: 2.4 MG/DL (ref 1.8–2.4)
MCH RBC QN AUTO: 31.4 PG (ref 27–31)
MCHC RBC AUTO-ENTMCNC: 32.1 % (ref 32–36)
MCV RBC AUTO: 98 FL (ref 78–100)
MONOCYTES ABSOLUTE: 1 K/CU MM
MONOCYTES RELATIVE PERCENT: 9.8 % (ref 0–4)
NUCLEATED RBC %: 0 %
PDW BLD-RTO: 18.8 % (ref 11.7–14.9)
PLATELET # BLD: 218 K/CU MM (ref 140–440)
PMV BLD AUTO: 9.5 FL (ref 7.5–11.1)
POTASSIUM SERPL-SCNC: 3.5 MMOL/L (ref 3.5–5.1)
RBC # BLD: 2.96 M/CU MM (ref 4.6–6.2)
SEGMENTED NEUTROPHILS ABSOLUTE COUNT: 7.5 K/CU MM
SEGMENTED NEUTROPHILS RELATIVE PERCENT: 73.8 % (ref 36–66)
SODIUM BLD-SCNC: 141 MMOL/L (ref 135–145)
TOTAL IMMATURE NEUTOROPHIL: 0.04 K/CU MM
TOTAL NUCLEATED RBC: 0 K/CU MM
WBC # BLD: 10.2 K/CU MM (ref 4–10.5)

## 2023-01-03 PROCEDURE — 80048 BASIC METABOLIC PNL TOTAL CA: CPT

## 2023-01-03 PROCEDURE — 85025 COMPLETE CBC W/AUTO DIFF WBC: CPT

## 2023-01-03 PROCEDURE — 6360000002 HC RX W HCPCS: Performed by: SPECIALIST

## 2023-01-03 PROCEDURE — 2700000000 HC OXYGEN THERAPY PER DAY

## 2023-01-03 PROCEDURE — 2580000003 HC RX 258: Performed by: SPECIALIST

## 2023-01-03 PROCEDURE — C9113 INJ PANTOPRAZOLE SODIUM, VIA: HCPCS | Performed by: SPECIALIST

## 2023-01-03 PROCEDURE — 6370000000 HC RX 637 (ALT 250 FOR IP): Performed by: STUDENT IN AN ORGANIZED HEALTH CARE EDUCATION/TRAINING PROGRAM

## 2023-01-03 PROCEDURE — 94761 N-INVAS EAR/PLS OXIMETRY MLT: CPT

## 2023-01-03 PROCEDURE — 36415 COLL VENOUS BLD VENIPUNCTURE: CPT

## 2023-01-03 PROCEDURE — 83735 ASSAY OF MAGNESIUM: CPT

## 2023-01-03 PROCEDURE — 2580000003 HC RX 258: Performed by: STUDENT IN AN ORGANIZED HEALTH CARE EDUCATION/TRAINING PROGRAM

## 2023-01-03 RX ORDER — PANTOPRAZOLE SODIUM 40 MG/1
40 TABLET, DELAYED RELEASE ORAL
Qty: 60 TABLET | Refills: 0 | Status: SHIPPED | OUTPATIENT
Start: 2023-01-03

## 2023-01-03 RX ADMIN — Medication 40 MG: at 09:47

## 2023-01-03 RX ADMIN — SODIUM CHLORIDE, PRESERVATIVE FREE 10 ML: 5 INJECTION INTRAVENOUS at 09:48

## 2023-01-03 RX ADMIN — CITALOPRAM HYDROBROMIDE 20 MG: 20 TABLET ORAL at 09:47

## 2023-01-03 RX ADMIN — FOLIC ACID 1 MG: 1 TABLET ORAL at 09:47

## 2023-01-03 RX ADMIN — TAMSULOSIN HYDROCHLORIDE 0.8 MG: 0.4 CAPSULE ORAL at 09:47

## 2023-01-03 RX ADMIN — ATORVASTATIN CALCIUM 40 MG: 40 TABLET, FILM COATED ORAL at 09:47

## 2023-01-03 ASSESSMENT — PAIN SCALES - GENERAL: PAINLEVEL_OUTOF10: 0

## 2023-01-03 NOTE — PROGRESS NOTES
Discharge instructions reviewed with patient, all questions answered and patient verbalized understanding. Both peripheral IVs removed. Patient taken out via wheelchair by gracia Escobar and is stopping at the pharmacy to  his script.

## 2023-01-03 NOTE — DISCHARGE SUMMARY
Discharge Summary    Name:  Craig Finn /Age/Sex: 1955  (79 y.o. male)   MRN & CSN:  [id number] & [id number] Admission Date/Time: 2022 10:50 PM   Attending:  No att. providers found Discharging Physician: Shaylee Whipple MD       Admission Diagnosis:   Acute on chronic anemia  Melena   ESRD on HD  NSTEMI type II  Hypokalemia  BPH  Anxiety  History of left AKA      Discharge Diagnosis:  Acute on chronic anemia  Melena   ESRD on HD  NSTEMI type II  Hypokalemia  BPH  Anxiety  History of left AKA      Discharge Exam  Physical Exam    General: NAD  Eyes: EOMI  ENT: neck supple  Cardiovascular: Regular rate. Respiratory: Clear to auscultation  Gastrointestinal: Soft, non tender  Genitourinary: no suprapubic tenderness  Musculoskeletal: No edema on right , left AKA   Skin: warm, dry  Neuro: Alert. Psych: Mood appropriate. Hospital Course:   Craig Finn is a 79 y.o.  male  who presents with Melena    Patient is a 71-year-old male with a PMHx of ESERD on HD MWF, ? COPD, GERD and BPH who presented to the ED after having multiple episodes of melena over past few days. No abdominal pain. No bleeding during HD. No NSAIDs. EGD on  with mild gastritis and duodenitis. Colonoscopy on 10/2 with diffuse diverticulosis coli and mixed hemorrhoids but no bleeding. On presentation, patient was hemodynamically stable. Lab work revealed hemoglobin of 6.3 g/DL. Previously 11.7 g/DL on 2022. He received 2 units of PRBC. Was seen by GI. He underwent EGD on 2023 which showed mild gastritis with some recent bleeding and submucosal lipoma in the second portion of the duodenum. During inpatient stay, his hemoglobin was stable after transfusion and prior to discharge his hemoglobin was 9.3 G/DL. Pinky Angles He was continued on p.o. Protonix. He will follow-up with GI outpatient for small bowel evaluation as well as colonoscopy in 3 months.       The patient expressed appropriate understanding of and agreement with the discharge recommendations, medications, and plan. Consults this admission:  IP CONSULT TO GI  IP CONSULT TO NEPHROLOGY      Discharge Instruction:   Handoff to PCP:     Follow up appointments: GI, PCP  Primary care physician:     Diet:  cardiac diet   Activity: activity as tolerated  Disposition: Discharged to:   [x]Home, [][facility], []SNF, []Acute Rehab, []Hospice   Condition on discharge: Stable    Discharge Medications:        Medication List        CHANGE how you take these medications      pantoprazole 40 MG tablet  Commonly known as: PROTONIX  Take 1 tablet by mouth every morning (before breakfast)  What changed: when to take this            CONTINUE taking these medications      albuterol sulfate  (90 Base) MCG/ACT inhaler  Commonly known as: PROVENTIL;VENTOLIN;PROAIR     ALPRAZolam 0.25 MG tablet  Commonly known as: XANAX     atorvastatin 40 MG tablet  Commonly known as: LIPITOR     azelastine 0.1 % nasal spray  Commonly known as: ASTELIN     b complex-C-folic acid 1 MG capsule     citalopram 20 MG tablet  Commonly known as: CELEXA  Take 1 tablet by mouth daily. folic acid 1 MG tablet  Commonly known as: FOLVITE  Take 1 tablet by mouth daily.      HYDROcodone-acetaminophen 5-325 MG per tablet  Commonly known as: NORCO     ipratropium-albuterol 0.5-2.5 (3) MG/3ML Soln nebulizer solution  Commonly known as: DUONEB     tamsulosin 0.4 MG capsule  Commonly known as: FLOMAX     TORSEMIDE PO     TRELEGY ELLIPTA IN     Vitamin D3 50 MCG (2000 UT) Caps            STOP taking these medications      magnesium oxide 400 (241.3 Mg) MG Tabs tablet  Commonly known as: MAG-OX               Where to Get Your Medications        These medications were sent to [address] [place] [address], [address] [facility]       Phone: [phone]   pantoprazole 40 MG tablet         Objective Findings at Discharge: BMP/CBC  Recent Labs     01/01/23  1158 01/01/23  2302 01/02/23  0245 01/02/23  1346 01/03/23  0628   NA  --   --  138 139 141   K  --   --  3.7 3.7 3.5   CL  --   --  105 100 103   CO2  --   --  22 28 25   BUN  --   --  51* 25* 32*   CREATININE  --   --  7.1* 4.5* 5.5*   WBC 9.5  --  11.8*  --  10.2   HCT 19.3* 25.1* 26.8*  --  29.0*     --  221  --  218       IMAGING:      Additional Information: Patient seen and examined day of discharge.  For more information regarding patient's care please contact [facility] records [phone]    Discharge Time of 35 minutes    Electronically signed by Cami Baum MD on 1/3/2023 at 2:56 PM

## 2023-01-03 NOTE — CARE COORDINATION
.CM has reviewed pt's chart for needs. CM screening shows that pt has PCP, insurance and is independent PTA. If any d/c needs arise please contact CARL[facility].   TE

## 2023-01-03 NOTE — PROGRESS NOTES
Nephrology Progress Note  1/3/2023 12:42 PM  Subjective:     Interval History: Ori Lama is a 67 y.o. male doing better overall today seen this morning      Data:   Scheduled Meds:   pantoprazole (PROTONIX) 40 mg injection  40 mg IntraVENous Daily    atorvastatin  40 mg Oral Daily    citalopram  20 mg Oral Daily    folic acid  1 mg Oral Daily    tamsulosin  0.8 mg Oral Daily    sodium chloride flush  5-40 mL IntraVENous 2 times per day     Continuous Infusions:   sodium chloride      dextrose      sodium chloride           CBC   Recent Labs     01/01/23  1158 01/01/23  2302 01/02/23  0245 01/02/23  1137 01/03/23  0628   WBC 9.5  --  11.8*  --  10.2   HGB 6.3* 8.3* 8.7* 9.1* 9.3*   HCT 19.3* 25.1* 26.8*  --  29.0*     --  221  --  218      BMP   Recent Labs     01/02/23  0245 01/02/23  1346 01/03/23  0628    139 141   K 3.7 3.7 3.5    100 103   CO2 22 28 25   BUN 51* 25* 32*   CREATININE 7.1* 4.5* 5.5*     Hepatic:   Recent Labs     12/31/22  2325 01/02/23  0245   AST 17 15   ALT 18 15   BILITOT 0.2 0.7   ALKPHOS 72 62     Troponin: No results for input(s): TROPONINI in the last 72 hours.  BNP: No results for input(s): BNP in the last 72 hours.  Lipids: No results for input(s): CHOL, HDL in the last 72 hours.    Invalid input(s): LDLCALCU  ABGs:   Lab Results   Component Value Date/Time    PO2ART 58.6 09/27/2022 12:52 AM    ILP1LNB 48.7 09/27/2022 12:52 AM     INR:   Recent Labs     01/02/23  0245   INR 0.77     Renal Labs  Albumin:    Lab Results   Component Value Date/Time    LABALBU 3.7 01/02/2023 02:45 AM     Calcium:    Lab Results   Component Value Date/Time    CALCIUM 8.7 01/03/2023 06:28 AM     Phosphorus:    Lab Results   Component Value Date/Time    PHOS 3.1 10/01/2022 12:13 PM     U/A:    Lab Results   Component Value Date/Time    NITRU NEGATIVE 09/28/2022 08:45 PM    NITRU Negative 12/06/2019 01:33 PM    COLORU YELLOW 09/28/2022 08:45 PM    PHUR 6.0 12/06/2019 01:33 PM    WBCUA  <1 09/28/2022 08:45 PM    RBCUA <1 09/28/2022 08:45 PM    MUCUS RARE 05/11/2021 01:10 PM    TRICHOMONAS NONE SEEN 05/17/2021 09:00 AM    YEAST FEW 01/09/2015 02:00 PM    BACTERIA NEGATIVE 09/28/2022 08:45 PM    CLARITYU CLEAR 09/28/2022 08:45 PM    SPECGRAV 1.010 09/28/2022 08:45 PM    UROBILINOGEN 0.2 09/28/2022 08:45 PM    BILIRUBINUR NEGATIVE 09/28/2022 08:45 PM    BLOODU NEGATIVE 09/28/2022 08:45 PM    GLUCOSEU Negative 12/06/2019 01:33 PM    KETUA NEGATIVE 09/28/2022 08:45 PM     ABG:    Lab Results   Component Value Date/Time    EOU5KAC 48.7 09/27/2022 12:52 AM    PO2ART 58.6 09/27/2022 12:52 AM    XTZ0SDA 18.7 09/27/2022 12:52 AM     HgBA1c:    Lab Results   Component Value Date/Time    LABA1C 5.4 12/19/2014 03:40 AM     Microalbumen/Creatinine ratio:  No components found for: RUCREAT  TSH:  No results found for: TSH  IRON:    Lab Results   Component Value Date/Time    IRON 66 08/25/2014 10:54 AM     Iron Saturation:  No components found for: PERCENTFE  TIBC:    Lab Results   Component Value Date/Time    TIBC 293 08/25/2014 10:54 AM     FERRITIN:    Lab Results   Component Value Date/Time    FERRITIN 292 08/25/2014 10:54 AM     RPR:  No results found for: RPR  SRINI:    Lab Results   Component Value Date/Time    SRINI Negative 01/02/2018 10:34 AM     24 Hour Urine for Creatinine Clearance:  No components found for: CREAT4, UHRS10, UTV10      Objective:   I/O: 01/02 0701 - 01/03 0700  In: 700 [P.O.:150; I.V.:50]  Out: 1090 [Urine:590]  I/O last 3 completed shifts: In: 0 [P.O.:150; I.V.:50]  Out: 1890 [IFWYC:1933]  I/O this shift: In: 56 [P.O.:480; I.V.:10]  Out: -   Vitals: BP (!) 149/66   Pulse 81   Temp 98 °F (36.7 °C) (Oral)   Resp 12   Ht 5' 8\" (1.727 m)   Wt 243 lb 9.6 oz (110.5 kg)   SpO2 97%   BMI 37.04 kg/m²  {  General appearance: awake weak  HEENT: Head: Normal, normocephalic, atraumatic.   Neck: supple, symmetrical, trachea midline  Lungs: diminished breath sounds bilaterally  Heart: S1, S2 normal  Abdomen: abnormal findings:  soft nt  Extremities: edema trace  Neurologic: Mental status: alertness: alert        Assessment and Plan:      IMP:  #1 end-stage renal disease on dialysis Monday Wednesday Friday  #2 anemia with GI bleed  #3 low potassium  #4 hypertension  #5 hyponatremia    Plan     #1 do next dialysis on Wednesday  #2 hemoglobin low stable follow-up GI outpatient  #3 monitor electrolytes with dialysis  #4 blood pressure stable  Okay for discharge today           Meghana Rivera MD, MD

## 2023-01-03 NOTE — OP NOTE
[address], [address] [place] [address]                                OPERATIVE REPORT    PATIENT NAME: Qiana Diamond                     :        1955  MED REC NO:   [id number]                          ROOM:       1133  ACCOUNT NO:   [de-identified]                           ADMIT DATE: 2022  PROVIDER:     Anup Pimentel MD    DATE OF PROCEDURE:  2023    EGD report. The patient is in room 3112. SURGEON:  Anup Pimentel MD.    CHIEF COMPLAINT:  History of anemia with melenic stools; rule out upper  GI bleeding. PREMEDICATION:  Please refer to the anesthesiologist's notes. PROCEDURE:  The patient was placed in the left lateral decubitus  position and the video plus gastroscope was introduced in back of throat  and was advanced into the esophagus. ESOPHAGUS:  The mucosa of the esophagus was unremarkable. There was no  evidence of hyperemia, erosion, ulcer, stricture, Sanchez's esophagus,  or mass lesions. STOMACH:  The fundus, cardia, body, antrum, lesser curvature, greater  curvature, and the pyloric regions of the stomach were examined. The  mucosa of the stomach was slightly hyperemic with some recent bleeding,  but no erosion or ulcers were seen. The gastroscope was retroflexed and  the fundus and cardia was carefully examined and no mass lesions were  seen. DUODENUM:  The first and second portions of the duodenum were examined  and a submucosal lipoma was noted in the second portion of the duodenum,  but no erosion or ulcers were seen. No fresh blood was noted in the  upper GI tract. POSTOPERATIVE DIAGNOSES:  1.  Mild gastritis with some recent bleeding. 2.  Submucosal lipoma noted in the second portion of the duodenum; no  biopsies obtained. RECOMMENDATIONS:  1. We will continue present management with Protonix.   2.  Monitor the patient's H and H and observe for any signs of recurrent  bleeding. 3.  The patient has been instructed to call the office to have an  outpatient capsule endoscopy scheduled also as a part of workup of his  anemia and GI bleeding. 4.  The patient also has been instructed to have a followup colonoscopy  in three months' time. The patient tolerated the procedure well. There were no postop  complications. Blood loss during the procedure was nil.         Maria Isabel Salamanca MD    D: 01/02/2023 16:30:41       T: 01/02/2023 16:32:55     [id number]  Job#: [id number]     Doc#: [id number]    CC:

## 2023-01-05 LAB — HEPATITIS B CORE TOTAL ANTIBODY: NEGATIVE

## 2023-01-17 NOTE — PROGRESS NOTES
IR Procedure at [facility]:  Spoke with patient and he will arrive at 1130 at [facility] on 1/18/2023 for his procedure at 1330. Also went over below instructions. NPO at [address]     2. Follow your directions as prescribed by the doctor for your procedure and medications. 3.   Consult your provider as to when to stop blood thinner  4. Do not take any pain medication within 6 hours of your procedure  5. Do not drink any alcoholic beverages or use any street drugs 24 hours before procedure. 6.   Please wear simple, loose fitting clothing to the hospital.  Do not bring valuables (money,             credit cards, checkbooks, etc.)     7. If you  have a Living Will and Durable Power of  for Healthcare, please bring in a copy. 8.   Please bring picture ID,  insurance card, paperwork from the doctors office            (H & P, Consent,  & card for implantable devices). 9.   Report to the information desk on the ground floor. 10. Take a shower the night before or morning of your procedure, do not apply any lotion, oil or powder. 11. If you are going to be sedated for the procedure, you will need a responsible adult to drive you home.

## 2023-01-18 ENCOUNTER — HOSPITAL ENCOUNTER (INPATIENT)
Age: 68
LOS: 1 days | Discharge: HOME | DRG: 314 | End: 2023-01-19
Attending: STUDENT IN AN ORGANIZED HEALTH CARE EDUCATION/TRAINING PROGRAM | Admitting: STUDENT IN AN ORGANIZED HEALTH CARE EDUCATION/TRAINING PROGRAM
Payer: MEDICARE

## 2023-01-18 ENCOUNTER — HOSPITAL ENCOUNTER (OUTPATIENT)
Dept: INTERVENTIONAL RADIOLOGY/VASCULAR | Age: 68
Discharge: HOME | DRG: 314 | End: 2023-01-18
Payer: MEDICARE

## 2023-01-18 VITALS
TEMPERATURE: 97.3 F | BODY MASS INDEX: 36.42 KG/M2 | WEIGHT: 240.3 LBS | HEIGHT: 68 IN | RESPIRATION RATE: 12 BRPM | SYSTOLIC BLOOD PRESSURE: 154 MMHG | HEART RATE: 87 BPM | OXYGEN SATURATION: 97 % | DIASTOLIC BLOOD PRESSURE: 97 MMHG

## 2023-01-18 DIAGNOSIS — N18.6 ESRD (END STAGE RENAL DISEASE) (HCC): ICD-10-CM

## 2023-01-18 PROBLEM — T82.868A: Status: ACTIVE | Noted: 2023-01-18

## 2023-01-18 PROBLEM — T82.590A AV GRAFT MALFUNCTION, INITIAL ENCOUNTER (HCC): Status: ACTIVE | Noted: 2023-01-18

## 2023-01-18 LAB
APTT: 43.1 SECONDS (ref 25.1–37.1)
APTT: 45.6 SECONDS (ref 25.1–37.1)
HCT VFR BLD CALC: 33.9 % (ref 42–52)
HEMOGLOBIN: 10.8 GM/DL (ref 13.5–18)
INR BLD: 0.79 INDEX
MCH RBC QN AUTO: 30.9 PG (ref 27–31)
MCHC RBC AUTO-ENTMCNC: 31.9 % (ref 32–36)
MCV RBC AUTO: 96.9 FL (ref 78–100)
PDW BLD-RTO: 15.3 % (ref 11.7–14.9)
PLATELET # BLD: 208 K/CU MM (ref 140–440)
PMV BLD AUTO: 9.9 FL (ref 7.5–11.1)
PROTHROMBIN TIME: 10.2 SECONDS (ref 11.7–14.5)
RBC # BLD: 3.5 M/CU MM (ref 4.6–6.2)
WBC # BLD: 9.3 K/CU MM (ref 4–10.5)

## 2023-01-18 PROCEDURE — 85027 COMPLETE CBC AUTOMATED: CPT

## 2023-01-18 PROCEDURE — 6360000004 HC RX CONTRAST MEDICATION: Performed by: RADIOLOGY

## 2023-01-18 PROCEDURE — 2580000003 HC RX 258: Performed by: PHYSICIAN ASSISTANT

## 2023-01-18 PROCEDURE — 85730 THROMBOPLASTIN TIME PARTIAL: CPT

## 2023-01-18 PROCEDURE — 2500000003 HC RX 250 WO HCPCS: Performed by: STUDENT IN AN ORGANIZED HEALTH CARE EDUCATION/TRAINING PROGRAM

## 2023-01-18 PROCEDURE — B51W1ZZ FLUOROSCOPY OF DIALYSIS SHUNT/FISTULA USING LOW OSMOLAR CONTRAST: ICD-10-PCS | Performed by: RADIOLOGY

## 2023-01-18 PROCEDURE — 6370000000 HC RX 637 (ALT 250 FOR IP): Performed by: PHYSICIAN ASSISTANT

## 2023-01-18 PROCEDURE — 3E04317 INTRODUCTION OF OTHER THROMBOLYTIC INTO CENTRAL VEIN, PERCUTANEOUS APPROACH: ICD-10-PCS | Performed by: STUDENT IN AN ORGANIZED HEALTH CARE EDUCATION/TRAINING PROGRAM

## 2023-01-18 PROCEDURE — 2580000003 HC RX 258

## 2023-01-18 PROCEDURE — 36905 THRMBC/NFS DIALYSIS CIRCUIT: CPT

## 2023-01-18 PROCEDURE — 2000000000 HC ICU R&B

## 2023-01-18 PROCEDURE — 85610 PROTHROMBIN TIME: CPT

## 2023-01-18 PROCEDURE — 2500000003 HC RX 250 WO HCPCS

## 2023-01-18 PROCEDURE — 2580000003 HC RX 258: Performed by: RADIOLOGY

## 2023-01-18 PROCEDURE — 6360000002 HC RX W HCPCS: Performed by: RADIOLOGY

## 2023-01-18 PROCEDURE — C1725 CATH, TRANSLUMIN NON-LASER: HCPCS

## 2023-01-18 PROCEDURE — 6370000000 HC RX 637 (ALT 250 FOR IP): Performed by: STUDENT IN AN ORGANIZED HEALTH CARE EDUCATION/TRAINING PROGRAM

## 2023-01-18 PROCEDURE — 6360000002 HC RX W HCPCS

## 2023-01-18 PROCEDURE — 6360000004 HC RX CONTRAST MEDICATION

## 2023-01-18 RX ORDER — TORSEMIDE 20 MG/1
100 TABLET ORAL 2 TIMES DAILY
Status: DISCONTINUED | OUTPATIENT
Start: 2023-01-18 | End: 2023-01-19 | Stop reason: HOSPADM

## 2023-01-18 RX ORDER — BUDESONIDE AND FORMOTEROL FUMARATE DIHYDRATE 160; 4.5 UG/1; UG/1
2 AEROSOL RESPIRATORY (INHALATION) 2 TIMES DAILY PRN
Status: DISCONTINUED | OUTPATIENT
Start: 2023-01-18 | End: 2023-01-19 | Stop reason: HOSPADM

## 2023-01-18 RX ORDER — HYDROCODONE BITARTRATE AND ACETAMINOPHEN 5; 325 MG/1; MG/1
1 TABLET ORAL EVERY 6 HOURS PRN
Status: DISCONTINUED | OUTPATIENT
Start: 2023-01-18 | End: 2023-01-19 | Stop reason: HOSPADM

## 2023-01-18 RX ORDER — HEPARIN SODIUM 1000 [USP'U]/ML
INJECTION, SOLUTION INTRAVENOUS; SUBCUTANEOUS
Status: COMPLETED | OUTPATIENT
Start: 2023-01-18 | End: 2023-01-18

## 2023-01-18 RX ORDER — HEPARIN SODIUM 1000 [USP'U]/ML
40 INJECTION, SOLUTION INTRAVENOUS; SUBCUTANEOUS PRN
Status: DISCONTINUED | OUTPATIENT
Start: 2023-01-18 | End: 2023-01-19 | Stop reason: HOSPADM

## 2023-01-18 RX ORDER — IPRATROPIUM BROMIDE AND ALBUTEROL SULFATE 2.5; .5 MG/3ML; MG/3ML
1 SOLUTION RESPIRATORY (INHALATION) EVERY 4 HOURS PRN
Status: DISCONTINUED | OUTPATIENT
Start: 2023-01-18 | End: 2023-01-19 | Stop reason: HOSPADM

## 2023-01-18 RX ORDER — SODIUM CHLORIDE 0.9 % (FLUSH) 0.9 %
5-40 SYRINGE (ML) INJECTION PRN
Status: DISCONTINUED | OUTPATIENT
Start: 2023-01-18 | End: 2023-01-19 | Stop reason: HOSPADM

## 2023-01-18 RX ORDER — HEPARIN SODIUM 1000 [USP'U]/ML
80 INJECTION, SOLUTION INTRAVENOUS; SUBCUTANEOUS PRN
Status: DISCONTINUED | OUTPATIENT
Start: 2023-01-18 | End: 2023-01-19 | Stop reason: HOSPADM

## 2023-01-18 RX ORDER — ACETAMINOPHEN 325 MG/1
650 TABLET ORAL EVERY 6 HOURS PRN
Status: DISCONTINUED | OUTPATIENT
Start: 2023-01-18 | End: 2023-01-19 | Stop reason: HOSPADM

## 2023-01-18 RX ORDER — IPRATROPIUM BROMIDE AND ALBUTEROL SULFATE 2.5; .5 MG/3ML; MG/3ML
1 SOLUTION RESPIRATORY (INHALATION) EVERY 4 HOURS
Status: DISCONTINUED | OUTPATIENT
Start: 2023-01-18 | End: 2023-01-18

## 2023-01-18 RX ORDER — BUDESONIDE AND FORMOTEROL FUMARATE DIHYDRATE 160; 4.5 UG/1; UG/1
2 AEROSOL RESPIRATORY (INHALATION) 2 TIMES DAILY
Status: DISCONTINUED | OUTPATIENT
Start: 2023-01-18 | End: 2023-01-18

## 2023-01-18 RX ORDER — ONDANSETRON 4 MG/1
4 TABLET, ORALLY DISINTEGRATING ORAL EVERY 8 HOURS PRN
Status: DISCONTINUED | OUTPATIENT
Start: 2023-01-18 | End: 2023-01-19 | Stop reason: HOSPADM

## 2023-01-18 RX ORDER — ATORVASTATIN CALCIUM 40 MG/1
40 TABLET, FILM COATED ORAL DAILY
Status: DISCONTINUED | OUTPATIENT
Start: 2023-01-18 | End: 2023-01-19 | Stop reason: HOSPADM

## 2023-01-18 RX ORDER — SODIUM CHLORIDE 9 MG/ML
INJECTION, SOLUTION INTRAVENOUS PRN
Status: DISCONTINUED | OUTPATIENT
Start: 2023-01-18 | End: 2023-01-19 | Stop reason: HOSPADM

## 2023-01-18 RX ORDER — SODIUM CHLORIDE 0.9 % (FLUSH) 0.9 %
5-40 SYRINGE (ML) INJECTION EVERY 12 HOURS SCHEDULED
Status: DISCONTINUED | OUTPATIENT
Start: 2023-01-18 | End: 2023-01-19 | Stop reason: HOSPADM

## 2023-01-18 RX ORDER — HEPARIN SODIUM 10000 [USP'U]/100ML
20 INJECTION, SOLUTION INTRAVENOUS CONTINUOUS
Status: DISCONTINUED | OUTPATIENT
Start: 2023-01-18 | End: 2023-01-19 | Stop reason: HOSPADM

## 2023-01-18 RX ORDER — POLYETHYLENE GLYCOL 3350 17 G/17G
17 POWDER, FOR SOLUTION ORAL DAILY PRN
Status: DISCONTINUED | OUTPATIENT
Start: 2023-01-18 | End: 2023-01-19 | Stop reason: HOSPADM

## 2023-01-18 RX ORDER — SODIUM CHLORIDE 9 MG/ML
INJECTION, SOLUTION INTRAVENOUS CONTINUOUS PRN
Status: COMPLETED | OUTPATIENT
Start: 2023-01-18 | End: 2023-01-18

## 2023-01-18 RX ORDER — HEPARIN SODIUM 1000 [USP'U]/ML
80 INJECTION, SOLUTION INTRAVENOUS; SUBCUTANEOUS PRN
Status: DISCONTINUED | OUTPATIENT
Start: 2023-01-18 | End: 2023-01-18

## 2023-01-18 RX ORDER — TAMSULOSIN HYDROCHLORIDE 0.4 MG/1
0.8 CAPSULE ORAL DAILY
Status: DISCONTINUED | OUTPATIENT
Start: 2023-01-18 | End: 2023-01-19 | Stop reason: HOSPADM

## 2023-01-18 RX ORDER — PANTOPRAZOLE SODIUM 40 MG/1
40 TABLET, DELAYED RELEASE ORAL
Status: DISCONTINUED | OUTPATIENT
Start: 2023-01-19 | End: 2023-01-19 | Stop reason: HOSPADM

## 2023-01-18 RX ORDER — ACETAMINOPHEN 650 MG/1
650 SUPPOSITORY RECTAL EVERY 6 HOURS PRN
Status: DISCONTINUED | OUTPATIENT
Start: 2023-01-18 | End: 2023-01-19 | Stop reason: HOSPADM

## 2023-01-18 RX ORDER — CITALOPRAM 20 MG/1
20 TABLET ORAL DAILY
Status: DISCONTINUED | OUTPATIENT
Start: 2023-01-18 | End: 2023-01-19 | Stop reason: HOSPADM

## 2023-01-18 RX ORDER — ALPRAZOLAM 0.25 MG/1
0.25 TABLET ORAL 2 TIMES DAILY PRN
Status: DISCONTINUED | OUTPATIENT
Start: 2023-01-18 | End: 2023-01-19 | Stop reason: HOSPADM

## 2023-01-18 RX ORDER — ALBUTEROL SULFATE 90 UG/1
2 AEROSOL, METERED RESPIRATORY (INHALATION) EVERY 6 HOURS PRN
Status: DISCONTINUED | OUTPATIENT
Start: 2023-01-18 | End: 2023-01-19 | Stop reason: HOSPADM

## 2023-01-18 RX ORDER — ONDANSETRON 2 MG/ML
4 INJECTION INTRAMUSCULAR; INTRAVENOUS EVERY 6 HOURS PRN
Status: DISCONTINUED | OUTPATIENT
Start: 2023-01-18 | End: 2023-01-19 | Stop reason: HOSPADM

## 2023-01-18 RX ADMIN — ALTEPLASE 1 MG: 2.2 INJECTION, POWDER, LYOPHILIZED, FOR SOLUTION INTRAVENOUS at 14:50

## 2023-01-18 RX ADMIN — TORSEMIDE 100 MG: 20 TABLET ORAL at 22:14

## 2023-01-18 RX ADMIN — ALTEPLASE 1 MG: 2.2 INJECTION, POWDER, LYOPHILIZED, FOR SOLUTION INTRAVENOUS at 14:14

## 2023-01-18 RX ADMIN — HEPARIN SODIUM 18 UNITS/KG/HR: 10000 INJECTION, SOLUTION INTRAVENOUS at 18:16

## 2023-01-18 RX ADMIN — TAMSULOSIN HYDROCHLORIDE 0.8 MG: 0.4 CAPSULE ORAL at 18:07

## 2023-01-18 RX ADMIN — SODIUM CHLORIDE 40 ML/HR: 9 INJECTION, SOLUTION INTRAVENOUS at 15:17

## 2023-01-18 RX ADMIN — IOPAMIDOL 30 ML: 755 INJECTION, SOLUTION INTRAVENOUS at 15:00

## 2023-01-18 RX ADMIN — HYDROCODONE BITARTRATE AND ACETAMINOPHEN 1 TABLET: 5; 325 TABLET ORAL at 19:08

## 2023-01-18 RX ADMIN — CITALOPRAM HYDROBROMIDE 20 MG: 20 TABLET ORAL at 18:08

## 2023-01-18 RX ADMIN — SODIUM CHLORIDE, PRESERVATIVE FREE 10 ML: 5 INJECTION INTRAVENOUS at 22:15

## 2023-01-18 RX ADMIN — ATORVASTATIN CALCIUM 40 MG: 40 TABLET, FILM COATED ORAL at 18:08

## 2023-01-18 RX ADMIN — HEPARIN SODIUM 1000 UNITS: 1000 INJECTION, SOLUTION INTRAVENOUS; SUBCUTANEOUS at 15:02

## 2023-01-18 RX ADMIN — ALPRAZOLAM 0.25 MG: 0.25 TABLET ORAL at 19:08

## 2023-01-18 RX ADMIN — HEPARIN SODIUM 1000 UNITS: 1000 INJECTION, SOLUTION INTRAVENOUS; SUBCUTANEOUS at 14:46

## 2023-01-18 ASSESSMENT — PAIN SCALES - GENERAL
PAINLEVEL_OUTOF10: 0
PAINLEVEL_OUTOF10: 6
PAINLEVEL_OUTOF10: 0
PAINLEVEL_OUTOF10: 8

## 2023-01-18 ASSESSMENT — PAIN DESCRIPTION - LOCATION
LOCATION: ARM
LOCATION: ARM

## 2023-01-18 ASSESSMENT — PAIN DESCRIPTION - ORIENTATION
ORIENTATION: RIGHT
ORIENTATION: RIGHT

## 2023-01-18 ASSESSMENT — PAIN DESCRIPTION - ONSET: ONSET: ON-GOING

## 2023-01-18 ASSESSMENT — PAIN - FUNCTIONAL ASSESSMENT
PAIN_FUNCTIONAL_ASSESSMENT: 0-10
PAIN_FUNCTIONAL_ASSESSMENT: PREVENTS OR INTERFERES WITH MANY ACTIVE NOT PASSIVE ACTIVITIES

## 2023-01-18 ASSESSMENT — PAIN DESCRIPTION - DESCRIPTORS
DESCRIPTORS: ACHING
DESCRIPTORS: ACHING

## 2023-01-18 ASSESSMENT — PAIN DESCRIPTION - FREQUENCY: FREQUENCY: CONTINUOUS

## 2023-01-18 NOTE — CONSULTS
Patient:   Kiran Henson    Date:  23  :  1955, 79 y.o. Nephrologist: Lio Jennings MD  Provider: Shivam Morales DO    Reason for Consult: End-stage kidney disease on maintenance dialysis    Consult requested by : Dr Alanna Ferro    Chief Complaint:   AV graft malfunction    HISTORY OF PRESENT ILLNESS/Brief hospital course  AND  brief background history    Mr. Kiran Landaverde is a 29-year-old male, who initially came from intervention of his clotted AV graft. During his dialysis Monday he got infiltrated followed by clot-an emergent graftogram has been arranged-which was done today by Dr. Dagmar Greene was able to do thrombectomy but still has some residual fresh clot-Mr. Kiran Landaverde did get some TPN loading dose of heparin-he will need heparin infusion overnight-and further intervention tomorrow-followed by central vein angioplasty-we will see if whether we can salvage the AV graft-and we will try to dialyze him tomorrow    He has end-stage kidney disease mainly from atherosclerotic cardiovascular disease-as well as atherosclerotic renal disease-although he was on incremental dialysis as an outpatient, but he switched to home hemodialysis-still getting training at [facility] had several intervention of this AV graft-also has an appointment with surgeon in [place] for another access creation in the near future-he still has good residual kidney function left-his last partial dialysis was on Monday    PMH :   1.  End-stage kidney disease on home dialysis still in training   1. HTN-  Dx 10 +  2. COPD  3.  PAD-  S/p LLE BKA - he had graft from aorta to LE artery- got infected and on ch antimicrobial   4.  atrophic Rt kidney -   5. ? COPD           habit :     Quit 2014  Has 36 PYH  occasional   etoh  No illicit drugs      Soc hx :   for 8 yrs- third marriage  1 child  Retired-       PSH :  arterial  Graft - bypass     Perforated stomach ulcer   Lt BKA  1987  Priyank Finley bladder   Right upper extremity AV graft-with recent thrombectomy  Left IJ tunnel catheter that had been removed recently     [facility] :      Cancer          REVIEW OF SYSTEMS:     All pertinent ROS neg except   No shortness of breath or chest pain    Medication :     Reviewed, see in epic    There were no vitals taken for this visit.     PHYSICAL EXAM:  General appearance: Alert, awake and oriented  HEENT: Mild conjunctival pallor  Neck: Supple  Heart: Regular rate and rhythm, well-healed incision from previous sternotomy  LUNGS: No crackles  Abdomen: Soft nontender  Extremities: No leg edema  Left upper extremity brachiocephalic AV graft with good thrill-he has sheath in-we will start heparin infusion    LABS:  Reviewed, see in epic            IMPRESSION:  End-stage kidney disease with incremental dialysis-on training for home dialysis-acceptable fluid status  AV graft malfunction-clotted and has central vein stenosis  Underlying atherosclerotic cardiovascular disease and hypertension-other chronic condition    PLAN:    Heparin infusion-maintenance dose-into the AV graft directly-plan for further intervention tomorrow-followed by cannulation and dialysis-if all successful-he will be discharged-meanwhile may resume home medication-renal diet-n.p.o. after midnight-follow clinically-CBC differential and CMP tomorrow morning

## 2023-01-18 NOTE — PROGRESS NOTES
Genesis sent to Dr. Chrissie Aguilar, covering nephrologist, asking for clarification on heparin order. Current order states infusion should be started at 20units/kg/hr but the initial infusion dose instructions state 18 units/kg/hr.  Awaiting response

## 2023-01-18 NOTE — PROGRESS NOTES
Pt arrived to ICU room 2111 accompanied by two IR Rns. Pt A&Ox4, on 4L NC and SpO2 in 90s. No c/o pain, numbness or tingling. Pt has 6fr and 4fr sheath in RUE fistula with NS running at 40cc/hr. Pt arrived with belongings (phone, wallet, prosthetic leg, shirt and pants) in bag. Pt to be NPO at midnight in preparation for IR procedure on 1/18. Sheaths and Tegaderm dressing are to remain in place. Arm board in place to discourage pt from bending RUE. Audible bruit and faint thrill above fistula.

## 2023-01-18 NOTE — H&P
History and Physical      Name:  Lin Robbins /Age/Sex: 1955  (79 y.o. male)   MRN & CSN:  [id number] & [id number] Admission Date/Time: 2023  4:19 PM   Location:  -A PCP: Kathryn Santana [address] Day: 1     Attending physician: Dr. Mickey Mancia and Plan:   Lin Robbins is a 79 y.o.  male  with significant PMH of ESRD on HD, anemia of chronic disease, anxiety, Hx of left AKA, COPD, GERD, who presents with thrombosis of HD AV fistula    Assessment and plan:     Right hemodialysis AV fistula thrombosis s/p fistulogram on  by IR  S/p tPA and heparin , sheath remains in fistula  Pt to go to IR tomorrow for re assessment of fistula   On heparin gtt  Monitor every 6 hours APTT  Transfer to ICU to remain on heparin drip overnight and for closer monitoring    Chronic conditions  History of anemia with melena s/p EGD on 2023- e/o mild gastritis, submucosal lipoma noted in the second portion of the duodenum-continue Protonix daily  ESRD-on hemodialysis M/W/F  Last dialysis was on Monday-he had 1 and half hour dialysis done when they noticed, fistula being clotted, scheduled for elective fistulogram today  BPH-continue Flomax  Depression and anxiety-continue Celexa and as needed Xanax  Left-sided AKA        BMI: 36.54 kg/m2 Life style modifications    Tobacco dependence: 43 pack years history of smoking, quit in     Disposition: ICU patient was accepted for continue evaluation and treatment and improvement of clinical symptoms. Discussed assessment and treatment plan with the admitting and supervising physician Dr. Jose Skinner who agrees with the plan. Diet ADULT DIET;  Regular   DVT Prophylaxis [] Lovenox, [x]  Heparin, [] SCDs, [] Warfarin  [] NOAC     GI Prophylaxis [x] PPI,  [] H2 Blocker,  [] Carafate,  [] Diet/Tube Feeds   Code Status Full Code     History of Present Illness:     Chief Complaint: <principal problem not specified>  Lin Robbins is a 79 y.o.  male, with past medical history significant for ESRD-on HD Monday Wednesday Friday, COPD, GERD, BPH, depression anxiety, left-sided AKA, who was at dialysis center on Monday and had a 1 and half hour of dialysis done when they noticed the AV fistula got clotted. He was scheduled for elective fistulogram today with IR, heparinized and given tPA. He was transferred to ICU for overnight monitoring, plan to go back to IR in the morning for reassessment of the fistula. On Examination,the patient is a&o x4. Review of Systems   Patient has been seen and examined after arrival to ICU  Denies chest pain/tightness, SOB, nausea/vomiting/abdominal pain, no focal neurological deficits no bladder or bowel dysfunction    Objective:   No intake or output data in the 24 hours ending 01/18/23 1810   Vitals: There were no vitals filed for this visit. Physical Exam:   GEN- Awake male, NAD, sitting up in bed, appears to be stated age. EYES- Pupils are equally round. No scleral erythema, discharge, or conjunctivitis. HENT- Mucous membranes are moist. Oral pharynx without exudates, no evidence of thrush. NECK- Supple, no apparent thyromegaly or masses. RESP-Clear to auscultation, no wheezes, rales or rhonchi. Symmetric chest movement while on room air. CARDIO/VASC-  S1/S2 auscultated. Regular rate and rhythm, No JVD. Peripheral pulses equal bilaterally and palpable. GI- Abdomen is soft, no tenderness, masses, or guarding. Bowel sounds present. MSK- No gross joint deformities. EXTREMITIES- mild bruit heard over Rt fistula site, dressing in place, left AKA  SKIN- Normal coloration, warm, dry. NEURO-Cranial nerves appear grossly intact, normal speech, no lateralizing weakness. PSYCH-Awake, alert, oriented x 4.     Past Medical History:      Past Medical History:   Diagnosis Date    Acid reflux     ARF (acute renal failure) ([facility] [address]     with admission 12/18/2015- consult done with Dr Myriam Rodriguez    Atrophy of left kidney     Back pain     \"Lower Back\"    Chronic bronchitis ([facility] [address]     Chronic kidney disease     COPD (chronic obstructive pulmonary disease) ([facility] [address]     NO PULMONOLOGIST AT THIS TIME    Emphysema/COPD ([facility] [address]     NO PULMONOLOGIST AT THIS TIME    H. pylori infection Dx 1990's    Hemodialysis patient ([facility] [address]     Hiatal hernia     History of blood transfusion 1987    NO REACTION TO BLOOD TRANSFUSION RECEIVED    History of respiratory failure     following surgery 12/18/2015- pt developed resp failure- placed on ventilator-unable to wean of vent- had trachesotomy tube placed 1/3/2015- off vent 1/7/2015( discharged 1/13/2015)\"per wife went to [place] after discharg and had to be sent to [facility] after had bleeding around the trach- they said the trach was to big- put back on ventilator for 24 hrs- then there for about a week then sent to ARU 2/15    Kashia (hard of hearing)     Bilateral Ears    Hx of blood clots     HX OTHER MEDICAL     Primary Care Physician Is Dr. Shaylee Roberts Right Renal Artery    HX OTHER MEDICAL     \"Dr. Alessandra Flynn One Of My Kidneys Is Dead\"    Omari Raleigh     \"See Dr. Candace Hernandez For Blood Being Too Thick\"(per old chart pt dx with polycythemia in 1990's and has had several phlebotomies in the past(pc)( per wife Dr Bernadette Lynch now says he does not really have polycythemia just from the COPD\"    Omari Raleigh 6*/25/2015    \"has drainage , clear drainage, since he was in ARU in Feb 2015, under left shoulder, arm pit area\"    Hyperlipidemia     Hypertension     Lung nodule \"MRI, PET SCAN\"    \"Right Lung\"    Pneumonia \"Last Episode Early 2000's\"    \"At Least Twice\"( with admission 12/18/2015)    Shortness of breath on exertion     Teeth missing     Upper And Lower    Wears glasses      PSHX:  has a past surgical history that includes Leg amputation below knee (Left, 6-7-87); [place] tooth extraction; other surgical history (Mid 1980's);  Endoscopy, colon, diagnostic (\"Once\"); Tonsillectomy (); tracheostomy (N/A, 1/3/15); Gastrostomy tube placement (Left, 1/3/15); vascular surgery; Colonoscopy (); Cholecystectomy, laparoscopic (2017); Upper gastrointestinal endoscopy (N/A, 2022); Colonoscopy (N/A, 10/1/2022); and Upper gastrointestinal endoscopy (N/A, 2023). Allergies: Allergies   Allergen Reactions    Penicillins Swelling    Lorazepam      Other reaction(s): Other - comment required  hallucination       FAM HX: family history includes Arthritis in his mother; Cancer in his father and mother; Heart Disease in his father; High Blood Pressure in his mother; High Cholesterol in his mother; Migraines in his sister; Other in his sister; Vision Loss in his mother. Soc HX:   Social History     Socioeconomic History    Marital status:    Tobacco Use    Smoking status: Former     Packs/day: 2.00     Years: 43.00     Pack years: 86.00     Types: Cigarettes     Start date: 1971     Quit date: 12/15/2014     Years since quittin.0    Smokeless tobacco: Never   Vaping Use    Vaping Use: Never used   Substance and Sexual Activity    Alcohol use: Yes     Alcohol/week: 0.0 standard drinks     Comment: \"Occ\"\"average one time per week    Drug use: Yes     Types: Marijuana Dauna Other)     Comment: \"Occ\"\"last used 3 rd week 2015\"    Sexual activity: Yes     Partners: Female       Social and family history reviewed with patient/family. Medications:     Home Medication   Prior to Admission medications    Medication Sig Start Date End Date Taking?  Authorizing Provider   pantoprazole (PROTONIX) 40 MG tablet Take 1 tablet by mouth every morning (before breakfast) 1/3/23   Neri Syed MD   Fluticasone-Umeclidin-Vilant (Cherelle Newer ELLIPTA IN) Inhale into the lungs    Historical Provider, MD   TORSEMIDE PO Take 1 tablet by mouth 2 times daily    Historical Provider, MD   atorvastatin (LIPITOR) 40 MG tablet TAKE ONE TABLET BY MOUTH DAILY 22 Historical Provider, MD   b complex-MANISHA-folic acid (NEPHROCAPS) 1 MG capsule Take 1 mg by mouth daily 8/11/21   Historical Provider, MD   tamsulosin (FLOMAX) 0.4 MG capsule Take 0.8 mg by mouth daily    Historical Provider, MD   HYDROcodone-acetaminophen (NORCO) 5-325 MG per tablet Take 1 tablet by mouth every 6 hours as needed for Pain. Historical Provider, MD   azelastine (ASTELIN) 0.1 % nasal spray 1 spray by Nasal route 2 times daily Use in each nostril as directed    Historical Provider, MD   ipratropium-albuterol (DUONEB) 0.5-2.5 (3) MG/3ML SOLN nebulizer solution Inhale 1 vial into the lungs every 4 hours    Historical Provider, MD   Cholecalciferol (VITAMIN D3) 2000 UNITS CAPS Take 1 capsule by mouth daily    Historical Provider, MD   albuterol (PROVENTIL HFA;VENTOLIN HFA) 108 (90 BASE) MCG/ACT inhaler Inhale 2 puffs into the lungs every 6 hours as needed for Wheezing    Historical Provider, MD   ALPRAZolam (XANAX) 0.25 MG tablet Take 0.25 mg by mouth 2 times daily as needed for Sleep. Historical Provider, MD   citalopram (CELEXA) 20 MG tablet Take 1 tablet by mouth daily. 2/20/15   James Diggs MD   folic acid (FOLVITE) 1 MG tablet Take 1 tablet by mouth daily. 2/20/15   James Diggs MD     Medications:    Infusions:   PRN Meds:     No results for input(s): WBC, HGB, HCT, PLT in the last 72 hours. No results for input(s): NA, K, CL, CO2, PHOS, BUN, CREATININE, CA in the last 72 hours. No results for input(s): AST, ALT, ALB, BILIDIR, BILITOT, ALKPHOS in the last 72 hours. Recent Labs     01/18/23  1305   INR 0.79     No results for input(s): CKTOTAL, CKMB, CKMBINDEX, TROPONINT in the last 72 hours. Imaging reviewed  XR CHEST PORTABLE    Result Date: 12/31/2022  EXAMINATION: ONE XRAY VIEW OF THE CHEST 12/31/2022 11:30 pm COMPARISON: 09/26/2022 HISTORY: ORDERING SYSTEM PROVIDED HISTORY: chest pain FINDINGS: Stable cardiomediastinal silhouette. There is left basilar consolidation. Emphysematous changes are redemonstrated. No pneumothorax. Left basilar consolidation.           Electronically signed by Gracie Duff PA-C on 1/18/2023 at 6:06 PM

## 2023-01-18 NOTE — OR NURSING
PROCEDURE PERFORMED: fistulagram with intervention     PRIMARY INDICATION FOR PROCEDURE: fistula clotted    INFORMED CONSENT:  Obtained prior to procedure. Consent placed in chart. COLLETTE SCORE PRE PROCEDURE:   10     PT TRANSPORTED FROM:    [facility]        TO THE IR ROOM:    Large room                     ARRIVED TO ROOM: 1345    ASSESSMENT:A&Ox4 on R/A with VSS. Pt c/o SOB when placed supine on table. Pt requested oxygen although SPO2 within normal limits. Applied 4L NC , pt stated his SOB was improved with oxygen. BARRIER PRECAUTIONS & STERILE TECHNIQUE:               Pt transferred to the table and positionedsupine for comfort. Warm blankets given. Pt placed on Cardiac Monitor. Pt prepped and draped in a sterile fashion with chlorhexadine. PAIN/LOCAL ANESTHESIA/SEDATION MANAGEMENT:           Local: Lidocaine 1% given by  @1404        INTRAOPERATIVE:           ACCESS TIME: @1406- micropuncture           US/FLUORO: Us images x 4  FT:12.7  K:82          WIRE USED: glidewire 180cm 0.035 stiff x2          SHEATH USED: 4fr ,6fr short , [id number]           CATHETER USED: 4fr Kumpe 65 cm @1408    @1421 -6.0x40mm  inflated   @1449 - 4fr x40cm Marco inflated             FINAL IMAGE TAKEN TO CONFIRM PLACEMENT OF: fistula             CONTRAST/CC: ISO GRACE 370 -30cc used    STERILE DRESSINGS: sheaths secured with steri strips, bio patch,tegaderm x2, arm board,wrapped in kerlix     SPECIMENS: none     EBL:      >5cc    FOLLOW- UP X-RAY: none         COMPLICATIONS/ OUTCOME: PT remained A&Ox4 with stable VSS. Patient requested to leave 2L NC on to be transferred to the ICU. Pt being admitted to ICU with 5fr and 6fr sheaths in fistula for continued de-clotting .     COLLETTE SCORE POST PROCEDURE:   9 on 2L NC per pt request        LEFT THE ROOM:  ICU bed  [email or web address] - pts belongings (phone, wallet, prosthetic leg, jacket,shirt,pants sent with pt REPORT CALLED TO:  ICU RN charge [employer] @8131

## 2023-01-19 ENCOUNTER — APPOINTMENT (OUTPATIENT)
Dept: INTERVENTIONAL RADIOLOGY/VASCULAR | Age: 68
DRG: 314 | End: 2023-01-19
Attending: STUDENT IN AN ORGANIZED HEALTH CARE EDUCATION/TRAINING PROGRAM
Payer: MEDICARE

## 2023-01-19 VITALS
TEMPERATURE: 97.6 F | OXYGEN SATURATION: 99 % | RESPIRATION RATE: 17 BRPM | BODY MASS INDEX: 35.28 KG/M2 | DIASTOLIC BLOOD PRESSURE: 78 MMHG | WEIGHT: 232.81 LBS | HEART RATE: 85 BPM | SYSTOLIC BLOOD PRESSURE: 135 MMHG | HEIGHT: 68 IN

## 2023-01-19 LAB
ALBUMIN SERPL-MCNC: 3.9 GM/DL (ref 3.4–5)
ALP BLD-CCNC: 67 IU/L (ref 40–128)
ALT SERPL-CCNC: 15 U/L (ref 10–40)
ANION GAP SERPL CALCULATED.3IONS-SCNC: 13 MMOL/L (ref 4–16)
APTT: 127.3 SECONDS (ref 25.1–37.1)
APTT: 182.9 SECONDS (ref 25.1–37.1)
AST SERPL-CCNC: 15 IU/L (ref 15–37)
BASOPHILS ABSOLUTE: 0.1 K/CU MM
BASOPHILS RELATIVE PERCENT: 0.8 % (ref 0–1)
BILIRUB SERPL-MCNC: 0.2 MG/DL (ref 0–1)
BUN BLDV-MCNC: 54 MG/DL (ref 6–23)
CALCIUM SERPL-MCNC: 8.9 MG/DL (ref 8.3–10.6)
CHLORIDE BLD-SCNC: 105 MMOL/L (ref 99–110)
CO2: 22 MMOL/L (ref 21–32)
CREAT SERPL-MCNC: 7.5 MG/DL (ref 0.9–1.3)
DIFFERENTIAL TYPE: ABNORMAL
EOSINOPHILS ABSOLUTE: 0.2 K/CU MM
EOSINOPHILS RELATIVE PERCENT: 1.8 % (ref 0–3)
GFR SERPL CREATININE-BSD FRML MDRD: 7 ML/MIN/1.73M2
GLUCOSE BLD-MCNC: 98 MG/DL (ref 70–99)
HCT VFR BLD CALC: 32.6 % (ref 42–52)
HEMOGLOBIN: 10.4 GM/DL (ref 13.5–18)
IMMATURE NEUTROPHIL %: 0.3 % (ref 0–0.43)
LYMPHOCYTES ABSOLUTE: 1.9 K/CU MM
LYMPHOCYTES RELATIVE PERCENT: 17.9 % (ref 24–44)
MCH RBC QN AUTO: 30.7 PG (ref 27–31)
MCHC RBC AUTO-ENTMCNC: 31.9 % (ref 32–36)
MCV RBC AUTO: 96.2 FL (ref 78–100)
MONOCYTES ABSOLUTE: 0.7 K/CU MM
MONOCYTES RELATIVE PERCENT: 6.8 % (ref 0–4)
NUCLEATED RBC %: 0 %
PDW BLD-RTO: 15.1 % (ref 11.7–14.9)
PLATELET # BLD: 184 K/CU MM (ref 140–440)
PMV BLD AUTO: 9.4 FL (ref 7.5–11.1)
POTASSIUM SERPL-SCNC: 4.1 MMOL/L (ref 3.5–5.1)
RBC # BLD: 3.39 M/CU MM (ref 4.6–6.2)
REASON FOR REJECTION: NORMAL
REJECTED TEST: NORMAL
SEGMENTED NEUTROPHILS ABSOLUTE COUNT: 7.6 K/CU MM
SEGMENTED NEUTROPHILS RELATIVE PERCENT: 72.4 % (ref 36–66)
SODIUM BLD-SCNC: 140 MMOL/L (ref 135–145)
TOTAL IMMATURE NEUTOROPHIL: 0.03 K/CU MM
TOTAL NUCLEATED RBC: 0 K/CU MM
TOTAL PROTEIN: 5.9 GM/DL (ref 6.4–8.2)
WBC # BLD: 10.6 K/CU MM (ref 4–10.5)

## 2023-01-19 PROCEDURE — 85730 THROMBOPLASTIN TIME PARTIAL: CPT

## 2023-01-19 PROCEDURE — 6360000004 HC RX CONTRAST MEDICATION: Performed by: STUDENT IN AN ORGANIZED HEALTH CARE EDUCATION/TRAINING PROGRAM

## 2023-01-19 PROCEDURE — 5A1D70Z PERFORMANCE OF URINARY FILTRATION, INTERMITTENT, LESS THAN 6 HOURS PER DAY: ICD-10-PCS | Performed by: INTERNAL MEDICINE

## 2023-01-19 PROCEDURE — 80053 COMPREHEN METABOLIC PANEL: CPT

## 2023-01-19 PROCEDURE — 36901 INTRO CATH DIALYSIS CIRCUIT: CPT

## 2023-01-19 PROCEDURE — 6370000000 HC RX 637 (ALT 250 FOR IP): Performed by: STUDENT IN AN ORGANIZED HEALTH CARE EDUCATION/TRAINING PROGRAM

## 2023-01-19 PROCEDURE — 94761 N-INVAS EAR/PLS OXIMETRY MLT: CPT

## 2023-01-19 PROCEDURE — 2580000003 HC RX 258: Performed by: PHYSICIAN ASSISTANT

## 2023-01-19 PROCEDURE — 2500000003 HC RX 250 WO HCPCS

## 2023-01-19 PROCEDURE — 90935 HEMODIALYSIS ONE EVALUATION: CPT

## 2023-01-19 PROCEDURE — 94640 AIRWAY INHALATION TREATMENT: CPT

## 2023-01-19 PROCEDURE — B51W1ZZ FLUOROSCOPY OF DIALYSIS SHUNT/FISTULA USING LOW OSMOLAR CONTRAST: ICD-10-PCS | Performed by: RADIOLOGY

## 2023-01-19 PROCEDURE — 2500000003 HC RX 250 WO HCPCS: Performed by: STUDENT IN AN ORGANIZED HEALTH CARE EDUCATION/TRAINING PROGRAM

## 2023-01-19 PROCEDURE — 6360000004 HC RX CONTRAST MEDICATION

## 2023-01-19 PROCEDURE — 6360000002 HC RX W HCPCS

## 2023-01-19 PROCEDURE — 85025 COMPLETE CBC W/AUTO DIFF WBC: CPT

## 2023-01-19 PROCEDURE — 2580000003 HC RX 258

## 2023-01-19 PROCEDURE — 2709999900 IR FISTULAGRAM

## 2023-01-19 RX ADMIN — HEPARIN SODIUM 14 UNITS/KG/HR: 10000 INJECTION, SOLUTION INTRAVENOUS at 11:47

## 2023-01-19 RX ADMIN — IOPAMIDOL 20 ML: 755 INJECTION, SOLUTION INTRAVENOUS at 15:17

## 2023-01-19 RX ADMIN — SODIUM CHLORIDE, PRESERVATIVE FREE 10 ML: 5 INJECTION INTRAVENOUS at 08:21

## 2023-01-19 RX ADMIN — PANTOPRAZOLE SODIUM 40 MG: 40 TABLET, DELAYED RELEASE ORAL at 07:10

## 2023-01-19 RX ADMIN — TIOTROPIUM BROMIDE INHALATION SPRAY 2 PUFF: 3.12 SPRAY, METERED RESPIRATORY (INHALATION) at 07:47

## 2023-01-19 ASSESSMENT — ENCOUNTER SYMPTOMS
CONSTIPATION: 0
SINUS PAIN: 0
WHEEZING: 0
TROUBLE SWALLOWING: 0
VOICE CHANGE: 0
COUGH: 0
ABDOMINAL PAIN: 0
CHEST TIGHTNESS: 0
SHORTNESS OF BREATH: 0
VOMITING: 0
SORE THROAT: 0
BACK PAIN: 0
COLOR CHANGE: 0
SINUS PRESSURE: 0
NAUSEA: 0
DIARRHEA: 0

## 2023-01-19 NOTE — PROGRESS NOTES
Nephrology Progress Note  1/19/2023 8:34 AM        Subjective:   Admit Date: 1/18/2023  PCP: Luz Marina Zavala DO    Interval History: No major event overnight  Received heparin infusion into the AV graft    Diet: N.p.o. for more intervention today    ROS: No shortness of breath, PND or orthopnea  No overt bleeding  Is making good urine  Acceptable blood pressure has no fever    Data:     Current meds:    sodium chloride flush  5-40 mL IntraVENous 2 times per day    atorvastatin  40 mg Oral Daily    citalopram  20 mg Oral Daily    pantoprazole  40 mg Oral QAM AC    tamsulosin  0.8 mg Oral Daily    torsemide  100 mg Oral BID    tiotropium  2 puff Inhalation Daily      sodium chloride      heparin (PORCINE) Infusion 14 Units/kg/hr (01/19/23 0414)         I/O last 3 completed shifts:   In: 480 [P.O.:480]  Out: -     CBC:   Recent Labs     01/18/23  1755 01/19/23  0500   WBC 9.3 10.6*   HGB 10.8* 10.4*    184          Recent Labs     01/19/23  0500      K 4.1      CO2 22   BUN 54*   CREATININE 7.5*   GLUCOSE 98       Lab Results   Component Value Date    CALCIUM 8.9 01/19/2023    PHOS 3.1 10/01/2022       Objective:     Vitals: BP (!) 163/86   Pulse 67   Temp 97.6 °F (36.4 °C) (Oral)   Resp 18   Ht 5' 7.99\" (1.727 m)   Wt 232 lb 12.9 oz (105.6 kg)   SpO2 94%   BMI 35.41 kg/m² ,    General appearance: Alert, awake and oriented  HEENT: Mild conjunctival pallor, no scleral icterus  Neck: Supple  Lungs: No gross crackles on auscultation  Heart: Seemed regular rate and rhythm-well-healed scar left upper chest from previous surgery  Abdomen: Soft, nontender  Extremities: Left below-knee amputation, right leg no edema  Right upper extremity brachiocephalic AV graft with good thrill on palpation good bruit auscultation-he has direct heparin infusion into the graft      Problem List :         Impression :     End-stage kidney disease with incremental dialysis and good residual kidney function-doing very well without any evidence of pulmonary edema  AV graft malfunction with thrombosis and central vein stenosis-we will go for intervention again  Atherosclerotic cardiovascular disease, hypertension, anemia and secondary hyperparathyroidism    Recommendation/Plan  :     He will go to interventional radiology today-hopefully his clot has been resolved-if so he will undergo central vein angioplasty-and we will try 2 hours of dialysis after intervention-if everything goes okay-he could be discharged-I will discuss with Dr. Lorna Tavares any role of anticoagulation-and or antiplatelet therapy-close outpatient follow-up      Hiren Garcia MD MD

## 2023-01-19 NOTE — PROGRESS NOTES
V2.0  [facility] Hospitalist Progress Note      Name:  Angel Woodall /Age/Sex: 1955  (79 y.o. male)   MRN & CSN:  [id number] & [id number] Encounter Date/Time: 2023 2:14 PM EST    Location:  -A PCP: [address] [place] [address] Day: 2    Assessment and Plan:   Angel Woodall is a 79 y.o. male with pmh of ESRD on HD, anemia of chronic disease, anxiety, Hx of left AKA, COPD, GERD, who presents with thrombosis of HD AV fistula      Plan:    Right HD AV fistula thrombosis  On Monday prior to presentation patient had an hour and a half of dialysis when the fistula was noted to be clotted. Patient with fistulogram by IR on . He had his sheath remain after receiving tPA and heparin. Continue heparin drip  To return to IR today for assessment of fistula. ESRD on HD  Hemodialysis on Monday was a Friday  Patient to have HD today if fistula deemed okay    Hyperlipidemia  Continue Lipitor 40mg daily     BPH  Continue Flomax    History of anemia  In setting of chronic kidney disease  Monitor hemoglobin    GERD  Has history of mild gastritis seen on EGD's prior. Continue Protonix daily    Depression anxiety  Continue home Celexa  Patient on as needed Xanax. Recommend patient be tapered off this medication if possible after discharge    Diet ADULT DIET; Regular   DVT Prophylaxis [] Lovenox, []  Heparin, [] SCDs, [] Ambulation,    [] Eliquis, [] Xarelto  [] Coumadin [] other   Code Status Full Code   Disposition From: home  Expected Disposition: home  Estimated Date of Discharge: 1-2 days  Patient requires continued admission due to fistula thrombosis   Surrogate Decision Maker/ POA      Subjective:     Chief Complaint: No chief complaint on file. Patient has no acute complaints today. He is to go back to IR to see if his fistula will be salvageable. If possible, will have HD after that procedure.     Review of Systems:    Review of Systems   Constitutional:  Negative for activity change, appetite change, chills, fatigue and fever. HENT:  Negative for congestion, hearing loss, sinus pressure, sinus pain, sore throat, trouble swallowing and voice change. Eyes:  Negative for visual disturbance. Respiratory:  Negative for cough, chest tightness, shortness of breath and wheezing. Cardiovascular:  Negative for chest pain, palpitations and leg swelling. Gastrointestinal:  Negative for abdominal pain, constipation, diarrhea, nausea and vomiting. Endocrine: Negative for cold intolerance, heat intolerance and polyuria. Genitourinary:  Negative for dysuria. Musculoskeletal:  Negative for arthralgias, back pain and gait problem. Skin:  Negative for color change. Neurological:  Negative for dizziness, weakness and headaches. Psychiatric/Behavioral:  Negative for agitation and confusion. The patient is not nervous/anxious. Objective: Intake/Output Summary (Last 24 hours) at 1/19/2023 1414  Last data filed at 1/18/2023 1730  Gross per 24 hour   Intake 480 ml   Output --   Net 480 ml        Vitals:   Vitals:    01/19/23 0800   BP: (!) 163/86   Pulse: 67   Resp: 18   Temp: 97.6 °F (36.4 °C)   SpO2: 94%       Physical Exam:     Physical Exam  Constitutional:       General: He is not in acute distress. Appearance: Normal appearance. HENT:      Head: Normocephalic and atraumatic. Nose: Nose normal.      Mouth/Throat:      Mouth: Mucous membranes are moist.      Pharynx: Oropharynx is clear. Eyes:      Extraocular Movements: Extraocular movements intact. Conjunctiva/sclera: Conjunctivae normal.      Pupils: Pupils are equal, round, and reactive to light. Cardiovascular:      Rate and Rhythm: Normal rate and regular rhythm. Pulses: Normal pulses. Heart sounds: Normal heart sounds. Pulmonary:      Effort: Pulmonary effort is normal.   Abdominal:      General: Abdomen is flat. Bowel sounds are normal.      Palpations: Abdomen is soft.    Musculoskeletal: General: Normal range of motion. Cervical back: Normal range of motion and neck supple. Skin:     General: Skin is warm and dry. Neurological:      General: No focal deficit present. Mental Status: He is alert and oriented to person, place, and time. Mental status is at baseline. Psychiatric:         Mood and Affect: Mood normal.         Behavior: Behavior normal.         Thought Content:  Thought content normal.       Medications:   Medications:    sodium chloride flush  5-40 mL IntraVENous 2 times per day    atorvastatin  40 mg Oral Daily    citalopram  20 mg Oral Daily    pantoprazole  40 mg Oral QAM AC    tamsulosin  0.8 mg Oral Daily    torsemide  100 mg Oral BID    tiotropium  2 puff Inhalation Daily      Infusions:    sodium chloride      heparin (PORCINE) Infusion 14 Units/kg/hr (01/19/23 1147)     PRN Meds: sodium chloride flush, 5-40 mL, PRN  sodium chloride, , PRN  ondansetron, 4 mg, Q8H PRN   Or  ondansetron, 4 mg, Q6H PRN  polyethylene glycol, 17 g, Daily PRN  acetaminophen, 650 mg, Q6H PRN   Or  acetaminophen, 650 mg, Q6H PRN  heparin (porcine), 80 Units/kg, PRN  heparin (porcine), 40 Units/kg, PRN  albuterol sulfate HFA, 2 puff, Q6H PRN  ALPRAZolam, 0.25 mg, BID PRN  HYDROcodone 5 mg - acetaminophen, 1 tablet, Q6H PRN  ipratropium-albuterol, 1 vial, Q4H PRN  budesonide-formoterol, 2 puff, BID PRN        Labs      Recent Results (from the past 24 hour(s))   CBC    Collection Time: 01/18/23  5:55 PM   Result Value Ref Range    WBC 9.3 4.0 - 10.5 K/CU MM    RBC 3.50 (L) 4.6 - 6.2 M/CU MM    Hemoglobin 10.8 (L) 13.5 - 18.0 GM/DL    Hematocrit 33.9 (L) 42 - 52 %    MCV 96.9 78 - 100 FL    MCH 30.9 27 - 31 PG    MCHC 31.9 (L) 32.0 - 36.0 %    RDW 15.3 (H) 11.7 - 14.9 %    Platelets 888 045 - 623 K/CU MM    MPV 9.9 7.5 - 11.1 FL   APTT    Collection Time: 01/18/23  5:55 PM   Result Value Ref Range    aPTT 45.6 (H) 25.1 - 37.1 SECONDS   APTT    Collection Time: 01/19/23 12:30 AM   Result Value Ref Range    aPTT 182.9 (H) 25.1 - 37.1 SECONDS   CBC with Auto Differential    Collection Time: 01/19/23  5:00 AM   Result Value Ref Range    WBC 10.6 (H) 4.0 - 10.5 K/CU MM    RBC 3.39 (L) 4.6 - 6.2 M/CU MM    Hemoglobin 10.4 (L) 13.5 - 18.0 GM/DL    Hematocrit 32.6 (L) 42 - 52 %    MCV 96.2 78 - 100 FL    MCH 30.7 27 - 31 PG    MCHC 31.9 (L) 32.0 - 36.0 %    RDW 15.1 (H) 11.7 - 14.9 %    Platelets 119 139 - 150 K/CU MM    MPV 9.4 7.5 - 11.1 FL    Differential Type AUTOMATED DIFFERENTIAL     Segs Relative 72.4 (H) 36 - 66 %    Lymphocytes % 17.9 (L) 24 - 44 %    Monocytes % 6.8 (H) 0 - 4 %    Eosinophils % 1.8 0 - 3 %    Basophils % 0.8 0 - 1 %    Segs Absolute 7.6 K/CU MM    Lymphocytes Absolute 1.9 K/CU MM    Monocytes Absolute 0.7 K/CU MM    Eosinophils Absolute 0.2 K/CU MM    Basophils Absolute 0.1 K/CU MM    Nucleated RBC % 0.0 %    Total Nucleated RBC 0.0 K/CU MM    Total Immature Neutrophil 0.03 K/CU MM    Immature Neutrophil % 0.3 0 - 0.43 %   Comprehensive Metabolic Panel    Collection Time: 01/19/23  5:00 AM   Result Value Ref Range    Sodium 140 135 - 145 MMOL/L    Potassium 4.1 3.5 - 5.1 MMOL/L    Chloride 105 99 - 110 mMol/L    CO2 22 21 - 32 MMOL/L    BUN 54 (H) 6 - 23 MG/DL    Creatinine 7.5 (H) 0.9 - 1.3 MG/DL    Est, Glom Filt Rate 7 (L) >60 mL/min/1.73m2    Glucose 98 70 - 99 MG/DL    Calcium 8.9 8.3 - 10.6 MG/DL    Albumin 3.9 3.4 - 5.0 GM/DL    Total Protein 5.9 (L) 6.4 - 8.2 GM/DL    Total Bilirubin 0.2 0.0 - 1.0 MG/DL    ALT 15 10 - 40 U/L    AST 15 15 - 37 IU/L    Alkaline Phosphatase 67 40 - 128 IU/L    Anion Gap 13 4 - 16   SPECIMEN REJECTION    Collection Time: 01/19/23  8:00 AM   Result Value Ref Range    Rejected Test PTT2     Reason for Rejection UNABLE TO PERFORM TESTING:    APTT    Collection Time: 01/19/23 12:11 PM   Result Value Ref Range    aPTT 127.3 (H) 25.1 - 37.1 SECONDS        Imaging/Diagnostics Last 24 Hours   No results found.     Comment: Please note this report has been produced using speech recognition software and may contain errors related to that system including errors in grammar, punctuation, and spelling, as well as words and phrases that may be inappropriate. If there are any questions or concerns please feel free to contact the dictating provider for clarification.      Electronically signed by Kiran Colvin MD on 1/19/2023 at 2:14 PM

## 2023-01-19 NOTE — PROGRESS NOTES
Pt discharged. Pt A&Ox4, ambulatory, and verbalized understanding of all discharge instructions. Pt discharged with all belongings and transported home by personal vehicle with spouse. Pt PIV removed prior to discharge. Pt tolerated removal well with no c/o pain or noteable complications.

## 2023-01-19 NOTE — DISCHARGE INSTR - DIET
Good nutrition is important when healing from an illness, injury, or surgery. Follow any nutrition recommendations given to you during your hospital stay. If you were given an oral nutrition supplement while in the hospital, continue to take this supplement at home. You can take it with meals, in-between meals, and/or before bedtime. These supplements can be purchased at most local grocery stores, pharmacies, and chain [employer]-stores.    If you have any questions about your diet or nutrition, call the hospital and ask for the dietitian  Resume regular diet

## 2023-01-19 NOTE — PROGRESS NOTES
Patient Name: Ori Lama  Patient : 1955  MRN: [id number]     Acct: [id number]  Date of Admission: 2023  Room/Bed: -A  Code Status:  Full Code  Allergies:   Allergies   Allergen Reactions    Penicillins Swelling    Lorazepam      Other reaction(s): Other - comment required  hallucination     Diagnosis:    Patient Active Problem List   Diagnosis    Choledocholithiasis    Chronic kidney disease (CKD), stage III (moderate) ([facility])    Abnormality of lung on CXR    S/P BKA (below knee amputation) unilateral ([facility])    Cholangitis    Acute calculous cholecystitis    Abdominal adhesions    Atrophic kidney    COPD (chronic obstructive pulmonary disease) ([facility])    HTN (hypertension)    PAD (peripheral artery disease) ([facility])    Proteinuria    Chronic kidney disease (CKD) stage G3b/A3, moderately decreased glomerular filtration rate (GFR) between 30-44 mL/min/1.73 square meter and albuminuria creatinine ratio greater than 300 mg/g ([facility])    Metabolic acidosis    ASCVD (arteriosclerotic cardiovascular disease)    Acute on chronic respiratory failure with hypoxia ([facility])    Acute GI bleeding    Melena    AV graft malfunction, initial encounter ([facility])    Hemodialysis AV fistula thrombosis, initial encounter ([facility])         Treatment:  Hemodilaysis 2:1  Priority: Routine  Location: Acute Room    Diabetic: Yes  NPO: No  Isolation Precautions: Dialysis     Consent for Treatment Verified: Yes  Blood Consent Verified: Not Applicable     Safety Verified: Identify (I), Consent (C), Equipment (E), HepB Status (B), Orders Complete (O), Access Verified (A), and Timeliness (T)  Time out performed prior to access at 1535 hours.    Report Received from Primary RN at 1450 hours.  Primary RN (First Initial, Last Name, Title): MELLY Reyez RN  Incapacitated Nurse Education Completed: Yes     HBsAg ONLY:  Date Drawn: 2023       Results: Negative  HBsAb:  Date Drawn:  2023       Results: Susceptible  <10    Order  Dialysis Bath  K+ (Potassium): 2  Ca+ (Calcium): 2.5  Na+ (Sodium): 138  HCO3 (Bicarb): 30  Bicarbonate Concentrate Lot No.: [id number]  Acid Concentrate Lot No.: [id number]     Na+ Modeling: Not Applicable  Dialyzer: [id number]  Dialysate Temperature (C):  35  Blood Flow Rate (BFR):  300   Dialysate Flow Rate (DFR):   700        Access to be Utilized   Access: AVG  Location: Upper Extremity  Side: Right   Needle gauge:  16  + Bruit/Thrill: Yes    First Use X-ray Verified: Not Applicable  OK to use line order: Not Applicable    Site Assessment:  Signs and Symptoms of Infection/Inflammation: None  If yes: Not Applicable  Dressing: Dry and Intact  Site Prep: Medical Aseptic Technique  Comment:    Flows: Good, Patent  If access problem, who was notified:     Pre and Post-Assessment  Patient Vitals for the past 8 hrs:   Level of Consciousness Oriented X Heart Rhythm Respiratory Quality/Effort O2 Device Bilateral Breath Sounds Skin Color Skin Condition/Temp Abdomen Inspection Bowel Sounds (All Quadrants) Edema RUE Edema   01/19/23 1453 0 -- -- -- -- -- -- -- -- -- -- --   01/19/23 1501 0 -- -- -- -- -- -- -- -- -- -- --   01/19/23 1506 0 -- -- -- -- -- -- -- -- -- -- --   01/19/23 1543 0 4 Regular Unlabored Nasal cannula Clear;Diminished -- Dry;Warm Rounded Active Right upper extremity +2   01/19/23 1630 0 4 Regular Unlabored Nasal cannula Clear;Diminished Dusky; Pale Dry; Warm Rounded Active Right upper extremity +2     Labs  Recent Labs     01/18/23  1755 01/19/23  0500   WBC 9.3 10.6*   HGB 10.8* 10.4*   HCT 33.9* 32.6*    184                                                                  Recent Labs     01/19/23  0500      K 4.1      CO2 22   BUN 54*   CREATININE 7.5*   GLUCOSE 98     IV Drips and Rate/Dose   sodium chloride      heparin (PORCINE) Infusion 14 Units/kg/hr (01/19/23 1147)      Safety - Before each treatment:   Dialysis Machine No.: [id number]   Machine Number: [id number]  Dialyzer Lot No.: [id number]   Machine Log Sheet Completed: Yes  Machine Alarm Self Test: Completed; Passed (01/19/23 1543)     Air Foam Detector: Tested, Proper Function, pH Reading  Extracorporeal Circuit Tested for Integrity: Yes  Machine Conductivity: 13.9  Manual Conductivity: 13.8     Bicarbonate Concentrate Lot No.: [id number]  Acid Concentrate Lot No.: [id number]  Manual Ph: 7.4  Bleach Test (Neg): Yes  Bath Temperature: 95 °F (35 °C)  Tubing Lot#: [id number]  Conductivity Meter Serial #: [id number]  All Connections Secure?: Yes  Venous Parameters Set?: Yes  Arterial Parameters Set?: Yes  Saline Line Double Clamped?: Yes  Air Foam Detector Engaged?: Yes  Machine Functioning Alarm Free?  Yes  Prime Given: 200ml    Chlorine Testing - Before each treatment and every 4 hours:   Treatment  Time On: 1543  Weight: 232 lb 12.9 oz (105.6 kg) (01/19/23 0756)  1st check: less than 0.1 ppm at: 1430 hours  2nd check: less than 0.1 ppm at: NA  3rd check: Not Applicable  (if greater than 0.1 ppm, then check every 30 minutes from secondary)    Access Flows and Pressures  Patient Vitals for the past 8 hrs:   Blood Flow Rate (ml/min) Ultrafiltration Rate (ml/hr) Arterial Pressure (mmHg) Venous Pressure (mmHg) TMP DFR Comments Access Visible   01/19/23 1543 300 ml/min 250 ml/hr -40 mmHg 240 30 600 tx initiated Yes   01/19/23 1600 300 ml/min 250 ml/hr -40 mmHg 240 30 600 lines secure Yes   01/19/23 1615 -- -- -- -- -- -- tx ended Yes     Vital Signs  Patient Vitals for the past 8 hrs:   BP Temp Pulse Resp SpO2   01/19/23 1200 -- -- 70 14 98 %   01/19/23 1300 (!) 158/72 -- 75 17 --   01/19/23 1400 (!) 147/75 -- 70 18 --   01/19/23 1453 123/88 -- 70 16 99 %   01/19/23 1501 (!) 158/83 -- 74 18 98 %   01/19/23 1506 (!) 154/91 -- 73 12 99 %   01/19/23 1543 (!) 143/79 97.6 °F (36.4 °C) 82 15 99 %   01/19/23 1600 (!) 165/76 -- 76 13 --   01/19/23 1630 135/78 97.6 °F (36.4 °C) 85 17 99 %     Post-Dialysis  Arterial Catheter Locking Solution: Not Applicable  Venous Catheter Locking Solution: Not Applicable  Post-Treatment Procedures: Blood returned, Access bleeding time < 10 minutes  Machine Disinfection Process: Acid/Vinegar Clean, Heat Disinfect, Exterior Machine Disinfection  Rinseback Volume (ml): 300 ml  Blood Volume Processed (Liters): 7.2 l/min  Dialyzer Clearance: Lightly streaked  Duration of Treatment (minutes): 30 minutes     Hemodialysis Intake (ml): 500 ml  Hemodialysis Output (ml): 110 ml     Tolerated Treatment: Good  Patient Response to Treatment: good  Physician Notified: Yes       Provider Notification  Provider Notification  Reason for Communication: Evaluate (01/19/23 1615)  Provider Name: Luciano Marte (01/19/23 [phone])  Provider Notification: Physician (01/19/23 1615)  Method of Communication: Call (01/19/23 1615)  Response: No new orders (01/19/23 1615)  Notification Time: [phone] (01/19/23 1615)     Handoff complete and report given to Primary RN at 1650 hours.   Primary RN (First Initial, Last Name, Title):  Gloria Barba RN     Education  Person Educated: Patient   Knowledge Base: Substantial  Barriers to Learning?: None  Preferred method of Learning: Oral  Topic(s): Access Care, Signs and Symptoms of Infection, and Procedural   Teaching Tools: Explanation   Response to Education: Verbalized Understanding     Electronically signed by Blanca Unger RN on 1/19/2023 at 4:53 PM

## 2023-01-19 NOTE — OR NURSING
PROCEDURE PERFORMED: fistulagram    PRIMARY INDICATION FOR PROCEDURE: clotted access    INFORMED CONSENT:  Obtained prior to procedure. Consent placed in chart. PT TRANSPORTED FROM:        2111                            TO THE IR ROOM:  large                ASSESSMENT:patient alert and oriented, understands procedure    BARRIER PRECAUTIONS & STERILE TECHNIQUE:               Pt transferred to the table and positioned for comfort. Warm blankets given. Pt placed on Cardiac Monitor. Pt prepped and draped in a sterile fashion with chlorhexadine.         PAIN/LOCAL ANESTHESIA/SEDATION MANAGEMENT:           Local: Lidocaine 1% given by Dr Negra Vargas @ none needed                    INTRAOPERATIVE:           ACCESS TIME: pre existing sheaths          Time 1500 Fistulagram          US/FLUORO:           WIRE USED: stiffglide 180          SHEATH USED: 6Fr and 5Fr preexisting from previous procedure            Catheter 4Fr Kumpe           Balloon           FINAL IMAGE TAKEN TO CONFIRM PLACEMENT OF:           CONTRAST/CC: isovue 370 20ml used            1500 Accessed existing 5Fr sheath fistulagram preformed        Kumpee cath OTW fistulagram preformed        1504 6Fr existing sheath removed and pressure held by Dr Negra Vargas until hemostasis achieved  1505 5 Fr existing 5 Fr sheath pulled and pressure held unttil hemostasis achieved    STERILE DRESSINGS: gell foam gauze and tegaderm    EBL:      Less than 1 ml      COMPLICATIONS:none    REPORT CALLED TO: Danilo Ness RN

## 2023-01-19 NOTE — PROGRESS NOTES
Pt back on unit following 30 minutes of  dialysis. Pt seen by Dr. Tammie Hood while at dialysis.  Okay to discharge per MD. Dr. Tito Ayala to put in discharge orders

## 2023-01-22 NOTE — DISCHARGE SUMMARY
Discharge Summary    Name:  Ricardo Guillen /Age/Sex: 1955  (79 y.o. male)   MRN & CSN:  [id number] & [id number] Admission Date/Time: 2023  4:19 PM   Attending:  No att. providers found Discharging Provider: JAYDEN Beltrán CNP     Hospital Course:   Ricardo Guillen is a 79 y.o.  male  who presents with <principal problem not specified>        Hospital Course:  Julee Javier is a 63-year-old male with past medical history of end-stage renal disease on hemodialysis, anemia, anxiety, history of left AKA, COPD, GERD who presented with thrombosis of hemodialysis AV fistula. Reportedly patient received an hour and a half of dialysis and then it was noted the fistula was clotted. Patient with fistulogram by IR on 0 . He received tPA and heparin. He did receive dialysis while he was inpatient. Patient was discharged home without any incident in stable condition. Right HD AV fistula thrombosis  On Monday prior to presentation patient had an hour and a half of dialysis when the fistula was noted to be clotted. Patient with fistulogram by IR on . He had his sheath remain after receiving tPA and heparin. Continue heparin drip  To return to IR today for assessment of fistula. ESRD on HD  Hemodialysis on Monday was a Friday  Patient to have HD today if fistula deemed okay     Hyperlipidemia  Continue Lipitor 40mg daily      BPH  Continue Flomax     History of anemia  In setting of chronic kidney disease  Monitor hemoglobin     GERD  Has history of mild gastritis seen on EGD's prior. Continue Protonix daily     Depression anxiety  Continue home Celexa  Patient on as needed Xanax.   Recommend patient be tapered off this medication if possible after discharge    Patient Active Problem List   Diagnosis    Choledocholithiasis    Chronic kidney disease (CKD), stage III (moderate) (HCC)    Abnormality of lung on CXR    S/P BKA (below knee amputation) unilateral (HCC)    Cholangitis    Acute calculous cholecystitis    Abdominal adhesions    Atrophic kidney    COPD (chronic obstructive pulmonary disease) ([facility])    HTN (hypertension)    PAD (peripheral artery disease) ([facility])    Proteinuria    Chronic kidney disease (CKD) stage G3b/A3, moderately decreased glomerular filtration rate (GFR) between 30-44 mL/min/1.73 square meter and albuminuria creatinine ratio greater than 300 mg/g ([facility])    Metabolic acidosis    ASCVD (arteriosclerotic cardiovascular disease)    Acute on chronic respiratory failure with hypoxia ([facility])    Acute GI bleeding    Melena    AV graft malfunction, initial encounter ([facility]    Hemodialysis AV fistula thrombosis, initial encounter [facility])           The patient expressed appropriate understanding of and agreement with the discharge recommendations, medications, and plan. Consults this admission:  None    Discharge Instruction:   Follow up appointments: Nephrology  Primary care physician:  within 2 weeks    Diet:  renal diet   Activity: activity as tolerated  Disposition: Discharged to:   [x]Home, [][facility], []SNF, []Acute Rehab, []    Condition on discharge: Stable    Discharge Medications:        Medication List        CONTINUE taking these medications      albuterol sulfate  (90 Base) MCG/ACT inhaler  Commonly known as: PROVENTIL;VENTOLIN;PROAIR     ALPRAZolam 0.25 MG tablet  Commonly known as: XANAX     atorvastatin 40 MG tablet  Commonly known as: LIPITOR     azelastine 0.1 % nasal spray  Commonly known as: ASTELIN     b complex-C-folic acid 1 MG capsule     citalopram 20 MG tablet  Commonly known as: CELEXA  Take 1 tablet by mouth daily. folic acid 1 MG tablet  Commonly known as: FOLVITE  Take 1 tablet by mouth daily.      HYDROcodone-acetaminophen 5-325 MG per tablet  Commonly known as: NORCO     ipratropium-albuterol 0.5-2.5 (3) MG/3ML Soln nebulizer solution  Commonly known as: DUONEB     pantoprazole 40 MG tablet  Commonly known as: PROTONIX  Take 1 tablet by mouth every morning (before breakfast)     tamsulosin 0.4 MG capsule  Commonly known as: FLOMAX     TORSEMIDE PO     TRELEGY ELLIPTA IN     Vitamin D3 50 MCG (2000 UT) Caps              Objective Findings at Discharge:   Pt was personally examined by me on the day of discharge with the following findings:   Vitals:    01/19/23 1506 01/19/23 1543 01/19/23 1600 01/19/23 1630   BP: (!) 154/91 (!) 143/79 (!) 165/76 135/78   Pulse: 73 82 76 85   Resp: 12 15 13 17   Temp:  97.6 °F (36.4 °C)  97.6 °F (36.4 °C)   TempSrc:       SpO2: 99% 99%  99%   Weight:       Height:         Physical Exam  Constitutional:       General: He is not in acute distress. Appearance: Normal appearance. HENT:      Head: Normocephalic and atraumatic. Nose: Nose normal.      Mouth/Throat:      Mouth: Mucous membranes are moist.      Pharynx: Oropharynx is clear. Eyes:      Extraocular Movements: Extraocular movements intact. Conjunctiva/sclera: Conjunctivae normal.      Pupils: Pupils are equal, round, and reactive to light. Cardiovascular:      Rate and Rhythm: Normal rate and regular rhythm. Pulses: Normal pulses. Heart sounds: Normal heart sounds. Pulmonary:      Effort: Pulmonary effort is normal.   Abdominal:      General: Abdomen is flat. Bowel sounds are normal.      Palpations: Abdomen is soft. Musculoskeletal:         General: Normal range of motion. Cervical back: Normal range of motion and neck supple. Skin:     General: Skin is warm and dry. Neurological:      General: No focal deficit present. Mental Status: He is alert and oriented to person, place, and time. Mental status is at baseline. Psychiatric:         Mood and Affect: Mood normal.         Behavior: Behavior normal.         Thought Content: Thought content normal.       Procedures: Fistulogram on 01/18. Significant Diagnostic Studies at discharge:   No results for input(s): WBC, HGB, HCT, PLT in the last 72 hours.    No results for input(s): NA, K, CL, CO2, PHOS, BUN, CREATININE, CA in the last 72 hours. Patient Instructions:     Medication List        CONTINUE taking these medications      albuterol sulfate  (90 Base) MCG/ACT inhaler  Commonly known as: PROVENTIL;VENTOLIN;PROAIR     ALPRAZolam 0.25 MG tablet  Commonly known as: XANAX     atorvastatin 40 MG tablet  Commonly known as: LIPITOR     azelastine 0.1 % nasal spray  Commonly known as: ASTELIN     b complex-C-folic acid 1 MG capsule     citalopram 20 MG tablet  Commonly known as: CELEXA  Take 1 tablet by mouth daily. folic acid 1 MG tablet  Commonly known as: FOLVITE  Take 1 tablet by mouth daily. HYDROcodone-acetaminophen 5-325 MG per tablet  Commonly known as: NORCO     ipratropium-albuterol 0.5-2.5 (3) MG/3ML Soln nebulizer solution  Commonly known as: DUONEB     pantoprazole 40 MG tablet  Commonly known as: PROTONIX  Take 1 tablet by mouth every morning (before breakfast)     tamsulosin 0.4 MG capsule  Commonly known as: FLOMAX     TORSEMIDE PO     TRELEGY ELLIPTA IN     Vitamin D3 50 MCG (2000 UT) Caps               Code Status: Prior     Consults:   None    Diet: Renal  diet    Activity: Activity as tolerated    Discharged Condition: Fair    Prognosis: Fair    Disposition: Home    Follow-up with   1. PCP within   5-7 Days  2. Follow-up with nephrology for dialysis. Follow up labs:        Discharging provider Signed: The patient was seen and examined on day of discharge and this discharge summary is in conjunction with any daily progress note from day of discharge.   Time spent on discharge in the examination, evaluation, counseling and review of medications and discharge plan: 30 minutes

## 2023-01-23 NOTE — PROGRESS NOTES
IR Procedure at [facility]:  Spoke with patient's wife Fatoumata and patient will arrive at 0730 at [facility] on 1/24/2023 for his procedure at 0930. Also went over below instructions. NPO at [address] [place] [address] Avenue      2. Follow your directions as prescribed by the doctor for your procedure and medications. 3.   Consult your provider as to when to stop blood thinner  4. Do not take any pain medication within 6 hours of your procedure  5. Do not drink any alcoholic beverages or use any street drugs 24 hours before procedure. 6.   Please wear simple, loose fitting clothing to the hospital.  Do not bring valuables (money,             credit cards, checkbooks, etc.)     7. If you  have a Living Will and Durable Power of  for Healthcare, please bring in a copy. 8.   Please bring picture ID,  insurance card, paperwork from the doctors office            (H & P, Consent,  & card for implantable devices). 9.   Report to the information desk on the ground floor. 10. Take a shower the night before or morning of your procedure, do not apply any lotion, oil or powder. 11. If you are going to be sedated for the procedure, you will need a responsible adult to drive you home.

## 2023-01-24 ENCOUNTER — HOSPITAL ENCOUNTER (OUTPATIENT)
Dept: INTERVENTIONAL RADIOLOGY/VASCULAR | Age: 68
Discharge: HOME | End: 2023-01-24
Payer: MEDICARE

## 2023-01-24 VITALS
HEIGHT: 68 IN | RESPIRATION RATE: 20 BRPM | SYSTOLIC BLOOD PRESSURE: 166 MMHG | DIASTOLIC BLOOD PRESSURE: 82 MMHG | TEMPERATURE: 97 F | WEIGHT: 240.3 LBS | BODY MASS INDEX: 36.42 KG/M2 | OXYGEN SATURATION: 94 % | HEART RATE: 80 BPM

## 2023-01-24 DIAGNOSIS — N18.6 END STAGE RENAL DISEASE (HCC): ICD-10-CM

## 2023-01-24 LAB — APTT: 40.6 SECONDS (ref 25.1–37.1)

## 2023-01-24 PROCEDURE — 6360000002 HC RX W HCPCS

## 2023-01-24 PROCEDURE — 85730 THROMBOPLASTIN TIME PARTIAL: CPT

## 2023-01-24 PROCEDURE — 2500000003 HC RX 250 WO HCPCS

## 2023-01-24 PROCEDURE — 7100000011 HC PHASE II RECOVERY - ADDTL 15 MIN

## 2023-01-24 PROCEDURE — 36558 INSERT TUNNELED CV CATH: CPT

## 2023-01-24 PROCEDURE — 76937 US GUIDE VASCULAR ACCESS: CPT

## 2023-01-24 PROCEDURE — C1750 CATH, HEMODIALYSIS,LONG-TERM: HCPCS

## 2023-01-24 PROCEDURE — 77001 FLUOROGUIDE FOR VEIN DEVICE: CPT

## 2023-01-24 PROCEDURE — 2580000003 HC RX 258: Performed by: RADIOLOGY

## 2023-01-24 PROCEDURE — 7100000010 HC PHASE II RECOVERY - FIRST 15 MIN

## 2023-01-24 RX ORDER — SODIUM CHLORIDE 0.9 % (FLUSH) 0.9 %
10 SYRINGE (ML) INJECTION PRN
Status: DISCONTINUED | OUTPATIENT
Start: 2023-01-24 | End: 2023-01-25 | Stop reason: HOSPADM

## 2023-01-24 RX ORDER — SUCRALFATE 1 G/1
1 TABLET ORAL 4 TIMES DAILY
COMMUNITY

## 2023-01-24 RX ADMIN — SODIUM CHLORIDE, PRESERVATIVE FREE 10 ML: 5 INJECTION INTRAVENOUS at 07:58

## 2023-01-24 ASSESSMENT — PAIN SCALES - GENERAL: PAINLEVEL_OUTOF10: 0

## 2023-01-24 ASSESSMENT — PAIN - FUNCTIONAL ASSESSMENT: PAIN_FUNCTIONAL_ASSESSMENT: NONE - DENIES PAIN

## 2023-01-24 NOTE — PROGRESS NOTES
8252  Patient arrived back to [facility]. Report given to this nurse from [facility]. Patient A&O  No complaint of pain. Beverage of choice offered to patient. Call light in reach and bed in lowest position. 6053 IV removed. 2082 Discharge instructions given to Fatoumata, and patient. 9798 Patient sitting on side of bed getting dressed assisted by Fatoumata. 4400 Patient escorted to car via wheelchair transported home by Fatoumata.

## 2023-01-24 NOTE — OR NURSING
PROCEDURE PERFORMED: Tunneled Hemodialysis Catheter REJ placement by Dr. Emerita Hawkinset:  Obtained prior to procedure. Consent placed in chart. BARRIER PRECAUTIONS & STERILE TECHNIQUE:               Pt transferred to the table and positioned for comfort. Warm blankets given. Pt placed on Cardiac/Vital Monitor. Pt prepped and draped in a sterile fashion with chlorhexadine.     PAIN/LOCAL ANESTHESIA/SEDATION MANAGEMENT:           Local: Lidocaine 1% given by             INTRAOPERATIVE:           ACCESS PCIE:[id number] with Micropuncture          US/FLUORO: Both  5 US Images           WIRE USED: 80 stiff glide            CATHETER USED: 14.5fx 19 cm long term Hemodialysis catheter     STERILE DRESSINGS: Suture Biopatch and tegaderm, Bandaid     SDS called [phone]

## 2023-05-03 NOTE — PROGRESS NOTES
IR Procedure at [facility]:  5/5/23 @ 1130, arrival 1000    NPO at [address]     (Paracentesis, Thoracentesis, Thyroid Bx and Injections may have a light breakfast)  2. Follow your directions as prescribed by the doctor for your procedure and medications. 3.   Consult your provider as to when to stop blood thinner  4. Do not take any pain medication within 6 hours of your procedure  5. Do not drink any alcoholic beverages or use any street drugs 24 hours before procedure. 6.   Please wear simple, loose fitting clothing to the hospital.  Do not bring valuables (money,             credit cards, checkbooks, etc.)     7. If you  have a Living Will and Durable Power of  for Healthcare, please bring in a copy. 8.   Please bring picture ID,  insurance card, paperwork from the doctors office            (H & P, Consent,  & card for implantable devices). 9.   Report to the information desk on the ground floor. 10. Take a shower the night before or morning of your procedure, do not apply any lotion, oil or powder. 11. If you are going to be sedated for the procedure, you will need a responsible adult to drive you home.

## 2023-05-05 ENCOUNTER — HOSPITAL ENCOUNTER (OUTPATIENT)
Dept: INTERVENTIONAL RADIOLOGY/VASCULAR | Age: 68
Discharge: HOME | End: 2023-05-05
Payer: MEDICARE

## 2023-05-05 VITALS
DIASTOLIC BLOOD PRESSURE: 96 MMHG | BODY MASS INDEX: 36.42 KG/M2 | HEIGHT: 68 IN | SYSTOLIC BLOOD PRESSURE: 179 MMHG | OXYGEN SATURATION: 98 % | HEART RATE: 79 BPM | TEMPERATURE: 97.2 F | WEIGHT: 240.3 LBS | RESPIRATION RATE: 16 BRPM

## 2023-05-05 DIAGNOSIS — T82.49XA FAILURE OF HEMODIALYSIS ACCESS, INITIAL ENCOUNTER (HCC): ICD-10-CM

## 2023-05-05 LAB
APTT: 46.8 SECONDS (ref 25.1–37.1)
HCT VFR BLD CALC: 32.2 % (ref 42–52)
HEMOGLOBIN: 9.5 GM/DL (ref 13.5–18)
INR BLD: 0.83 INDEX
MCH RBC QN AUTO: 30.6 PG (ref 27–31)
MCHC RBC AUTO-ENTMCNC: 29.5 % (ref 32–36)
MCV RBC AUTO: 103.9 FL (ref 78–100)
PDW BLD-RTO: 17.1 % (ref 11.7–14.9)
PLATELET # BLD: 196 K/CU MM (ref 140–440)
PMV BLD AUTO: 9.1 FL (ref 7.5–11.1)
PROTHROMBIN TIME: 10.5 SECONDS (ref 11.7–14.5)
RBC # BLD: 3.1 M/CU MM (ref 4.6–6.2)
WBC # BLD: 12.1 K/CU MM (ref 4–10.5)

## 2023-05-05 PROCEDURE — 36005 INJECTION EXT VENOGRAPHY: CPT

## 2023-05-05 PROCEDURE — 85027 COMPLETE CBC AUTOMATED: CPT

## 2023-05-05 PROCEDURE — 85610 PROTHROMBIN TIME: CPT

## 2023-05-05 PROCEDURE — 75820 VEIN X-RAY ARM/LEG: CPT

## 2023-05-05 PROCEDURE — 85730 THROMBOPLASTIN TIME PARTIAL: CPT

## 2023-05-05 PROCEDURE — 6360000002 HC RX W HCPCS

## 2023-05-05 PROCEDURE — 6360000004 HC RX CONTRAST MEDICATION

## 2023-05-05 PROCEDURE — 2709999900 IR VENOGRAM UPPER EXTREMITY RIGHT

## 2023-05-05 RX ORDER — SODIUM CHLORIDE 0.9 % (FLUSH) 0.9 %
10 SYRINGE (ML) INJECTION PRN
Status: DISCONTINUED | OUTPATIENT
Start: 2023-05-05 | End: 2023-05-06 | Stop reason: HOSPADM

## 2023-05-05 ASSESSMENT — PAIN - FUNCTIONAL ASSESSMENT: PAIN_FUNCTIONAL_ASSESSMENT: 0-10

## 2023-05-05 NOTE — PROGRESS NOTES
Pt returned to room from  IR   Report received from [facility], call light placed within reach, side rails up x's 2, 2 bandaids dry and intact to rt chest  1330 Discharge instructions given, voiced understanding  [phone] Pt escorted to main entrance via wheelchair for discharge.

## 2023-05-05 NOTE — OR NURSING
PROCEDURE PERFORMED: Unable to place Tunneled Hemodialysis Catheter     INFORMED CONSENT:  Obtained prior to procedure. Consent placed in chart. BARRIER PRECAUTIONS & STERILE TECHNIQUE:               Pt transferred to the table and positioned for comfort. Warm blankets given. Pt placed on Cardiac/Vital Monitor. Pt prepped and draped in a sterile fashion with chlorhexadine. PAIN/LOCAL ANESTHESIA/SEDATION MANAGEMENT:           Local: Lidocaine 1% given by  at 1244           INTRAOPERATIVE:           ACCESS TIME: 1247 with Micropuncture           US/FLUORO: Both 2 US Images          WIRE USED: 180 stiff glide          Contrast Used: ISOVUE 370- 5cc     Unable to advance wire due to occulsion, access removed and presure held to right Neck. Bandaid placed. 1259-Dr.Harron called  to discuss plan of care.      Left arm Fistula marked by Dr. Garrick Cruz to aid in access at Dialysis       REPORT CALLED TO: Balbir Fitzpatrick RN SDS

## 2023-07-25 NOTE — PROGRESS NOTES
IR Procedure at [facility]: Spoke with patient and he will arrive at 1100 at [facility] on 7/28/2023 for his procedure at  1230. Also went over below instructions. He will check with his Dr about stopping his aspirin    NPO at Midnight     (Paracentesis, Thoracentesis, Thyroid Bx and Injections may have a light breakfast)  2. Follow your directions as prescribed by the doctor for your procedure and medications. 3.   Consult your provider as to when to stop blood thinner  4. Do not take any pain medication within 6 hours of your procedure  5. Do not drink any alcoholic beverages or use any street drugs 24 hours before procedure. 6.   Please wear simple, loose fitting clothing to the hospital.  Do not bring valuables (money,             credit cards, checkbooks, etc.)     7. If you  have a Living Will and Durable Power of  for Healthcare, please bring in a copy. 8.   Please bring picture ID,  insurance card, paperwork from the doctors office            (H & P, Consent,  & card for implantable devices). 9.   Report to the information desk on the ground floor. 10. Take a shower the night before or morning of your procedure, do not apply any lotion, oil or powder. 11. If you are going to be sedated for the procedure, you will need a responsible adult to drive you home.

## 2023-07-28 ENCOUNTER — HOSPITAL ENCOUNTER (OUTPATIENT)
Dept: INTERVENTIONAL RADIOLOGY/VASCULAR | Age: 68
Discharge: HOME | End: 2023-07-28

## 2023-08-02 NOTE — PROGRESS NOTES
.IR Procedure at [facility]:  8/9/23 at 1130, arrival 1000    NPO at [address]     (Paracentesis, Thoracentesis, Thyroid Bx and Injections may have a light breakfast)  2. Follow your directions as prescribed by the doctor for your procedure and medications. Take Metoprolol with a sip of water. 3.   Consult your provider as to when to stop blood thinner  4. Do not take any pain medication within 6 hours of your procedure  5. Do not drink any alcoholic beverages or use any street drugs 24 hours before procedure. 6.   Please wear simple, loose fitting clothing to the hospital.  Do not bring valuables (money,             credit cards, checkbooks, etc.)     7. If you  have a Living Will and Durable Power of  for Healthcare, please bring in a copy. 8.   Please bring picture ID,  insurance card, paperwork from the doctors office            (H & P, Consent,  & card for implantable devices). 9.   Report to the information desk on the ground floor. 10. Take a shower the night before or morning of your procedure, do not apply any lotion, oil or powder. 11. If you are going to be sedated for the procedure, you will need a responsible adult to drive you home.

## 2023-08-09 ENCOUNTER — HOSPITAL ENCOUNTER (OUTPATIENT)
Dept: INTERVENTIONAL RADIOLOGY/VASCULAR | Age: 68
Discharge: HOME | End: 2023-08-09
Payer: MEDICARE

## 2023-08-09 VITALS
SYSTOLIC BLOOD PRESSURE: 147 MMHG | HEART RATE: 80 BPM | HEIGHT: 68 IN | RESPIRATION RATE: 14 BRPM | BODY MASS INDEX: 36.37 KG/M2 | DIASTOLIC BLOOD PRESSURE: 78 MMHG | WEIGHT: 240 LBS | TEMPERATURE: 96.7 F | OXYGEN SATURATION: 94 %

## 2023-08-09 DIAGNOSIS — N18.6 ESRD (END STAGE RENAL DISEASE) (HCC): ICD-10-CM

## 2023-08-09 PROCEDURE — 2500000003 HC RX 250 WO HCPCS: Performed by: RADIOLOGY

## 2023-08-09 PROCEDURE — 36589 REMOVAL TUNNELED CV CATH: CPT

## 2023-08-09 PROCEDURE — 2709999900 IR REMOVE TUNNELED CVAD WO SQ PORT/PUMP

## 2023-08-09 RX ORDER — LIDOCAINE HYDROCHLORIDE 10 MG/ML
INJECTION, SOLUTION EPIDURAL; INFILTRATION; INTRACAUDAL; PERINEURAL PRN
Status: COMPLETED | OUTPATIENT
Start: 2023-08-09 | End: 2023-08-09

## 2023-08-09 RX ADMIN — LIDOCAINE HYDROCHLORIDE 5 ML: 10 INJECTION, SOLUTION EPIDURAL; INFILTRATION; INTRACAUDAL; PERINEURAL at 11:54

## 2023-08-09 NOTE — BRIEF OP NOTE
Brief Postoperative Note      Patient: Mohan Reyes  YOB: 1955  MRN: [id number]    Date of Procedure: 8/9/2023    * No Diagnosis Codes entered *tunneled dialysis access catheter removal    Post-Op Diagnosis: Same       * No procedures listed *tunneled dialysis access catheter removal    * No surgeons listed *benjie carlson do    Assistant:  * No surgical staff found *    Anesthesia: * No anesthesia type entered *local    Estimated Blood Loss (mL): Minimal    Complications: None    Specimens:   * No specimens in log *    Implants:  * No implants in log *      Drains: * No LDAs found *    Findings: previously placed tdc removed in total and in tact      Electronically signed by Mohsen Vargas DO on 8/9/2023 at 12:12 PM

## 2023-08-09 NOTE — PROGRESS NOTES
Discharge instructions reviewed. Questions answered. Chest site WNL. Patient discharged to home. Telephone Encounter by Kamari Rangel RMA at 05/30/17 01:45 PM     Author:  Kamari Rangel RMA Service:  (none) Author Type:  Certified Medical Assistant     Filed:  05/30/17 01:46 PM Encounter Date:  5/29/2017 Status:  Signed     :  Kamari Rangel RMA (Certified Medical Assistant)            Last Visit[AK1.1T]  3/28/17[AK1.1M]    Last Refill[AK1.1T]  Meclizine HCl 25 MG TABS 90 Tab 0 5/1/2015  --   Sig - Route: Take 1 Tab by mouth every 4 (four) hours as needed (dizziness). - Oral        Order: [id number][AK1.1C]                Revision History        User Key Date/Time User Provider Type Action    > AK1.1 05/30/17 01:46 PM Kamari Rangel RMA Certified Medical Assistant Sign    C - Copied, M - Manual, T - Template

## 2023-08-09 NOTE — PROGRESS NOTES
TRANSFER - OUT REPORT:    Verbal report given to [facility] MAGALI LARA nurse on Dean Martinez being transferred to [facility](unit) for routine post-op       Report consisted of patient's Situation, Background, Assessment and   Recommendations(SBAR). Information from the following report(s) Nurse Handoff Report was reviewed with the receiving nurse. Opportunity for questions and clarification was provided.       Patient transported with:   Registered Nurse

## 2023-09-11 ENCOUNTER — HOSPITAL ENCOUNTER (OUTPATIENT)
Dept: PET IMAGING | Age: 68
Discharge: HOME | End: 2023-09-11

## 2023-09-11 DIAGNOSIS — R93.89 ABNORMAL CXR (CHEST X-RAY): ICD-10-CM

## 2023-09-11 DIAGNOSIS — R91.1 NODULE OF MIDDLE LOBE OF RIGHT LUNG: ICD-10-CM

## 2024-12-26 NOTE — CONSULTS
Dr. Alicea notified of patient elevated bp. AM meds administered. Awaiting repeat bp.    Nephrology Service Consultation    Patient:  Soto Gamboa  MRN: [id number]  Consulting physician:  Devan Alexandra MD  Reason for Consult: End-stage renal disease    History Obtained From:   patient and wife  PCP: Nicky Pinto DO    HISTORY OF PRESENT ILLNESS:   The patient is a 79 y.o. male who presents with  weakness fatigue tired having black stool missed dialysis on Monday. Is on dialysis twice weekly with my partner on Mondays and Fridays. History of hypertension COPD home O2 had prior GI bleed as well now presents with dark stool hypotension weakness concern for active GI bleed needed to be intubated moved to ICU for airway protection and the need of endoscopy. EGD last night showed evidence of mild gastritis possible lipoma as well no other acute pathology CTA was done which showed no evidence of active bleeding anywhere. BUN elevated to 90 because of bleeding and initial acidosis improved on follow-up blood gas hemoglobin after transfusion is at 9.7        Impression   1. No GI bleed identified. 2. 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 There is minimal reconstitution of flow of the bilateral   external iliac arteries and minimal to no flow identified in the fem-fem   bypass, also chronic. 3. Bibasilar consolidation concerning for atelectasis with superimposed   infection. Emphysema is also present. 4. Severe diverticulosis without evidence of acute diverticulitis. 5. Atrophic kidneys, right worse than left.          Past Medical History:        Diagnosis Date    Acid reflux     ARF (acute renal failure) ([address]     with admission 12/18/2015- consult done with Dr Scott Gil    Atrophy of left kidney     Back pain     \"Lower Back\"    Chronic bronchitis ([address]     Chronic kidney disease     COPD (chronic obstructive pulmonary disease) ([address]     NO PULMONOLOGIST AT THIS TIME    Emphysema/COPD ([address]     NO PULMONOLOGIST AT THIS TIME    H. pylori infection Dx 1990's    Hemod[facility] patient ([facility] [address]     Hiatal hernia     History of blood transfusion 1987    NO REACTION TO BLOOD TRANSFUSION RECEIVED    History of respiratory failure     following surgery 12/18/2015- pt developed resp failure- placed on ventilator-unable to wean of vent- had trachesotomy tube placed 1/3/2015- off vent 1/7/2015( discharged 1/13/2015)\"per wife went to [place] after discharg and had to be sent to [facility] after had bleeding around the trach- they said the trach was to big- put back on ventilator for 24 hrs- then there for about a week then sent to ARU 2/15    [place] (hard of hearing)     Bilateral Ears    Hx of blood clots     HX OTHER MEDICAL     Primary Care Physician Is Dr. Heath Gutierrez Right Renal Artery    HX OTHER MEDICAL     \"Dr. Chris Sun One Of My Kidneys Is Dead\"    Sarah Candelaria     \"See Dr. Анна Park For Blood Being Too Thick\"(per old chart pt dx with polycythemia in 1990's and has had several phlebotomies in the past(pc)( per wife Dr Alfredo Ramírez now says he does not really have polycythemia just from the COPD\"    Sarah Candelaria 6*/25/2015    \"has drainage , clear drainage, since he was in ARU in Feb 2015, under left shoulder, arm pit area\"    Hyperlipidemia     Hypertension     Lung nodule \"MRI, PET SCAN\"    \"Right Lung\"    Pneumonia \"Last Episode Early 2000's\"    \"At Least Twice\"( with admission 12/18/2015)    Shortness of breath on exertion     Teeth missing     Upper And Lower    Wears glasses        Past Surgical History:        Procedure Laterality Date    CHOLECYSTECTOMY, LAPAROSCOPIC  02/27/2017    cholangiogram     COLONOSCOPY  2011    \"Couple Polyps Removed\"    ENDOSCOPY, COLON, DIAGNOSTIC  \"Once\"    GASTROSTOMY TUBE PLACEMENT Left 1/3/15    to gravity drain( removed g tube in Feb or jan per wife)    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    Farming Accident    OTHER SURGICAL HISTORY  Mid 1980's    \"Emergency Surgery For Ruptured Ulcer\"    TONSILLECTOMY  1962    TRACHEOSTOMY N/A 1/3/15    per old chart pt had trachesotomy tube, bronch , egd and g- tube placement done 1/13/2015    UPPER GASTROINTESTINAL ENDOSCOPY N/A 9/26/2022    EGD DIAGNOSTIC ONLY performed by Felipe Ayala MD at [facility]      per old chart pt had left axillary to bifemoral bypass graft done 12/18/2014(pc)    WISDOM TOOTH EXTRACTION      All Sharon Teeth Extracted In Past       Medications:   Scheduled Meds:   pantoprazole  40 mg Oral BID AC    cloNIDine  0.1 mg Oral BID    sodium chloride flush  5-40 mL IntraVENous 2 times per day     Continuous Infusions:   sodium chloride      sodium chloride       PRN Meds:.hydrALAZINE, sodium chloride, sodium chloride flush, sodium chloride, ondansetron **OR** ondansetron, acetaminophen **OR** [DISCONTINUED] acetaminophen    Allergies:  Penicillins and Lorazepam    Social History:   TOBACCO:   reports that he quit smoking about 7 years ago. His smoking use included cigarettes. He started smoking about 50 years ago. He has a 86.00 pack-year smoking history. He has never used smokeless tobacco.  ETOH:   reports current alcohol use. OCCUPATION:      Family History:       Problem Relation Age of Onset    Cancer Mother         Breast Cancer    Arthritis Mother     High Blood Pressure Mother     High Cholesterol Mother     Vision Loss Mother         Wears Glasses    Cancer Father         Prostate And Bone Cancer    Heart Disease Father         \"Triple Bypass Surgery\"    Migraines Sister     Other Sister         \"Bad Back\"       REVIEW OF SYSTEMS:  Negative except for  weak tired fatigue.     Physical Exam:    I/O: 09/26 0701 - 09/27 0700  In: 612.4 [I.V.:208.1]  Out: 300     Vitals: /64   Pulse 83   Temp 98.1 °F (36.7 °C) (Oral)   Resp 15   Ht 5' 8\" (1.727 m)   Wt 227 lb 4.7 oz (103.1 kg)   SpO2 97%   BMI 34.56 kg/m²   General appearance: awake weak  HEENT: Head: Normal, normocephalic, atraumatic. Neck: supple, symmetrical, trachea midline  Lungs: diminished breath sounds bilaterally  Heart: S1, S2 normal  Abdomen: abnormal findings:  soft NT  Extremities: edema trace positive AV fistula  Neurologic: Mental status: alertness: alert recent extubation      CBC:   Recent Labs     09/26/22 2153 09/27/22 0052 09/27/22 0335 09/27/22  0458 09/27/22  1020   WBC 21.4*  --   --  17.7* 14.2*   HGB 10.8*   < > 9.4* 9.1* 9.7*     --   --  193 210    < > = values in this interval not displayed. BMP:    Recent Labs     09/26/22 2153 09/27/22 0052 09/27/22 0335 09/27/22 0458    138 139 138   K 3.5 3.8 4.2 3.8     --  104 104   CO2 19* 20* 18* 18*   BUN 92*  --  89* 90*   CREATININE 6.7* 6.8* 8.1* 8.2*   GLUCOSE 128*  --  88 86     Hepatic:   Recent Labs     09/26/22 1900 09/26/22 2153 09/27/22 0335   AST 16 19 15   ALT 16 18 14   BILITOT 0.2 0.4 0.3   ALKPHOS 48 52 49     Troponin: No results for input(s): TROPONINI in the last 72 hours. BNP: No results for input(s): BNP in the last 72 hours. Lipids: No results for input(s): CHOL, HDL in the last 72 hours.     Invalid input(s): LDLCALCU  ABGs:   Lab Results   Component Value Date/Time    PO2ART 58.6 09/27/2022 12:52 AM    QSD5HWH 48.7 09/27/2022 12:52 AM     INR:   Recent Labs     09/26/22 1900 09/26/22 2153   INR 0.85 0.80     Renal Labs  Albumin:    Lab Results   Component Value Date/Time    LABALBU 3.5 09/27/2022 03:35 AM     Calcium:    Lab Results   Component Value Date/Time    CALCIUM 8.0 09/27/2022 04:58 AM     Phosphorus:    Lab Results   Component Value Date/Time    PHOS 5.6 09/27/2022 04:58 AM     U/A:    Lab Results   Component Value Date/Time    NITRU NEGATIVE 05/17/2021 09:00 AM    NITRU Negative 12/06/2019 01:33 PM    COLORU YELLOW 05/17/2021 09:00 AM    PHUR 6.0 12/06/2019 01:33 PM    WBCUA 13 05/17/2021 09:00 AM    RBCUA 14 05/17/2021 09:00 AM    MUCUS RARE 05/11/2021 01:10 PM    TRICHOMONAS NONE SEEN 05/17/2021 09:00 AM    YEAST FEW 01/09/2015 02:00 PM    BACTERIA RARE 05/17/2021 09:00 AM    CLARITYU CLEAR 05/17/2021 09:00 AM    SPECGRAV 1.013 05/17/2021 09:00 AM    UROBILINOGEN NEGATIVE 05/17/2021 09:00 AM    BILIRUBINUR NEGATIVE 05/17/2021 09:00 AM    BLOODU MODERATE 05/17/2021 09:00 AM    GLUCOSEU Negative 12/06/2019 01:33 PM    KETUA NEGATIVE 05/17/2021 09:00 AM     ABG:    Lab Results   Component Value Date/Time    UQE5VYQ 48.7 09/27/2022 12:52 AM    PO2ART 58.6 09/27/2022 12:52 AM    KEW0HTL 18.7 09/27/2022 12:52 AM     HgBA1c:    Lab Results   Component Value Date/Time    LABA1C 5.4 12/19/2014 03:40 AM     Microalbumen/Creatinine ratio:  No components found for: RUCREAT  TSH:  No results found for: TSH  IRON:    Lab Results   Component Value Date/Time    IRON 66 08/25/2014 10:54 AM     Iron Saturation:  No components found for: PERCENTFE  TIBC:    Lab Results   Component Value Date/Time    TIBC 293 08/25/2014 10:54 AM     FERRITIN:    Lab Results   Component Value Date/Time    FERRITIN 292 08/25/2014 10:54 AM     RPR:  No results found for: RPR  SRINI:    Lab Results   Component Value Date/Time    SRINI Negative 01/02/2018 10:34 AM     24 Hour Urine for Creatinine Clearance:  No components found for: CREAT4, UHRS10, UTV10  -----------------------------------------------------------------      Assessment and Recommendations     Patient Active Problem List   Diagnosis Code    Choledocholithiasis K80.50    Chronic kidney disease (CKD), stage III (moderate) (HCC) N18.30    Abnormality of lung on CXR R91.8    S/P BKA (below knee amputation) unilateral (HCC) Z89.519    Cholangitis K83.09    Acute calculous cholecystitis K80.00    Abdominal adhesions K66.0    Atrophic kidney N26.1    COPD (chronic obstructive pulmonary disease) ([facility]) J44.9    HTN (hypertension) I10    PAD (peripheral artery disease) ([facility]) I73.9    Proteinuria R80.9    Chronic kidney disease (CKD) stage G3b/A3, moderately decreased glomerular

## (undated) DEVICE — Z DISCONTINUED (USE MFG CAT MVABO)  TUBING GAS SAMPLING STD 6.5 FT FEMALE CONN SMRT CAPNOLINE

## (undated) DEVICE — ENDOSCOPY KIT: Brand: MEDLINE INDUSTRIES, INC.

## (undated) DEVICE — FORCEPS BX L240CM JAW DIA2.8MM L CAP W/ NDL MIC MESH TOOTH

## (undated) DEVICE — SNARE ENDOSCP L240CM SHTH DIA24MM LOOP W10MM POLYP RND REINF

## (undated) DEVICE — Z DISCONTINUED NO SUB IDED TUBING ETCO2 AD L6.5FT NSL ORAL CVD PRNG NONFLARED TIP OVR